# Patient Record
Sex: FEMALE | Race: BLACK OR AFRICAN AMERICAN | Employment: OTHER | URBAN - METROPOLITAN AREA
[De-identification: names, ages, dates, MRNs, and addresses within clinical notes are randomized per-mention and may not be internally consistent; named-entity substitution may affect disease eponyms.]

---

## 2018-03-05 ENCOUNTER — OP HISTORICAL/CONVERTED ENCOUNTER (OUTPATIENT)
Dept: OTHER | Age: 63
End: 2018-03-05

## 2018-07-25 ENCOUNTER — IP HISTORICAL/CONVERTED ENCOUNTER (OUTPATIENT)
Dept: OTHER | Age: 63
End: 2018-07-25

## 2020-09-28 ENCOUNTER — HOSPITAL ENCOUNTER (OUTPATIENT)
Dept: NUCLEAR MEDICINE | Age: 65
Discharge: HOME | End: 2020-09-28
Attending: NURSE PRACTITIONER
Payer: MEDICARE

## 2020-09-28 ENCOUNTER — HOSPITAL ENCOUNTER (OUTPATIENT)
Dept: NON INVASIVE DIAGNOSTICS | Age: 65
Discharge: HOME | End: 2020-09-28
Attending: NURSE PRACTITIONER
Payer: MEDICARE

## 2020-09-28 VITALS — WEIGHT: 199.74 LBS

## 2020-09-28 DIAGNOSIS — I25.10 CAD (CORONARY ARTERY DISEASE): ICD-10-CM

## 2020-09-28 PROCEDURE — 78452 HT MUSCLE IMAGE SPECT MULT: CPT

## 2020-09-28 PROCEDURE — 74011250636 HC RX REV CODE- 250/636: Performed by: NURSE PRACTITIONER

## 2020-09-28 PROCEDURE — 93017 CV STRESS TEST TRACING ONLY: CPT

## 2020-09-28 PROCEDURE — 74011000258 HC RX REV CODE- 258: Performed by: NURSE PRACTITIONER

## 2020-09-28 RX ADMIN — ADENOSINE: 3 INJECTION INTRAVENOUS at 09:44

## 2020-09-30 LAB
STRESS BASELINE DIAS BP: 64 MMHG
STRESS BASELINE HR: 53 BPM
STRESS BASELINE SYS BP: 137 MMHG
STRESS PEAK DIAS BP: 59 MMHG
STRESS PEAK SYS BP: 146 MMHG
STRESS PERCENT HR ACHIEVED: 58 %
STRESS POST PEAK HR: 91 BPM
STRESS RATE PRESSURE PRODUCT: NORMAL BPM*MMHG
STRESS TARGET HR: 156 BPM

## 2020-11-11 ENCOUNTER — TRANSCRIBE ORDER (OUTPATIENT)
Dept: SCHEDULING | Age: 65
End: 2020-11-11

## 2020-11-11 DIAGNOSIS — R68.89 ABNORMAL ANKLE BRACHIAL INDEX (ABI): Primary | ICD-10-CM

## 2020-12-11 ENCOUNTER — TRANSCRIBE ORDER (OUTPATIENT)
Dept: SCHEDULING | Age: 65
End: 2020-12-11

## 2020-12-11 DIAGNOSIS — Z12.31 SCREENING MAMMOGRAM FOR HIGH-RISK PATIENT: Primary | ICD-10-CM

## 2020-12-16 ENCOUNTER — HOSPITAL ENCOUNTER (OUTPATIENT)
Dept: MAMMOGRAPHY | Age: 65
Discharge: HOME | End: 2020-12-16
Payer: MEDICARE

## 2020-12-16 DIAGNOSIS — Z12.31 SCREENING MAMMOGRAM FOR HIGH-RISK PATIENT: ICD-10-CM

## 2020-12-16 PROCEDURE — 77067 SCR MAMMO BI INCL CAD: CPT

## 2020-12-21 ENCOUNTER — TRANSCRIBE ORDER (OUTPATIENT)
Dept: SCHEDULING | Age: 65
End: 2020-12-21

## 2020-12-21 DIAGNOSIS — R92.8 ABNORMAL MAMMOGRAM: Primary | ICD-10-CM

## 2021-01-04 ENCOUNTER — HOSPITAL ENCOUNTER (OUTPATIENT)
Dept: MAMMOGRAPHY | Age: 66
Discharge: HOME | End: 2021-01-04
Payer: MEDICARE

## 2021-01-04 ENCOUNTER — HOSPITAL ENCOUNTER (OUTPATIENT)
Dept: ULTRASOUND IMAGING | Age: 66
Discharge: HOME | End: 2021-01-04
Payer: MEDICARE

## 2021-01-04 DIAGNOSIS — R92.8 ABNORMAL MAMMOGRAM: ICD-10-CM

## 2021-01-04 PROCEDURE — 77065 DX MAMMO INCL CAD UNI: CPT

## 2021-01-18 ENCOUNTER — DOCUMENTATION ONLY (OUTPATIENT)
Dept: SURGERY | Age: 66
End: 2021-01-18

## 2021-01-18 ENCOUNTER — OFFICE VISIT (OUTPATIENT)
Dept: SURGERY | Age: 66
End: 2021-01-18
Payer: MEDICARE

## 2021-01-18 VITALS
BODY MASS INDEX: 31.98 KG/M2 | HEIGHT: 66 IN | DIASTOLIC BLOOD PRESSURE: 56 MMHG | SYSTOLIC BLOOD PRESSURE: 139 MMHG | HEART RATE: 61 BPM | WEIGHT: 199 LBS | TEMPERATURE: 98.2 F

## 2021-01-18 DIAGNOSIS — R92.8 ABNORMAL MAMMOGRAM OF RIGHT BREAST: Primary | ICD-10-CM

## 2021-01-18 DIAGNOSIS — R92.1 CALCIFICATION OF RIGHT BREAST ON MAMMOGRAPHY: ICD-10-CM

## 2021-01-18 PROCEDURE — 99203 OFFICE O/P NEW LOW 30 MIN: CPT | Performed by: SURGERY

## 2021-01-18 RX ORDER — BRINZOLAMIDE/BRIMONIDINE TARTRATE 10; 2 MG/ML; MG/ML
1 SUSPENSION/ DROPS OPHTHALMIC 2 TIMES DAILY
COMMUNITY
Start: 2020-04-07 | End: 2022-02-14

## 2021-01-18 RX ORDER — MELATONIN
1000 DAILY
COMMUNITY

## 2021-01-18 RX ORDER — FOLIC AC/VIT BCOMP,C/ZN/VIT D3 0.8MG-2
1 TABLET ORAL DAILY
COMMUNITY
Start: 2020-03-04

## 2021-01-18 RX ORDER — SIMETHICONE 125 MG
125 TABLET,CHEWABLE ORAL
COMMUNITY

## 2021-01-18 RX ORDER — INSULIN ASPART 100 [IU]/ML
10 INJECTION, SOLUTION INTRAVENOUS; SUBCUTANEOUS
COMMUNITY

## 2021-01-18 RX ORDER — LATANOPROST 50 UG/ML
1 SOLUTION/ DROPS OPHTHALMIC AT BEDTIME
COMMUNITY
Start: 2020-04-07

## 2021-01-18 RX ORDER — GUAIFENESIN 100 MG/5ML
81 LIQUID (ML) ORAL DAILY
COMMUNITY

## 2021-01-18 RX ORDER — PANTOPRAZOLE SODIUM 40 MG/1
40 TABLET, DELAYED RELEASE ORAL DAILY
COMMUNITY
Start: 2020-12-09

## 2021-01-18 RX ORDER — ATORVASTATIN CALCIUM 10 MG/1
10 TABLET, FILM COATED ORAL DAILY
COMMUNITY
Start: 2020-12-09

## 2021-01-18 RX ORDER — SACUBITRIL AND VALSARTAN 24; 26 MG/1; MG/1
TABLET, FILM COATED ORAL
COMMUNITY
Start: 2020-11-30

## 2021-01-18 RX ORDER — TIMOLOL MALEATE 5 MG/ML
SOLUTION/ DROPS OPHTHALMIC 2 TIMES DAILY
COMMUNITY
Start: 2020-02-29

## 2021-01-18 RX ORDER — SODIUM BICARBONATE 650 MG/1
1300 TABLET ORAL 2 TIMES DAILY
COMMUNITY
Start: 2020-02-29 | End: 2022-02-14

## 2021-01-18 RX ORDER — FUROSEMIDE 80 MG/1
80 TABLET ORAL DAILY
COMMUNITY
Start: 2020-12-09

## 2021-01-18 RX ORDER — PEN NEEDLE, DIABETIC 29 G X1/2"
NEEDLE, DISPOSABLE MISCELLANEOUS
COMMUNITY
Start: 2020-12-22

## 2021-01-18 NOTE — LETTER
1/22/2021 10:53 AM 
 
Patient:  Promise Patterson YOB: 1955 Date of Visit: 1/18/2021 Dear Carmelo Penaloza: Thank you for referring Ms. Titi Howard to me for evaluation/treatment. Below are the relevant portions of my assessment and plan of care. If you have questions, please do not hesitate to call me. I look forward to following Ms. Jorge A Moser along with you. Sincerely, 
 
Theador Cure Debbie Tapia MD

## 2021-01-18 NOTE — PROGRESS NOTES
Type of Film: [x] CD [] FILMS  Type of Test: [] MRI [x] [place]  Given to: Patient to take with her  To be Downloaded into PACS:  NO    These are already in PACS.

## 2021-01-18 NOTE — PATIENT INSTRUCTIONS
Core Needle Breast Biopsy: About This Test  What is it? A core needle breast biopsy removes samples of breast tissue using a needle with a special tip. The samples are looked at under a microscope to check for cancer cells. Why is this test done? A core needle breast biopsy is most often done to check a lump found during a breast exam or a suspicious area found on a mammogram or other imaging. How do you prepare for the test?  If you take aspirin or some other blood thinner, ask your doctor if you should stop taking it before your test. Make sure that you understand exactly what your doctor wants you to do. These medicines increase the risk of bleeding. How is the test done? · You may sit in a chair or lie face up on a table. Or you may lie on your stomach on a table that has a hole for your breast to hang through. · When the area in your breast is numb, a small cut (incision) is made in the skin. · Using imaging, the doctor will guide the needle into the biopsy area. · The doctor will take several samples of breast tissue. This may be done with the needle or with a probe that uses a gentle vacuum to remove the samples. · A small clip is usually inserted into your breast to eleonora the biopsy site. · The needle is removed and pressure is put on the needle site to stop any bleeding. · A bandage is put on the needle site. How long does the test take? The test may take 15 minutes to 60 minutes, depending on how the procedure is done. What happens after the test?  · You'll be told how long it may take to get your results back. · You will probably be able to go home right away. · After a specialist looks at the biopsy sample for signs of cancer, your doctor's office will let you know the results. · If the test results aren't clear, you may have another biopsy or test.  How can you care for yourself at home? · You can go back to your usual activities right away.  But avoid heavy lifting for 24 hours.  · The site may be tender for 2 or 3 days. You may also have some bruising, swelling, or slight bleeding. ? You can use an ice pack. Put ice or a cold pack on the area for 10 to 20 minutes at a time. Put a thin cloth between the ice and your skin. ? Ask your doctor if you can take an over-the-counter pain medicine, such as acetaminophen (Tylenol), ibuprofen (Advil, Motrin), or naproxen (Aleve). Be safe with medicines. Read and follow all instructions on the label. Follow-up care is a key part of your treatment and safety. Be sure to make and go to all appointments, and call your doctor if you are having problems. It's also a good idea to keep a list of the medicines you take. Ask your doctor when you can expect to have your test results. Where can you learn more? Go to http://www.gray.com/  Enter Y286 in the search box to learn more about \"Core Needle Breast Biopsy: About This Test.\"  Current as of: April 29, 2020               Content Version: 12.6  © 9040-8726 [employer]. Care instructions adapted under license by [employer] (which disclaims liability or warranty for this information). If you have questions about a medical condition or this instruction, always ask your healthcare professional. [address] any warranty or liability for your use of this information.

## 2021-01-18 NOTE — PROGRESS NOTES
HISTORY OF PRESENT ILLNESS Tyrone Chung is a 60 y.o. female. HPI    NEW patient presents for consultation at the request of Jaren Waterman PA-C for abnormal RIGHT mammogram which shows calcifications. She is not feeling any breast lumps, has no nipple or skin change, has no pain. Did have BILATERAL breast biopsies about 25 yrs ago (benign pathology). She is on hemodialysis three days a week. 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 Mammograms have been at [facility]. [facility] Results (most recent): 
Results from Abstract encounter on 21 [facility] MAMMO BI SCREENING INCL CAD Review of Systems Constitutional: Negative. HENT: Negative. Eyes: Positive for blurred vision. Respiratory: Negative. Cardiovascular: Positive for palpitations. Gastrointestinal: Negative. Genitourinary: Negative. Musculoskeletal: Positive for joint pain. Skin: Negative. Neurological: Negative. Endo/Heme/Allergies: Negative. Psychiatric/Behavioral: Negative. Physical Exam 
 
ASSESSMENT and PLAN 
{ASSESSMENT/PLAN:31001}

## 2021-01-18 NOTE — PROGRESS NOTES
HISTORY OF PRESENT ILLNESS  Garrett Wilkerson is a 60 y.o. female. HPI  NEW patient presents for consultation at the request of Dionte Reese PA-C for abnormal RIGHT mammogram which shows calcifications. She is not feeling any breast lumps, has no nipple or skin change, has no pain. Did have BILATERAL breast biopsies about 25 yrs ago (benign pathology). She is on hemodialysis three days a week. 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 Mammograms have been at [facility].     [facility] Results (most recent):  Results from Abstract encounter on 21   [facility] MAMMO BI SCREENING INCL CAD        Past Medical History:   Diagnosis Date    Chronic kidney disease     [facility]     Heart failure ([facility] [facility]     Hypertension     Menopause        Past Surgical History:   Procedure Laterality Date    VASCULAR SURGERY PROCEDURE UNLIST         Social History     Socioeconomic History    Marital status:      Spouse name: Not on file    Number of children: Not on file    Years of education: Not on file    Highest education level: Not on file   Occupational History    Not on file   Social Needs    Financial resource strain: Not on file    Food insecurity     Worry: Not on file     Inability: Not on file    Transportation needs     Medical: Not on file     Non-medical: Not on file   Tobacco Use    Smoking status: Never Smoker    Smokeless tobacco: Never Used   Substance and Sexual Activity    Alcohol use: Not on file    Drug use: Not Currently    Sexual activity: Not on file   Lifestyle    Physical activity     Days per week: Not on file     Minutes per session: Not on file    Stress: Not on file   Relationships    Social connections     Talks on phone: Not on file     Gets together: Not on file     Attends [employer] service: Not on file     Active member of club or organization: Not on file     Attends meetings of clubs or organizations: Not on file     Relationship status: Not on file    Intimate partner violence     Fear of current or ex partner: Not on file     Emotionally abused: Not on file     Physically abused: Not on file     Forced sexual activity: Not on file   Other Topics Concern    Not on file   Social History Narrative    Not on file       Current Outpatient Medications on File Prior to Visit   Medication Sig Dispense Refill    ferric citrate (AURYXIA PO) Take  by mouth.  aspirin 81 mg chewable tablet Take 81 mg by mouth daily.  atorvastatin (LIPITOR) 10 mg tablet       brinzolamide-brimonidine (Simbrinza) 1-0.2 % drps Apply 1 Drop to eye two (2) times a day.  cholecalciferol (VITAMIN D3) (1000 Units /25 mcg) tablet Take 1,000 Units by mouth daily.  FA-Vit BComp&C-Zinc-Vitamin D3 (Dialyvite 800-Ultra D) 0.8-2,000 mg-unit tab Take 1 Tab by mouth daily.  furosemide (LASIX) 80 mg tablet       insulin aspart U-100 (NOVOLOG) 100 unit/mL (3 mL) inpn 10 Units by SubCUTAneous route.  latanoprost (XALATAN) 0.005 % ophthalmic solution Apply 1 Drop to eye At bedtime.  omega 3-dha-epa-fish oil 100-160-1,000 mg cap Take  by mouth.  pantoprazole (PROTONIX) 40 mg tablet       Insulin Needles, Disposable, 31 gauge x 1/4\" ndle USE PEN NEEDLES TO INJECT INSULIN TWICE DAILY      Entresto 24-26 mg tablet TAKE 1 TABLET BY MOUTH TWICE DAILY      sodium bicarbonate 650 mg tablet Take 1,300 mg by mouth two (2) times a day.  timolol (TIMOPTIC) 0.5 % ophthalmic solution Apply  to eye two (2) times a day.  insulin detemir (LEVEMIR FLEXPEN SC) by SubCUTAneous route.  cephalexin (KEFLEX PO) Take  by mouth.  acetaminophen (TYLENOL PO) Take  by mouth.  simethicone (Gas-X Extra Strength) 125 mg chewable tablet Take 125 mg by mouth every six (6) hours as needed for Flatulence. No current facility-administered medications on file prior to visit.         Allergies   Allergen Reactions    Darvon [Propoxyphene] Nausea and Vomiting    Levaquin [Levofloxacin] Other (comments)    Nsaids (Non-Steroidal Anti-Inflammatory Drug) Other (comments)     Gi bleed    Tolectin [Tolmetin] Nausea and Vomiting    Tramadol Other (comments)     Gi bleed       OB History    No obstetric history on file. Obstetric Comments   Menarche 16, LMP 2000, # of children 1, age of 4st delivery 14, Hysterectomy/oophorectomy No/No, Breast bx Yes, BILATERAL 25 yrs ago, history of breast feeding No, BCP Yes, in the past, Hormone therapy No               ROS  Constitutional: Negative. HENT: Negative. Eyes: Positive for blurred vision. Respiratory: Negative. Cardiovascular: Positive for palpitations. Gastrointestinal: Negative. Genitourinary: Negative. Musculoskeletal: Positive for joint pain. Skin: Negative. Neurological: Negative. Endo/Heme/Allergies: Negative. Psychiatric/Behavioral: Negative. Physical Exam  Exam conducted with a chaperone present. Cardiovascular:      Rate and Rhythm: Normal rate and regular rhythm. Heart sounds: Normal heart sounds. Pulmonary:      Breath sounds: Normal breath sounds. Chest:      Breasts: Breasts are symmetrical.         Right: Normal. No swelling, bleeding, inverted nipple, mass, nipple discharge, skin change or tenderness. Left: Normal. No swelling, bleeding, inverted nipple, mass, nipple discharge, skin change or tenderness. Lymphadenopathy:      Cervical:      Right cervical: No superficial, deep or posterior cervical adenopathy. Left cervical: No superficial, deep or posterior cervical adenopathy. Upper Body:      Right upper body: No supraclavicular or axillary adenopathy. Left upper body: No supraclavicular or axillary adenopathy. ASSESSMENT and PLAN    ICD-10-CM ICD-9-CM    1. Abnormal mammogram of right breast  R92.8 793.80 ALTHEA 3D FERNANDO W MAMMO RT DX INCL CAD   2.  Calcification of right breast on mammography  R92.1 793.89       New patient presents for abnormal RIGHT breast mammogram with calcifications, and is doing well overall. Physical exam today normal. Reviewed imaging; calcifications stable definetly since 2019, and probably since 2018. Instead of bx, we discussed repeating RIGHT diagnostic mammogram in 6 months. F/U in 6 months, after mammogram. This plan was reviewed with the patient and patient agrees. All questions were answered. Total time spent was 30 minutes.     Written by Mani Little, as dictated by Dr. Jose Daniel White MD.

## 2021-04-07 ENCOUNTER — APPOINTMENT (OUTPATIENT)
Dept: CARDIAC REHAB | Age: 66
End: 2021-04-07

## 2021-04-09 ENCOUNTER — APPOINTMENT (OUTPATIENT)
Dept: CARDIAC REHAB | Age: 66
End: 2021-04-09

## 2021-04-12 ENCOUNTER — APPOINTMENT (OUTPATIENT)
Dept: CARDIAC REHAB | Age: 66
End: 2021-04-12

## 2021-04-14 ENCOUNTER — APPOINTMENT (OUTPATIENT)
Dept: CARDIAC REHAB | Age: 66
End: 2021-04-14

## 2021-04-16 ENCOUNTER — APPOINTMENT (OUTPATIENT)
Dept: CARDIAC REHAB | Age: 66
End: 2021-04-16

## 2021-04-19 ENCOUNTER — APPOINTMENT (OUTPATIENT)
Dept: CARDIAC REHAB | Age: 66
End: 2021-04-19

## 2021-04-21 ENCOUNTER — APPOINTMENT (OUTPATIENT)
Dept: CARDIAC REHAB | Age: 66
End: 2021-04-21

## 2021-04-23 ENCOUNTER — APPOINTMENT (OUTPATIENT)
Dept: CARDIAC REHAB | Age: 66
End: 2021-04-23

## 2021-04-26 ENCOUNTER — APPOINTMENT (OUTPATIENT)
Dept: CARDIAC REHAB | Age: 66
End: 2021-04-26

## 2021-04-28 ENCOUNTER — APPOINTMENT (OUTPATIENT)
Dept: CARDIAC REHAB | Age: 66
End: 2021-04-28

## 2021-04-30 ENCOUNTER — APPOINTMENT (OUTPATIENT)
Dept: CARDIAC REHAB | Age: 66
End: 2021-04-30

## 2021-05-03 ENCOUNTER — APPOINTMENT (OUTPATIENT)
Dept: CARDIAC REHAB | Age: 66
End: 2021-05-03

## 2021-05-05 ENCOUNTER — APPOINTMENT (OUTPATIENT)
Dept: CARDIAC REHAB | Age: 66
End: 2021-05-05

## 2021-05-07 ENCOUNTER — APPOINTMENT (OUTPATIENT)
Dept: CARDIAC REHAB | Age: 66
End: 2021-05-07

## 2021-05-10 ENCOUNTER — APPOINTMENT (OUTPATIENT)
Dept: CARDIAC REHAB | Age: 66
End: 2021-05-10

## 2021-05-12 ENCOUNTER — APPOINTMENT (OUTPATIENT)
Dept: CARDIAC REHAB | Age: 66
End: 2021-05-12

## 2021-05-14 ENCOUNTER — APPOINTMENT (OUTPATIENT)
Dept: CARDIAC REHAB | Age: 66
End: 2021-05-14

## 2021-05-17 ENCOUNTER — APPOINTMENT (OUTPATIENT)
Dept: CARDIAC REHAB | Age: 66
End: 2021-05-17

## 2021-05-19 ENCOUNTER — APPOINTMENT (OUTPATIENT)
Dept: CARDIAC REHAB | Age: 66
End: 2021-05-19

## 2021-05-21 ENCOUNTER — APPOINTMENT (OUTPATIENT)
Dept: CARDIAC REHAB | Age: 66
End: 2021-05-21

## 2021-05-24 ENCOUNTER — APPOINTMENT (OUTPATIENT)
Dept: CARDIAC REHAB | Age: 66
End: 2021-05-24

## 2021-05-26 ENCOUNTER — APPOINTMENT (OUTPATIENT)
Dept: CARDIAC REHAB | Age: 66
End: 2021-05-26

## 2021-05-28 ENCOUNTER — APPOINTMENT (OUTPATIENT)
Dept: CARDIAC REHAB | Age: 66
End: 2021-05-28

## 2021-06-02 ENCOUNTER — APPOINTMENT (OUTPATIENT)
Dept: CARDIAC REHAB | Age: 66
End: 2021-06-02

## 2021-06-04 ENCOUNTER — APPOINTMENT (OUTPATIENT)
Dept: CARDIAC REHAB | Age: 66
End: 2021-06-04

## 2021-06-07 ENCOUNTER — APPOINTMENT (OUTPATIENT)
Dept: CARDIAC REHAB | Age: 66
End: 2021-06-07

## 2021-06-09 ENCOUNTER — APPOINTMENT (OUTPATIENT)
Dept: CARDIAC REHAB | Age: 66
End: 2021-06-09

## 2021-06-11 ENCOUNTER — APPOINTMENT (OUTPATIENT)
Dept: CARDIAC REHAB | Age: 66
End: 2021-06-11

## 2021-06-14 ENCOUNTER — APPOINTMENT (OUTPATIENT)
Dept: CARDIAC REHAB | Age: 66
End: 2021-06-14

## 2021-06-16 ENCOUNTER — APPOINTMENT (OUTPATIENT)
Dept: CARDIAC REHAB | Age: 66
End: 2021-06-16

## 2021-06-18 ENCOUNTER — APPOINTMENT (OUTPATIENT)
Dept: CARDIAC REHAB | Age: 66
End: 2021-06-18

## 2021-06-21 ENCOUNTER — APPOINTMENT (OUTPATIENT)
Dept: CARDIAC REHAB | Age: 66
End: 2021-06-21

## 2021-06-23 ENCOUNTER — APPOINTMENT (OUTPATIENT)
Dept: CARDIAC REHAB | Age: 66
End: 2021-06-23

## 2021-06-24 ENCOUNTER — OFFICE VISIT (OUTPATIENT)
Dept: SURGERY | Age: 66
End: 2021-06-24
Payer: MEDICARE

## 2021-06-24 VITALS
WEIGHT: 197.8 LBS | HEIGHT: 66 IN | DIASTOLIC BLOOD PRESSURE: 60 MMHG | OXYGEN SATURATION: 99 % | BODY MASS INDEX: 31.79 KG/M2 | RESPIRATION RATE: 14 BRPM | HEART RATE: 67 BPM | SYSTOLIC BLOOD PRESSURE: 140 MMHG | TEMPERATURE: 98.1 F

## 2021-06-24 DIAGNOSIS — L02.32 BOIL OF BUTTOCK: Primary | ICD-10-CM

## 2021-06-24 PROBLEM — Z99.2 ESRD ON DIALYSIS (HCC): Status: ACTIVE | Noted: 2021-06-24

## 2021-06-24 PROBLEM — N18.6 ESRD ON DIALYSIS (HCC): Status: ACTIVE | Noted: 2021-06-24

## 2021-06-24 PROBLEM — E11.9 DIABETES MELLITUS (HCC): Status: ACTIVE | Noted: 2021-06-24

## 2021-06-24 PROCEDURE — G8427 DOCREV CUR MEDS BY ELIG CLIN: HCPCS | Performed by: COLON & RECTAL SURGERY

## 2021-06-24 PROCEDURE — G8417 CALC BMI ABV UP PARAM F/U: HCPCS | Performed by: COLON & RECTAL SURGERY

## 2021-06-24 PROCEDURE — 99204 OFFICE O/P NEW MOD 45 MIN: CPT | Performed by: COLON & RECTAL SURGERY

## 2021-06-24 PROCEDURE — G8510 SCR DEP NEG, NO PLAN REQD: HCPCS | Performed by: COLON & RECTAL SURGERY

## 2021-06-24 PROCEDURE — G9899 SCRN MAM PERF RSLTS DOC: HCPCS | Performed by: COLON & RECTAL SURGERY

## 2021-06-24 PROCEDURE — 3017F COLORECTAL CA SCREEN DOC REV: CPT | Performed by: COLON & RECTAL SURGERY

## 2021-06-24 RX ORDER — SUCROFERRIC OXYHYDROXIDE 500 MG/1
500 TABLET, CHEWABLE ORAL
COMMUNITY

## 2021-06-24 RX ORDER — AMOXICILLIN AND CLAVULANATE POTASSIUM 875; 125 MG/1; MG/1
1 TABLET, FILM COATED ORAL EVERY 12 HOURS
Qty: 20 TABLET | Refills: 0 | Status: SHIPPED | OUTPATIENT
Start: 2021-06-24 | End: 2021-07-04

## 2021-06-24 NOTE — PROGRESS NOTES
OFFICE VISIT NOTE    Mary Leonardo is a 60 y.o. female who presents to the office today for:    Chief Complaint   Patient presents with    New Patient     Recurrent boil  R Buttock. PCP C Tereso Cheadle has been treating her. Ms. Bud Hidalgo is a very pleasant 81-year-old female who is referred for evaluation of recurrent perianal abscess. She states been present for couple of months and intermittently swells up. She has been treated with antibiotics. She also states that intermittently drained spontaneously. Currently she denies any pain at the site. Her last colonoscopy was approximately 8 years ago she states she was told was fine and she is due for another one in 2 years. She denies any change in stools or blood in the stool. She has a fairly extensive past medical history. She has a history of diabetes, hypertension, CHF, end-stage renal disease on dialysis, peripheral vascular disease. She is followed by podiatry for diabetic neuropathy as well as where she states her ulcers on her great toes. She has been seen by a vascular surgeon not feel any intervention is necessary. In terms of her diabetes she states her sugars are well regulated. Her blood pressure likewise is well regulated and today is 140/60.       Past Medical History:   Diagnosis Date    Boil of buttock 06/24/2021    R Buttock    Chronic kidney disease     Diabetes ([address]     Diabetes mellitus ([address] 6/24/2021    ESRD on dialysis ([address] 6/24/2021    Heart failure ([address]     Hypertension     Menopause        Past Surgical History:   Procedure Laterality Date    VASCULAR SURGERY PROCEDURE UNLIST         Family History   Problem Relation Age of Onset    Hypertension Other     Diabetes Other     Cancer Other     Heart Disease Other        Social History     Socioeconomic History    Marital status:  Spouse name: Not on file    Number of children: Not on file    Years of education: Not on file    Highest education level: Not on file   Occupational History    Not on file   Tobacco Use    Smoking status: Never Smoker    Smokeless tobacco: Never Used   Vaping Use    Vaping Use: Never used   Substance and Sexual Activity    Alcohol use: Never    Drug use: Not Currently    Sexual activity: Not on file   Other Topics Concern    Not on file   Social History Narrative    Not on file     Social Determinants of Health     Financial Resource Strain:     Difficulty of Paying Living Expenses:    Food Insecurity:     Worried About Running Out of Food in the Last Year:     [address] in the Last Year:    Transportation Needs:     Lack of Transportation (Medical):  Lack of Transportation (Non-Medical):    Physical Activity:     Days of Exercise per Week:     Minutes of Exercise per Session:    Stress:     Feeling of Stress :    Social Connections:     Frequency of Communication with Friends and Family:     Frequency of Social Gatherings with Friends and Family:     Attends Hinduism Services:     Active Member of Clubs or Organizations:     Attends Club or Organization Meetings:     Marital Status:    Intimate Partner Violence:     Fear of Current or Ex-Partner:     Emotionally Abused:     Physically Abused:     Sexually Abused: Allergies   Allergen Reactions    Influenza Virus Vaccines Shortness of Breath    Darvon [Propoxyphene] Nausea and Vomiting    Levaquin [Levofloxacin] Other (comments)    Nsaids (Non-Steroidal Anti-Inflammatory Drug) Other (comments)     Gi bleed    Tolectin [Tolmetin] Nausea and Vomiting    Tramadol Other (comments)     Gi bleed       Current Outpatient Medications   Medication Sig    sucroferric oxyhydroxide (Velphoro) 500 mg chew chewable tablet Take 500 mg by mouth three (3) times daily (with meals).     amoxicillin-clavulanate (AUGMENTIN) 630-182 mg per tablet Take 1 Tablet by mouth every twelve (12) hours for 10 days. Indications: an infection of the skin and the tissue below the skin    aspirin 81 mg chewable tablet Take 81 mg by mouth daily.  atorvastatin (LIPITOR) 10 mg tablet 10 mg daily.  brinzolamide-brimonidine (Simbrinza) 1-0.2 % drps Apply 1 Drop to eye two (2) times a day.  cholecalciferol (VITAMIN D3) (1000 Units /25 mcg) tablet Take 1,000 Units by mouth daily.  FA-Vit BComp&C-Zinc-Vitamin D3 (Dialyvite 800-Ultra D) 0.8-2,000 mg-unit tab Take 1 Tab by mouth daily.  furosemide (LASIX) 80 mg tablet 80 mg daily.  insulin aspart U-100 (NOVOLOG) 100 unit/mL (3 mL) inpn 10 Units by SubCUTAneous route.  latanoprost (XALATAN) 0.005 % ophthalmic solution Apply 1 Drop to eye At bedtime.  pantoprazole (PROTONIX) 40 mg tablet 40 mg daily.  Insulin Needles, Disposable, 31 gauge x 1/4\" ndle USE PEN NEEDLES TO INJECT INSULIN TWICE DAILY    Entresto 24-26 mg tablet TAKE 1 TABLET BY MOUTH TWICE DAILY    sodium bicarbonate 650 mg tablet Take 1,300 mg by mouth two (2) times a day.  timolol (TIMOPTIC) 0.5 % ophthalmic solution Apply  to eye two (2) times a day.  insulin detemir (LEVEMIR FLEXPEN SC) by SubCUTAneous route.  acetaminophen (TYLENOL PO) Take  by mouth.  ferric citrate (AURYXIA PO) Take  by mouth. (Patient not taking: Reported on 6/24/2021)    omega 3-dha-epa-fish oil 100-160-1,000 mg cap Take  by mouth.  cephalexin (KEFLEX PO) Take  by mouth. (Patient not taking: Reported on 6/24/2021)    simethicone (Gas-X Extra Strength) 125 mg chewable tablet Take 125 mg by mouth every six (6) hours as needed for Flatulence. No current facility-administered medications for this visit. Review of Systems   Constitutional: Negative. Eyes:        Glaucoma   Respiratory: Negative. Cardiovascular: Positive for leg swelling. Gastrointestinal: Negative. Genitourinary: Negative.     Musculoskeletal: Positive for back pain, joint pain and myalgias. Skin: Negative. Neurological: Negative. Endo/Heme/Allergies:        Diabetes   Psychiatric/Behavioral: Negative. BP (!) 140/60 (BP 1 Location: Right upper arm, BP Patient Position: Sitting, BP Cuff Size: Adult)   Pulse 67   Temp 98.1 °F (36.7 °C) (Temporal)   Resp 14   Ht 5' 6\" (1.676 m)   Wt 197 lb 12.8 oz (89.7 kg)   SpO2 99% Comment: room air  BMI 31.93 kg/m²     Physical Exam  Constitutional:       General: She is not in acute distress. Appearance: Normal appearance. She is obese. She is not ill-appearing. HENT:      Head: Normocephalic and atraumatic. Cardiovascular:      Rate and Rhythm: Normal rate. Heart sounds: Normal heart sounds. Pulmonary:      Breath sounds: Normal breath sounds. Abdominal:      General: There is no distension. Palpations: Abdomen is soft. Tenderness: There is no abdominal tenderness. Genitourinary:         Comments: Small area of induration in the right anterior lateral quadrant. There is no erythema of the overlying skin. I cannot express any purulent material.  Musculoskeletal:         General: Normal range of motion. Cervical back: Normal range of motion and neck supple. Skin:     General: Skin is warm and dry. Neurological:      General: No focal deficit present. Mental Status: She is alert and oriented to person, place, and time. Psychiatric:         Mood and Affect: Mood normal.         Thought Content: Thought content normal.         Judgment: Judgment normal.         Problem List Items Addressed This Visit        Integumentary    Boil of buttock - Primary    Relevant Medications    amoxicillin-clavulanate (AUGMENTIN) 875-125 mg per tablet          Assessment and Plan:    I told the patient my suspicion is that this is a abscess associated with an epidermoid cyst.  We discussed drainage and possible excision.   She wishes to avoid hospitalization for this because of the cost.  I told her that I can do an I&D procedure in the office next week at this may resolve it. She wishes to proceed with that as she has been scheduled to come back next week for an incision and drainage. In the interim I will start her on Augmentin.           Hasmukh Smith MD

## 2021-06-24 NOTE — PROGRESS NOTES
1. Have you been to the ER, urgent care clinic since your last visit? Hospitalized since your last visit? NO    2. Have you seen or consulted any other health care providers outside of the [address] [place] [address] since your last visit? Include any pap smears or colon screening. NO   Chief Complaint   Patient presents with    New Patient     Recurrent boil   . CC  Visit Vitals  BP (!) 140/60 (BP 1 Location: Right upper arm, BP Patient Position: Sitting, BP Cuff Size: Adult)   Pulse 67   Temp 98.1 °F (36.7 °C) (Temporal)   Resp 14   Ht 5' 6\" (1.676 m)   Wt 89.7 kg (197 lb 12.8 oz)   SpO2 99% Comment: room air   BMI 31.93 kg/m²

## 2021-06-25 ENCOUNTER — APPOINTMENT (OUTPATIENT)
Dept: CARDIAC REHAB | Age: 66
End: 2021-06-25

## 2021-06-28 ENCOUNTER — APPOINTMENT (OUTPATIENT)
Dept: CARDIAC REHAB | Age: 66
End: 2021-06-28

## 2021-07-08 ENCOUNTER — OFFICE VISIT (OUTPATIENT)
Dept: SURGERY | Age: 66
End: 2021-07-08
Payer: MEDICARE

## 2021-07-08 VITALS
RESPIRATION RATE: 14 BRPM | DIASTOLIC BLOOD PRESSURE: 60 MMHG | HEART RATE: 66 BPM | WEIGHT: 201 LBS | OXYGEN SATURATION: 97 % | HEIGHT: 66 IN | TEMPERATURE: 97.6 F | SYSTOLIC BLOOD PRESSURE: 140 MMHG | BODY MASS INDEX: 32.3 KG/M2

## 2021-07-08 DIAGNOSIS — L02.32 BOIL OF BUTTOCK: Primary | ICD-10-CM

## 2021-07-08 PROCEDURE — G8510 SCR DEP NEG, NO PLAN REQD: HCPCS | Performed by: COLON & RECTAL SURGERY

## 2021-07-08 PROCEDURE — 1090F PRES/ABSN URINE INCON ASSESS: CPT | Performed by: COLON & RECTAL SURGERY

## 2021-07-08 PROCEDURE — 99213 OFFICE O/P EST LOW 20 MIN: CPT | Performed by: COLON & RECTAL SURGERY

## 2021-07-08 PROCEDURE — G8536 NO DOC ELDER MAL SCRN: HCPCS | Performed by: COLON & RECTAL SURGERY

## 2021-07-08 PROCEDURE — G8427 DOCREV CUR MEDS BY ELIG CLIN: HCPCS | Performed by: COLON & RECTAL SURGERY

## 2021-07-08 PROCEDURE — G8417 CALC BMI ABV UP PARAM F/U: HCPCS | Performed by: COLON & RECTAL SURGERY

## 2021-07-08 PROCEDURE — 1101F PT FALLS ASSESS-DOCD LE1/YR: CPT | Performed by: COLON & RECTAL SURGERY

## 2021-07-08 PROCEDURE — 3017F COLORECTAL CA SCREEN DOC REV: CPT | Performed by: COLON & RECTAL SURGERY

## 2021-07-08 PROCEDURE — G8400 PT W/DXA NO RESULTS DOC: HCPCS | Performed by: COLON & RECTAL SURGERY

## 2021-07-08 PROCEDURE — G9899 SCRN MAM PERF RSLTS DOC: HCPCS | Performed by: COLON & RECTAL SURGERY

## 2021-07-08 RX ORDER — AMOXICILLIN AND CLAVULANATE POTASSIUM 875; 125 MG/1; MG/1
1 TABLET, FILM COATED ORAL 2 TIMES DAILY
COMMUNITY
End: 2022-02-14

## 2021-07-08 NOTE — PROGRESS NOTES
Chief Complaint   Patient presents with    Follow-up     boil on buttock     1. Have you been to the ER, urgent care clinic since your last visit? Hospitalized since your last visit? NO    2. Have you seen or consulted any other health care providers outside of the [address] [place] [address] since your last visit? Include any pap smears or colon screening.  NO  Visit Vitals  BP (!) 140/60 (BP 1 Location: Right upper arm, BP Patient Position: Sitting, BP Cuff Size: Adult)   Pulse 66   Temp 97.6 °F (36.4 °C) (Temporal)   Resp 14   Ht 5' 6\" (1.676 m)   Wt 91.2 kg (201 lb)   SpO2 97% Comment: room air   BMI 32.44 kg/m²

## 2021-07-08 NOTE — PROGRESS NOTES
OFFICE VISIT NOTE    Justice Sherman is a 73 y.o. female who presents to the office today for:    Chief Complaint   Patient presents with    Follow-up     boil on buttock  PATIENT SAYS HEALED       Ms. Niesha Capps comes in today for evaluation of her perianal abscess. I saw her approximately 2 weeks ago at that time she gave a history of intermittent swelling and drainage. She has been treated with antibiotics several times. Her last colonoscopy was approximately 8 years ago. At her last visit she did have a draining wound. I felt that this is most likely an infected epidermoid cyst rather than a perianal abscess with anal etiology. I did switch her antibiotics. Today she is supposed to come in for an incision and drainage procedure she did not want to have anything done in the hospital because of the cost.    Today she states that she the area is healed up. She denies any drainage. She denies any pain or swelling at the site currently.         Past Medical History:   Diagnosis Date    Boil of buttock 06/24/2021    R Buttock    Chronic kidney disease     Diabetes ([facility]     Diabetes mellitus ([address] 6/24/2021    ESRD on dialysis ([address] 6/24/2021    Heart failure ([address]     Hypertension     Menopause        Past Surgical History:   Procedure Laterality Date    VASCULAR SURGERY PROCEDURE UNLIST         Family History   Problem Relation Age of Onset    Hypertension Other     Diabetes Other     Cancer Other     Heart Disease Other        Social History     Socioeconomic History    Marital status:      Spouse name: Not on file    Number of children: Not on file    Years of education: Not on file    Highest education level: Not on file   Occupational History    Not on file   Tobacco Use    Smoking status: Never Smoker    Smokeless tobacco: Never Used   Vaping Use    Vaping Use: Never used   Substance and Sexual Activity    Alcohol use: Never    Drug use: Not Currently    Sexual activity: Not on file   Other Topics Concern    Not on file   Social History Narrative    Not on file     Social Determinants of Health     Financial Resource Strain:     Difficulty of Paying Living Expenses:    Food Insecurity:     Worried About Running Out of Food in the Last Year:     [address] in the Last Year:    Transportation Needs:     Lack of Transportation (Medical):  Lack of Transportation (Non-Medical):    Physical Activity:     Days of Exercise per Week:     Minutes of Exercise per Session:    Stress:     Feeling of Stress :    Social Connections:     Frequency of Communication with Friends and Family:     Frequency of Social Gatherings with Friends and Family:     Attends Pent[employer]al Services:     Active Member of Clubs or Organizations:     Attends Club or Organization Meetings:     Marital Status:    Intimate Partner Violence:     Fear of Current or Ex-Partner:     Emotionally Abused:     Physically Abused:     Sexually Abused: Allergies   Allergen Reactions    Influenza Virus Vaccines Shortness of Breath    Darvon [Propoxyphene] Nausea and Vomiting    Levaquin [Levofloxacin] Other (comments)    Nsaids (Non-Steroidal Anti-Inflammatory Drug) Other (comments)     Gi bleed    Tolectin [Tolmetin] Nausea and Vomiting    Tramadol Other (comments)     Gi bleed       Current Outpatient Medications   Medication Sig    amoxicillin-clavulanate (Augmentin) 875-125 mg per tablet Take 1 Tablet by mouth two (2) times a day. Taking last one today    sucroferric oxyhydroxide (Velphoro) 500 mg chew chewable tablet Take 500 mg by mouth three (3) times daily (with meals).  aspirin 81 mg chewable tablet Take 81 mg by mouth daily.  atorvastatin (LIPITOR) 10 mg tablet 10 mg daily.     brinzolamide-brimonidine (Simbrinza) 1-0.2 % drps Apply 1 Drop to eye two (2) times a day.  cholecalciferol (VITAMIN D3) (1000 Units /25 mcg) tablet Take 1,000 Units by mouth daily.  FA-Vit BComp&C-Zinc-Vitamin D3 (Dialyvite 800-Ultra D) 0.8-2,000 mg-unit tab Take 1 Tab by mouth daily.  furosemide (LASIX) 80 mg tablet 80 mg daily.  insulin aspart U-100 (NOVOLOG) 100 unit/mL (3 mL) inpn 10 Units by SubCUTAneous route.  latanoprost (XALATAN) 0.005 % ophthalmic solution Apply 1 Drop to eye At bedtime.  omega 3-dha-epa-fish oil 100-160-1,000 mg cap Take  by mouth.  pantoprazole (PROTONIX) 40 mg tablet 40 mg daily.  Insulin Needles, Disposable, 31 gauge x 1/4\" ndle USE PEN NEEDLES TO INJECT INSULIN TWICE DAILY    Entresto 24-26 mg tablet TAKE 1 TABLET BY MOUTH TWICE DAILY    timolol (TIMOPTIC) 0.5 % ophthalmic solution Apply  to eye two (2) times a day.  insulin detemir (LEVEMIR FLEXPEN SC) by SubCUTAneous route.  acetaminophen (TYLENOL PO) Take  by mouth.  simethicone (Gas-X Extra Strength) 125 mg chewable tablet Take 125 mg by mouth every six (6) hours as needed for Flatulence.  ferric citrate (AURYXIA PO) Take  by mouth. (Patient not taking: Reported on 6/24/2021)    sodium bicarbonate 650 mg tablet Take 1,300 mg by mouth two (2) times a day. No current facility-administered medications for this visit. Review of Systems   All other systems reviewed and are negative. BP (!) 140/60 (BP 1 Location: Right upper arm, BP Patient Position: Sitting, BP Cuff Size: Adult)   Pulse 66   Temp 97.6 °F (36.4 °C) (Temporal)   Resp 14   Ht 5' 6\" (1.676 m)   Wt 201 lb (91.2 kg)   SpO2 97% Comment: room air  BMI 32.44 kg/m²     Physical Exam  Genitourinary:     Comments: The area of small open wound with drainage in the right anterior lateral quadrant has completely healed. There is no erythema induration of the overlying skin. There is no undrained collection that I can appreciate.   It does feel like there is a small cyst present however this is nontender to palpation. Problem List Items Addressed This Visit        Integumentary    Boil of buttock - Primary    Relevant Medications    amoxicillin-clavulanate (Augmentin) 875-125 mg per tablet          Assessment and Plan:  I told Ms. Angelika Reynoso since area is healed up at this point I recommend against any procedure. Given her history of diabetes and it takes her longer to heal an I&D site. She will come back and see me on a as needed basis should her abscesses recur.             Lonnie Bautista MD

## 2021-07-26 ENCOUNTER — OFFICE VISIT (OUTPATIENT)
Dept: SURGERY | Age: 66
End: 2021-07-26
Payer: MEDICARE

## 2021-07-26 ENCOUNTER — HOSPITAL ENCOUNTER (OUTPATIENT)
Dept: MAMMOGRAPHY | Age: 66
Discharge: HOME | End: 2021-07-26
Attending: SURGERY
Payer: MEDICARE

## 2021-07-26 VITALS
HEIGHT: 66 IN | HEART RATE: 65 BPM | BODY MASS INDEX: 32.3 KG/M2 | SYSTOLIC BLOOD PRESSURE: 169 MMHG | WEIGHT: 201 LBS | DIASTOLIC BLOOD PRESSURE: 65 MMHG

## 2021-07-26 DIAGNOSIS — R92.8 ABNORMAL MAMMOGRAM OF RIGHT BREAST: ICD-10-CM

## 2021-07-26 DIAGNOSIS — R92.1 CALCIFICATION OF RIGHT BREAST ON MAMMOGRAPHY: Primary | ICD-10-CM

## 2021-07-26 PROCEDURE — G9899 SCRN MAM PERF RSLTS DOC: HCPCS | Performed by: SURGERY

## 2021-07-26 PROCEDURE — G8536 NO DOC ELDER MAL SCRN: HCPCS | Performed by: SURGERY

## 2021-07-26 PROCEDURE — G8432 DEP SCR NOT DOC, RNG: HCPCS | Performed by: SURGERY

## 2021-07-26 PROCEDURE — 1101F PT FALLS ASSESS-DOCD LE1/YR: CPT | Performed by: SURGERY

## 2021-07-26 PROCEDURE — 3017F COLORECTAL CA SCREEN DOC REV: CPT | Performed by: SURGERY

## 2021-07-26 PROCEDURE — G8417 CALC BMI ABV UP PARAM F/U: HCPCS | Performed by: SURGERY

## 2021-07-26 PROCEDURE — G8400 PT W/DXA NO RESULTS DOC: HCPCS | Performed by: SURGERY

## 2021-07-26 PROCEDURE — 77061 BREAST TOMOSYNTHESIS UNI: CPT

## 2021-07-26 PROCEDURE — G8427 DOCREV CUR MEDS BY ELIG CLIN: HCPCS | Performed by: SURGERY

## 2021-07-26 PROCEDURE — 1090F PRES/ABSN URINE INCON ASSESS: CPT | Performed by: SURGERY

## 2021-07-26 PROCEDURE — 99213 OFFICE O/P EST LOW 20 MIN: CPT | Performed by: SURGERY

## 2021-07-26 NOTE — PATIENT INSTRUCTIONS

## 2021-07-26 NOTE — PROGRESS NOTES
HISTORY OF PRESENT ILLNESS  Janeth Florentino is a 73 y.o. female. HPI   ESTABLISHED PATIENT here today for her 6 month follow up for RIGHT breast calcifications. She is not feeling any breast lumps, has no nipple or skin change, has no pain. [facility] Results (most recent):  Results from [place] encounter on 07/26/21     [facility] 3D FERNANDO W MAMMO RT DX INCL CAD     Narrative  INDICATION: . Other abnormal and inconclusive findings on diagnostic imaging of  breast.  COMPARISON: Mammogram(s), most recent, 1/4/2021. Batsheva Sida COMPOSITION:  There are scattered fibroglandular densities. .  TECHNIQUE:  Standard 2D, CC and MLO views as well as spot magnification views of the right  breast were performed with digital technique and subjected to computer-aided  detection. Tomosynthesis of the right breast was performed in CC and MLO  projections. Batsheva Sida FINDINGS:  There is a group of calcifications in the medial aspect of the right breast.  These have slowly increased since 2018 and have some linear component. .     Impression  1. BI-RADS Category 4 - Suspicious abnormality.   .  RECOMMENDATION:  Stereotactic guided core needle biopsy      Past Medical History:   Diagnosis Date    Boil of buttock 06/24/2021    R Buttock    Chronic kidney disease     Diabetes ([address]     Diabetes mellitus ([address] 6/24/2021    ESRD on dialysis ([address] 6/24/2021    Heart failure ([address]     Hypertension     Menopause        Past Surgical History:   Procedure Laterality Date    VASCULAR SURGERY PROCEDURE UNLIST         Social History     Socioeconomic History    Marital status:      Spouse name: Not on file    Number of children: Not on file    Years of education: Not on file    Highest education level: Not on file   Occupational History    Not on file   Tobacco Use    Smoking status: Never Smoker    Smokeless tobacco: Never Used   Vaping Use    Vaping Use: Never used   Substance and Sexual Activity    Alcohol use: Never    Drug use: Not Currently    Sexual activity: Not on file   Other Topics Concern    Not on file   Social History Narrative    Not on file     Social Determinants of Health     Financial Resource Strain:     Difficulty of Paying Living Expenses:    Food Insecurity:     Worried About Running Out of Food in the Last Year:     [address] in the Last Year:    Transportation Needs:     Lack of Transportation (Medical):  Lack of Transportation (Non-Medical):    Physical Activity:     Days of Exercise per Week:     Minutes of Exercise per Session:    Stress:     Feeling of Stress :    Social Connections:     Frequency of Communication with Friends and Family:     Frequency of Social Gatherings with Friends and Family:     Attends Muslim Services:     Active Member of Clubs or Organizations:     Attends Club or Organization Meetings:     Marital Status:    Intimate Partner Violence:     Fear of Current or Ex-Partner:     Emotionally Abused:     Physically Abused:     Sexually Abused:        Current Outpatient Medications on File Prior to Visit   Medication Sig Dispense Refill    amoxicillin-clavulanate (Augmentin) 875-125 mg per tablet Take 1 Tablet by mouth two (2) times a day. Taking last one today      sucroferric oxyhydroxide (Velphoro) 500 mg chew chewable tablet Take 500 mg by mouth three (3) times daily (with meals).  ferric citrate (AURYXIA PO) Take  by mouth.  aspirin 81 mg chewable tablet Take 81 mg by mouth daily.  atorvastatin (LIPITOR) 10 mg tablet 10 mg daily.  brinzolamide-brimonidine (Simbrinza) 1-0.2 % drps Apply 1 Drop to eye two (2) times a day.  cholecalciferol (VITAMIN D3) (1000 Units /25 mcg) tablet Take 1,000 Units by mouth daily.  FA-Vit BComp&C-Zinc-Vitamin D3 (Dialyvite 800-Ultra D) 0.8-2,000 mg-unit tab Take 1 Tab by mouth daily.  furosemide (LASIX) 80 mg tablet 80 mg daily.       insulin aspart U-100 (NOVOLOG) 100 unit/mL (3 mL) inpn 10 Units by SubCUTAneous route.  latanoprost (XALATAN) 0.005 % ophthalmic solution Apply 1 Drop to eye At bedtime.  omega 3-dha-epa-fish oil 100-160-1,000 mg cap Take  by mouth.  pantoprazole (PROTONIX) 40 mg tablet 40 mg daily.  Insulin Needles, Disposable, 31 gauge x 1/4\" ndle USE PEN NEEDLES TO INJECT INSULIN TWICE DAILY      Entresto 24-26 mg tablet TAKE 1 TABLET BY MOUTH TWICE DAILY      sodium bicarbonate 650 mg tablet Take 1,300 mg by mouth two (2) times a day.  timolol (TIMOPTIC) 0.5 % ophthalmic solution Apply  to eye two (2) times a day.  insulin detemir (LEVEMIR FLEXPEN SC) by SubCUTAneous route.  acetaminophen (TYLENOL PO) Take  by mouth.  simethicone (Gas-X Extra Strength) 125 mg chewable tablet Take 125 mg by mouth every six (6) hours as needed for Flatulence. No current facility-administered medications on file prior to visit. Allergies   Allergen Reactions    Influenza Virus Vaccines Shortness of Breath    Darvon [Propoxyphene] Nausea and Vomiting    Levaquin [Levofloxacin] Other (comments)    Nsaids (Non-Steroidal Anti-Inflammatory Drug) Other (comments)     Gi bleed    Tolectin [Tolmetin] Nausea and Vomiting    Tramadol Other (comments)     Gi bleed       OB History    No obstetric history on file. Obstetric Comments   Menarche 16, LMP 2000, # of children 1, age of 4st delivery 14, Hysterectomy/oophorectomy No/No, Breast bx Yes, BILATERAL 25 yrs ago, history of breast feeding No, BCP Yes, in the past, Hormone therapy No               ROS    Physical Exam       ASSESSMENT and PLAN    ICD-10-CM ICD-9-CM    1. Calcification of right breast on mammography  R92.1 793.89       Patient presents to follow up on RIGHT breast calcifications, and is doing well overall. No exam today. Reviewed today's mammogram findings, and discussed recommendation for bx. Reviewed options for stereo bx or excisional bx.  Pt states she gets Heparin for dialysis on Tuesdays, Thursdays, and Saturdays. Will schedule for stereo bx on a Monday in the near future. This plan was reviewed with the patient and patient agrees. All questions were answered. Total time spent was 20 minutes.     Written by Philip Arevalo, as dictated by Dr. Negrita Vazquez MD.

## 2021-07-26 NOTE — PROGRESS NOTES
HISTORY OF PRESENT ILLNESS  Nataliia Alston is a 73 y.o. female. HPI   ESTABLISHED PATIENT here today for her 5 (SIX) month follow up for  RIGHT  Breast calcifications. She is not feeling any breast lumps, has no nipple or skin change, has no pain. [facility] Results (most recent):  Results from [place] encounter on 07/26/21    [facility] 3D FERNANDO W MAMMO RT DX INCL CAD    Narrative  INDICATION: . Other abnormal and inconclusive findings on diagnostic imaging of  breast.  COMPARISON: Mammogram(s), most recent, 1/4/2021. Bettey Grad COMPOSITION:  There are scattered fibroglandular densities. .  TECHNIQUE:  Standard 2D, CC and MLO views as well as spot magnification views of the right  breast were performed with digital technique and subjected to computer-aided  detection. Tomosynthesis of the right breast was performed in CC and MLO  projections. Bettey Grad FINDINGS:  There is a group of calcifications in the medial aspect of the right breast.  These have slowly increased since 2018 and have some linear component. .    Impression  1. BI-RADS Category 4 - Suspicious abnormality.   .  RECOMMENDATION:  Stereotactic guided core needle biopsy    ROS    Physical Exam    ASSESSMENT and PLAN  {ASSESSMENT/PLAN:99227}

## 2021-08-09 ENCOUNTER — HOSPITAL ENCOUNTER (OUTPATIENT)
Dept: MAMMOGRAPHY | Age: 66
Discharge: HOME | End: 2021-08-09
Payer: MEDICARE

## 2021-08-09 ENCOUNTER — OFFICE VISIT (OUTPATIENT)
Dept: SURGERY | Age: 66
End: 2021-08-09
Payer: MEDICARE

## 2021-08-09 ENCOUNTER — HOSPITAL ENCOUNTER (OUTPATIENT)
Dept: LAB | Age: 66
Discharge: HOME | End: 2021-08-09

## 2021-08-09 VITALS — DIASTOLIC BLOOD PRESSURE: 59 MMHG | SYSTOLIC BLOOD PRESSURE: 152 MMHG

## 2021-08-09 DIAGNOSIS — R92.1 CALCIFICATION OF RIGHT BREAST ON MAMMOGRAPHY: ICD-10-CM

## 2021-08-09 DIAGNOSIS — R92.8 ABNORMAL FINDING ON BREAST IMAGING: ICD-10-CM

## 2021-08-09 DIAGNOSIS — R92.1 BREAST CALCIFICATION, RIGHT: Primary | ICD-10-CM

## 2021-08-09 PROCEDURE — 1090F PRES/ABSN URINE INCON ASSESS: CPT | Performed by: SURGERY

## 2021-08-09 PROCEDURE — 77065 DX MAMMO INCL CAD UNI: CPT

## 2021-08-09 PROCEDURE — 3017F COLORECTAL CA SCREEN DOC REV: CPT | Performed by: SURGERY

## 2021-08-09 PROCEDURE — 99212 OFFICE O/P EST SF 10 MIN: CPT | Performed by: SURGERY

## 2021-08-09 PROCEDURE — 19081 BX BREAST 1ST LESION STRTCTC: CPT

## 2021-08-09 PROCEDURE — G8400 PT W/DXA NO RESULTS DOC: HCPCS | Performed by: SURGERY

## 2021-08-09 PROCEDURE — G8536 NO DOC ELDER MAL SCRN: HCPCS | Performed by: SURGERY

## 2021-08-09 PROCEDURE — G8427 DOCREV CUR MEDS BY ELIG CLIN: HCPCS | Performed by: SURGERY

## 2021-08-09 PROCEDURE — 19081 BX BREAST 1ST LESION STRTCTC: CPT | Performed by: SURGERY

## 2021-08-09 PROCEDURE — G9899 SCRN MAM PERF RSLTS DOC: HCPCS | Performed by: SURGERY

## 2021-08-09 PROCEDURE — G8432 DEP SCR NOT DOC, RNG: HCPCS | Performed by: SURGERY

## 2021-08-09 PROCEDURE — G8417 CALC BMI ABV UP PARAM F/U: HCPCS | Performed by: SURGERY

## 2021-08-09 PROCEDURE — 1101F PT FALLS ASSESS-DOCD LE1/YR: CPT | Performed by: SURGERY

## 2021-08-09 NOTE — PROGRESS NOTES
HISTORY OF PRESENT ILLNESS  Rashad Marie is a 73 y.o. female. HPI ESTABLISHED patient here for DR CAMP BX 15. [facility] Results (most recent):  Results from [place] [facility] encounter on 07/26/21    [facility] 3D FERNANDO W MAMMO RT DX INCL CAD    Narrative  INDICATION: . Other abnormal and inconclusive findings on diagnostic imaging of  breast.  COMPARISON: Mammogram(s), most recent, 1/4/2021. Maryln Solar COMPOSITION:  There are scattered fibroglandular densities. .  TECHNIQUE:  Standard 2D, CC and MLO views as well as spot magnification views of the right  breast were performed with digital technique and subjected to computer-aided  detection. Tomosynthesis of the right breast was performed in CC and MLO  projections. Maryln Solar FINDINGS:  There is a group of calcifications in the medial aspect of the right breast.  These have slowly increased since 2018 and have some linear component. .    Impression  1. BI-RADS Category 4 - Suspicious abnormality.   .  RECOMMENDATION:  Stereotactic guided core needle biopsy          ROS    Physical Exam    ASSESSMENT and PLAN  {ASSESSMENT/PLAN:[id number]}

## 2021-08-09 NOTE — PROGRESS NOTES
HISTORY OF PRESENT ILLNESS  Brady Dinero is a 73 y.o. female. HPI  ESTABLISHED patient here for stereo bx for calcifications on RIGHT breast mammogram.      [facility] Results (most recent):  Results from [place] encounter on 07/26/21     [facility] 3D FERNANDO W MAMMO RT DX INCL CAD     Narrative  INDICATION: . Other abnormal and inconclusive findings on diagnostic imaging of  breast.  COMPARISON: Mammogram(s), most recent, 1/4/2021. Tian Kyle COMPOSITION:  There are scattered fibroglandular densities. .  TECHNIQUE:  Standard 2D, CC and MLO views as well as spot magnification views of the right  breast were performed with digital technique and subjected to computer-aided  detection. Tomosynthesis of the right breast was performed in CC and MLO  projections. Tian Kyle FINDINGS:  There is a group of calcifications in the medial aspect of the right breast.  These have slowly increased since 2018 and have some linear component. .     Impression  1. BI-RADS Category 4 - Suspicious abnormality.   .  RECOMMENDATION:  Stereotactic guided core needle biopsy      Past Medical History:   Diagnosis Date    Boil of buttock 06/24/2021    R Buttock    Chronic kidney disease     Diabetes ([address]     Diabetes mellitus ([address] 6/24/2021    ESRD on dialysis ([address] 6/24/2021    Heart failure ([address]     Hypertension     Menopause        Past Surgical History:   Procedure Laterality Date    HX BREAST BIOPSY Right 08/09/2021    Stereo    VASCULAR SURGERY PROCEDURE UNLIST         Social History     Socioeconomic History    Marital status:      Spouse name: Not on file    Number of children: Not on file    Years of education: Not on file    Highest education level: Not on file   Occupational History    Not on file   Tobacco Use    Smoking status: Never Smoker    Smokeless tobacco: Never Used   Vaping Use    Vaping Use: Never used   Substance and Sexual Activity    Alcohol use: Never    Drug use: Not Currently    Sexual activity: Not on file   Other Topics Concern    Not on file   Social History Narrative    Not on file     Social Determinants of Health     Financial Resource Strain:     Difficulty of Paying Living Expenses:    Food Insecurity:     Worried About Running Out of Food in the Last Year:     [address] in the Last Year:    Transportation Needs:     Lack of Transportation (Medical):  Lack of Transportation (Non-Medical):    Physical Activity:     Days of Exercise per Week:     Minutes of Exercise per Session:    Stress:     Feeling of Stress :    Social Connections:     Frequency of Communication with Friends and Family:     Frequency of Social Gatherings with Friends and Family:     Attends Denominational Services:     Active Member of Clubs or Organizations:     Attends Club or Organization Meetings:     Marital Status:    Intimate Partner Violence:     Fear of Current or Ex-Partner:     Emotionally Abused:     Physically Abused:     Sexually Abused:        Current Outpatient Medications on File Prior to Visit   Medication Sig Dispense Refill    amoxicillin-clavulanate (Augmentin) 875-125 mg per tablet Take 1 Tablet by mouth two (2) times a day. Taking last one today      sucroferric oxyhydroxide (Velphoro) 500 mg chew chewable tablet Take 500 mg by mouth three (3) times daily (with meals).  ferric citrate (AURYXIA PO) Take  by mouth.  aspirin 81 mg chewable tablet Take 81 mg by mouth daily.  atorvastatin (LIPITOR) 10 mg tablet 10 mg daily.  brinzolamide-brimonidine (Simbrinza) 1-0.2 % drps Apply 1 Drop to eye two (2) times a day.  cholecalciferol (VITAMIN D3) (1000 Units /25 mcg) tablet Take 1,000 Units by mouth daily.  FA-Vit BComp&C-Zinc-Vitamin D3 (Dialyvite 800-Ultra D) 0.8-2,000 mg-unit tab Take 1 Tab by mouth daily.  furosemide (LASIX) 80 mg tablet 80 mg daily.  insulin aspart U-100 (NOVOLOG) 100 unit/mL (3 mL) inpn 10 Units by SubCUTAneous route.  latanoprost (XALATAN) 0.005 % ophthalmic solution Apply 1 Drop to eye At bedtime.  omega 3-dha-epa-fish oil 100-160-1,000 mg cap Take  by mouth.  pantoprazole (PROTONIX) 40 mg tablet 40 mg daily.  Insulin Needles, Disposable, 31 gauge x 1/4\" ndle USE PEN NEEDLES TO INJECT INSULIN TWICE DAILY      Entresto 24-26 mg tablet TAKE 1 TABLET BY MOUTH TWICE DAILY      sodium bicarbonate 650 mg tablet Take 1,300 mg by mouth two (2) times a day.  timolol (TIMOPTIC) 0.5 % ophthalmic solution Apply  to eye two (2) times a day.  insulin detemir (LEVEMIR FLEXPEN SC) by SubCUTAneous route.  acetaminophen (TYLENOL PO) Take  by mouth.  simethicone (Gas-X Extra Strength) 125 mg chewable tablet Take 125 mg by mouth every six (6) hours as needed for Flatulence. No current facility-administered medications on file prior to visit. Allergies   Allergen Reactions    Influenza Virus Vaccines Shortness of Breath    Darvon [Propoxyphene] Nausea and Vomiting    Levaquin [Levofloxacin] Other (comments)    Nsaids (Non-Steroidal Anti-Inflammatory Drug) Other (comments)     Gi bleed    Tolectin [Tolmetin] Nausea and Vomiting    Tramadol Other (comments)     Gi bleed       OB History    No obstetric history on file. Obstetric Comments   Menarche 16, LMP 2000, # of children 1, age of 5st delivery 14, Hysterectomy/oophorectomy No/No, Breast bx Yes, BILATERAL 25 yrs ago, history of breast feeding No, BCP Yes, in the past, Hormone therapy No               ROS    Physical Exam       STEREOTACTIC BIOPSY  PROCEDURE: LORAD stereotactic vacuum assisted biopsy and related digital mammography of the RIGHT breast.  INDICATION: Microcalcifications. CONSENT: Following a detailed description of the LORAD stereotactic core biopsy procedure, its risks, benefits and possible alternatives (open biopsy), the patient signed the informed consent.  The risks and benefits of the procedure have been explained to the patient by the stereotactic technologist and Dr. Julianne Tabor. IMAGING: The RIGHT breast was imaged. Pre-biopsy digital stereotactic imaging of the breast was obtained and confirmed the presence of the abnormality in both  and stereotactic views. PROJECTION: ML.  BIOPSY PROCEDURE: Following sterile preparation of the skin, a cutaneous skin wheel of 1% lidocaine was use as a local anesthetic. A 4mm skin incision was made for the entry site of the biopsy needle. Prefire stereotactic images were obtained to confirm accurate targeting. A 7 gauge Encor needle was used for the biopsy. 7 biopsy specimens were obtained. CLIP PLACEMENT: A marking clip was placed for future mammographic reference and position was confirmed with a 0 degree postfire image. SPECIMEN RADIOGRAPHY: Digital spot mammography of the core biopsy specimens demonstrated microcalcifications corresponding to the targeted calcifications. FINAL PATHOLOGY: Pending at this time. POST BIOPSY MAMMOGRAM: Clip in good position. ASSESSMENT and PLAN    ICD-10-CM ICD-9-CM    1. Breast calcification, right  R92.1 793.89 SURGICAL PATHOLOGY   2. Abnormal finding on breast imaging  R92.8 793.89       Patient presents for stereo bx for RIGHT breast calcifications, and is doing well overall. Discussed stereo bx procedure in detail, and pt agreed to proceed. She tolerated the procedure well. ML projection, 7 bx specimens obtained, clip placed. Will send for PATH and f/u with the results. This plan was reviewed with the patient and patient agrees. All questions were answered. Total time spent was 20 minutes.     Written by Juan Alberto Gabriel, as dictated by Dr. Quinn Emery MD.

## 2021-08-09 NOTE — PROGRESS NOTES
21   Patient received for right breast stereotactic biopsy. Procedure explained to patient as well as risks associated with procedure. Patient has had this procedure in the past and is comfortable with proceeding. 1105  Patient tolerated procedure well with minimal bleeding. Pressure held to biopsy site for 5 minutes, no oozing, bleeding or hematoma noted. Dressing applied over steri-strip at biopsy site. Patient sent for post-biopsy mammogram.  Dressing remained dry and intact. Ice pack applied over transparent dressing. 1140   Discharge instructions reviewed with patient and given a copy of the instructions. She has no questions at this time. Patient expects to be contacted by phone by with pathology results by Dr. Tina aZrate.

## 2021-08-11 ENCOUNTER — TELEPHONE (OUTPATIENT)
Dept: SURGERY | Age: 66
End: 2021-08-11

## 2022-02-14 ENCOUNTER — APPOINTMENT (OUTPATIENT)
Dept: GENERAL RADIOLOGY | Age: 67
End: 2022-02-14
Attending: EMERGENCY MEDICINE
Payer: MEDICARE

## 2022-02-14 ENCOUNTER — HOSPITAL ENCOUNTER (OUTPATIENT)
Age: 67
Setting detail: OBSERVATION
Discharge: HOME | End: 2022-02-15
Attending: EMERGENCY MEDICINE | Admitting: FAMILY MEDICINE
Payer: MEDICARE

## 2022-02-14 DIAGNOSIS — R42 DIZZINESS: Primary | ICD-10-CM

## 2022-02-14 DIAGNOSIS — Z86.79 HISTORY OF CORONARY ARTERY DISEASE: ICD-10-CM

## 2022-02-14 PROBLEM — R55 SYNCOPE: Status: ACTIVE | Noted: 2022-02-14

## 2022-02-14 LAB
ALBUMIN SERPL-MCNC: 3.5 G/DL (ref 3.5–5)
ALBUMIN/GLOB SERPL: 0.7 {RATIO} (ref 1.1–2.2)
ALP SERPL-CCNC: 77 U/L (ref 45–117)
ALT SERPL-CCNC: 22 U/L (ref 12–78)
ANION GAP SERPL CALC-SCNC: 7 MMOL/L (ref 5–15)
AST SERPL W P-5'-P-CCNC: 61 U/L (ref 15–37)
BASOPHILS # BLD: 0.1 K/UL (ref 0–0.2)
BASOPHILS NFR BLD: 1 % (ref 0–2.5)
BILIRUB SERPL-MCNC: 0.7 MG/DL (ref 0.2–1)
BNP SERPL-MCNC: 181 PG/ML
BUN SERPL-MCNC: 22 MG/DL (ref 6–20)
BUN/CREAT SERPL: 3 (ref 12–20)
CA-I BLD-MCNC: 9.9 MG/DL (ref 8.5–10.1)
CHLORIDE SERPL-SCNC: 98 MMOL/L (ref 97–108)
CO2 SERPL-SCNC: 28 MMOL/L (ref 21–32)
CREAT SERPL-MCNC: 7.16 MG/DL (ref 0.55–1.02)
EOSINOPHIL # BLD: 0.1 K/UL (ref 0–0.7)
EOSINOPHIL NFR BLD: 2 % (ref 0.9–2.9)
ERYTHROCYTE [DISTWIDTH] IN BLOOD BY AUTOMATED COUNT: 14.1 % (ref 11.5–14.5)
GLOBULIN SER CALC-MCNC: 4.7 G/DL (ref 2–4)
GLUCOSE BLD STRIP.AUTO-MCNC: 139 MG/DL (ref 65–117)
GLUCOSE BLD STRIP.AUTO-MCNC: 143 MG/DL (ref 65–117)
GLUCOSE SERPL-MCNC: 162 MG/DL (ref 65–100)
HCT VFR BLD AUTO: 36.3 % (ref 36–46)
HGB BLD-MCNC: 11.7 G/DL (ref 13.5–17.5)
INR PPP: 1 (ref 0.9–1.1)
LYMPHOCYTES # BLD: 1 K/UL (ref 1–4.8)
LYMPHOCYTES NFR BLD: 17 % (ref 20.5–51.1)
MAGNESIUM SERPL-MCNC: 2.5 MG/DL (ref 1.6–2.4)
MCH RBC QN AUTO: 26.7 PG (ref 31–34)
MCHC RBC AUTO-ENTMCNC: 32.3 G/DL (ref 31–36)
MCV RBC AUTO: 82.8 FL (ref 80–100)
MONOCYTES # BLD: 0.5 K/UL (ref 0.2–2.4)
MONOCYTES NFR BLD: 9 % (ref 1.7–9.3)
NEUTS SEG # BLD: 4.2 K/UL (ref 1.8–7.7)
NEUTS SEG NFR BLD: 71 % (ref 42–75)
NRBC # BLD: 0 K/UL
NRBC BLD-RTO: 0.1 PER 100 WBC
PERFORMED BY, TECHID: ABNORMAL
PERFORMED BY, TECHID: ABNORMAL
PLATELET # BLD AUTO: 186 K/UL (ref 150–400)
PMV BLD AUTO: 8.6 FL (ref 6.5–11.5)
POTASSIUM SERPL-SCNC: 4.7 MMOL/L (ref 3.5–5.1)
PROT SERPL-MCNC: 8.2 G/DL (ref 6.4–8.2)
PROTHROMBIN TIME: 9.9 SEC (ref 9–11.1)
RBC # BLD AUTO: 4.39 M/UL (ref 4.5–5.9)
SODIUM SERPL-SCNC: 133 MMOL/L (ref 136–145)
TROPONIN-HIGH SENSITIVITY: 31 NG/L (ref 0–51)
TROPONIN-HIGH SENSITIVITY: 41 NG/L (ref 0–51)
WBC # BLD AUTO: 5.9 K/UL (ref 4.4–11.3)

## 2022-02-14 PROCEDURE — 74011000250 HC RX REV CODE- 250: Performed by: FAMILY MEDICINE

## 2022-02-14 PROCEDURE — 71045 X-RAY EXAM CHEST 1 VIEW: CPT

## 2022-02-14 PROCEDURE — 84484 ASSAY OF TROPONIN QUANT: CPT

## 2022-02-14 PROCEDURE — 93005 ELECTROCARDIOGRAM TRACING: CPT

## 2022-02-14 PROCEDURE — 83880 ASSAY OF NATRIURETIC PEPTIDE: CPT

## 2022-02-14 PROCEDURE — 99285 EMERGENCY DEPT VISIT HI MDM: CPT

## 2022-02-14 PROCEDURE — G0378 HOSPITAL OBSERVATION PER HR: HCPCS

## 2022-02-14 PROCEDURE — 85610 PROTHROMBIN TIME: CPT

## 2022-02-14 PROCEDURE — 80053 COMPREHEN METABOLIC PANEL: CPT

## 2022-02-14 PROCEDURE — 96372 THER/PROPH/DIAG INJ SC/IM: CPT

## 2022-02-14 PROCEDURE — 82962 GLUCOSE BLOOD TEST: CPT

## 2022-02-14 PROCEDURE — 83735 ASSAY OF MAGNESIUM: CPT

## 2022-02-14 PROCEDURE — 74011250636 HC RX REV CODE- 250/636: Performed by: FAMILY MEDICINE

## 2022-02-14 PROCEDURE — 85025 COMPLETE CBC W/AUTO DIFF WBC: CPT

## 2022-02-14 RX ORDER — ACETAMINOPHEN 650 MG/1
650 SUPPOSITORY RECTAL
Status: DISCONTINUED | OUTPATIENT
Start: 2022-02-14 | End: 2022-02-16 | Stop reason: HOSPADM

## 2022-02-14 RX ORDER — SODIUM CHLORIDE 0.9 % (FLUSH) 0.9 %
5-40 SYRINGE (ML) INJECTION AS NEEDED
Status: DISCONTINUED | OUTPATIENT
Start: 2022-02-14 | End: 2022-02-16 | Stop reason: HOSPADM

## 2022-02-14 RX ORDER — INSULIN LISPRO 100 [IU]/ML
INJECTION, SOLUTION INTRAVENOUS; SUBCUTANEOUS
Status: DISCONTINUED | OUTPATIENT
Start: 2022-02-14 | End: 2022-02-16 | Stop reason: HOSPADM

## 2022-02-14 RX ORDER — DEXTROSE 50 % IN WATER (D50W) INTRAVENOUS SYRINGE
12.5-25 AS NEEDED
Status: DISCONTINUED | OUTPATIENT
Start: 2022-02-14 | End: 2022-02-16 | Stop reason: HOSPADM

## 2022-02-14 RX ORDER — ONDANSETRON 2 MG/ML
4 INJECTION INTRAMUSCULAR; INTRAVENOUS
Status: DISCONTINUED | OUTPATIENT
Start: 2022-02-14 | End: 2022-02-16 | Stop reason: HOSPADM

## 2022-02-14 RX ORDER — BRIMONIDINE TARTRATE 2 MG/ML
1 SOLUTION/ DROPS OPHTHALMIC 2 TIMES DAILY
Status: DISCONTINUED | OUTPATIENT
Start: 2022-02-14 | End: 2022-02-16 | Stop reason: HOSPADM

## 2022-02-14 RX ORDER — HEPARIN SODIUM 5000 [USP'U]/ML
5000 INJECTION, SOLUTION INTRAVENOUS; SUBCUTANEOUS EVERY 8 HOURS
Status: DISCONTINUED | OUTPATIENT
Start: 2022-02-14 | End: 2022-02-16 | Stop reason: HOSPADM

## 2022-02-14 RX ORDER — LATANOPROST 50 UG/ML
1 SOLUTION/ DROPS OPHTHALMIC EVERY EVENING
Status: DISCONTINUED | OUTPATIENT
Start: 2022-02-14 | End: 2022-02-16 | Stop reason: HOSPADM

## 2022-02-14 RX ORDER — CARVEDILOL 3.12 MG/1
TABLET ORAL
COMMUNITY
Start: 2022-01-14 | End: 2022-02-15

## 2022-02-14 RX ORDER — MAGNESIUM SULFATE 100 %
4 CRYSTALS MISCELLANEOUS AS NEEDED
Status: DISCONTINUED | OUTPATIENT
Start: 2022-02-14 | End: 2022-02-16 | Stop reason: HOSPADM

## 2022-02-14 RX ORDER — DORZOLAMIDE HCL 20 MG/ML
1 SOLUTION/ DROPS OPHTHALMIC 2 TIMES DAILY
Status: DISCONTINUED | OUTPATIENT
Start: 2022-02-14 | End: 2022-02-16 | Stop reason: HOSPADM

## 2022-02-14 RX ORDER — SODIUM CHLORIDE 0.9 % (FLUSH) 0.9 %
5-40 SYRINGE (ML) INJECTION EVERY 8 HOURS
Status: DISCONTINUED | OUTPATIENT
Start: 2022-02-14 | End: 2022-02-16 | Stop reason: HOSPADM

## 2022-02-14 RX ORDER — MIDODRINE HYDROCHLORIDE 5 MG/1
TABLET ORAL
COMMUNITY
Start: 2021-12-23

## 2022-02-14 RX ORDER — ACETAMINOPHEN 325 MG/1
650 TABLET ORAL
Status: DISCONTINUED | OUTPATIENT
Start: 2022-02-14 | End: 2022-02-16 | Stop reason: HOSPADM

## 2022-02-14 RX ORDER — POLYETHYLENE GLYCOL 3350 17 G/17G
17 POWDER, FOR SOLUTION ORAL DAILY PRN
Status: DISCONTINUED | OUTPATIENT
Start: 2022-02-14 | End: 2022-02-16 | Stop reason: HOSPADM

## 2022-02-14 RX ORDER — ONDANSETRON 4 MG/1
4 TABLET, ORALLY DISINTEGRATING ORAL
Status: DISCONTINUED | OUTPATIENT
Start: 2022-02-14 | End: 2022-02-16 | Stop reason: HOSPADM

## 2022-02-14 RX ORDER — TIMOLOL MALEATE 5 MG/ML
1 SOLUTION/ DROPS OPHTHALMIC 2 TIMES DAILY
Status: DISCONTINUED | OUTPATIENT
Start: 2022-02-14 | End: 2022-02-16 | Stop reason: HOSPADM

## 2022-02-14 RX ADMIN — HEPARIN SODIUM 5000 UNITS: 5000 INJECTION INTRAVENOUS; SUBCUTANEOUS at 16:27

## 2022-02-14 RX ADMIN — SODIUM CHLORIDE, PRESERVATIVE FREE 10 ML: 5 INJECTION INTRAVENOUS at 21:54

## 2022-02-14 RX ADMIN — DORZOLAMIDE HYDROCHLORIDE 1 DROP: 20 SOLUTION/ DROPS OPHTHALMIC at 21:08

## 2022-02-14 RX ADMIN — HEPARIN SODIUM 5000 UNITS: 5000 INJECTION INTRAVENOUS; SUBCUTANEOUS at 21:08

## 2022-02-14 RX ADMIN — TIMOLOL MALEATE 1 DROP: 5 SOLUTION/ DROPS OPHTHALMIC at 21:25

## 2022-02-14 RX ADMIN — LATANOPROST 1 DROP: 50 SOLUTION/ DROPS OPHTHALMIC at 17:10

## 2022-02-14 RX ADMIN — BRIMONIDINE TARTRATE 1 DROP: 2 SOLUTION/ DROPS OPHTHALMIC at 21:07

## 2022-02-14 RX ADMIN — SODIUM CHLORIDE, PRESERVATIVE FREE 10 ML: 5 INJECTION INTRAVENOUS at 16:25

## 2022-02-14 NOTE — ED NOTES
TRANSFER - OUT REPORT:    Verbal report given to Amanda BOB on Miryam Frazier  being transferred to [facility] for routine progression of care       Report consisted of patients Situation, Background, Assessment and   Recommendations(SBAR). Information from the following report(s) SBAR, ED Summary, MAR, Med Rec Status and Cardiac Rhythm NSR was reviewed with the receiving nurse. Lines:       Opportunity for questions and clarification was provided.       Patient transported with:   Registered Nurse

## 2022-02-14 NOTE — ED PROVIDER NOTES
EMERGENCY DEPARTMENT HISTORY AND PHYSICAL EXAM      Date: 2/14/2022  Patient Name: Stepan Hickey    History of Presenting Illness     Chief Complaint   Patient presents with    Dizziness     woke up this am around 0800 and was dizzy, felt fine yesterday, no new medication changes, had normal dialysis treatment Saturday, denies shortness/n/v, hx of MI 2020. denies chest pain, ambulatory to ER 10 with cane        History Provided By: Patient    HPI: Stepan Hickey, 73 y.o. female with a past medical history significant diabetes, hypertension, renal insufficiency and Coronary artery disease presents to the ED with cc of dizziness similar to when she had her first heart attack, patient denies any chest pain denies any shortness of breath, patient stated that when she had her first MI she never had chest pain but just dizziness and stated that \"this is what that feels like but today's dizziness is milder\"    There are no other complaints, changes, or physical findings at this time. PCP: Hema Gale PA-C    No current facility-administered medications on file prior to encounter. Current Outpatient Medications on File Prior to Encounter   Medication Sig Dispense Refill    sucroferric oxyhydroxide (Velphoro) 500 mg chew chewable tablet Take 500 mg by mouth three (3) times daily (with meals).  aspirin 81 mg chewable tablet Take 81 mg by mouth daily.  atorvastatin (LIPITOR) 10 mg tablet 10 mg daily.  cholecalciferol (VITAMIN D3) (1000 Units /25 mcg) tablet Take 1,000 Units by mouth daily.  FA-Vit BComp&C-Zinc-Vitamin D3 (Dialyvite 800-Ultra D) 0.8-2,000 mg-unit tab Take 1 Tab by mouth daily.  furosemide (LASIX) 80 mg tablet 80 mg daily.  insulin aspart U-100 (NOVOLOG) 100 unit/mL (3 mL) inpn 10 Units by SubCUTAneous route.  latanoprost (XALATAN) 0.005 % ophthalmic solution Apply 1 Drop to eye At bedtime.       omega 3-dha-epa-fish oil 100-160-1,000 mg cap Take by mouth.  pantoprazole (PROTONIX) 40 mg tablet 40 mg daily.  Insulin Needles, Disposable, 31 gauge x 1/4\" ndle USE PEN NEEDLES TO INJECT INSULIN TWICE DAILY      Entresto 24-26 mg tablet TAKE 1 TABLET BY MOUTH TWICE DAILY      timolol (TIMOPTIC) 0.5 % ophthalmic solution Apply  to eye two (2) times a day.  insulin detemir (LEVEMIR FLEXPEN SC) by SubCUTAneous route.  carvediloL (COREG) 3.125 mg tablet       midodrine (PROAMATINE) 5 mg tablet TAKE 1 TABLET BY MOUTH ONCE DAILY BEFORE DIALYSIS      [DISCONTINUED] amoxicillin-clavulanate (Augmentin) 875-125 mg per tablet Take 1 Tablet by mouth two (2) times a day. Taking last one today      acetaminophen (TYLENOL PO) Take  by mouth.  simethicone (Gas-X Extra Strength) 125 mg chewable tablet Take 125 mg by mouth every six (6) hours as needed for Flatulence.  [DISCONTINUED] ferric citrate (AURYXIA PO) Take  by mouth.  [DISCONTINUED] brinzolamide-brimonidine (Simbrinza) 1-0.2 % drps Apply 1 Drop to eye two (2) times a day.  [DISCONTINUED] sodium bicarbonate 650 mg tablet Take 1,300 mg by mouth two (2) times a day.          Past History     Past Medical History:  Past Medical History:   Diagnosis Date    Boil of buttock 06/24/2021    R Buttock    CAD (coronary artery disease)     MI 2020    Chronic kidney disease     dialyssi Tu Thu Sat    [facility] [address]     Diabetes mellitus ([facility] [address] 6/24/2021    ESRD on dialysis ([facility] [address] 6/24/2021    Heart failure ([facility] [address]     Hypertension     Ill-defined condition     scolosis    Menopause        Past Surgical History:  Past Surgical History:   Procedure Laterality Date    HX BREAST BIOPSY Right 08/09/2021    Stereo    VASCULAR SURGERY PROCEDURE UNLIST         Family History:  Family History   Problem Relation Age of Onset    Hypertension Other     Diabetes Other     Cancer Other     Heart Disease Other        Social History:  Social History     Tobacco Use    Smoking status: Never Smoker    Smokeless tobacco: Never Used   Vaping Use    Vaping Use: Never used   Substance Use Topics    Alcohol use: Never    Drug use: Not Currently       Allergies: Allergies   Allergen Reactions    Influenza Virus Vaccines Shortness of Breath    Darvon [Propoxyphene] Nausea and Vomiting    Levaquin [Levofloxacin] Other (comments)    Nsaids (Non-Steroidal Anti-Inflammatory Drug) Other (comments)     Gi bleed    Tolectin [Tolmetin] Nausea and Vomiting    Tramadol Other (comments)     Gi bleed         Review of Systems     Review of Systems   Constitutional: Negative for chills and fever. HENT: Negative for rhinorrhea and sore throat. Eyes: Negative for pain and visual disturbance. Respiratory: Negative for cough and shortness of breath. Cardiovascular: Negative for chest pain, palpitations and leg swelling. Gastrointestinal: Negative for abdominal pain and vomiting. Endocrine: Negative for polydipsia and polyuria. Genitourinary: Negative for dysuria and hematuria. Musculoskeletal: Negative for back pain and neck pain. Skin: Negative for color change and pallor. Neurological: Positive for dizziness. Negative for weakness and headaches. Psychiatric/Behavioral: Negative for agitation and suicidal ideas. Physical Exam     Physical Exam  Vitals and nursing note reviewed. Constitutional:       General: She is not in acute distress. Appearance: She is not ill-appearing, toxic-appearing or diaphoretic. HENT:      Head: Normocephalic and atraumatic. Right Ear: Tympanic membrane normal.      Left Ear: Tympanic membrane normal.      Nose: Nose normal. No congestion. Mouth/Throat:      Mouth: Mucous membranes are moist.      Pharynx: Oropharynx is clear. Eyes:      Extraocular Movements: Extraocular movements intact. Conjunctiva/sclera: Conjunctivae normal.      Pupils: Pupils are equal, round, and reactive to light.    Cardiovascular:      Rate and Rhythm: Normal rate and regular rhythm. Pulses: Normal pulses. Heart sounds: Normal heart sounds. Pulmonary:      Effort: Pulmonary effort is normal.      Breath sounds: Normal breath sounds. Abdominal:      General: Bowel sounds are normal.      Palpations: Abdomen is soft. Tenderness: There is no abdominal tenderness. Musculoskeletal:         General: No tenderness, deformity or signs of injury. Normal range of motion. Cervical back: Normal range of motion and neck supple. No rigidity or tenderness. Lymphadenopathy:      Cervical: No cervical adenopathy. Skin:     General: Skin is warm and dry. Capillary Refill: Capillary refill takes less than 2 seconds. Findings: No rash. Neurological:      General: No focal deficit present. Mental Status: She is alert and oriented to person, place, and time. Cranial Nerves: No cranial nerve deficit. Sensory: No sensory deficit.    Psychiatric:         Mood and Affect: Mood normal.         Behavior: Behavior normal.         Lab and Diagnostic Study Results     Labs -     Recent Results (from the past 12 hour(s))   EKG, 12 LEAD, INITIAL    Collection Time: 02/14/22 10:47 AM   Result Value Ref Range    Ventricular Rate 59 BPM    Atrial Rate 59 BPM    P-R Interval 191 ms    QRS Duration 97 ms    Q-T Interval 439 ms    QTC Calculation (Bezet) 435 ms    Calculated P Axis 28 degrees    Calculated R Axis 9 degrees    Calculated T Axis 52 degrees    Diagnosis Sinus rhythm  Low voltage, precordial leads      CBC WITH AUTOMATED DIFF    Collection Time: 02/14/22 11:05 AM   Result Value Ref Range    WBC 5.9 4.4 - 11.3 K/uL    RBC 4.39 (L) 4.50 - 5.90 M/uL    HGB 11.7 (L) 13.5 - 17.5 g/dL    HCT 36.3 36 - 46 %    MCV 82.8 80 - 100 FL    MCH 26.7 (L) 31 - 34 PG    MCHC 32.3 31.0 - 36.0 g/dL    RDW 14.1 11.5 - 14.5 %    PLATELET 596 407 - 601 K/uL    MPV 8.6 6.5 - 11.5 FL    NRBC 0.1  WBC    ABSOLUTE NRBC 0.00 K/uL    NEUTROPHILS 71 42 - 75 %    LYMPHOCYTES 17 (L) 20.5 - 51.1 %    MONOCYTES 9 1.7 - 9.3 %    EOSINOPHILS 2 0.9 - 2.9 %    BASOPHILS 1 0.0 - 2.5 %    ABS. NEUTROPHILS 4.2 1.8 - 7.7 K/UL    ABS. LYMPHOCYTES 1.0 1.0 - 4.8 K/UL    ABS. MONOCYTES 0.5 0.2 - 2.4 K/UL    ABS. EOSINOPHILS 0.1 0.0 - 0.7 K/UL    ABS. BASOPHILS 0.1 0.0 - 0.2 K/UL   PROTHROMBIN TIME + INR    Collection Time: 02/14/22 11:05 AM   Result Value Ref Range    Prothrombin time 9.9 9.0 - 11.1 sec    INR 1.0 0.9 - 1.1     METABOLIC PANEL, COMPREHENSIVE    Collection Time: 02/14/22 11:05 AM   Result Value Ref Range    Sodium 133 (L) 136 - 145 mmol/L    Potassium 4.7 3.5 - 5.1 mmol/L    Chloride 98 97 - 108 mmol/L    CO2 28 21 - 32 mmol/L    Anion gap 7 5 - 15 mmol/L    Glucose 162 (H) 65 - 100 mg/dL    BUN 22 (H) 6 - 20 mg/dL    Creatinine 7.16 (H) 0.55 - 1.02 mg/dL    BUN/Creatinine ratio 3 (L) 12 - 20      GFR est AA 7 (L) >60 ml/min/1.73m2    GFR est non-AA 6 (L) >60 ml/min/1.73m2    Calcium 9.9 8.5 - 10.1 mg/dL    Bilirubin, total 0.7 0.2 - 1.0 mg/dL    AST (SGOT) 61 (H) 15 - 37 U/L    ALT (SGPT) 22 12 - 78 U/L    Alk. phosphatase 77 45 - 117 U/L    Protein, total 8.2 6.4 - 8.2 g/dL    Albumin 3.5 3.5 - 5.0 g/dL    Globulin 4.7 (H) 2.0 - 4.0 g/dL    A-G Ratio 0.7 (L) 1.1 - 2.2     TROPONIN-HIGH SENSITIVITY    Collection Time: 02/14/22 11:05 AM   Result Value Ref Range    Troponin-High Sensitivity 31 0 - 51 ng/L   NT-PRO BNP    Collection Time: 02/14/22 11:05 AM   Result Value Ref Range    NT pro- (H) <125 pg/mL   MAGNESIUM    Collection Time: 02/14/22 11:05 AM   Result Value Ref Range    Magnesium 2.5 (H) 1.6 - 2.4 mg/dL       Radiologic Studies -   @lastxrresult@  CT Results  (Last 48 hours)    None        CXR Results  (Last 48 hours)               02/14/22 1136  XR CHEST PORT Final result    Narrative:  Chest single view. Cannot comparison single view chest September 15, 2019. Lungs clear; no interstitial or alveolar pulmonary edema.  Cardiac silhouette enlargement similar compared to prior imaging. Thoracic aorta atherosclerosis. No pneumothorax or sizable pleural effusion. Left axillary stent and staples, evidence for prior vascular intervention. Medical Decision Making   - I am the first provider for this patient. - I reviewed the vital signs, available nursing notes, past medical history, past surgical history, family history and social history. - Initial assessment performed. The patients presenting problems have been discussed, and they are in agreement with the care plan formulated and outlined with them. I have encouraged them to ask questions as they arise throughout their visit. Vital Signs-Reviewed the patient's vital signs. Patient Vitals for the past 12 hrs:   Temp Pulse Resp BP SpO2   02/14/22 1048 98.8 °F (37.1 °C) 61 18 (!) 183/79 100 %       Records Reviewed: Nursing Notes    The patient presents with dizziness with a differential diagnosis of  cardiac dysrhythm, dizziness/vertigo and hypertension      ED Course:     ED Course as of 02/14/22 1217   Mon Feb 14, 2022   1100 Patient states current symptoms similar to when she had her heart cath in 2020 [SB]      ED Course User Index  [SB] Leontine Kanner, MD       Provider Notes (Medical Decision Making): MDM       Procedures   Medical Decision Makingedical Decision Making  Performed by: Isabella Moncada MD  PROCEDURES:  Procedures       Disposition   Disposition: Condition stable and improved  Admitted to Observation Unit the case was discussed with the admitting physician Kerwin        DISCHARGE PLAN:  1. Current Discharge Medication List      CONTINUE these medications which have NOT CHANGED    Details   sucroferric oxyhydroxide (Velphoro) 500 mg chew chewable tablet Take 500 mg by mouth three (3) times daily (with meals). aspirin 81 mg chewable tablet Take 81 mg by mouth daily. atorvastatin (LIPITOR) 10 mg tablet 10 mg daily. cholecalciferol (VITAMIN D3) (1000 Units /25 mcg) tablet Take 1,000 Units by mouth daily. FA-Vit BComp&C-Zinc-Vitamin D3 (Dialyvite 800-Ultra D) 0.8-2,000 mg-unit tab Take 1 Tab by mouth daily. furosemide (LASIX) 80 mg tablet 80 mg daily. insulin aspart U-100 (NOVOLOG) 100 unit/mL (3 mL) inpn 10 Units by SubCUTAneous route. latanoprost (XALATAN) 0.005 % ophthalmic solution Apply 1 Drop to eye At bedtime. omega 3-dha-epa-fish oil 100-160-1,000 mg cap Take  by mouth.      pantoprazole (PROTONIX) 40 mg tablet 40 mg daily. Insulin Needles, Disposable, 31 gauge x 1/4\" ndle USE PEN NEEDLES TO INJECT INSULIN TWICE DAILY      Entresto 24-26 mg tablet TAKE 1 TABLET BY MOUTH TWICE DAILY      timolol (TIMOPTIC) 0.5 % ophthalmic solution Apply  to eye two (2) times a day. insulin detemir (LEVEMIR FLEXPEN SC) by SubCUTAneous route. carvediloL (COREG) 3.125 mg tablet       midodrine (PROAMATINE) 5 mg tablet TAKE 1 TABLET BY MOUTH ONCE DAILY BEFORE DIALYSIS      acetaminophen (TYLENOL PO) Take  by mouth. simethicone (Gas-X Extra Strength) 125 mg chewable tablet Take 125 mg by mouth every six (6) hours as needed for Flatulence. 2.   Follow-up Information    None       3. Return to ED if worse   4. Current Discharge Medication List            Diagnosis     Clinical Impression: No diagnosis found. Attestations:    Armandina Buerger, MD    Please note that this dictation was completed with [email or web address], the [employer] voice recognition software. Quite often unanticipated grammatical, syntax, homophones, and other interpretive errors are inadvertently transcribed by the computer software. Please disregard these errors. Please excuse any errors that have escaped final proofreading. Thank you.

## 2022-02-14 NOTE — ROUTINE PROCESS
Pt admitted to room 203. Monitor on. MA intact. No c/o. No dizziness. Up to void 250cc. Visitor in at bedside. MONTSERRAT bruit+ fistula no resp distress no shortness of breath.

## 2022-02-14 NOTE — H&P
History and Physical    Date of Service:  2/14/2022  Primary Care Provider: Vadim Chacon PA-C  Source of information: The patient    Chief Complaint: Dizziness (woke up this am around 0800 and was dizzy, felt fine yesterday, no new medication changes, had normal dialysis treatment Saturday, denies shortness/n/v, hx of MI 2020. denies chest pain, ambulatory to ER 10 with cane )      History of Presenting Illness:   Jac Hoffman is a 73 y.o. female who presents with dizziness. Patient went to bed last night in her usual state of health. She slept on a recliner. This morning she woke up around 6 AM and watch TV prior to getting up from the recliner. After she stood up from the recliner, she felt lightheaded and felt like she was almost about to pass out. However she did not lose consciousness. She sat down back in the recliner. Then she checked her blood pressure and her systolic blood pressure was noted to be around 180s. Then she stood up to check her blood pressure again and her blood pressure is a little higher on standing up. When she tried to walk she felt like she was swaying to one side a like she could not control her balance. She called a physician that she has known from the past, who advised to come to the emergency room. Patient denies any chest pain or shortness of breath. Denies any slurred speech, blurry vision, weakness or numbness of extremities. On evaluation in the ER, cardiac enzymes were unremarkable. Blood pressure was a little higher on the systolic. CT head was not done. Hospitalist service requested admit the patient for further evaluation management.     The patient denies any headache, blurry vision, sore throat, trouble swallowing, trouble with speech, chest pain, SOB, cough, fever, chills, N/V/D, abd pain, urinary symptoms, constipation, recent travels, sick contacts, focal or generalized neurological symptoms, falls, injuries, rashes, contact with COVID-19 diagnosed patients, hematemesis, melena, hemoptysis, hematuria, rashes, denies starting any new medications and denies any other concerns or problems besides as mentioned above. REVIEW OF SYSTEMS:  A comprehensive review of systems was negative except for that written in the History of Present Illness. Past Medical History:   Diagnosis Date    Boil of buttock 06/24/2021    R Buttock    CAD (coronary artery disease)     MI 2020    Chronic kidney disease     dialyssi Tu Thu Sat    [facility] [address]     Diabetes mellitus ([facility] [address] 6/24/2021    ESRD on dialysis ([facility] [address] 6/24/2021    Heart failure [facility] [address]     Hypertension     Ill-defined condition     scolosis    Menopause       Past Surgical History:   Procedure Laterality Date    HX BREAST BIOPSY Right 08/09/2021    Stereo    VASCULAR SURGERY PROCEDURE UNLIST       Prior to Admission medications    Medication Sig Start Date End Date Taking? Authorizing Provider   sucroferric oxyhydroxide (Velphoro) 500 mg chew chewable tablet Take 500 mg by mouth three (3) times daily (with meals). Yes Provider, Historical   aspirin 81 mg chewable tablet Take 81 mg by mouth daily. Yes Provider, Historical   atorvastatin (LIPITOR) 10 mg tablet 10 mg daily. 12/9/20  Yes Provider, Historical   cholecalciferol (VITAMIN D3) (1000 Units /25 mcg) tablet Take 1,000 Units by mouth daily. Yes Provider, Historical   FA-Vit BComp&C-Zinc-Vitamin D3 (Dialyvite 800-Ultra D) 0.8-2,000 mg-unit tab Take 1 Tab by mouth daily. 3/4/20  Yes Provider, Historical   furosemide (LASIX) 80 mg tablet 80 mg daily. 12/9/20  Yes Provider, Historical   insulin aspart U-100 (NOVOLOG) 100 unit/mL (3 mL) inpn 10 Units by SubCUTAneous route. Yes Provider, Historical   latanoprost (XALATAN) 0.005 % ophthalmic solution Apply 1 Drop to eye At bedtime. 4/7/20  Yes Provider, Historical   omega 3-dha-epa-fish oil 100-160-1,000 mg cap Take  by mouth.    Yes Provider, Historical   pantoprazole (PROTONIX) 40 mg tablet 40 mg daily. 12/9/20  Yes Provider, Historical   Insulin Needles, Disposable, 31 gauge x 1/4\" ndle USE PEN NEEDLES TO INJECT INSULIN TWICE DAILY 12/22/20  Yes Provider, Historical   Entresto 24-26 mg tablet TAKE 1 TABLET BY MOUTH TWICE DAILY 11/30/20  Yes Provider, Historical   timolol (TIMOPTIC) 0.5 % ophthalmic solution Apply  to eye two (2) times a day. 2/29/20  Yes Provider, Historical   insulin detemir (LEVEMIR FLEXPEN SC) by SubCUTAneous route. Yes Provider, Historical   carvediloL (COREG) 3.125 mg tablet  1/14/22   Other, MD Airam   midodrine (PROAMATINE) 5 mg tablet TAKE 1 TABLET BY MOUTH ONCE DAILY BEFORE DIALYSIS 12/23/21   Other, MD Airam   acetaminophen (TYLENOL PO) Take  by mouth. Provider, Historical   simethicone (Gas-X Extra Strength) 125 mg chewable tablet Take 125 mg by mouth every six (6) hours as needed for Flatulence. Provider, Historical     Allergies   Allergen Reactions    Influenza Virus Vaccines Shortness of Breath    Darvon [Propoxyphene] Nausea and Vomiting    Levaquin [Levofloxacin] Other (comments)    Nsaids (Non-Steroidal Anti-Inflammatory Drug) Other (comments)     Gi bleed    Tolectin [Tolmetin] Nausea and Vomiting    Tramadol Other (comments)     Gi bleed      Family History   Problem Relation Age of Onset    Hypertension Other     Diabetes Other     Cancer Other     Heart Disease Other       Social History:  reports that she has never smoked. She has never used smokeless tobacco. She reports previous drug use. She reports that she does not drink alcohol. Family and social history were personally reviewed, all pertinent and relevant details are outlined as above.     Objective:     Visit Vitals  BP (!) 187/63   Pulse 93   Temp 98.8 °F (37.1 °C)   Resp 18   Ht 5' 6\" (1.676 m)   Wt 90.7 kg (200 lb)   SpO2 100%   BMI 32.28 kg/m²      O2 Device: None (Room air)    PHYSICAL EXAM:   General: Alert x oriented x 3, awake, no acute distress, resting in bed, pleasant female, appears to be stated age  [de-identified]: PEERL, EOMI, moist mucus membranes  Neck: Supple, no JVD, no meningeal signs  Chest: Clear to auscultation bilaterally   CVS: RRR, S1 S2 heard, no murmurs/rubs/gallops  Abd: Soft, non-tender, non-distended, +bowel sounds   Ext: No clubbing, no cyanosis, no edema  Neuro/Psych: Pleasant mood and affect, CN 2-12 grossly intact, sensory grossly within normal limit, Strength 5/5 in all extremities, DTR 1+ x 4  Cap refill: Brisk, less than 3 seconds  Pulses: 2+, symmetric in all extremities  Skin: Warm, dry, without rashes or lesions    Data Review: All diagnostic labs and studies have been reviewed.     Abnormal Labs Reviewed   CBC WITH AUTOMATED DIFF - Abnormal; Notable for the following components:       Result Value    RBC 4.39 (*)     HGB 11.7 (*)     MCH 26.7 (*)     LYMPHOCYTES 17 (*)     All other components within normal limits   METABOLIC PANEL, COMPREHENSIVE - Abnormal; Notable for the following components:    Sodium 133 (*)     Glucose 162 (*)     BUN 22 (*)     Creatinine 7.16 (*)     BUN/Creatinine ratio 3 (*)     GFR est AA 7 (*)     GFR est non-AA 6 (*)     AST (SGOT) 61 (*)     Globulin 4.7 (*)     A-G Ratio 0.7 (*)     All other components within normal limits   NT-PRO BNP - Abnormal; Notable for the following components:    NT pro- (*)     All other components within normal limits   MAGNESIUM - Abnormal; Notable for the following components:    Magnesium 2.5 (*)     All other components within normal limits       All Micro Results     None          IMAGING:   XR CHEST PORT   Final Result           ECG/ECHO:    Results for orders placed or performed during the hospital encounter of 02/14/22   EKG, 12 LEAD, INITIAL   Result Value Ref Range    Ventricular Rate 59 BPM    Atrial Rate 59 BPM    P-R Interval 191 ms    QRS Duration 97 ms    Q-T Interval 439 ms    QTC Calculation (Bezet) 435 ms    Calculated P Axis 28 degrees    Calculated R Axis 9 degrees    Calculated T Axis 52 degrees    Diagnosis Sinus rhythm  Low voltage, precordial leads           Assessment:   Given the patient's current clinical presentation, there is a high level of concern for decompensation if discharged from the emergency department. Complex decision making was performed, which includes reviewing the patient's available past medical records, laboratory results, and imaging studies. Plan:     Presyncope  -Symptoms consistent with presyncope, admitted to observation for further evaluation  -Monitor on cardiac telemetry, check echocardiogram, prior history of coronary artery disease, no clinical signs or symptoms of ACS  -Check head CT to rule out intracranial pathology, check orthostatic blood pressure  -Cardiology consult for further evaluation  -Depending on patient's symptoms, may need PT evaluation in a.m. Coronary artery disease  -Continue aspirin, statin and beta-blocker  -No clinical signs or symptoms of acute coronary syndrome  -Check echocardiogram    End-stage renal disease  -On hemodialysis Tuesday, Thursday and Saturday  -Patient states that she takes midodrine prior to dialysis as patient known to have hypotension on dialysis  -Consult nephrology to continue dialysis here in the hospital    Cardiomyopathy  -No echo available in the system, she follows with cardiology as outpatient  -Resume Entresto, Coreg and Lasix    Hypertension  -Mildly elevated systolic blood pressure  -Resume home medications including Coreg, Entresto and Lasix     Diabetes type 2  -Start insulin sliding scale coverage  -Lantus dose was not available in home med list  -Resume Lantus after medication reconciliation    Dyslipidemia  -Resume statin    Estimated length of stay is 1 midnight. DIET: ADULT DIET Regular; Low Sodium (2 gm); Low Potassium (Less than 3000 mg/day);  Low Phosphorus (Less than 1000 mg)   ISOLATION PRECAUTIONS: There are currently no Active Isolations  CODE STATUS: Full Code   DVT PROPHYLAXIS: Heparin  FUNCTIONAL STATUS PRIOR TO HOSPITALIZATION: Fully active and ambulatory; able to carry on all self-care without restriction. EARLY MOBILITY ASSESSMENT: Recommend routine ambulation while hospitalized with the assistance of nursing staff  ANTICIPATED DISCHARGE: less than 24 hours. EMERGENCY CONTACT/SURROGATE DECISION MAKER:     CRITICAL CARE WAS PERFORMED FOR THIS ENCOUNTER: NO.      Signed By: Franco Malik MD     February 14, 2022         Please note that this dictation may have been completed with Dragon, the computer voice recognition software. Quite often unanticipated grammatical, syntax, homophones, and other interpretive errors are inadvertently transcribed by the computer software. Please disregard these errors. Please excuse any errors that have escaped final proofreading.

## 2022-02-15 ENCOUNTER — APPOINTMENT (OUTPATIENT)
Dept: CT IMAGING | Age: 67
End: 2022-02-15
Attending: FAMILY MEDICINE
Payer: MEDICARE

## 2022-02-15 ENCOUNTER — APPOINTMENT (OUTPATIENT)
Dept: VASCULAR SURGERY | Age: 67
End: 2022-02-15
Attending: FAMILY MEDICINE
Payer: MEDICARE

## 2022-02-15 VITALS
BODY MASS INDEX: 32.14 KG/M2 | TEMPERATURE: 97.7 F | HEART RATE: 56 BPM | HEIGHT: 66 IN | DIASTOLIC BLOOD PRESSURE: 51 MMHG | OXYGEN SATURATION: 99 % | SYSTOLIC BLOOD PRESSURE: 108 MMHG | WEIGHT: 200 LBS | RESPIRATION RATE: 20 BRPM

## 2022-02-15 PROBLEM — I10 HYPERTENSION: Status: ACTIVE | Noted: 2022-02-15

## 2022-02-15 PROBLEM — D63.1 ANEMIA OF RENAL DISEASE: Status: ACTIVE | Noted: 2022-02-15

## 2022-02-15 PROBLEM — M89.8X9 METABOLIC BONE DISEASE: Status: ACTIVE | Noted: 2022-02-15

## 2022-02-15 PROBLEM — N18.9 ANEMIA OF RENAL DISEASE: Status: ACTIVE | Noted: 2022-02-15

## 2022-02-15 LAB
ANION GAP SERPL CALC-SCNC: 10 MMOL/L (ref 5–15)
BUN SERPL-MCNC: 57 MG/DL (ref 6–20)
BUN/CREAT SERPL: 7 (ref 12–20)
CA-I BLD-MCNC: 9.4 MG/DL (ref 8.5–10.1)
CHLORIDE SERPL-SCNC: 101 MMOL/L (ref 97–108)
CO2 SERPL-SCNC: 27 MMOL/L (ref 21–32)
CREAT SERPL-MCNC: 7.86 MG/DL (ref 0.55–1.02)
ECHO AV AREA PEAK VELOCITY: 2.1 CM2
ECHO AV AREA VTI: 2.3 CM2
ECHO AV AREA/BSA PEAK VELOCITY: 1.1 CM2/M2
ECHO AV AREA/BSA VTI: 1.2 CM2/M2
ECHO AV MEAN GRADIENT: 5 MMHG
ECHO AV PEAK GRADIENT: 11 MMHG
ECHO AV PEAK VELOCITY: 1.7 M/S
ECHO AV VTI: 36.7 CM
ECHO EST RA PRESSURE: 3 MMHG
ECHO LV EDV A2C: 49 ML
ECHO LV EDV A4C: 76 ML
ECHO LV EDV INDEX A4C: 38 ML/M2
ECHO LV EDV NDEX A2C: 25 ML/M2
ECHO LV EJECTION FRACTION BIPLANE: 72 % (ref 55–100)
ECHO LV ESV A2C: 12 ML
ECHO LV ESV A4C: 26 ML
ECHO LV ESV INDEX A2C: 6 ML/M2
ECHO LV ESV INDEX A4C: 13 ML/M2
ECHO LV FRACTIONAL SHORTENING: 33 % (ref 28–44)
ECHO LV INTERNAL DIMENSION DIASTOLE INDEX: 2.15 CM/M2
ECHO LV INTERNAL DIMENSION DIASTOLIC: 4.3 CM (ref 3.9–5.3)
ECHO LV INTERNAL DIMENSION SYSTOLIC INDEX: 1.45 CM/M2
ECHO LV INTERNAL DIMENSION SYSTOLIC: 2.9 CM
ECHO LV IVSD: 1.5 CM (ref 0.6–0.9)
ECHO LV MASS 2D: 245 G (ref 67–162)
ECHO LV MASS INDEX 2D: 122.5 G/M2 (ref 43–95)
ECHO LV POSTERIOR WALL DIASTOLIC: 1.4 CM (ref 0.6–0.9)
ECHO LV RELATIVE WALL THICKNESS RATIO: 0.65
ECHO LVOT AREA: 2.8 CM2
ECHO LVOT AV VTI INDEX: 0.82
ECHO LVOT DIAM: 1.9 CM
ECHO LVOT MEAN GRADIENT: 3 MMHG
ECHO LVOT PEAK GRADIENT: 6 MMHG
ECHO LVOT STROKE VOLUME INDEX: 42.5 ML/M2
ECHO LVOT SV: 85 ML
ECHO LVOT VTI: 30 CM
ECHO MV A VELOCITY: 1.35 M/S
ECHO MV AREA PHT: 2.2 CM2
ECHO MV E DECELERATION TIME (DT): 341.8 MS
ECHO MV E VELOCITY: 1.1 M/S
ECHO MV E/A RATIO: 0.81
ECHO MV PRESSURE HALF TIME (PHT): 99.1 MS
ECHO RA AREA 4C: 8 CM2
ECHO RIGHT VENTRICULAR SYSTOLIC PRESSURE (RVSP): 23 MMHG
ECHO RV INTERNAL DIMENSION: 2.2 CM
ECHO RV TAPSE: 2.4 CM (ref 1.5–2)
ECHO TRICUSPID ANNULAR PEAK SYSTOLIC VELOCITY: 14 CM/S
ECHO TV REGURGITANT MAX VELOCITY: 2.21 M/S
GLUCOSE BLD STRIP.AUTO-MCNC: 112 MG/DL (ref 65–117)
GLUCOSE BLD STRIP.AUTO-MCNC: 155 MG/DL (ref 65–117)
GLUCOSE BLD STRIP.AUTO-MCNC: 175 MG/DL (ref 65–117)
GLUCOSE SERPL-MCNC: 180 MG/DL (ref 65–100)
PERFORMED BY, TECHID: ABNORMAL
PERFORMED BY, TECHID: ABNORMAL
PERFORMED BY, TECHID: NORMAL
POTASSIUM SERPL-SCNC: 4.9 MMOL/L (ref 3.5–5.1)
SODIUM SERPL-SCNC: 138 MMOL/L (ref 136–145)

## 2022-02-15 PROCEDURE — 70450 CT HEAD/BRAIN W/O DYE: CPT

## 2022-02-15 PROCEDURE — 96372 THER/PROPH/DIAG INJ SC/IM: CPT

## 2022-02-15 PROCEDURE — 74011000250 HC RX REV CODE- 250: Performed by: FAMILY MEDICINE

## 2022-02-15 PROCEDURE — 74011250637 HC RX REV CODE- 250/637: Performed by: FAMILY MEDICINE

## 2022-02-15 PROCEDURE — 90935 HEMODIALYSIS ONE EVALUATION: CPT

## 2022-02-15 PROCEDURE — G0257 UNSCHED DIALYSIS ESRD PT HOS: HCPCS

## 2022-02-15 PROCEDURE — 74011636637 HC RX REV CODE- 636/637: Performed by: FAMILY MEDICINE

## 2022-02-15 PROCEDURE — 74011250636 HC RX REV CODE- 250/636: Performed by: FAMILY MEDICINE

## 2022-02-15 PROCEDURE — 80048 BASIC METABOLIC PNL TOTAL CA: CPT

## 2022-02-15 PROCEDURE — 97161 PT EVAL LOW COMPLEX 20 MIN: CPT

## 2022-02-15 PROCEDURE — G0378 HOSPITAL OBSERVATION PER HR: HCPCS

## 2022-02-15 PROCEDURE — 82962 GLUCOSE BLOOD TEST: CPT

## 2022-02-15 PROCEDURE — 36415 COLL VENOUS BLD VENIPUNCTURE: CPT

## 2022-02-15 PROCEDURE — 93306 TTE W/DOPPLER COMPLETE: CPT

## 2022-02-15 RX ORDER — TIMOLOL MALEATE 5 MG/ML
1 SOLUTION/ DROPS OPHTHALMIC 2 TIMES DAILY
Status: DISCONTINUED | OUTPATIENT
Start: 2022-02-15 | End: 2022-02-15

## 2022-02-15 RX ORDER — MIDODRINE HYDROCHLORIDE 5 MG/1
5 TABLET ORAL
Status: COMPLETED | OUTPATIENT
Start: 2022-02-15 | End: 2022-02-15

## 2022-02-15 RX ORDER — CARVEDILOL 3.12 MG/1
3.12 TABLET ORAL 2 TIMES DAILY WITH MEALS
Status: DISCONTINUED | OUTPATIENT
Start: 2022-02-15 | End: 2022-02-15

## 2022-02-15 RX ORDER — LATANOPROST 50 UG/ML
1 SOLUTION/ DROPS OPHTHALMIC EVERY EVENING
Status: DISCONTINUED | OUTPATIENT
Start: 2022-02-15 | End: 2022-02-15

## 2022-02-15 RX ORDER — ATORVASTATIN CALCIUM 10 MG/1
10 TABLET, FILM COATED ORAL
Status: DISCONTINUED | OUTPATIENT
Start: 2022-02-15 | End: 2022-02-16 | Stop reason: HOSPADM

## 2022-02-15 RX ORDER — DIPHENHYDRAMINE HYDROCHLORIDE 50 MG/ML
25 INJECTION, SOLUTION INTRAMUSCULAR; INTRAVENOUS
Status: DISCONTINUED | OUTPATIENT
Start: 2022-02-15 | End: 2022-02-16 | Stop reason: HOSPADM

## 2022-02-15 RX ORDER — PANTOPRAZOLE SODIUM 40 MG/1
40 TABLET, DELAYED RELEASE ORAL
Status: DISCONTINUED | OUTPATIENT
Start: 2022-02-15 | End: 2022-02-16 | Stop reason: HOSPADM

## 2022-02-15 RX ORDER — MELATONIN
1000 DAILY
Status: DISCONTINUED | OUTPATIENT
Start: 2022-02-15 | End: 2022-02-16 | Stop reason: HOSPADM

## 2022-02-15 RX ORDER — GUAIFENESIN 100 MG/5ML
81 LIQUID (ML) ORAL DAILY
Status: DISCONTINUED | OUTPATIENT
Start: 2022-02-15 | End: 2022-02-16 | Stop reason: HOSPADM

## 2022-02-15 RX ORDER — FUROSEMIDE 40 MG/1
80 TABLET ORAL DAILY
Status: DISCONTINUED | OUTPATIENT
Start: 2022-02-15 | End: 2022-02-16 | Stop reason: HOSPADM

## 2022-02-15 RX ADMIN — CARVEDILOL 3.12 MG: 3.12 TABLET, FILM COATED ORAL at 08:18

## 2022-02-15 RX ADMIN — SACUBITRIL AND VALSARTAN 1 TABLET: 24; 26 TABLET, FILM COATED ORAL at 08:18

## 2022-02-15 RX ADMIN — SODIUM CHLORIDE, PRESERVATIVE FREE 10 ML: 5 INJECTION INTRAVENOUS at 06:41

## 2022-02-15 RX ADMIN — Medication 1000 UNITS: at 08:18

## 2022-02-15 RX ADMIN — TIMOLOL MALEATE 1 DROP: 5 SOLUTION/ DROPS OPHTHALMIC at 21:36

## 2022-02-15 RX ADMIN — ATORVASTATIN CALCIUM 10 MG: 10 TABLET, FILM COATED ORAL at 21:38

## 2022-02-15 RX ADMIN — BRIMONIDINE TARTRATE 1 DROP: 2 SOLUTION/ DROPS OPHTHALMIC at 09:54

## 2022-02-15 RX ADMIN — INSULIN LISPRO 2 UNITS: 100 INJECTION, SOLUTION INTRAVENOUS; SUBCUTANEOUS at 08:18

## 2022-02-15 RX ADMIN — LATANOPROST 1 DROP: 50 SOLUTION/ DROPS OPHTHALMIC at 18:16

## 2022-02-15 RX ADMIN — MIDODRINE HYDROCHLORIDE 5 MG: 5 TABLET ORAL at 19:20

## 2022-02-15 RX ADMIN — PANTOPRAZOLE SODIUM 40 MG: 40 TABLET, DELAYED RELEASE ORAL at 16:50

## 2022-02-15 RX ADMIN — SODIUM CHLORIDE, PRESERVATIVE FREE 10 ML: 5 INJECTION INTRAVENOUS at 14:53

## 2022-02-15 RX ADMIN — BRIMONIDINE TARTRATE 1 DROP: 2 SOLUTION/ DROPS OPHTHALMIC at 21:35

## 2022-02-15 RX ADMIN — DORZOLAMIDE HYDROCHLORIDE 1 DROP: 20 SOLUTION/ DROPS OPHTHALMIC at 21:35

## 2022-02-15 RX ADMIN — DORZOLAMIDE HYDROCHLORIDE 1 DROP: 20 SOLUTION/ DROPS OPHTHALMIC at 09:54

## 2022-02-15 RX ADMIN — HEPARIN SODIUM 5000 UNITS: 5000 INJECTION INTRAVENOUS; SUBCUTANEOUS at 06:41

## 2022-02-15 RX ADMIN — ASPIRIN 81 MG 81 MG: 81 TABLET ORAL at 08:18

## 2022-02-15 RX ADMIN — FUROSEMIDE 80 MG: 40 TABLET ORAL at 08:18

## 2022-02-15 RX ADMIN — TIMOLOL MALEATE 1 DROP: 5 SOLUTION/ DROPS OPHTHALMIC at 08:26

## 2022-02-15 RX ADMIN — ACETAMINOPHEN 650 MG: 325 TABLET ORAL at 12:11

## 2022-02-15 RX ADMIN — INSULIN LISPRO 2 UNITS: 100 INJECTION, SOLUTION INTRAVENOUS; SUBCUTANEOUS at 12:07

## 2022-02-15 RX ADMIN — PANTOPRAZOLE SODIUM 40 MG: 40 TABLET, DELAYED RELEASE ORAL at 08:30

## 2022-02-15 NOTE — PROGRESS NOTES
PHYSICAL THERAPY EVALUATION/DISCHARGE  Patient: Chichi Abdalla (89 y.o. female)  Date: 2/15/2022  Primary Diagnosis: Syncope [R55]       Precautions: fall         ASSESSMENT  Based on the objective data described below, the patient presents with decreased balance but appears to be at baseline status with functional mobility with use of single point cane for ambulation of short distances. Other factors to consider for discharge: patient lives with  in private residence     Further skilled acute physical therapy is not indicated at this time. PLAN :  Recommendation for discharge: (in order for the patient to meet his/her long term goals)  No skilled physical therapy/ follow up rehabilitation needs identified at this time. This discharge recommendation:  Has been made in collaboration with the attending provider and/or case management    IF patient discharges home will need the following DME: patient owns DME required for discharge       SUBJECTIVE:   Patient stated I use my cane for short distances and the walker if I am going out somewhere and have to walk a lot.     OBJECTIVE DATA SUMMARY:   HISTORY:    Past Medical History:   Diagnosis Date    Boil of buttock 06/24/2021    R Buttock    CAD (coronary artery disease)     MI 2020    Chronic kidney disease     dialyssi Tu Thu Sat    [facility] [address]     Diabetes mellitus ([facility] [address] 6/24/2021    ESRD on dialysis ([facility] [address] 6/24/2021    Heart failure ([facility] [address]     Hypertension     Ill-defined condition     scolosis    Menopause      Past Surgical History:   Procedure Laterality Date    HX BREAST BIOPSY Right 08/09/2021    Stereo    VASCULAR SURGERY PROCEDURE UNLIST         Prior level of function: Patient was modified independent with all functional mobility with use of rolling walker for long distances and cane for short distances. Personal factors and/or comorbidities impacting plan of care: patient receives dialysis.      Home Situation  Home Environment: Private residence  # Steps to Enter: 4  Rails to Enter: Yes  Hand Rails : Bilateral  One/Two Story Residence: One story  Living Alone: No  Support Systems: Spouse/Significant Other  Patient Expects to be Discharged to[de-identified] Home  Current DME Used/Available at Home: Cane, straight,Walker, rolling    EXAMINATION/PRESENTATION/DECISION MAKING:   Critical Behavior:  Neurologic State: Alert  Orientation Level: Oriented X4  Cognition: Appropriate decision making,Appropriate for age attention/concentration,Appropriate safety awareness     Hearing: Auditory  Auditory Impairment: None  Range Of Motion:  AROM: Within functional limits                       Strength:    Strength: Within functional limits                       Functional Mobility:  Bed Mobility:  Rolling: Independent  Supine to Sit: Independent  Sit to Supine: Independent  Scooting: Independent  Transfers:  Sit to Stand: Independent  Stand to Sit: Independent  Stand Pivot Transfers: Independent                    Balance:   Sitting: Intact  Standing: Intact; With support  Ambulation/Gait Training:  Distance (ft): 30 Feet (ft)  Assistive Device: Cane, straight  Ambulation - Level of Assistance: Modified independent     Gait Description (WDL): Exceptions to [facility]           Base of Support: Widened                           Patient uses single point cane for standing balance   Stairs:  Number of Stairs Trained: 7  Stairs - Level of Assistance: Modified independent   Rail Use: Both         Physical Therapy Evaluation Charge Determination   History Examination Presentation Decision-Making   LOW Complexity : Zero comorbidities / personal factors that will impact the outcome / POC LOW Complexity : 1-2 Standardized tests and measures addressing body structure, function, activity limitation and / or participation in recreation  LOW Complexity : Stable, uncomplicated  Low      Based on the above components, the patient evaluation is determined to be of the following complexity level: LOW     Pain Ratin/10    Activity Tolerance:   Good      After treatment patient left in no apparent distress:   Call bell within reach, Caregiver / family present and sitting up in bed with lunch    COMMUNICATION/EDUCATION:   The patients plan of care was discussed with: Physical therapist, Physician and Case management. Fall prevention education was provided and the patient/caregiver indicated understanding.     Thank you for this referral.  Nan Agarwal, PT   Time Calculation: 13 mins

## 2022-02-15 NOTE — PROGRESS NOTES
Problem: Falls - Risk of  Goal: *Absence of Falls  Description: Document Freeman Fail Fall Risk and appropriate interventions in the flowsheet.   2/15/2022 1807 by Tabby France RN  Outcome: Progressing Towards Goal  Note: Fall Risk Interventions:  Mobility Interventions: Patient to call before getting OOB,Strengthening exercises (ROM-active/passive),Utilize walker, cane, or other assistive device              Elimination Interventions: Call light in reach           2/15/2022 0725 by Tabby France RN  Outcome: Progressing Towards Goal  Note: Fall Risk Interventions:  Mobility Interventions: Patient to call before getting OOB,Strengthening exercises (ROM-active/passive),Utilize walker, cane, or other assistive device              Elimination Interventions: Call light in reach,Patient to call for help with toileting needs,Toileting schedule/hourly rounds           2/15/2022 0725 by Tabby France RN  Outcome: Progressing Towards Goal  Note: Fall Risk Interventions:  Mobility Interventions: Patient to call before getting OOB,Strengthening exercises (ROM-active/passive),Utilize walker, cane, or other assistive device              Elimination Interventions: Call light in reach,Patient to call for help with toileting needs,Toileting schedule/hourly rounds

## 2022-02-15 NOTE — DISCHARGE INSTRUCTIONS
Patient Education        Fainting: Care Instructions  Your Care Instructions     When you faint, or pass out, you lose consciousness for a short time. A brief drop in blood flow to the brain often causes it. When you fall or lie down, more blood flows to your brain and you regain consciousness. Emotional stress, pain, or overheating--especially if you have been standing--can make you faint. In these cases, fainting is usually not serious. But fainting can be a sign of a more serious problem. Your doctor may want you to have more tests to rule out other causes. The treatment you need depends on the reason why you fainted. The doctor has checked you carefully, but problems can develop later. If you notice any problems or new symptoms, get medical treatment right away. Follow-up care is a key part of your treatment and safety. Be sure to make and go to all appointments, and call your doctor if you are having problems. It's also a good idea to know your test results and keep a list of the medicines you take. How can you care for yourself at home? · Drink plenty of fluids to prevent dehydration. If you have kidney, heart, or liver disease and have to limit fluids, talk with your doctor before you increase your fluid intake. When should you call for help? Call 911 anytime you think you may need emergency care. For example, call if:    · You have symptoms of a heart problem. These may include:  ? Chest pain or pressure. ? Severe trouble breathing. ? A fast or irregular heartbeat. ? Lightheadedness or sudden weakness. ? Coughing up pink, foamy mucus. ? Passing out. After you call 911, the  may tell you to chew 1 adult-strength or 2 to 4 low-dose aspirin. Wait for an ambulance. Do not try to drive yourself.     · You have symptoms of a stroke. These may include:  ? Sudden numbness, tingling, weakness, or loss of movement in your face, arm, or leg, especially on only one side of your body.   ? Sudden vision changes. ? Sudden trouble speaking. ? Sudden confusion or trouble understanding simple statements. ? Sudden problems with walking or balance. ? A sudden, severe headache that is different from past headaches.     · You passed out (lost consciousness) again. Watch closely for changes in your health, and be sure to contact your doctor if:    · You do not get better as expected. Where can you learn more? Go to http://www.gray.com/  Enter A848 in the search box to learn more about \"Fainting: Care Instructions. \"  Current as of: July 1, 2021               Content Version: 13.0  © 8610-0226 [employer]. Care instructions adapted under license by [employer] (which disclaims liability or warranty for this information). If you have questions about a medical condition or this instruction, always ask your healthcare professional. [address] any warranty or liability for your use of this information.

## 2022-02-15 NOTE — PROGRESS NOTES
02/15/22 1730   Patient Information   Acute or Chronic Care Chronic   Treatment Number 1   Informed Consent Verified Yes(obtained)   Dialysis Weight   Pre-Dialysis Weight 90.7 kg (199 lb 15.3 oz)   Goal/Amount of Fluid to Remove (mL) 2500 mL   Dialyzer/Set Up Inspection   Dialyzer/Set Up Inspection Revaclear   Alarms Verified Yes   Test Pass Yes   pH 7.4   Machine Conductivity 13.9   Meter Conductivity 14   Reverse Osmosis Safety Checks   Reverse Osmosis Machine Log Completed Yes   Total Chlorine Test Negative   Machine Initiation   Machine Number [id number]   Hemodialysis Start Time 1730   Unused Lines Clamped Yes   Machine Temperature 96.8 °F (36 °C)   Dialysis Initiation   All Connections Secured Yes   NS Bag  Yes   Saline Line Double Clamped Yes   Prime Given   Air Foam Detector Engaged Yes   Dialysate NA (mEq/L) 138   Dialysate K (mEq/L) 2   Dialysate CA (mEq/L) 2.5   Dialysate HCO3 (mEq/L) 35   Citrasate No   During Hemodialysis    Temp 98.1 °F (36.7 °C)   Temp source Oral   Pulse (Heart Rate) (!) 59   Resp Rate 17   BP (!) 143/66   MAP (Calculated) 92   Transducer Checks Dry   Saline Given (mL) 200 mL   Heparin Bolus (units) 0 units   Continuous Heparin Infusion (Units/hr) 0 Units/hr   Blood Flow Rate (ml/min) 450 ml/min   Dialysate Flow Rate (ml/hr) 600 ml/hr   Arterial Access Pressure (mmHg) -80   Venous Return Pressure (mmHg) 180   Transmembrane Pressure (mmHg) 90 mmHg   Ultrafiltration Rate (ml/hr) 860 ml/hr   Fluid Removed (mL) 0   $$ Dialysis Charges   $$ Method Hemodialysis      02/15/22 2100   During Hemodialysis    Temp 98.3 °F (36.8 °C)   Pulse (Heart Rate) (!) 58   Resp Rate 17   BP (!) 101/55   MAP (Calculated) 70   Fluid Removed (mL) 2500   NET Fluid Removed (mL) 2000 ml   Post-Dialysis   Rinseback Volume (ml) 200 ml   Duration of Treatment (hours) 3.5 hours   Patient Response to Treatment Well   Patient Disposition (tx @ bedside)   Condition of Dialyzer Filter Good   Hemodialysis End Time 2100 Assessment Pre Post   LOC A/OX4 NO CHANGE   Lungs BLCTA NO CHANGE   Cardiac RRR, NO TELE NO CHANGE   Skin APPROPRIATE FOR ETHNICITY, W/D/I NO CHANGE   Edema NONE NO CHANGE   Pain Pain Intensity 1: 0 (02/15/22 0741) NO CHANGE     Orders   Duration: Start: 3980 End: 2100 Total: 3.5   Dialyzer: Dialyzer/Set Up Inspection: Marialuisa Pew (02/15/22 1730)   K Bath: Dialysate K (mEq/L): 2 (02/15/22 1730)   Ca Bath: Dialysate CA (mEq/L): 2.5 (02/15/22 1730)   Na: Dialysate NA (mEq/L): 138 (02/15/22 1730)   Bicarb: Dialysate HCO3 (mEq/L): 35 (02/15/22 1730)   Target Fluid Removal: Goal/Amount of Fluid to Remove (mL): 2500 mL (02/15/22 1730)     Access   Type & Location: LUE AVG   Comments:  +BRUIT/THRILL, NO S/S INFECTION, NOTED AREAS OF SCARRING AND SCABBING TO SITE.  ACCESSED W/ 2 15G 1IN NEEDLES W/O ISSUES, AWAY FROM SCABBED AREAS, GOOD FLOW TO EACH LINE                                      Labs   HBsAg (Antigen) / date:  11/8/16 NEGATIVE                                             HBsAb (Antibody) / date: IMMUNE (38) 8/12/21   Source: PHYSICIAN PORTAL   Obtained/Reviewed  Critical Results Called HGB   Date Value Ref Range Status   02/14/2022 11.7 (L) 13.5 - 17.5 g/dL Final     Potassium   Date Value Ref Range Status   02/15/2022 4.9 3.5 - 5.1 mmol/L Final     Calcium   Date Value Ref Range Status   02/15/2022 9.4 8.5 - 10.1 mg/dL Final     BUN   Date Value Ref Range Status   02/15/2022 57 (H) 6 - 20 mg/dL Final     Creatinine   Date Value Ref Range Status   02/15/2022 7.86 (H) 0.55 - 1.02 mg/dL Final        Meds Given N/A     Adequacy / Fluid    Total Liters Process: 77.6      Comments   Time Out Done:   (Time) 1725   Admitting Diagnosis: DIZZINESS, SYNCOPE   Consent obtained/signed: Informed Consent Verified: Yes (obtained) (02/15/22 1730)   Machine / RO # Machine Number: [id number] (02/15/22 1730)   Primary Nurse Rpt Pre: XENIA WASHBURN   Primary Nurse Rpt Post: Severiano Chihuahua   Pt Education: ACCESS CARE, DIET, FLUID REMOVAL/MANAGEMENT   Dialyzer: [id number]  Lines: [id number]  Tx Summary   Comments: @1730 TX STARTED W/O ISSUES, GOOD FLOW TO EACH LINE.   @1800 pt watching tv, nad  @1900 pt resting w/ eyes closed, nad, SBP into 90s UF goal decreased  @1915 SBP remains in 90s, UF off and BFR down to 350, pt does state her head feeling \"off\", prn midodrine requested as it was not administered prior to HD tx.            @2100 TX COMPLETED W/O ISSUES, BLOOD RETURNED, LINES FLUSHED, NEEDLES REMOVED 1 @ TIME, MANUAL PRESSURE APPLIED TO EACH SITE UNTIL HEMOSTASIS ACHIEVED. ADHESIVE BANDAGES TO EACH SITE. NO S/S ACTIVE BLEEDING UPON HD RN LEAVING PT ROOM.  BED IN LOWEST POSITION, CALL CAMILO W/I LEIDA, SPOUSE @ BEDSIDE,

## 2022-02-15 NOTE — ROUTINE PROCESS
Nephrology was recalled today re: the nephrology consult and to advise that this pt is dialysis pt that has dialysis on tue, thurs, Saturday.

## 2022-02-15 NOTE — PROGRESS NOTES
Patients case reviewed during interdisciplinary team meeting in [address] [place], [place] [address]Acute Care Unit. Rev.  John Lee, Ross Tuttle

## 2022-02-15 NOTE — CONSULTS
CONSULTATION    REASON FOR CONSULT:  Dizziness     REQUESTING PROVIDER:  Dr. Sudheer Nueñz:    Chief Complaint   Patient presents with    Dizziness     woke up this am around 0800 and was dizzy, felt fine yesterday, no new medication changes, had normal dialysis treatment Saturday, denies shortness/n/v, hx of MI 2020. denies chest pain, ambulatory to ER 10 with cane          HISTORY OF PRESENT ILLNESS:  Dorian Gandara is a 73y.o. year-old female with past medical history significant for MI, HTN, DM, HLD, ESRD with HD Dependence T,TH,SAT, and Chronic HFpEF who presented to ED for evaluation of Dizziness. Patient reports that she sleeps in recliner s/t scoliosis and when she woke up and attempted to stand yesterday am became \"dizzy\" described as feeling faint, and fell back into chair, no LOC. Checked her BP at that time and it was 170s. Her  went and got her medications, a Banana, and some Diet Sundrop and she took it all. Sometime later she checked her glucose and it was 130. She was eventually able to stand and presented to ED ambulating. Her concern was the dizziness, because she had some with MI in 2020. Workup in ED was negative for acute cardiac ischemia, however given risk factors and prior history was admitted for observation for further trending and workup. This am she is sx free and reports that she is feeling better. She has no new complaints. She reports that she is followed by Dr. Pancho Chapman in the OP setting and has an appointment with him next week. Records from hospital admission course thus far reviewed.       Telemetry Review: SB rates 50s      PAST MEDICAL HISTORY:    Past Medical History:   Diagnosis Date    Boil of buttock 06/24/2021    R Buttock    CAD (coronary artery disease)     MI 2020    Chronic kidney disease     dialyssi Tu Thu Sat    Diabetes ([facility]     Diabetes mellitus ([address] 6/24/2021    ESRD on dialysis ([address] 6/24/2021    Heart failure ([address]  Hypertension     Ill-defined condition     scolosis    Menopause        PAST SURGICAL HISTORY:   Past Surgical History:   Procedure Laterality Date    HX BREAST BIOPSY Right 08/09/2021    Stereo    VASCULAR SURGERY PROCEDURE UNLIST         ALLERGIES:    Allergies   Allergen Reactions    Influenza Virus Vaccines Shortness of Breath    Darvon [Propoxyphene] Nausea and Vomiting    Levaquin [Levofloxacin] Other (comments)    Nsaids (Non-Steroidal Anti-Inflammatory Drug) Other (comments)     Gi bleed    Tolectin [Tolmetin] Nausea and Vomiting    Tramadol Other (comments)     Gi bleed       FAMILY HISTORY:    Family History   Problem Relation Age of Onset    Hypertension Other     Diabetes Other     Cancer Other     Heart Disease Other        SOCIAL HISTORY:    Social History     Tobacco Use    Smoking status: Never Smoker    Smokeless tobacco: Never Used   Vaping Use    Vaping Use: Never used   Substance Use Topics    Alcohol use: Never    Drug use: Not Currently         HOME MEDICATIONS:    Prior to Admission Medications   Prescriptions Last Dose Informant Patient Reported? Taking? Entresto 24-26 mg tablet 2/14/2022 at Unknown time  Yes Yes   Sig: TAKE 1 TABLET BY MOUTH TWICE DAILY   FA-Vit BComp&C-Zinc-Vitamin D3 (Dialyvite 800-Ultra D) 0.8-2,000 mg-unit tab 2/14/2022 at Unknown time  Yes Yes   Sig: Take 1 Tab by mouth daily. Insulin Needles, Disposable, 31 gauge x 1/4\" ndle 2/14/2022 at Unknown time  Yes Yes   Sig: USE PEN NEEDLES TO INJECT INSULIN TWICE DAILY   acetaminophen (TYLENOL PO) Unknown at Unknown time  Yes No   Sig: Take  by mouth. aspirin 81 mg chewable tablet 2/14/2022 at Unknown time  Yes Yes   Sig: Take 81 mg by mouth daily. atorvastatin (LIPITOR) 10 mg tablet 2/14/2022 at Unknown time  Yes Yes   Sig: 10 mg daily.    carvediloL (COREG) 3.125 mg tablet   Yes No   cholecalciferol (VITAMIN D3) (1000 Units /25 mcg) tablet 2/14/2022 at Unknown time  Yes Yes   Sig: Take 1,000 Units by mouth daily. furosemide (LASIX) 80 mg tablet 2022 at Unknown time  Yes Yes   Si mg daily. insulin aspart U-100 (NOVOLOG) 100 unit/mL (3 mL) inpn 2022 at Unknown time  Yes Yes   Sig: 10 Units by SubCUTAneous route. insulin detemir (LEVEMIR FLEXPEN SC) 2022 at Unknown time  Yes Yes   Sig: 10 Units by SubCUTAneous route nightly. latanoprost (XALATAN) 0.005 % ophthalmic solution 2022 at Unknown time  Yes Yes   Sig: Apply 1 Drop to eye At bedtime. midodrine (PROAMATINE) 5 mg tablet   Yes No   Sig: TAKE 1 TABLET BY MOUTH ONCE DAILY BEFORE DIALYSIS   omega 3-dha-epa-fish oil 100-160-1,000 mg cap 2022 at Unknown time  Yes Yes   Sig: Take  by mouth.   pantoprazole (PROTONIX) 40 mg tablet 2022 at Unknown time  Yes Yes   Si mg daily. simethicone (Gas-X Extra Strength) 125 mg chewable tablet Unknown at Unknown time  Yes No   Sig: Take 125 mg by mouth every six (6) hours as needed for Flatulence. sucroferric oxyhydroxide (Velphoro) 500 mg chew chewable tablet 2022 at Unknown time  Yes Yes   Sig: Take 500 mg by mouth three (3) times daily (with meals). timolol (TIMOPTIC) 0.5 % ophthalmic solution 2022 at Unknown time  Yes Yes   Sig: Apply  to eye two (2) times a day. Facility-Administered Medications: None       REVIEW OF SYSTEMS:  Complete review of systems performed, pertinents noted above, all other systems are negative.     Patient Vitals for the past 24 hrs:   Temp Pulse Resp BP SpO2   02/15/22 0822  63  (!) 147/58    02/15/22 0818  62  (!) 152/61    02/15/22 0800  63      02/15/22 0741 97.5 °F (36.4 °C) (!) 55 18 (!) 154/55 98 %   02/15/22 0346 98.3 °F (36.8 °C) 60 20 (!) 136/51 98 %   02/15/22 0014 98.1 °F (36.7 °C) 61 20 (!) 139/57 98 %   22 2000  78      22 1919 98.8 °F (37.1 °C) 78 18 (!) 149/76 98 %   22 1612 98 °F (36.7 °C) 65 18 (!) 187/67 100 %   22 1600  62      22 1505  (!) 58 18 (!) 187/68 100 %   02/14/22 1231  93 18  100 %   02/14/22 1130  (!) 56 16 (!) 187/63 99 %   02/14/22 1100  (!) 56 21 (!) 171/65 100 %       PHYSICAL EXAMINATION:    General: Well nourished female sitting up in bed, NAD, A&O  HEENT: Normocephalic, PERRL, no drainage, no carotid bruit auscultated bilaterally. Neck: Supple, Trachea midline, No JVD  RESP: CTA bilaterally. + Symmetrical chest movement. No SOB or distress. On RA  Cardiovascular: RRR no MRG  PVS: No rubor, cyanosis, no edema, Radial, DP, PT pulses equal bilaterally  ABD: obese, soft, NT, Normoactive BS  Derm: Warm/Dry/Intact with no lesions, normal turgor, LUE fistula noted  Neuro: A&O PPTS, cranial nerves II- XII grossly intact via interaction with patient. No focal deficits  PSYCH: No anxiety or agitation      Electrocardiogram performed earlier reviewed, it shows SR, low voltage precordial leads, no acute ischemic changes    Recent labs results and imaging reviewed. Recent Results (from the past 24 hour(s))   CBC WITH AUTOMATED DIFF    Collection Time: 02/14/22 11:05 AM   Result Value Ref Range    WBC 5.9 4.4 - 11.3 K/uL    RBC 4.39 (L) 4.50 - 5.90 M/uL    HGB 11.7 (L) 13.5 - 17.5 g/dL    HCT 36.3 36 - 46 %    MCV 82.8 80 - 100 FL    MCH 26.7 (L) 31 - 34 PG    MCHC 32.3 31.0 - 36.0 g/dL    RDW 14.1 11.5 - 14.5 %    PLATELET 479 378 - 921 K/uL    MPV 8.6 6.5 - 11.5 FL    NRBC 0.1  WBC    ABSOLUTE NRBC 0.00 K/uL    NEUTROPHILS 71 42 - 75 %    LYMPHOCYTES 17 (L) 20.5 - 51.1 %    MONOCYTES 9 1.7 - 9.3 %    EOSINOPHILS 2 0.9 - 2.9 %    BASOPHILS 1 0.0 - 2.5 %    ABS. NEUTROPHILS 4.2 1.8 - 7.7 K/UL    ABS. LYMPHOCYTES 1.0 1.0 - 4.8 K/UL    ABS. MONOCYTES 0.5 0.2 - 2.4 K/UL    ABS. EOSINOPHILS 0.1 0.0 - 0.7 K/UL    ABS.  BASOPHILS 0.1 0.0 - 0.2 K/UL   PROTHROMBIN TIME + INR    Collection Time: 02/14/22 11:05 AM   Result Value Ref Range    Prothrombin time 9.9 9.0 - 11.1 sec    INR 1.0 0.9 - 1.1     METABOLIC PANEL, COMPREHENSIVE    Collection Time: 02/14/22 11:05 AM   Result Value Ref Range    Sodium 133 (L) 136 - 145 mmol/L    Potassium 4.7 3.5 - 5.1 mmol/L    Chloride 98 97 - 108 mmol/L    CO2 28 21 - 32 mmol/L    Anion gap 7 5 - 15 mmol/L    Glucose 162 (H) 65 - 100 mg/dL    BUN 22 (H) 6 - 20 mg/dL    Creatinine 7.16 (H) 0.55 - 1.02 mg/dL    BUN/Creatinine ratio 3 (L) 12 - 20      GFR est AA 7 (L) >60 ml/min/1.73m2    GFR est non-AA 6 (L) >60 ml/min/1.73m2    Calcium 9.9 8.5 - 10.1 mg/dL    Bilirubin, total 0.7 0.2 - 1.0 mg/dL    AST (SGOT) 61 (H) 15 - 37 U/L    ALT (SGPT) 22 12 - 78 U/L    Alk.  phosphatase 77 45 - 117 U/L    Protein, total 8.2 6.4 - 8.2 g/dL    Albumin 3.5 3.5 - 5.0 g/dL    Globulin 4.7 (H) 2.0 - 4.0 g/dL    A-G Ratio 0.7 (L) 1.1 - 2.2     TROPONIN-HIGH SENSITIVITY    Collection Time: 02/14/22 11:05 AM   Result Value Ref Range    Troponin-High Sensitivity 31 0 - 51 ng/L   NT-PRO BNP    Collection Time: 02/14/22 11:05 AM   Result Value Ref Range    NT pro- (H) <125 pg/mL   MAGNESIUM    Collection Time: 02/14/22 11:05 AM   Result Value Ref Range    Magnesium 2.5 (H) 1.6 - 2.4 mg/dL   TROPONIN-HIGH SENSITIVITY    Collection Time: 02/14/22  1:16 PM   Result Value Ref Range    Troponin-High Sensitivity 41 0 - 51 ng/L   GLUCOSE, POC    Collection Time: 02/14/22  4:24 PM   Result Value Ref Range    Glucose (POC) 139 (H) 65 - 117 mg/dL    Performed by [employer]    GLUCOSE, POC    Collection Time: 02/14/22  9:13 PM   Result Value Ref Range    Glucose (POC) 143 (H) 65 - 117 mg/dL    Performed by [address] [place] [address] Street, BASIC    Collection Time: 02/15/22  5:31 AM   Result Value Ref Range    Sodium 138 136 - 145 mmol/L    Potassium 4.9 3.5 - 5.1 mmol/L    Chloride 101 97 - 108 mmol/L    CO2 27 21 - 32 mmol/L    Anion gap 10 5 - 15 mmol/L    Glucose 180 (H) 65 - 100 mg/dL    BUN 57 (H) 6 - 20 mg/dL    Creatinine 7.86 (H) 0.55 - 1.02 mg/dL    BUN/Creatinine ratio 7 (L) 12 - 20      GFR est AA 6 (L) >60 ml/min/1.73m2    GFR est non-AA 5 (L) >60 ml/min/1.73m2    Calcium 9.4 8.5 - 10.1 mg/dL   GLUCOSE, POC    Collection Time: 02/15/22  7:45 AM   Result Value Ref Range    Glucose (POC) 175 (H) 65 - 117 mg/dL    Performed by Hema Salvador    ECHO ADULT COMPLETE    Collection Time: 02/15/22  9:18 AM   Result Value Ref Range    IVSd 1.5 (A) 0.6 - 0.9 cm    LVIDd 4.3 3.9 - 5.3 cm    LVIDs 2.9 cm    LVOT Diameter 1.9 cm    LVPWd 1.4 (A) 0.6 - 0.9 cm    EF BP 72 55 - 100 %    LV EDV A2C 49 mL    LV EDV A4C 76 mL    LV ESV A2C 12 mL    LV ESV A4C 26 mL    LVOT Peak Gradient 6 mmHg    LVOT Mean Gradient 3 mmHg    LVOT SV 85.0 ml    LVOT VTI 30.0 cm    RVIDd 2.2 cm    AV Area by Peak Velocity 2.1 cm2    AV Area by VTI 2.3 cm2    AV Peak Gradient 11 mmHg    AV Mean Gradient 5 mmHg    AV Peak Velocity 1.7 m/s    AV VTI 36.7 cm    MV A Velocity 1.35 m/s    MV E Wave Deceleration Time 341.8 ms    MV E Velocity 1.10 m/s    MV PHT 99.1 ms    MV Area by PHT 2.2 cm2    Fractional Shortening 2D 33 28 - 44 %    LV ESV Index A4C 13 mL/m2    LV EDV Index A4C 38 mL/m2    LV ESV Index A2C 6 mL/m2    LV EDV Index A2C 25 mL/m2    LVIDd Index 2.15 cm/m2    LVIDs Index 1.45 cm/m2    LV RWT Ratio 0.65     LV Mass 2D 245.0 (A) 67 - 162 g    LV Mass 2D Index 122.5 (A) 43 - 95 g/m2    MV E/A 0.81     LVOT Stroke Volume Index 42.5 mL/m2    LVOT Area 2.8 cm2    LVOT:AV VTI Index 0.82     TAPSE 2.4 1.5 - 2.0 cm    TAPSV 14 cm/s    RA Area 4C 8.0 cm2    Est. RA Pressure 3 mmHg    TR Max Velocity 2.21 m/s    RVSP 23 mmHg    CAPRI/BSA VTI 1.2 cm2/m2    CAPRI/BSA Peak Velocity 1.1 cm2/m2       XR Results (maximum last 3):   Results from Hospital Encounter encounter on 02/14/22    XR CHEST PORT      CT Results (maximum last 3):        Current Facility-Administered Medications:     aspirin chewable tablet 81 mg, 81 mg, Oral, DAILY, Len Suresh MD, 81 mg at 02/15/22 0818    atorvastatin (LIPITOR) tablet 10 mg, 10 mg, Oral, QHS, Len Suresh MD    carvediloL (COREG) tablet 3.125 mg, 3.125 mg, Oral, BID WITH MEALS, Shaina Anderson MD, 3.125 mg at 02/15/22 0818    cholecalciferol (VITAMIN D3) (1000 Units /25 mcg) tablet 1,000 Units, 1,000 Units, Oral, DAILY, Napoleon BUITRAGO MD, 1,000 Units at 02/15/22 0818    sacubitriL-valsartan (ENTRESTO) 24-26 mg tablet 1 Tablet, 1 Tablet, Oral, BID, Shaina Anderson MD, 1 Tablet at 02/15/22 0818    furosemide (LASIX) tablet 80 mg, 80 mg, Oral, DAILY, Shaina Anderson MD, 80 mg at 02/15/22 0818    midodrine (PROAMATINE) tablet 5 mg, 5 mg, Oral, ONCE PRN, Shaina Anderson MD    pantoprazole (PROTONIX) tablet 40 mg, 40 mg, Oral, ACB&D, Shaian Anderson MD, 40 mg at 02/15/22 0830    sodium chloride (NS) flush 5-40 mL, 5-40 mL, IntraVENous, Q8H, Len Suresh MD, 10 mL at 02/15/22 0641    sodium chloride (NS) flush 5-40 mL, 5-40 mL, IntraVENous, PRN, Shaina Anderson MD    acetaminophen (TYLENOL) tablet 650 mg, 650 mg, Oral, Q6H PRN **OR** acetaminophen (TYLENOL) suppository 650 mg, 650 mg, Rectal, Q6H PRN, Shaina Anderson MD    polyethylene glycol (MIRALAX) packet 17 g, 17 g, Oral, DAILY PRN, Shaina Anderson MD    ondansetron (ZOFRAN ODT) tablet 4 mg, 4 mg, Oral, Q8H PRN **OR** ondansetron (ZOFRAN) injection 4 mg, 4 mg, IntraVENous, Q6H PRN, Shaina Anderson MD    heparin (porcine) injection 5,000 Units, 5,000 Units, SubCUTAneous, Q8H, Len Suresh MD, 5,000 Units at 02/15/22 0641    insulin lispro (HUMALOG) injection, , SubCUTAneous, AC&HS, Shaina Anderson MD, 2 Units at 02/15/22 0818    glucose chewable tablet 16 g, 4 Tablet, Oral, PRN, Len Suresh MD    dextrose (D50W) injection syrg 12.5-25 g, 12.5-25 g, IntraVENous, PRN, Shaina Anderson MD    glucagon (GLUCAGEN) injection 1 mg, 1 mg, IntraMUSCular, PRN, Len Suresh MD    brimonidine (ALPHAGAN) 0.2 % ophthalmic solution 1 Drop, 1 Drop, Both Eyes, BID, Shaina Anderson MD, 1 Drop at 02/15/22 0954    timolol (TIMOPTIC) 0.5 % ophthalmic solution 1 Drop, 1 Drop, Both Eyes, BID, Len Suresh MD, 1 Drop at 02/15/22 0826    latanoprost (XALATAN) 0.005 % ophthalmic solution 1 Drop, 1 Drop, Both Eyes, QPM, Len Suresh MD, 1 Drop at 02/14/22 1710    dorzolamide (TRUSOPT) 2 % ophthalmic solution 1 Drop, 1 Drop, Both Eyes, BID, Len Suresh MD, 1 Drop at 02/15/22 3104          Case discussed with collaborating physician Dr. Nevaeh Mckenzie and our impression and recommendations are as follows:   1. Dizziness:  Not orthostatic. SBP noted to be elevated prior to patient arrival in ED and was still elevated upon arrival to ED despite patient taking morning am meds. ACS was ruled out with Trop trends 31-41, negative EKG. TTE pending for this am.  Patient reports followed by Vascular and feels she has had Carotid Duplex recently. No obvious Bruit noted. She did not check glucose at onset of sx yesterday. She denies chest pain or dyspnea. Last seen by Dr. Andres Simms approx 6  Months ago with appointment already scheduled for next week. Encouraged patient to keep that appointment. Note patients HR 50s/60s prior to Coreg. Would recommend holding for now until follow-up in OP setting next week. Encouraged patient to keep track of HR/BP until that follow-up and take trends with her. 2. HTN:  BP improved today with SBP in 140s this am. She reports that she usually holds meds on am of HD and takes midodrine s/t hypotension during tx. Would recommend continuing home routine, further per Nephrology. 3. Bradycardia:  Minimal with no rates <50. No pauses, blocks, significant ectopy noted on telemetry review. Stop Coreg. Follow-up with Dr. Andres Simms next week. OP holter per his discretion. 4. Chronic HFpEF:  TTE for today pending. Continue GDMT from OP setting except for BB. As long as Nephrology is in agreeance with Entresto. Thank you for involving us in the care of this patient. Please do not hesitate to call if additional questions arise.  If after hours please call [phone]

## 2022-02-15 NOTE — ROUTINE PROCESS
The pt is awake doing her am care. She have shown no syncopal episodes during the night. No further c/o of dizziness is being voiced. The assessment is unchanged.

## 2022-02-15 NOTE — ROUTINE PROCESS
The pt remains awake resting in bed with no c/o. She have shown no diabetic symptoms. She have been given a BT snack. She is beng assisted as needed.

## 2022-02-15 NOTE — PROGRESS NOTES
Problem: Falls - Risk of  Goal: *Absence of Falls  Description: Document Isaac Amado Fall Risk and appropriate interventions in the flowsheet.   2/15/2022 1819 by Celestine Muhammad RN  Outcome: Resolved/Met  Note: Fall Risk Interventions:  Mobility Interventions: Patient to call before getting OOB,Strengthening exercises (ROM-active/passive),Utilize walker, cane, or other assistive device              Elimination Interventions: Call light in reach           2/15/2022 1807 by Celestine Muhammad RN  Outcome: Progressing Towards Goal  Note: Fall Risk Interventions:  Mobility Interventions: Patient to call before getting OOB,Strengthening exercises (ROM-active/passive),Utilize walker, cane, or other assistive device              Elimination Interventions: Call light in reach           2/15/2022 0725 by Celestine Muhammad RN  Outcome: Progressing Towards Goal  Note: Fall Risk Interventions:  Mobility Interventions: Patient to call before getting OOB,Strengthening exercises (ROM-active/passive),Utilize walker, cane, or other assistive device              Elimination Interventions: Call light in reach,Patient to call for help with toileting needs,Toileting schedule/hourly rounds           2/15/2022 0725 by Celestine Muhammad RN  Outcome: Progressing Towards Goal  Note: Fall Risk Interventions:  Mobility Interventions: Patient to call before getting OOB,Strengthening exercises (ROM-active/passive),Utilize walker, cane, or other assistive device              Elimination Interventions: Call light in reach,Patient to call for help with toileting needs,Toileting schedule/hourly rounds

## 2022-02-15 NOTE — ACP (ADVANCE CARE PLANNING)
Advance Care Planning   Healthcare Decision Maker:       Primary Decision Maker: Kamaljit [place] - Spouse - [phone]    Click here to complete [id number] including selection of the Healthcare Decision Maker Relationship (ie \"Primary\")

## 2022-02-15 NOTE — PROGRESS NOTES
Problem: Falls - Risk of  Goal: *Absence of Falls  Description: Document Harleen Embs Fall Risk and appropriate interventions in the flowsheet.   2/15/2022 1819 by Vahe Hollingsworth RN  Outcome: Resolved/Met  Note: Fall Risk Interventions:  Mobility Interventions: Patient to call before getting OOB,Strengthening exercises (ROM-active/passive),Utilize walker, cane, or other assistive device              Elimination Interventions: Call light in reach           2/15/2022 1807 by Vahe Hollingsworth RN  Outcome: Progressing Towards Goal  Note: Fall Risk Interventions:  Mobility Interventions: Patient to call before getting OOB,Strengthening exercises (ROM-active/passive),Utilize walker, cane, or other assistive device              Elimination Interventions: Call light in reach           2/15/2022 0725 by Vahe Hollingsworth RN  Outcome: Progressing Towards Goal  Note: Fall Risk Interventions:  Mobility Interventions: Patient to call before getting OOB,Strengthening exercises (ROM-active/passive),Utilize walker, cane, or other assistive device              Elimination Interventions: Call light in reach,Patient to call for help with toileting needs,Toileting schedule/hourly rounds           2/15/2022 0725 by Vahe Hollingsworth RN  Outcome: Progressing Towards Goal  Note: Fall Risk Interventions:  Mobility Interventions: Patient to call before getting OOB,Strengthening exercises (ROM-active/passive),Utilize walker, cane, or other assistive device              Elimination Interventions: Call light in reach,Patient to call for help with toileting needs,Toileting schedule/hourly rounds

## 2022-02-15 NOTE — PROGRESS NOTES
Problem: Falls - Risk of  Goal: *Absence of Falls  Description: Document Josselin Platt Fall Risk and appropriate interventions in the flowsheet.   2/15/2022 0725 by Tigre Peck RN  Outcome: Progressing Towards Goal  Note: Fall Risk Interventions:  Mobility Interventions: Patient to call before getting OOB,Strengthening exercises (ROM-active/passive),Utilize walker, cane, or other assistive device              Elimination Interventions: Call light in reach,Patient to call for help with toileting needs,Toileting schedule/hourly rounds           2/15/2022 0725 by Tigre Peck RN  Outcome: Progressing Towards Goal  Note: Fall Risk Interventions:  Mobility Interventions: Patient to call before getting OOB,Strengthening exercises (ROM-active/passive),Utilize walker, cane, or other assistive device              Elimination Interventions: Call light in reach,Patient to call for help with toileting needs,Toileting schedule/hourly rounds

## 2022-02-15 NOTE — ROUTINE PROCESS
Report was received from the offgoing nurse Amanda BOB. Care was resumed via the incoming nurse [facility] HOMAR LPN. The pt is resting awake in bed with no c/o at this time. There have been no syncopal episodes noted. She does c/o of having slight dizzy feeling.

## 2022-02-15 NOTE — ROUTINE PROCESS
As per dr. Sheryle Blanc of to dc pt after dialysis.  placed 4th call to [facility] to find out whem they are coming to do dialysis for the pt.

## 2022-02-15 NOTE — PROGRESS NOTES
Care Management Interventions  PCP Verified by CM: Yes [facility]. Last seen 12/2021.)  Mode of Transport at Discharge: Other (see comment) (family member)  Transition of Care Consult (CM Consult): Discharge Planning  Support Systems: Spouse/Significant Other,Child(gayatri)  Confirm Follow Up Transport: Self  The Plan for Transition of Care is Related to the Following Treatment Goals : Patient lives with her  and is independent iwth her own care. No discharge planning needs identified.   Discharge Location  Patient Expects to be Discharged to[de-identified] Home

## 2022-02-15 NOTE — PROGRESS NOTES
Problem: Falls - Risk of  Goal: *Absence of Falls  Description: Document Sofia Fothergill Fall Risk and appropriate interventions in the flowsheet.   Outcome: Progressing Towards Goal  Note: Fall Risk Interventions:  Mobility Interventions: Patient to call before getting OOB,Strengthening exercises (ROM-active/passive),Utilize walker, cane, or other assistive device              Elimination Interventions: Call light in reach,Patient to call for help with toileting needs,Toileting schedule/hourly rounds

## 2022-02-15 NOTE — DISCHARGE SUMMARY
Discharge Summary       PATIENT ID: Laraine Barthel  MRN: [id number]   YOB: 1955    DATE OF ADMISSION: 2/14/2022 10:41 AM    DATE OF DISCHARGE: 2/15/2022   PRIMARY CARE PROVIDER: Esau Gresham PA-C     ATTENDING PHYSICIAN: Daniella Marcial  DISCHARGING PROVIDER: Aleisha Hill MD    To contact this individual call [phone] and ask the  to page. If unavailable ask to be transferred the Adult Hospitalist Department. CONSULTATIONS: IP CONSULT TO NEPHROLOGY  IP CONSULT TO CARDIOLOGY    PROCEDURES/SURGERIES: * No surgery found *    DISCHARGE DIAGNOSES:     Near syncope  Coronary artery disease  End-stage renal disease  Ischemic cardiomyopathy  Hypertension  Diabetes  Dyslipidemia    ADMISSION SUMMARY AND HOSPITAL COURSE:     HPI  Laraine Barthel is a 73 y.o. female who presents with dizziness. Patient went to bed last night in her usual state of health. She slept on a recliner. This morning she woke up around 6 AM and watch TV prior to getting up from the recliner. After she stood up from the recliner, she felt lightheaded and felt like she was almost about to pass out. However she did not lose consciousness. She sat down back in the recliner. Then she checked her blood pressure and her systolic blood pressure was noted to be around 180s. Then she stood up to check her blood pressure again and her blood pressure is a little higher on standing up. When she tried to walk she felt like she was swaying to one side a like she could not control her balance. She called a physician that she has known from the past, who advised to come to the emergency room. Patient denies any chest pain or shortness of breath. Denies any slurred speech, blurry vision, weakness or numbness of extremities. On evaluation in the ER, cardiac enzymes were unremarkable. Blood pressure was a little higher on the systolic. CT head was not done.   Hospitalist service requested admit the patient for further evaluation management. Hospital Course  Patient admitted with near syncopal episode at home. Work-up includes CT head that shows no acute pathology, age-related atrophy was noted. Echo shows moderate increased wall thickness, questionable mild hypokinesis of the base of the inferior wall. EF is greater than 65%. Negative for orthostatic blood pressure. Borderline bradycardia was noted and therefore Coreg was discontinued. Cardiology evaluated the patient during this hospitalization. Recommended follow-up with patient's cardiologist as scheduled upon discharge. PT also evaluated the patient and patient does not need any physical therapy services    DISCHARGE DIAGNOSES / PLAN:          BMI: Body mass index is 32.28 kg/m². . This patient: Meets criteria for obesity given BMI >/= 30 and < 40 due to excess calories/nutritional. Weight loss and lifestyle modifications should be encouraged as an outpatient. PENDING TEST RESULTS:   At the time of discharge the following test results are still pending: None     ADDITIONAL CARE RECOMMENDATIONS:     None     NOTIFY YOUR PHYSICIAN FOR ANY OF THE FOLLOWING:   Fever over 101 degrees for 24 hours. Chest pain, shortness of breath, fever, chills, nausea, vomiting, diarrhea, change in mentation, falling, weakness, bleeding. Severe pain or pain not relieved by medications, as well as any other signs or symptoms that you may have questions about.     FOLLOW UP APPOINTMENTS:    Follow-up Information     Follow up With Specialties Details Why Contact Info    Yessenia Goncalves PA-C Physician Assistant On 2/22/2022 10AM [address] Yessenia Peterson [phone]               DIET: Cardiac Diet    ACTIVITY: Activity as tolerated    EQUIPMENT needed: None    DISCHARGE MEDICATIONS:  Current Discharge Medication List      CONTINUE these medications which have NOT CHANGED    Details   sucroferric oxyhydroxide (Velphoro) 500 mg chew chewable tablet Take 500 mg by mouth three (3) times daily (with meals). aspirin 81 mg chewable tablet Take 81 mg by mouth daily. atorvastatin (LIPITOR) 10 mg tablet 10 mg daily. cholecalciferol (VITAMIN D3) (1000 Units /25 mcg) tablet Take 1,000 Units by mouth daily. FA-Vit BComp&C-Zinc-Vitamin D3 (Dialyvite 800-Ultra D) 0.8-2,000 mg-unit tab Take 1 Tab by mouth daily. furosemide (LASIX) 80 mg tablet 80 mg daily. insulin aspart U-100 (NOVOLOG) 100 unit/mL (3 mL) inpn 10 Units by SubCUTAneous route. latanoprost (XALATAN) 0.005 % ophthalmic solution Apply 1 Drop to eye At bedtime. omega 3-dha-epa-fish oil 100-160-1,000 mg cap Take  by mouth.      pantoprazole (PROTONIX) 40 mg tablet 40 mg daily. Insulin Needles, Disposable, 31 gauge x 1/4\" ndle USE PEN NEEDLES TO INJECT INSULIN TWICE DAILY      Entresto 24-26 mg tablet TAKE 1 TABLET BY MOUTH TWICE DAILY      timolol (TIMOPTIC) 0.5 % ophthalmic solution Apply  to eye two (2) times a day. insulin detemir (LEVEMIR FLEXPEN SC) 10 Units by SubCUTAneous route nightly. midodrine (PROAMATINE) 5 mg tablet TAKE 1 TABLET BY MOUTH ONCE DAILY BEFORE DIALYSIS      acetaminophen (TYLENOL PO) Take  by mouth. simethicone (Gas-X Extra Strength) 125 mg chewable tablet Take 125 mg by mouth every six (6) hours as needed for Flatulence. STOP taking these medications       carvediloL (COREG) 3.125 mg tablet Comments:   Reason for Stopping:               DISPOSITION:    Home With:   OT  PT  HH  RN       Long term SNF/Inpatient Rehab    Independent/assisted living    Hospice    Other:       PATIENT CONDITION AT DISCHARGE:     Functional status    Poor     Deconditioned     Independent      Cognition     Lucid     Forgetful     Dementia      Catheters/lines (plus indication)    Guevara     PICC     PEG     None      Code status     Full code     DNR      PHYSICAL EXAMINATION AT DISCHARGE:  General:          Alert, cooperative, no distress, appears stated age.      HEENT: Atraumatic, anicteric sclerae, pink conjunctivae                          No oral ulcers, mucosa moist, throat clear, dentition fair  Neck:               Supple, symmetrical  Lungs:             Clear to auscultation bilaterally. No Wheezing or Rhonchi. No rales. Chest wall:      No tenderness  No Accessory muscle use. Heart:              Regular  rhythm,  No  murmur   No edema  Abdomen:        Soft, non-tender. Not distended. Bowel sounds normal  Extremities:     No cyanosis. No clubbing,                            Skin turgor normal, Capillary refill normal  Skin:                Not pale. Not Jaundiced  No rashes   Psych:             Not anxious or agitated.   Neurologic:      Alert, moves all extremities, answers questions appropriately and responds to commands       CHRONIC MEDICAL DIAGNOSES:  Problem List as of 2/15/2022 Date Reviewed: 8/17/2021          Codes Class Noted - Resolved    Syncope ICD-10-CM: R55  ICD-9-CM: 780.2  2/14/2022 - Present        Diabetes mellitus ([facility] [address] ICD-10-CM: E11.9  ICD-9-CM: 250.00  6/24/2021 - Present        ESRD on dialysis [facility]) ICD-10-CM: N18.6, Z99.2  ICD-9-CM: 585.6, V45.11  6/24/2021 - Present        Boil of buttock ICD-10-CM: L02.32  ICD-9-CM: 680.5  6/24/2021 - Present    Overview Signed 6/24/2021 11:02 AM by Andry Stokes                   Greater than 60 minutes were spent with the patient on counseling and coordination of care    Signed:   Matheus Bahena MD  2/15/2022  3:34 PM

## 2022-02-16 LAB
ATRIAL RATE: 59 BPM
CALCULATED P AXIS, ECG09: 28 DEGREES
CALCULATED R AXIS, ECG10: 9 DEGREES
CALCULATED T AXIS, ECG11: 52 DEGREES
DIAGNOSIS, 93000: NORMAL
P-R INTERVAL, ECG05: 191 MS
Q-T INTERVAL, ECG07: 439 MS
QRS DURATION, ECG06: 97 MS
QTC CALCULATION (BEZET), ECG08: 435 MS
VENTRICULAR RATE, ECG03: 59 BPM

## 2022-02-16 NOTE — ASSESSMENT & PLAN NOTE
In the setting of renal failure on hemodialysis  Being managed on outpatient setting of activated vitamin D as per protocol  To continue with binders during hospitalization.  Can use sevelamer 800 mg 3 times daily with meals  Monitor serum calcium

## 2022-02-16 NOTE — ROUTINE PROCESS
Report was received from the offgoing nurse [facility] RN. Care was resumed via the incoming nurse [facility] HOMAR LPN. The pt is resting in bed on dialysis. She is tolerating the run. Her  is also at the bedside.

## 2022-02-16 NOTE — CONSULTS
Consult Date: 2/15/2022    IP CONSULT TO NEPHROLOGY  Consult performed by: Mirtha Parker MD  Consult ordered by: Porfirio Hardwick MD          Subjective   This is a 28-year-old female with past medical history significant for ESRD on hemodialysis, hypertension, coronary artery disease, prior MI, diabetes mellitus, heart failure with preserved ejection fraction, who presented emergency room with complaints of dizziness, that started suddenly when she was sitting in her recliner and attempted to stand up. The onset of dizziness was sudden, and it was severe which resulted in her having to sit down. This recurred when she again attempted to stand up. In the emergency room, patient has had work-up including troponins which were negative. A CT scan of head was performed which was unremarkable. EKG was unremarkable echocardiogram was performed which was also unremarkable. Patient reports normal p.o. intake. Over the weekend she reports no other significant changes. Patient was due for hemodialysis today. She has regular dialysis as per TTS schedule at [facility].         Past Medical History:   Diagnosis Date    Boil of buttock 06/24/2021    R Buttock    CAD (coronary artery disease)     MI 2020    Chronic kidney disease     dialyssi [address] Sat    Diabetes ([address]     Diabetes mellitus ([address] 6/24/2021    ESRD on dialysis ([address] 6/24/2021    Heart failure ([address]     Hypertension     Ill-defined condition     scolosis    Menopause       Past Surgical History:   Procedure Laterality Date    HX BREAST BIOPSY Right 08/09/2021    Stereo    VASCULAR SURGERY PROCEDURE UNLIST       Family History   Problem Relation Age of Onset    Hypertension Other     Diabetes Other     Cancer Other     Heart Disease Other       Social History     Tobacco Use    Smoking status: Never Smoker    Smokeless tobacco: Never Used   Substance Use Topics    Alcohol use: Never       Current Facility-Administered Medications Medication Dose Route Frequency Provider Last Rate Last Admin    aspirin chewable tablet 81 mg  81 mg Oral DAILY Len Suresh MD   81 mg at 02/15/22 0818    atorvastatin (LIPITOR) tablet 10 mg  10 mg Oral QHS Klaudia Suresh MD        cholecalciferol (VITAMIN D3) (1000 Units /25 mcg) tablet 1,000 Units  1,000 Units Oral DAILY Len Suresh MD   1,000 Units at 02/15/22 0818    sacubitriL-valsartan (ENTRESTO) 24-26 mg tablet 1 Tablet  1 Tablet Oral BID Jonas Camargo MD   1 Tablet at 02/15/22 0818    furosemide (LASIX) tablet 80 mg  80 mg Oral DAILY Len Suresh MD   80 mg at 02/15/22 0818    pantoprazole (PROTONIX) tablet 40 mg  40 mg Oral ACB&D Jonas Camargo MD   40 mg at 02/15/22 1650    diphenhydrAMINE (BENADRYL) injection 25 mg  25 mg IntraVENous ONCE PRN Tyron Rhodes MD        sodium chloride (NS) flush 5-40 mL  5-40 mL IntraVENous Q8H Jonas Camargo MD   10 mL at 02/15/22 1453    sodium chloride (NS) flush 5-40 mL  5-40 mL IntraVENous PRN Jonas Camargo MD        acetaminophen (TYLENOL) tablet 650 mg  650 mg Oral Q6H PRN Jonas Camargo MD   650 mg at 02/15/22 1211    Or    acetaminophen (TYLENOL) suppository 650 mg  650 mg Rectal Q6H PRN Jonas Camargo MD        polyethylene glycol (MIRALAX) packet 17 g  17 g Oral DAILY PRN Jonas Camargo MD        ondansetron (ZOFRAN ODT) tablet 4 mg  4 mg Oral Q8H PRN Jonas Camargo MD        Or    ondansetron (ZOFRAN) injection 4 mg  4 mg IntraVENous Q6H PRN Jonas Camargo MD        heparin (porcine) injection 5,000 Units  5,000 Units SubCUTAneous Klaudia Baker MD   5,000 Units at 02/15/22 0641    insulin lispro (HUMALOG) injection   SubCUTAneous AC&HS Jonas Camargo MD   2 Units at 02/15/22 1207    glucose chewable tablet 16 g  4 Tablet Oral PRN Jonas Camargo MD        dextrose (D50W) injection syrg 12.5-25 g  12.5-25 g IntraVENous PRN Jonas Camargo MD        glucagon (GLUCAGEN) injection 1 mg  1 mg IntraMUSCular PRN Jonas Camargo MD        brimonidine (ALPHAGAN) 0.2 % ophthalmic solution 1 Drop  1 Drop Both Eyes BID Nasim Campuzano MD   1 Drop at 02/15/22 0954    timolol (TIMOPTIC) 0.5 % ophthalmic solution 1 Drop  1 Drop Both Eyes BID Nasim Campuzano MD   1 Drop at 02/15/22 0826    latanoprost (XALATAN) 0.005 % ophthalmic solution 1 Drop  1 Drop Both Eyes QPM Nasim Campuzano MD   1 Drop at 02/15/22 1816    dorzolamide (TRUSOPT) 2 % ophthalmic solution 1 Drop  1 Drop Both Eyes BID Nasim Campuzano MD   1 Drop at 02/15/22 0954        Review of Systems   Constitutional: Negative. HENT: Negative for hearing loss, rhinorrhea and sinus pain. Respiratory: Negative. Cardiovascular: Negative. Genitourinary: Negative. Musculoskeletal: Negative. Neurological: Positive for dizziness. Hematological: Negative. Psychiatric/Behavioral: Negative. Objective     Vital signs for last 24 hours:  Visit Vitals  BP (!) 101/55   Pulse (!) 58   Temp 98.1 °F (36.7 °C) (Oral)   Resp 17   Ht 5' 6\" (1.676 m)   Wt 90.7 kg (200 lb)   SpO2 99%   BMI 32.28 kg/m²       Intake/Output this shift:  Current Shift: No intake/output data recorded.   Last 3 Shifts: 02/14 0701 - 02/15 1900  In: -   Out: 101 [Urine:100]    Data Review:   Recent Results (from the past 24 hour(s))   GLUCOSE, POC    Collection Time: 02/14/22  9:13 PM   Result Value Ref Range    Glucose (POC) 143 (H) 65 - 117 mg/dL    Performed by [address] [place] [address]    Collection Time: 02/15/22  5:31 AM   Result Value Ref Range    Sodium 138 136 - 145 mmol/L    Potassium 4.9 3.5 - 5.1 mmol/L    Chloride 101 97 - 108 mmol/L    CO2 27 21 - 32 mmol/L    Anion gap 10 5 - 15 mmol/L    Glucose 180 (H) 65 - 100 mg/dL    BUN 57 (H) 6 - 20 mg/dL    Creatinine 7.86 (H) 0.55 - 1.02 mg/dL    BUN/Creatinine ratio 7 (L) 12 - 20      GFR est AA 6 (L) >60 ml/min/1.73m2    GFR est non-AA 5 (L) >60 ml/min/1.73m2    Calcium 9.4 8.5 - 10.1 mg/dL   GLUCOSE, POC    Collection Time: 02/15/22  7:45 AM   Result Value Ref Range    Glucose (POC) 175 (H) 65 - 117 mg/dL    Performed by Violet Littlejohn    ECHO ADULT COMPLETE    Collection Time: 02/15/22  9:18 AM   Result Value Ref Range    IVSd 1.5 (A) 0.6 - 0.9 cm    LVIDd 4.3 3.9 - 5.3 cm    LVIDs 2.9 cm    LVOT Diameter 1.9 cm    LVPWd 1.4 (A) 0.6 - 0.9 cm    EF BP 72 55 - 100 %    LV EDV A2C 49 mL    LV EDV A4C 76 mL    LV ESV A2C 12 mL    LV ESV A4C 26 mL    LVOT Peak Gradient 6 mmHg    LVOT Mean Gradient 3 mmHg    LVOT SV 85.0 ml    LVOT VTI 30.0 cm    RVIDd 2.2 cm    AV Area by Peak Velocity 2.1 cm2    AV Area by VTI 2.3 cm2    AV Peak Gradient 11 mmHg    AV Mean Gradient 5 mmHg    AV Peak Velocity 1.7 m/s    AV VTI 36.7 cm    MV A Velocity 1.35 m/s    MV E Wave Deceleration Time 341.8 ms    MV E Velocity 1.10 m/s    MV PHT 99.1 ms    MV Area by PHT 2.2 cm2    Fractional Shortening 2D 33 28 - 44 %    LV ESV Index A4C 13 mL/m2    LV EDV Index A4C 38 mL/m2    LV ESV Index A2C 6 mL/m2    LV EDV Index A2C 25 mL/m2    LVIDd Index 2.15 cm/m2    LVIDs Index 1.45 cm/m2    LV RWT Ratio 0.65     LV Mass 2D 245.0 (A) 67 - 162 g    LV Mass 2D Index 122.5 (A) 43 - 95 g/m2    MV E/A 0.81     LVOT Stroke Volume Index 42.5 mL/m2    LVOT Area 2.8 cm2    LVOT:AV VTI Index 0.82     TAPSE 2.4 1.5 - 2.0 cm    TAPSV 14 cm/s    RA Area 4C 8.0 cm2    Est. RA Pressure 3 mmHg    TR Max Velocity 2.21 m/s    RVSP 23 mmHg    CAPRI/BSA VTI 1.2 cm2/m2    CAPRI/BSA Peak Velocity 1.1 cm2/m2   GLUCOSE, POC    Collection Time: 02/15/22 11:49 AM   Result Value Ref Range    Glucose (POC) 155 (H) 65 - 117 mg/dL    Performed by Violet Littlejohn    GLUCOSE, POC    Collection Time: 02/15/22  4:45 PM   Result Value Ref Range    Glucose (POC) 112 65 - 117 mg/dL    Performed by Violet Littlejohn        Physical Exam  Constitutional:       Appearance: She is normal weight. HENT:      Mouth/Throat:      Mouth: Mucous membranes are moist.      Pharynx: Oropharynx is clear. Cardiovascular:      Rate and Rhythm: Normal rate and regular rhythm.       Pulses: Normal pulses. Heart sounds: Normal heart sounds. Pulmonary:      Effort: Pulmonary effort is normal.      Breath sounds: Normal breath sounds. Abdominal:      General: Abdomen is flat. Skin:     General: Skin is warm and dry. Neurological:      Mental Status: She is alert. ESRD on dialysis [facility])  Regular hemodialysis as per TTS schedule, being continued while inpatient. Nominal ultrafiltration today of 1.5 L given regular state of health  Patient has working left upper extremity AV graft that has had no recent complications  Patient remains close to dry weight and her intradialytic weight gains have been reasonable. Hypertension  Blood pressure has stabilized during inpatient setting. Being continued on Entresto, as well as taking furosemide 80 mg twice daily  Inpatient blood pressure goal less than 150/80    Anemia of renal disease  Being managed on outpatient setting of EUSEBIA and IV iron as required  Hemoglobin within goal limits, with goal being hemoglobin above 10  Continue to monitor    Metabolic bone disease  In the setting of renal failure on hemodialysis  Being managed on outpatient setting of activated vitamin D as per protocol  To continue with binders during hospitalization.  Can use sevelamer 800 mg 3 times daily with meals  Monitor serum calcium

## 2022-02-16 NOTE — ASSESSMENT & PLAN NOTE
Being managed on outpatient setting of EUSEBIA and IV iron as required  Hemoglobin within goal limits, with goal being hemoglobin above 10  Continue to monitor

## 2022-02-16 NOTE — ROUTINE PROCESS
VS was checked,monitor was removed & the rt wrist Iv site was removed. Pt instructed to get dressed. No c/o is being voiced.

## 2022-02-16 NOTE — ASSESSMENT & PLAN NOTE
Regular hemodialysis as per TTS schedule, being continued while inpatient. Nominal ultrafiltration today of 1.5 L given regular state of health  Patient has working left upper extremity AV graft that has had no recent complications  Patient remains close to dry weight and her intradialytic weight gains have been reasonable.

## 2022-02-16 NOTE — ASSESSMENT & PLAN NOTE
Blood pressure has stabilized during inpatient setting.   Being continued on Entresto, as well as taking furosemide 80 mg twice daily  Inpatient blood pressure goal less than 150/80

## 2022-03-18 PROBLEM — L02.32 BOIL OF BUTTOCK: Status: ACTIVE | Noted: 2021-06-24

## 2022-03-18 PROBLEM — M89.8X9 METABOLIC BONE DISEASE: Status: ACTIVE | Noted: 2022-02-15

## 2022-03-19 PROBLEM — R55 SYNCOPE: Status: ACTIVE | Noted: 2022-02-14

## 2022-03-19 PROBLEM — Z99.2 ESRD ON DIALYSIS (HCC): Status: ACTIVE | Noted: 2021-06-24

## 2022-03-19 PROBLEM — I10 HYPERTENSION: Status: ACTIVE | Noted: 2022-02-15

## 2022-03-19 PROBLEM — N18.9 ANEMIA OF RENAL DISEASE: Status: ACTIVE | Noted: 2022-02-15

## 2022-03-19 PROBLEM — D63.1 ANEMIA OF RENAL DISEASE: Status: ACTIVE | Noted: 2022-02-15

## 2022-03-19 PROBLEM — N18.6 ESRD ON DIALYSIS (HCC): Status: ACTIVE | Noted: 2021-06-24

## 2022-03-20 PROBLEM — E11.9 DIABETES MELLITUS (HCC): Status: ACTIVE | Noted: 2021-06-24

## 2022-04-22 ENCOUNTER — TRANSCRIBE ORDER (OUTPATIENT)
Dept: SCHEDULING | Age: 67
End: 2022-04-22

## 2022-04-22 DIAGNOSIS — Z12.31 VISIT FOR SCREENING MAMMOGRAM: Primary | ICD-10-CM

## 2022-05-11 ENCOUNTER — HOSPITAL ENCOUNTER (OUTPATIENT)
Age: 67
Setting detail: OBSERVATION
LOS: 1 days | Discharge: HOME | End: 2022-05-12
Attending: INTERNAL MEDICINE | Admitting: INTERNAL MEDICINE
Payer: MEDICARE

## 2022-05-11 PROBLEM — E87.70 FLUID OVERLOAD: Status: ACTIVE | Noted: 2022-05-11

## 2022-05-11 LAB
ALBUMIN SERPL-MCNC: 3.2 G/DL (ref 3.5–5)
ANION GAP SERPL CALC-SCNC: 14 MMOL/L (ref 5–15)
BASOPHILS # BLD: 0 K/UL (ref 0–0.2)
BASOPHILS NFR BLD: 1 % (ref 0–2.5)
BUN SERPL-MCNC: 70 MG/DL (ref 6–20)
BUN/CREAT SERPL: 8 (ref 12–20)
CA-I BLD-MCNC: 8.9 MG/DL (ref 8.5–10.1)
CHLORIDE SERPL-SCNC: 105 MMOL/L (ref 97–108)
CO2 SERPL-SCNC: 24 MMOL/L (ref 21–32)
CREAT SERPL-MCNC: 8.73 MG/DL (ref 0.55–1.02)
EOSINOPHIL # BLD: 0 K/UL (ref 0–0.7)
EOSINOPHIL NFR BLD: 0 % (ref 0.9–2.9)
ERYTHROCYTE [DISTWIDTH] IN BLOOD BY AUTOMATED COUNT: 15.1 % (ref 11.5–14.5)
GLUCOSE BLD STRIP.AUTO-MCNC: 120 MG/DL (ref 65–117)
GLUCOSE SERPL-MCNC: 148 MG/DL (ref 65–100)
HCT VFR BLD AUTO: 34.4 % (ref 36–46)
HGB BLD-MCNC: 11.2 G/DL (ref 13.5–17.5)
LYMPHOCYTES # BLD: 0.6 K/UL (ref 1–4.8)
LYMPHOCYTES NFR BLD: 15 % (ref 20.5–51.1)
MCH RBC QN AUTO: 26.5 PG (ref 31–34)
MCHC RBC AUTO-ENTMCNC: 32.5 G/DL (ref 31–36)
MCV RBC AUTO: 81.7 FL (ref 80–100)
MONOCYTES # BLD: 0.4 K/UL (ref 0.2–2.4)
MONOCYTES NFR BLD: 11 % (ref 1.7–9.3)
NEUTS SEG # BLD: 2.9 K/UL (ref 1.8–7.7)
NEUTS SEG NFR BLD: 73 % (ref 42–75)
NRBC # BLD: 0 K/UL
NRBC BLD-RTO: 0.1 PER 100 WBC
PERFORMED BY, TECHID: ABNORMAL
PHOSPHATE SERPL-MCNC: 4.4 MG/DL (ref 2.6–4.7)
PLATELET # BLD AUTO: 111 K/UL (ref 150–400)
PMV BLD AUTO: 9.4 FL (ref 6.5–11.5)
POTASSIUM SERPL-SCNC: 4.5 MMOL/L (ref 3.5–5.1)
RBC # BLD AUTO: 4.21 M/UL (ref 4.5–5.9)
SODIUM SERPL-SCNC: 143 MMOL/L (ref 136–145)
WBC # BLD AUTO: 3.9 K/UL (ref 4.4–11.3)

## 2022-05-11 PROCEDURE — 90935 HEMODIALYSIS ONE EVALUATION: CPT

## 2022-05-11 PROCEDURE — 36415 COLL VENOUS BLD VENIPUNCTURE: CPT

## 2022-05-11 PROCEDURE — 80069 RENAL FUNCTION PANEL: CPT

## 2022-05-11 PROCEDURE — G0279 TOMOSYNTHESIS, MAMMO: HCPCS

## 2022-05-11 PROCEDURE — G0378 HOSPITAL OBSERVATION PER HR: HCPCS

## 2022-05-11 PROCEDURE — 74011636637 HC RX REV CODE- 636/637: Performed by: INTERNAL MEDICINE

## 2022-05-11 PROCEDURE — 85025 COMPLETE CBC W/AUTO DIFF WBC: CPT

## 2022-05-11 PROCEDURE — 74011250637 HC RX REV CODE- 250/637: Performed by: INTERNAL MEDICINE

## 2022-05-11 PROCEDURE — 82962 GLUCOSE BLOOD TEST: CPT

## 2022-05-11 RX ORDER — PANTOPRAZOLE SODIUM 40 MG/1
40 TABLET, DELAYED RELEASE ORAL DAILY
Status: DISCONTINUED | OUTPATIENT
Start: 2022-05-12 | End: 2022-05-12 | Stop reason: HOSPADM

## 2022-05-11 RX ORDER — SODIUM CHLORIDE 0.9 % (FLUSH) 0.9 %
5-40 SYRINGE (ML) INJECTION AS NEEDED
Status: DISCONTINUED | OUTPATIENT
Start: 2022-05-11 | End: 2022-05-12 | Stop reason: HOSPADM

## 2022-05-11 RX ORDER — ATORVASTATIN CALCIUM 10 MG/1
10 TABLET, FILM COATED ORAL DAILY
Status: DISCONTINUED | OUTPATIENT
Start: 2022-05-12 | End: 2022-05-12 | Stop reason: HOSPADM

## 2022-05-11 RX ORDER — ONDANSETRON 2 MG/ML
4 INJECTION INTRAMUSCULAR; INTRAVENOUS
Status: DISCONTINUED | OUTPATIENT
Start: 2022-05-11 | End: 2022-05-12 | Stop reason: HOSPADM

## 2022-05-11 RX ORDER — ONDANSETRON 4 MG/1
4 TABLET, ORALLY DISINTEGRATING ORAL
Status: DISCONTINUED | OUTPATIENT
Start: 2022-05-11 | End: 2022-05-12 | Stop reason: HOSPADM

## 2022-05-11 RX ORDER — POLYETHYLENE GLYCOL 3350 17 G/17G
17 POWDER, FOR SOLUTION ORAL DAILY PRN
Status: DISCONTINUED | OUTPATIENT
Start: 2022-05-11 | End: 2022-05-12 | Stop reason: HOSPADM

## 2022-05-11 RX ORDER — SODIUM CHLORIDE 0.9 % (FLUSH) 0.9 %
5-40 SYRINGE (ML) INJECTION EVERY 8 HOURS
Status: DISCONTINUED | OUTPATIENT
Start: 2022-05-11 | End: 2022-05-12 | Stop reason: HOSPADM

## 2022-05-11 RX ORDER — ACETAMINOPHEN 650 MG/1
650 SUPPOSITORY RECTAL
Status: DISCONTINUED | OUTPATIENT
Start: 2022-05-11 | End: 2022-05-12 | Stop reason: HOSPADM

## 2022-05-11 RX ORDER — ACETAMINOPHEN 325 MG/1
650 TABLET ORAL
Status: DISCONTINUED | OUTPATIENT
Start: 2022-05-11 | End: 2022-05-12 | Stop reason: HOSPADM

## 2022-05-11 RX ORDER — FUROSEMIDE 40 MG/1
80 TABLET ORAL DAILY
Status: DISCONTINUED | OUTPATIENT
Start: 2022-05-12 | End: 2022-05-12 | Stop reason: HOSPADM

## 2022-05-11 RX ORDER — INSULIN GLARGINE 100 [IU]/ML
10 INJECTION, SOLUTION SUBCUTANEOUS
Status: DISCONTINUED | OUTPATIENT
Start: 2022-05-11 | End: 2022-05-12 | Stop reason: HOSPADM

## 2022-05-11 RX ORDER — GUAIFENESIN 100 MG/5ML
81 LIQUID (ML) ORAL DAILY
Status: DISCONTINUED | OUTPATIENT
Start: 2022-05-12 | End: 2022-05-12 | Stop reason: HOSPADM

## 2022-05-11 RX ADMIN — INSULIN GLARGINE 10 UNITS: 100 INJECTION, SOLUTION SUBCUTANEOUS at 21:43

## 2022-05-11 RX ADMIN — ONDANSETRON 4 MG: 4 TABLET, ORALLY DISINTEGRATING ORAL at 19:50

## 2022-05-11 RX ADMIN — SACUBITRIL AND VALSARTAN 1 TABLET: 24; 26 TABLET, FILM COATED ORAL at 21:44

## 2022-05-11 NOTE — ACP (ADVANCE CARE PLANNING)
Advance Care Planning   Healthcare Decision Maker:       Primary Decision Maker: Eric Lomax - Spouse - [phone]    Click here to complete [id number] including selection of the Healthcare Decision Maker Relationship (ie \"Primary\")

## 2022-05-11 NOTE — PROGRESS NOTES
Called dialysis center to confirm chair availability for Friday. Patient does not have a confirmed chair in [facility] on Friday, per her home dialysis center in [place] [address]. Called [facility] admissions and and requested a Covid positive chair for Friday. [facility] stated that patient will be able to come back to her normal dialysis schedule on Monday. [facility] hear [facility] confirmation. Primary nurse, Rosi Phoenix, made aware that patient does not have a confirmed chair at this time.

## 2022-05-11 NOTE — PROGRESS NOTES
Care Management Interventions  PCP Verified by CM: Yes  Transition of Care Consult (CM Consult): Discharge Planning  Support Systems: Spouse/Significant Other  The Plan for Transition of Care is Related to the Following Treatment Goals : Plan to discharge patient after dialysis, per Nephrology note. Note also stated that patient has an outpatient chair in [place] on Friday. Will confirm same.   Discharge Location  Patient Expects to be Discharged to[de-identified] Home with family assistance

## 2022-05-11 NOTE — H&P
History & Physical    Primary Care Provider: Manuela Lawton PA-C  Source of Information: Patient     History of Presenting Illness:   Sara Lombardo is a 73 y.o. female who presents with c/o shortness of breath. Known history of ESRD on HD. Not dialyses since last Thursday due to COVID positive test results. Notes non productive cough without fevers or chills. Due to fluid overload,she will be admitted for dialysis. Relating to COVID, she is not hypoxic. Review of Systems:  A comprehensive review of systems was negative except for that written in the History of Present Illness. Past Medical History:   Diagnosis Date    Boil of buttock 06/24/2021    R Buttock    CAD (coronary artery disease)     MI 2020    Chronic kidney disease     dialyssi Tu Thu Sat    Diabetes ([facility]     Diabetes mellitus ([facility] 6/24/2021    ESRD on dialysis ([facility] 6/24/2021    Heart failure ([facility]     Hypertension     Ill-defined condition     scolosis    Menopause       Past Surgical History:   Procedure Laterality Date    HX BREAST BIOPSY Right 08/09/2021    Stereo    VASCULAR SURGERY PROCEDURE UNLIST       Prior to Admission medications    Medication Sig Start Date End Date Taking? Authorizing Provider   midodrine (PROAMATINE) 5 mg tablet TAKE 1 TABLET BY MOUTH ONCE DAILY BEFORE DIALYSIS 12/23/21   Other, MD Airam   sucroferric oxyhydroxide (Velphoro) 500 mg chew chewable tablet Take 500 mg by mouth three (3) times daily (with meals). Provider, Historical   aspirin 81 mg chewable tablet Take 81 mg by mouth daily. Provider, Historical   atorvastatin (LIPITOR) 10 mg tablet 10 mg daily. 12/9/20   Provider, Historical   cholecalciferol (VITAMIN D3) (1000 Units /25 mcg) tablet Take 1,000 Units by mouth daily. Provider, Historical   FA-Vit BComp&C-Zinc-Vitamin D3 (Dialyvite 800-Ultra D) 0.8-2,000 mg-unit tab Take 1 Tab by mouth daily.  3/4/20   Provider, Historical furosemide (LASIX) 80 mg tablet 80 mg daily. 12/9/20   Provider, Historical   insulin aspart U-100 (NOVOLOG) 100 unit/mL (3 mL) inpn 10 Units by SubCUTAneous route. Provider, Historical   latanoprost (XALATAN) 0.005 % ophthalmic solution Apply 1 Drop to eye At bedtime. 4/7/20   Provider, Historical   omega 3-dha-epa-fish oil 100-160-1,000 mg cap Take  by mouth. Provider, Historical   pantoprazole (PROTONIX) 40 mg tablet 40 mg daily. 12/9/20   Provider, Historical   Insulin Needles, Disposable, 31 gauge x 1/4\" ndle USE PEN NEEDLES TO INJECT INSULIN TWICE DAILY 12/22/20   Provider, Historical   Entresto 24-26 mg tablet TAKE 1 TABLET BY MOUTH TWICE DAILY 11/30/20   Provider, Historical   timolol (TIMOPTIC) 0.5 % ophthalmic solution Apply  to eye two (2) times a day. 2/29/20   Provider, Historical   insulin detemir (LEVEMIR FLEXPEN SC) 10 Units by SubCUTAneous route nightly. Provider, Historical   acetaminophen (TYLENOL PO) Take  by mouth. Provider, Historical   simethicone (Gas-X Extra Strength) 125 mg chewable tablet Take 125 mg by mouth every six (6) hours as needed for Flatulence.     Provider, Historical     Allergies   Allergen Reactions    Influenza Virus Vaccines Shortness of Breath    Darvon [Propoxyphene] Nausea and Vomiting    Levaquin [Levofloxacin] Other (comments)    Nsaids (Non-Steroidal Anti-Inflammatory Drug) Other (comments)     Gi bleed    Tolectin [Tolmetin] Nausea and Vomiting    Tramadol Other (comments)     Gi bleed      Family History   Problem Relation Age of Onset    Hypertension Other     Diabetes Other     Cancer Other     Heart Disease Other         SOCIAL HISTORY:  Patient resides:  Independently    Assisted Living    SNF    With family care x      Smoking history:   None x   Former    Chronic      Alcohol history:   None x   Social    Chronic      Ambulates:   Independently x   w/cane    w/walker    w/wc    CODE STATUS:  DNR    Full x   Other      Objective: Physical Exam:     Visit Vitals  /80 (BP 1 Location: Right upper arm, BP Patient Position: At rest)   Pulse 88   Temp 98.7 °F (37.1 °C)   Resp 20   Ht 5' 6\" (1.676 m)   Wt 92.9 kg (204 lb 12.8 oz)   SpO2 100%   BMI 33.06 kg/m²      O2 Device: None (Room air)    General:  Alert, cooperative, no distress, appears stated age. Head:  Normocephalic, without obvious abnormality, atraumatic. Eyes:  Conjunctivae/corneas clear. PERRL, EOMs intact. Nose: Nares normal. Septum midline. Mucosa normal. No drainage or sinus tenderness. Throat: Lips, mucosa, and tongue normal. Teeth and gums normal.   Neck: Supple, symmetrical, trachea midline, no adenopathy, thyroid: no enlargement/tenderness/nodules, no carotid bruit and no JVD. Back:   Symmetric, no curvature. ROM normal. No CVA tenderness. Lungs:   Clear to auscultation bilaterally. Chest wall:  No tenderness or deformity. Heart:  Regular rate and rhythm, S1, S2 normal, no murmur, click, rub or gallop. Abdomen:   Soft, non-tender. Bowel sounds normal. No masses,  No organomegaly. Extremities: Extremities normal, atraumatic, no cyanosis or edema. Pulses: 2+ and symmetric all extremities. Skin: Skin color, texture, turgor normal. No rashes or lesions   Neurologic: CNII-XII intact. No motor or sensory deficits. EKG:  normal EKG, normal sinus rhythm, unchanged from previous tracings, nonspecific ST and T waves changes. Data Review:     Recent Days:  No results for input(s): WBC, HGB, HCT, PLT, HGBEXT, HCTEXT, PLTEXT in the last 72 hours. No results for input(s): NA, K, CL, CO2, GLU, BUN, CREA, CA, MG, PHOS, ALB, TBIL, TBILI, ALT, INR, INREXT in the last 72 hours. No lab exists for component: SGOT  No results for input(s): PH, PCO2, PO2, HCO3, FIO2 in the last 72 hours. 24 Hour Results:  No results found for this or any previous visit (from the past 24 hour(s)).       Imaging:   No orders to display         Assessment:     Fluid overload  End-stage renal disease on hemodialysis  Type 2 diabetes.   Fairly stable    Plan:     Admit to observation  Dialysis tomorrow called this afternoon  Patient may discharge home after dialysis  Case discussed with nephrologist  She is full code    Signed By: Sukumar Harris MD     May 11, 2022

## 2022-05-11 NOTE — DIALYSIS
[facility] / [phone]    Vitals Pre Post Assessment Pre Post   BP BP: (!) 173/79 (05/11/22 1800) 151/73 LOC A/O x4, anxious to get started A/O x4   HR Pulse (Heart Rate): 84 (05/11/22 1800) 81 Lungs Clear Clear   Resp Resp Rate: 18 (05/11/22 1800) 18 Cardiac NSR NSR   Temp Temp: 98.2 °F (36.8 °C) (05/11/22 1745) 98.6 Skin Warm/dry/wnc Warm/dry/wnc   Weight  92.9 kg NA Edema +2 hands, +3 BLE +1 hands, +2 BLE   Tele status Remote Remote Pain Pain Intensity 1: 0 (05/11/22 1551) 0/10     Orders   Duration: Start: 1800 End: 2100 Total: 3   Dialyzer: Dialyzer/Set Up Inspection: [place] [employer] (05/11/22 1745)   K Bath: Dialysate K (mEq/L): 2 (05/11/22 1745)   Ca Bath: Dialysate CA (mEq/L): 2.5 (05/11/22 1745)   Na: Dialysate NA (mEq/L): 138 (05/11/22 1745)   Bicarb: Dialysate HCO3 (mEq/L): 35 (05/11/22 1745)   Target Fluid Removal: Goal/Amount of Fluid to Remove (mL): 1000 mL (05/11/22 1745)     Access   Type & Location: Left upper arm AV Fistula without evidence of warmth, redness, or drainage. +thrill/+bruit. Each access site disinfected for 60 seconds per site with alcohol swabs per P&P. Cannulated with 15G needles x2 and secured with paper tape. +aspiration/+flushed.    Comments:                                        Labs   HBsAg (Antigen) / date:                      Negative - 8/12/21                         HBsAb (Antibody) / date:                      Immune - 8/12/21   Source:                      [facility]   Obtained/Reviewed  Critical Results Called HGB   Date Value Ref Range Status   05/11/2022 11.2 (L) 13.5 - 17.5 g/dL Final     Potassium   Date Value Ref Range Status   05/11/2022 4.5 3.5 - 5.1 mmol/L Final     Calcium   Date Value Ref Range Status   05/11/2022 8.9 8.5 - 10.1 mg/dL Final     BUN   Date Value Ref Range Status   05/11/2022 70 (H) 6 - 20 mg/dL Final     Creatinine   Date Value Ref Range Status   05/11/2022 8.73 (H) 0.55 - 1.02 mg/dL Final        Meds Given   Name Dose Route Adequacy / Fluid    Total Liters Process: 61.8   Net Fluid Removed: 1250 mL      Comments   Time Out Done:   (Time) 1700   Admitting Diagnosis: Covid, needs HD   Consent obtained/signed: Informed Consent Verified: Yes (05/11/22 7341)   Machine / Pascale Burns # Machine Number: [id number] (05/11/22 0430)   Primary Nurse Rpt Pre: Latanya Hooks LPN   Primary Nurse Rpt Post: Neftali Watts RN   Pt Education: Procedure and run time   Care Plan: Continue HD MWF until DC   Pts outpatient clinic: [facility] Summary   Comments:                         1800 - Treatment initiated without difficulty. 2030 - Venous pressures now climbing, small rinse of saline given with no improvement in pressures. 2100 - Treatment terminated early due to extremely high venous pressures. Circuit 100% clotted half way through rinseback, partial amount completed. Sites remain +aspiration/flush, 15g needles removed intact x2 and pressure with bandages applied. Sites remain +bruit/thrill x2. Patient A/O x4 on departure, plan is for patient to be discharged in the morning.

## 2022-05-11 NOTE — CONSULTS
Consult Date: 5/11/2022    Consults    Subjective   HISTORY OF PRESENTING ILLNESS   Patient is a 71-year-old -American female with history of diabetes mellitus, hypertension, ESRD on hemodialysis, previous history of coronary artery disease who is presenting again with shortness of breath and dyspnea. She was recently positive for COVID-last test on Monday. She has no symptoms suggestive of COVID. She has missed dialysis since last week Thursday. Past Surgical History:   Procedure Laterality Date    HX BREAST BIOPSY Right 08/09/2021    Stereo    VASCULAR SURGERY PROCEDURE UNLIST       Family History   Problem Relation Age of Onset    Hypertension Other     Diabetes Other     Cancer Other     Heart Disease Other       Social History     Tobacco Use    Smoking status: Never Smoker    Smokeless tobacco: Never Used   Substance Use Topics    Alcohol use: Never            Review of Systems   Constitutional: Negative for activity change, appetite change, fatigue, fever and unexpected weight change. HENT: Negative for congestion, facial swelling, postnasal drip, sinus pressure and trouble swallowing. Eyes: Negative for discharge, redness and visual disturbance. Respiratory: Positive for shortness of breath. Negative for cough, chest tightness and wheezing. Cardiovascular: Positive for chest pain and palpitations. Negative for leg swelling. Gastrointestinal: Negative for abdominal distention, abdominal pain, constipation, diarrhea, nausea and vomiting. Endocrine: Negative for cold intolerance, heat intolerance and polyuria. Genitourinary: Negative for difficulty urinating and hematuria. Musculoskeletal: Negative for arthralgias, back pain, gait problem, joint swelling and myalgias. Skin: Negative for color change and pallor. Allergic/Immunologic: Negative for environmental allergies and food allergies.    Neurological: Negative for dizziness, tremors, seizures, facial asymmetry and headaches. Hematological: Negative for adenopathy. Does not bruise/bleed easily. Psychiatric/Behavioral: Negative for agitation, behavioral problems and confusion. Objective     Vital signs for last 24 hours: There were no vitals taken for this visit. No results found for this or any previous visit (from the past 24 hour(s)). No intake or output data in the 24 hours ending 05/11/22 1342   Current Shift: No intake/output data recorded. Last 3 Shifts: No intake/output data recorded. Physical Exam  Vitals and nursing note reviewed. Constitutional:       Appearance: Normal appearance. She is normal weight. HENT:      Head: Normocephalic and atraumatic. Nose: Nose normal.      Mouth/Throat:      Mouth: Mucous membranes are moist.      Pharynx: Oropharynx is clear. Eyes:      Conjunctiva/sclera: Conjunctivae normal.   Cardiovascular:      Rate and Rhythm: Regular rhythm. Tachycardia present. Pulses: Normal pulses. Heart sounds: Normal heart sounds. Pulmonary:      Effort: Pulmonary effort is normal.      Breath sounds: Normal breath sounds. Abdominal:      General: Abdomen is flat. Palpations: Abdomen is soft. Skin:     General: Skin is warm and dry. Coloration: Skin is not pale. Findings: No erythema. Neurological:      General: No focal deficit present. Mental Status: She is alert and oriented to person, place, and time. Psychiatric:         Mood and Affect: Mood normal.         Behavior: Behavior normal.        Presence of AV fistula with good thrill  Data Review:   No results found for this or any previous visit (from the past 24 hour(s)).       No orders to display        Patient Active Problem List   Diagnosis Code    Diabetes mellitus ([facility] E11.9    ESRD on dialysis ([facility] N18.6, Z99.2    Boil of buttock L02.32    Syncope R55    Hypertension I10    Anemia of renal disease N18.9, F68.8    Metabolic bone disease J06.8, M90.80 DIAGNOSES:  1. ESRD on hemodialysis  2. COVID-19 positive  3. Missed dialysis  4. Hypertension  5. Anemia chronic disease  6. Secondary hyperparathyroidism  DISCUSSION:  · She has no symptoms of COVID but was tested positive last Monday. · Outpatient dialysis chair has not yet been arranged. Usually dialysis at [facility]. But no chairs available at the cohort unit at [facility]  · Will arrange dialysis today as she has gone several days without dialysis in the school because of the shortness of breath. My expectation is her symptoms will slightly improve and she could be discharged after dialysis  · OP Dialysis chair-cohort unit at [facility] is available on Friday  · Labs CBC, renal panel to be sent at dialysis  · Hepatitis B studies can be obtained from the dialysis center at [facility]    Thanks for consulting me. Please don't hesitate to contact me if any questions arise of if I can assist in any manner. This dictation was done by dragon, computer voice recognition software. Often unanticipated grammatical, syntax, phones and other interpretive errors are inadvertently transcribed. Please excuse errors that have escaped final proofreading. Please contact me if you suspect dictation or transcription errors.   Dr Yady Isaacs  [id number], [address], [address] [facility]  Cell Phone: [phone]  Office phone: [phone]  Fax: [phone]

## 2022-05-11 NOTE — PROGRESS NOTES
Problem: Falls - Risk of  Goal: *Absence of Falls  Description: Document Clarisa Lyons Fall Risk and appropriate interventions in the flowsheet.   Outcome: Progressing Towards Goal  Note: Fall Risk Interventions:  Mobility Interventions: Patient to call before getting OOB    Mentation Interventions: More frequent rounding

## 2022-05-11 NOTE — ROUTINE PROCESS
Received pt. Direct admit from doctor office. Pt. Alert and oriented. Pt. Denies any pain. Lungs clear. Left av fistuila intact. ronda lower ext edema noted. Bed in lowest position call bell within reach. Closure 3 Information: This tab is for additional flaps and grafts above and beyond our usual structured repairs.  Please note if you enter information here it will not currently bill and you will need to add the billing information manually.

## 2022-05-12 VITALS
SYSTOLIC BLOOD PRESSURE: 133 MMHG | HEIGHT: 66 IN | WEIGHT: 204.8 LBS | RESPIRATION RATE: 20 BRPM | OXYGEN SATURATION: 98 % | BODY MASS INDEX: 32.92 KG/M2 | TEMPERATURE: 99.9 F | HEART RATE: 71 BPM | DIASTOLIC BLOOD PRESSURE: 57 MMHG

## 2022-05-12 LAB
ANION GAP SERPL CALC-SCNC: 6 MMOL/L (ref 5–15)
BUN SERPL-MCNC: 34 MG/DL (ref 6–20)
BUN/CREAT SERPL: 6 (ref 12–20)
CA-I BLD-MCNC: 8.8 MG/DL (ref 8.5–10.1)
CHLORIDE SERPL-SCNC: 101 MMOL/L (ref 97–108)
CO2 SERPL-SCNC: 31 MMOL/L (ref 21–32)
CREAT SERPL-MCNC: 5.6 MG/DL (ref 0.55–1.02)
ERYTHROCYTE [DISTWIDTH] IN BLOOD BY AUTOMATED COUNT: 14.8 % (ref 11.5–14.5)
GLUCOSE BLD STRIP.AUTO-MCNC: 138 MG/DL (ref 65–117)
GLUCOSE SERPL-MCNC: 77 MG/DL (ref 65–100)
HCT VFR BLD AUTO: 31.1 % (ref 36–46)
HGB BLD-MCNC: 10 G/DL (ref 13.5–17.5)
MCH RBC QN AUTO: 26.4 PG (ref 31–34)
MCHC RBC AUTO-ENTMCNC: 32.2 G/DL (ref 31–36)
MCV RBC AUTO: 81.8 FL (ref 80–100)
NRBC # BLD: 0 K/UL
NRBC BLD-RTO: 0.1 PER 100 WBC
PERFORMED BY, TECHID: ABNORMAL
PLATELET # BLD AUTO: 107 K/UL (ref 150–400)
PMV BLD AUTO: 9 FL (ref 6.5–11.5)
POTASSIUM SERPL-SCNC: 4 MMOL/L (ref 3.5–5.1)
RBC # BLD AUTO: 3.8 M/UL (ref 4.5–5.9)
SODIUM SERPL-SCNC: 138 MMOL/L (ref 136–145)
WBC # BLD AUTO: 3 K/UL (ref 4.4–11.3)

## 2022-05-12 PROCEDURE — 85027 COMPLETE CBC AUTOMATED: CPT

## 2022-05-12 PROCEDURE — 80048 BASIC METABOLIC PNL TOTAL CA: CPT

## 2022-05-12 PROCEDURE — 82962 GLUCOSE BLOOD TEST: CPT

## 2022-05-12 PROCEDURE — G0378 HOSPITAL OBSERVATION PER HR: HCPCS

## 2022-05-12 PROCEDURE — 36415 COLL VENOUS BLD VENIPUNCTURE: CPT

## 2022-05-12 PROCEDURE — 74011250637 HC RX REV CODE- 250/637: Performed by: INTERNAL MEDICINE

## 2022-05-12 RX ADMIN — SACUBITRIL AND VALSARTAN 1 TABLET: 24; 26 TABLET, FILM COATED ORAL at 09:50

## 2022-05-12 RX ADMIN — PANTOPRAZOLE SODIUM 40 MG: 40 TABLET, DELAYED RELEASE ORAL at 09:50

## 2022-05-12 RX ADMIN — ASPIRIN 81 MG 81 MG: 81 TABLET ORAL at 09:50

## 2022-05-12 RX ADMIN — FUROSEMIDE 80 MG: 40 TABLET ORAL at 09:51

## 2022-05-12 RX ADMIN — ATORVASTATIN CALCIUM 10 MG: 10 TABLET, FILM COATED ORAL at 09:50

## 2022-05-12 NOTE — DISCHARGE SUMMARY
Physician Discharge Summary     Patient ID:    Antony Vargas  [id number]  73 y.o.  1955    Admit date: 5/11/2022    Discharge date : 5/12/2022    Chronic Diagnoses:    Problem List as of 5/12/2022 Date Reviewed: 8/17/2021          Codes Class Noted - Resolved    Fluid overload ICD-10-CM: E87.70  ICD-9-CM: 276.69  5/11/2022 - Present        Hypertension ICD-10-CM: I10  ICD-9-CM: 401.9  2/15/2022 - Present        Anemia of renal disease ICD-10-CM: N18.9, D63.1  ICD-9-CM: 285.21  2/15/2022 - Present        Metabolic bone disease [facility]62-DZ: E88.9, M90.80  ICD-9-CM: 733.99  2/15/2022 - Present        Syncope ICD-10-CM: R55  ICD-9-CM: 780.2  2/14/2022 - Present        Diabetes mellitus ([facility] ICD-10-CM: E11.9  ICD-9-CM: 250.00  6/24/2021 - Present        ESRD on dialysis [facility]) ICD-10-CM: N18.6, Z99.2  ICD-9-CM: 585.6, V45.11  6/24/2021 - Present        Boil of buttock ICD-10-CM: L02.32  ICD-9-CM: 680.5  6/24/2021 - Present    Overview Signed 6/24/2021 11:02 AM by Georgie Wood Buttock               22    Final Diagnoses:   Fluid overload [E87.70]  Fluid overload  End-stage renal disease on hemodialysis  Type 2 diabetes. Fairly stable    Reason for Hospitalization:    Shortness of breath    Hospital Course:   Per H and P,\"69 y.o. female who presents with c/o shortness of breath. Known history of ESRD on HD. Not dialyses since last Thursday due to COVID positive test results. Notes non productive cough without fevers or chills. Due to fluid overload,she will be admitted for dialysis. Relating to COVID, she is not hypoxic\"  She was dialyzed without any issues. No shortness of breath. Encourage self quarantine at home due to recent COVID diagnosis.   She is not hypoxic and not requiring home oxygen              Discharge Medications:   Current Discharge Medication List      CONTINUE these medications which have NOT CHANGED    Details   midodrine (PROAMATINE) 5 mg tablet TAKE 1 TABLET BY MOUTH ONCE DAILY BEFORE DIALYSIS      sucroferric oxyhydroxide (Velphoro) 500 mg chew chewable tablet Take 500 mg by mouth three (3) times daily (with meals). aspirin 81 mg chewable tablet Take 81 mg by mouth daily. atorvastatin (LIPITOR) 10 mg tablet 10 mg daily. cholecalciferol (VITAMIN D3) (1000 Units /25 mcg) tablet Take 1,000 Units by mouth daily. FA-Vit BComp&C-Zinc-Vitamin D3 (Dialyvite 800-Ultra D) 0.8-2,000 mg-unit tab Take 1 Tab by mouth daily. furosemide (LASIX) 80 mg tablet 80 mg daily. insulin aspart U-100 (NOVOLOG) 100 unit/mL (3 mL) inpn 10 Units by SubCUTAneous route. latanoprost (XALATAN) 0.005 % ophthalmic solution Apply 1 Drop to eye At bedtime. omega 3-dha-epa-fish oil 100-160-1,000 mg cap Take  by mouth.      pantoprazole (PROTONIX) 40 mg tablet 40 mg daily. Insulin Needles, Disposable, 31 gauge x 1/4\" ndle USE PEN NEEDLES TO INJECT INSULIN TWICE DAILY      Entresto 24-26 mg tablet TAKE 1 TABLET BY MOUTH TWICE DAILY      timolol (TIMOPTIC) 0.5 % ophthalmic solution Apply  to eye two (2) times a day. insulin detemir (LEVEMIR FLEXPEN SC) 10 Units by SubCUTAneous route nightly. acetaminophen (TYLENOL PO) Take  by mouth. simethicone (Gas-X Extra Strength) 125 mg chewable tablet Take 125 mg by mouth every six (6) hours as needed for Flatulence. Follow up Care:    1. Wilberto Mayfield PA-C in 1-2 weeks. Please call to set up an appointment shortly after discharge. Diet:  Cardiac Diet    Disposition:  Home.     Advanced Directive:   FULL    DNR      Discharge Exam:  Visit Vitals  BP (!) 133/57 (BP 1 Location: Right upper arm, BP Patient Position: At rest;Lying)   Pulse 71   Temp 99.9 °F (37.7 °C)   Resp 20   Ht 5' 6\" (1.676 m)   Wt 92.9 kg (204 lb 12.8 oz)   SpO2 98%   BMI 33.06 kg/m²      O2 Device: None (Room air)    Temp (24hrs), Av.2 °F (36.8 °C), Min:96 °F (35.6 °C), Max:99.9 °F (37.7 °C)    No intake/output data recorded. 05/10 1901 - 05/12 0700  In: -   Out: 1250     General:  Alert, cooperative, no distress, appears stated age. Lungs:   Clear to auscultation bilaterally. Chest wall:  No tenderness or deformity. Heart:  Regular rate and rhythm, S1, S2 normal, no murmur, click, rub or gallop. Abdomen:   Soft, non-tender. Bowel sounds normal. No masses,  No organomegaly. Extremities: Extremities normal, atraumatic, no cyanosis or edema. Pulses: 2+ and symmetric all extremities. Skin: Skin color, texture, turgor normal. No rashes or lesions   Neurologic: CNII-XII intact. No gross sensory or motor deficits         CONSULTATIONS: Nephrology    Significant Diagnostic Studies:   5/11/2022: BUN 70 mg/dL (H; Ref range: 6 - 20 mg/dL); Calcium 8.9 mg/dL (Ref range: 8.5 - 10.1 mg/dL); CO2 24 mmol/L (Ref range: 21 - 32 mmol/L); Creatinine 8.73 mg/dL (H; Ref range: 0.55 - 1.02 mg/dL); Glucose 148 mg/dL (H; Ref range: 65 - 100 mg/dL); HCT 34.4 % (L; Ref range: 36 - 46 %); HGB 11.2 g/dL (L; Ref range: 13.5 - 17.5 g/dL); Potassium 4.5 mmol/L (Ref range: 3.5 - 5.1 mmol/L); Sodium 143 mmol/L (Ref range: 136 - 145 mmol/L)  5/12/2022: BUN 34 mg/dL (H; Ref range: 6 - 20 mg/dL); Calcium 8.8 mg/dL (Ref range: 8.5 - 10.1 mg/dL); CO2 31 mmol/L (Ref range: 21 - 32 mmol/L); Creatinine 5.60 mg/dL (H; Ref range: 0.55 - 1.02 mg/dL); Glucose 77 mg/dL (Ref range: 65 - 100 mg/dL); HCT 31.1 % (L; Ref range: 36 - 46 %); HGB 10.0 g/dL (L; Ref range: 13.5 - 17.5 g/dL); Potassium 4.0 mmol/L (Ref range: 3.5 - 5.1 mmol/L);  Sodium 138 mmol/L (Ref range: 136 - 145 mmol/L)  Recent Labs     05/12/22  0542 05/11/22  1429   WBC 3.0* 3.9*   HGB 10.0* 11.2*   HCT 31.1* 34.4*   * 111*     Recent Labs     05/12/22  0542 05/11/22  1429    143   K 4.0 4.5    105   CO2 31 24   BUN 34* 70*   CREA 5.60* 8.73*   GLU 77 148*   CA 8.8 8.9   PHOS  --  4.4     Recent Labs     05/11/22  1429   ALB 3.2*     No results for input(s): INR, PTP, APTT, INREXT in the last 72 hours. No results for input(s): FE, TIBC, PSAT, FERR in the last 72 hours. No results for input(s): PH, PCO2, PO2 in the last 72 hours. No results for input(s): CPK, CKMB in the last 72 hours.     No lab exists for component: TROPONINI  Lab Results   Component Value Date/Time    Glucose (POC) 138 (H) 05/12/2022 09:10 AM    Glucose (POC) 120 (H) 05/11/2022 09:51 PM    Glucose (POC) 112 02/15/2022 04:45 PM    Glucose (POC) 155 (H) 02/15/2022 11:49 AM    Glucose (POC) 175 (H) 02/15/2022 07:45 AM       Total Time: 35 minutes    Signed:  Bryan Ash MD  5/12/2022  9:52 AM

## 2022-05-12 NOTE — PROGRESS NOTES
Patients case reviewed during interdisciplinary team meeting in [address] [place], [place] [address]Acute Care Unit. Rev.  John Coppola [address], [address]

## 2022-05-12 NOTE — PROGRESS NOTES
Spoke to [facility] and they stated patient can resume her normal chair on Monday 5. 16. informed Dr. Jewel Berrios and he stated that she is okay to be DC with no chair until Monday because she is not in fluid overload.

## 2022-05-12 NOTE — ROUTINE PROCESS
The pt is resting awake in bed on dialysis. She is now c/o of being nauseated & was given zofran 4mg po. She is showing no diabetic symptoms.

## 2022-05-12 NOTE — ROUTINE PROCESS
Report was received from the offgoing nurse Page Archibald. Care was resumed via the incoming nurse [facility] HOMAR LOREDO. The report consist of using SBAR information.

## 2022-05-18 ENCOUNTER — HOSPITAL ENCOUNTER (OUTPATIENT)
Dept: MAMMOGRAPHY | Age: 67
Discharge: HOME | End: 2022-05-18
Payer: MEDICARE

## 2022-05-18 DIAGNOSIS — Z12.31 VISIT FOR SCREENING MAMMOGRAM: ICD-10-CM

## 2022-05-18 PROCEDURE — 77063 BREAST TOMOSYNTHESIS BI: CPT

## 2022-05-21 ENCOUNTER — TELEPHONE (OUTPATIENT)
Dept: INTERNAL MEDICINE | Age: 67
End: 2022-05-21

## 2022-05-21 NOTE — TELEPHONE ENCOUNTER
Follow up call from recent hospitalization where patient had COVID and needing dialysis. States she is doing well has restarted going to her home dialysis and from Cristiano standpoint no fevers just productive cough.   Has followed up with her primary doctor and no noted concerns at this time

## 2023-01-06 ENCOUNTER — APPOINTMENT (OUTPATIENT)
Dept: GENERAL RADIOLOGY | Age: 68
End: 2023-01-06
Payer: MEDICARE

## 2023-01-06 ENCOUNTER — HOSPITAL ENCOUNTER (EMERGENCY)
Age: 68
Discharge: HOME | End: 2023-01-06
Attending: EMERGENCY MEDICINE
Payer: MEDICARE

## 2023-01-06 VITALS
DIASTOLIC BLOOD PRESSURE: 72 MMHG | BODY MASS INDEX: 32.14 KG/M2 | RESPIRATION RATE: 19 BRPM | SYSTOLIC BLOOD PRESSURE: 198 MMHG | HEART RATE: 63 BPM | HEIGHT: 66 IN | WEIGHT: 199.96 LBS | OXYGEN SATURATION: 99 % | TEMPERATURE: 98 F

## 2023-01-06 DIAGNOSIS — M54.6 ACUTE RIGHT-SIDED THORACIC BACK PAIN: ICD-10-CM

## 2023-01-06 DIAGNOSIS — N30.01 ACUTE CYSTITIS WITH HEMATURIA: Primary | ICD-10-CM

## 2023-01-06 LAB
APPEARANCE UR: ABNORMAL
BACTERIA URNS QL MICRO: ABNORMAL /HPF
BILIRUB UR QL: NEGATIVE
COLOR UR: YELLOW
EPITH CASTS URNS QL MICRO: ABNORMAL /LPF
GLUCOSE UR STRIP.AUTO-MCNC: NEGATIVE MG/DL
HGB UR QL STRIP: ABNORMAL
KETONES UR QL STRIP.AUTO: NEGATIVE MG/DL
LEUKOCYTE ESTERASE UR QL STRIP.AUTO: ABNORMAL
MUCOUS THREADS URNS QL MICRO: ABNORMAL /LPF
NITRITE UR QL STRIP.AUTO: NEGATIVE
PH UR STRIP: 7 (ref 5–8)
PROT UR STRIP-MCNC: 100 MG/DL
RBC #/AREA URNS HPF: ABNORMAL /HPF (ref 0–5)
SP GR UR REFRACTOMETRY: 1.01 (ref 1–1.03)
UROBILINOGEN UR QL STRIP.AUTO: 0.1 EU/DL (ref 0.1–1)
WBC URNS QL MICRO: >100 /HPF (ref 0–4)

## 2023-01-06 PROCEDURE — 74011000250 HC RX REV CODE- 250

## 2023-01-06 PROCEDURE — 99284 EMERGENCY DEPT VISIT MOD MDM: CPT

## 2023-01-06 PROCEDURE — 71046 X-RAY EXAM CHEST 2 VIEWS: CPT

## 2023-01-06 PROCEDURE — 74011250637 HC RX REV CODE- 250/637

## 2023-01-06 PROCEDURE — 81001 URINALYSIS AUTO W/SCOPE: CPT

## 2023-01-06 RX ORDER — LIDOCAINE 4 G/100G
PATCH TOPICAL
Qty: 5 PATCH | Refills: 0 | Status: SHIPPED | OUTPATIENT
Start: 2023-01-06

## 2023-01-06 RX ORDER — ACETAMINOPHEN 500 MG
1000 TABLET ORAL ONCE
Status: COMPLETED | OUTPATIENT
Start: 2023-01-06 | End: 2023-01-06

## 2023-01-06 RX ORDER — LIDOCAINE 4 G/100G
1 PATCH TOPICAL EVERY 24 HOURS
Status: DISCONTINUED | OUTPATIENT
Start: 2023-01-06 | End: 2023-01-06 | Stop reason: HOSPADM

## 2023-01-06 RX ORDER — CEPHALEXIN 500 MG/1
500 CAPSULE ORAL 4 TIMES DAILY
Qty: 28 CAPSULE | Refills: 0 | Status: SHIPPED | OUTPATIENT
Start: 2023-01-06 | End: 2023-01-13

## 2023-01-06 RX ADMIN — ACETAMINOPHEN 1000 MG: 500 TABLET ORAL at 17:33

## 2023-01-06 NOTE — DISCHARGE INSTRUCTIONS
Thank you! Thank you for allowing me to care for you in the emergency department. It is my goal to provide you with excellent care. If you have not received excellent quality care, please ask to speak to the nurse manager. Please fill out the survey that will come to you by mail or email since we listen to your feedback! Below you will find a list of your tests from today's visit. Should you have any questions, please do not hesitate to call the emergency department. Labs  No results found for this or any previous visit (from the past 12 hour(s)). Radiologic Studies  XR CHEST PA LAT   Final Result      No acute process seen by plain chest radiography           CT Results  (Last 48 hours)      None          CXR Results  (Last 48 hours)                 01/06/23 1605  XR CHEST PA LAT Final result    Impression:      No acute process seen by plain chest radiography           Narrative:  EXAM: XR CHEST PA LAT       INDICATION: Right posterior thoracic pain       COMPARISON: 2/14/2022 and 11/3/2016       TECHNIQUE: PA and lateral chest views       FINDINGS: The cardiac size is within normal limits. The pulmonary vasculature is   within normal limits. A left axillary stent is present. The lungs and pleural spaces are clear. The visualized bones and upper abdomen   are age-appropriate. Detail of osseous structures is compromised by patient body   habitus                 ------------------------------------------------------------------------------------------------------------  The exam and treatment you received in the Emergency Department were for an urgent problem and are not intended as complete care. It is important that you follow-up with a doctor, nurse practitioner, or physician assistant to:  (1) confirm your diagnosis,  (2) re-evaluation of changes in your illness and treatment, and  (3) for ongoing care. Please take your discharge instructions with you when you go to your follow-up appointment. If you have any problem arranging a follow-up appointment, contact the Emergency Department. If your symptoms become worse or you do not improve as expected and you are unable to reach your health care provider, please return to the Emergency Department. We are available 24 hours a day. If a prescription has been provided, please have it filled as soon as possible to prevent a delay in treatment. If you have any questions or reservations about taking the medication due to side effects or interactions with other medications, please call your primary care provider or contact the ER.

## 2023-01-06 NOTE — ED TRIAGE NOTES
Pt arrives with back pain and right hip pain. Denies urinary/bowel incontinence. T,TH,SAT dialysis patient.

## 2023-01-06 NOTE — ED PROVIDER NOTES
EMERGENCY DEPARTMENT HISTORY AND PHYSICAL EXAM      Date: 1/6/2023  Patient Name: Ernestine Martinez    History of Presenting Illness     Chief Complaint   Patient presents with    Back Pain       History Provided By: Patient    HPI: Ernestine Martinez, 78 y.o. female with a history of CKD, dialysis, DM, heart failure, hypertension, and scoliosis. Patient states atraumatic onset 2 days ago of right posterior thoracic pain. She denies recent trauma or illness. No inciting event with gradual worsening. Pain is sharp in nature, worsened with twisting motions and bending, relieved by rest and laying down. Patient took Tylenol with relief last night. Dialysis compliant patient, T, TH, Sat. She denies fever, difficulty breathing, chest pain, abdominal pain, nausea, vomiting, diarrhea, dysuria, reduced urine output, hematuria, dizziness, or palpitations. No hx of IV drug use, chronic steroids, trauma, osteopenia, or malignancy. There are no other complaints, changes, or physical findings at this time.     Past History     Past Medical History:  Past Medical History:   Diagnosis Date    Boil of buttock 06/24/2021    R Buttock    CAD (coronary artery disease)     MI 2020    Chronic kidney disease     dialyssi Tu Thu Sat    Diabetes ([facility] [address]     Diabetes mellitus ([facility] [address] 6/24/2021    ESRD on dialysis ([facility] [address] 6/24/2021    Heart failure ([facility] [address]     Hypertension     Ill-defined condition     scolosis    Menopause        Past Surgical History:  Past Surgical History:   Procedure Laterality Date    HX BREAST BIOPSY Right 08/09/2021    Stereo    DC UNLISTED PROCEDURE VASCULAR SURGERY         Family History:  Family History   Problem Relation Age of Onset    Hypertension Other     Diabetes Other     Cancer Other     Heart Disease Other        Social History:  Social History     Tobacco Use    Smoking status: Never    Smokeless tobacco: Never   Vaping Use    Vaping Use: Never used   Substance Use Topics    Alcohol use: Never Drug use: Not Currently       Allergies: Allergies   Allergen Reactions    Influenza Virus Vaccines Shortness of Breath    Darvon [Propoxyphene] Nausea and Vomiting    Levaquin [Levofloxacin] Other (comments)    Nsaids (Non-Steroidal Anti-Inflammatory Drug) Other (comments)     Gi bleed    Tolectin [Tolmetin] Nausea and Vomiting    Tramadol Other (comments)     Gi bleed       PCP: Prasanth Torres PA-C    No current facility-administered medications on file prior to encounter. Current Outpatient Medications on File Prior to Encounter   Medication Sig Dispense Refill    midodrine (PROAMATINE) 5 mg tablet TAKE 1 TABLET BY MOUTH ONCE DAILY BEFORE DIALYSIS      sucroferric oxyhydroxide (Velphoro) 500 mg chew chewable tablet Take 500 mg by mouth three (3) times daily (with meals). aspirin 81 mg chewable tablet Take 81 mg by mouth daily. atorvastatin (LIPITOR) 10 mg tablet 10 mg daily. cholecalciferol (VITAMIN D3) (1000 Units /25 mcg) tablet Take 1,000 Units by mouth daily. FA-Vit BComp&C-Zinc-Vitamin D3 (Dialyvite 800-Ultra D) 0.8-2,000 mg-unit tab Take 1 Tab by mouth daily. furosemide (LASIX) 80 mg tablet 80 mg daily. insulin aspart U-100 (NOVOLOG) 100 unit/mL (3 mL) inpn 10 Units by SubCUTAneous route. latanoprost (XALATAN) 0.005 % ophthalmic solution Apply 1 Drop to eye At bedtime. omega 3-dha-epa-fish oil 100-160-1,000 mg cap Take  by mouth.      pantoprazole (PROTONIX) 40 mg tablet 40 mg daily. Insulin Needles, Disposable, 31 gauge x 1/4\" ndle USE PEN NEEDLES TO INJECT INSULIN TWICE DAILY      Entresto 24-26 mg tablet TAKE 1 TABLET BY MOUTH TWICE DAILY      timolol (TIMOPTIC) 0.5 % ophthalmic solution Apply  to eye two (2) times a day. insulin detemir (LEVEMIR FLEXPEN SC) 10 Units by SubCUTAneous route nightly. acetaminophen (TYLENOL PO) Take  by mouth.       simethicone (Gas-X Extra Strength) 125 mg chewable tablet Take 125 mg by mouth every six (6) hours as needed for Flatulence. Review of Systems   Review of Systems   Constitutional: Negative. Negative for appetite change, chills, fatigue and fever. HENT: Negative. Negative for congestion, rhinorrhea, sore throat and trouble swallowing. Eyes: Negative. Negative for photophobia. Respiratory: Negative. Negative for cough, chest tightness and shortness of breath. Cardiovascular: Negative. Negative for chest pain and palpitations. Gastrointestinal: Negative. Negative for abdominal pain, constipation, diarrhea, nausea and vomiting. Endocrine: Negative. Genitourinary: Negative. Negative for decreased urine volume, dysuria, frequency, hematuria and pelvic pain. Musculoskeletal:  Positive for back pain (Right midthoracic). Negative for gait problem, myalgias and neck pain. Skin: Negative. Negative for rash. Allergic/Immunologic: Negative. Neurological: Negative. Negative for dizziness, weakness, numbness and headaches. Hematological: Negative. Psychiatric/Behavioral: Negative. Physical Exam   Physical Exam  Vitals and nursing note reviewed. Constitutional:       General: She is not in acute distress. Appearance: She is not ill-appearing. HENT:      Head: Normocephalic. Nose: Nose normal. No congestion or rhinorrhea. Mouth/Throat:      Mouth: Mucous membranes are moist.      Pharynx: Oropharynx is clear. No posterior oropharyngeal erythema. Eyes:      Extraocular Movements: Extraocular movements intact. Conjunctiva/sclera: Conjunctivae normal.      Pupils: Pupils are equal, round, and reactive to light. Cardiovascular:      Rate and Rhythm: Normal rate and regular rhythm. Pulses: Normal pulses. Heart sounds: Normal heart sounds. No murmur heard. Pulmonary:      Effort: Pulmonary effort is normal. No respiratory distress. Breath sounds: Normal breath sounds.    Abdominal:      General: Bowel sounds are normal.      Palpations: Abdomen is soft. Tenderness: There is no abdominal tenderness. There is no right CVA tenderness, left CVA tenderness or guarding. Musculoskeletal:         General: Tenderness (Right paraspinal to lateral midthoracic tenderness with palpation. Demonstrates twisting bending movements with reported exacerbation. No pain at rest.) present. Normal range of motion. Cervical back: Normal range of motion and neck supple. No tenderness. Lymphadenopathy:      Cervical: No cervical adenopathy. Skin:     General: Skin is warm and dry. Findings: No bruising or rash. Neurological:      General: No focal deficit present. Mental Status: She is alert and oriented to person, place, and time. Cranial Nerves: No cranial nerve deficit. Sensory: No sensory deficit. Motor: No weakness. Psychiatric:         Mood and Affect: Mood normal.       Lab and Diagnostic Study Results   Labs -   No results found for this or any previous visit (from the past 12 hour(s)). Radiologic Studies -   @lastxrresult@  CT Results  (Last 48 hours)      None          CXR Results  (Last 48 hours)                 01/06/23 1605  XR CHEST PA LAT Final result    Impression:      No acute process seen by plain chest radiography           Narrative:  EXAM: XR CHEST PA LAT       INDICATION: Right posterior thoracic pain       COMPARISON: 2/14/2022 and 11/3/2016       TECHNIQUE: PA and lateral chest views       FINDINGS: The cardiac size is within normal limits. The pulmonary vasculature is   within normal limits. A left axillary stent is present. The lungs and pleural spaces are clear. The visualized bones and upper abdomen   are age-appropriate. Detail of osseous structures is compromised by patient body   habitus                   Medical Decision Making and ED Course   Differential Diagnosis & Medical Decision Making Provider Note:     - I am the first provider for this patient.   I reviewed the vital signs, available nursing notes, past medical history, past surgical history, family history and social history. The patients presenting problems have been discussed, and they are in agreement with the care plan formulated and outlined with them. I have encouraged them to ask questions as they arise throughout their visit. Vital Signs-Reviewed the patient's vital signs. Vitals:    01/06/23 1326   BP: (!) 198/72   Pulse: 63   Resp: 19   Temp: 98 °F (36.7 °C)   SpO2: 99%   Weight: 90.7 kg (199 lb 15.3 oz)   Height: 5' 6\" (1.676 m)       ED Course:   ED Course as of 01/07/23 1339   Fri Jan 06, 2023   139 52-year-old female presents with right posterior thoracic pain. She is well-appearing, sitting upright in hallway chair, in no visible distress. Triage vitals with hypertension. Differentials include pneumonia, pneumothorax, musculoskeletal pain, UTI, pyelonephritis, rib fracture. Urine ordered in triage. Will assess CXR. [KW]   3925 Chest x-ray without acute abnormality. [KW]   1710 No crepitus, diminished lung sounds, tearing back pain, neurologic symptoms. Equal radial pulses. No cardio / respiratory complaint. No localized or systemic signs of infection. [KW]   1747 Appearance(!): Turbid [KW]   1747 Protein(!): 100 [KW]   4313 Blood(!): Small [KW]   1747 Leukocyte Esterase(!): Large [KW]   1747 WBC(!): >100 [KW]   1748 Epithelial cells(!): Many [KW]   1748 Bacteria(!): 2+ [KW]   1748 Mucus(!): Trace [KW]   1800 Reassessment without change. We discussed results and prescription for UTI antibiotics. Importance of completion of antibiotics and probiotic use. Pharmacologic and nonpharmacologic (stretching, heat application, PT referral) symptom management discussed. Restricted options for pain medication due to kidney failure and allergies. Patient agrees to follow-up closely with primary care and verbalized understanding of warning signs and return precautions.  [KW]      ED Course User Index  [KW] Poppy Lantigua Breonna Lucas NP       Procedures   Performed by: Kia Valdez NP  Procedures      Disposition   Disposition: DC- Adult Discharges: All of the diagnostic tests were reviewed and questions answered. Diagnosis, care plan and treatment options were discussed. The patient understands the instructions and will follow up as directed. The patients results have been reviewed with them. They have been counseled regarding their diagnosis. The patient verbally convey understanding and agreement of the signs, symptoms, diagnosis, treatment and prognosis and additionally agrees to follow up as recommended with their PCP in 24 - 48 hours. They also agree with the care-plan and convey that all of their questions have been answered. I have also put together some discharge instructions for them that include: 1) educational information regarding their diagnosis, 2) how to care for their diagnosis at home, as well a 3) list of reasons why they would want to return to the ED prior to their follow-up appointment, should their condition change. DISCHARGE PLAN:  1. Current Discharge Medication List        CONTINUE these medications which have NOT CHANGED    Details   midodrine (PROAMATINE) 5 mg tablet TAKE 1 TABLET BY MOUTH ONCE DAILY BEFORE DIALYSIS      sucroferric oxyhydroxide (Velphoro) 500 mg chew chewable tablet Take 500 mg by mouth three (3) times daily (with meals). aspirin 81 mg chewable tablet Take 81 mg by mouth daily. atorvastatin (LIPITOR) 10 mg tablet 10 mg daily. cholecalciferol (VITAMIN D3) (1000 Units /25 mcg) tablet Take 1,000 Units by mouth daily. FA-Vit BComp&C-Zinc-Vitamin D3 (Dialyvite 800-Ultra D) 0.8-2,000 mg-unit tab Take 1 Tab by mouth daily. furosemide (LASIX) 80 mg tablet 80 mg daily. insulin aspart U-100 (NOVOLOG) 100 unit/mL (3 mL) inpn 10 Units by SubCUTAneous route. latanoprost (XALATAN) 0.005 % ophthalmic solution Apply 1 Drop to eye At bedtime.       omega 3-dha-epa-fish oil 100-160-1,000 mg cap Take  by mouth.      pantoprazole (PROTONIX) 40 mg tablet 40 mg daily. Insulin Needles, Disposable, 31 gauge x 1/4\" ndle USE PEN NEEDLES TO INJECT INSULIN TWICE DAILY      Entresto 24-26 mg tablet TAKE 1 TABLET BY MOUTH TWICE DAILY      timolol (TIMOPTIC) 0.5 % ophthalmic solution Apply  to eye two (2) times a day. insulin detemir (LEVEMIR FLEXPEN SC) 10 Units by SubCUTAneous route nightly. acetaminophen (TYLENOL PO) Take  by mouth. simethicone (Gas-X Extra Strength) 125 mg chewable tablet Take 125 mg by mouth every six (6) hours as needed for Flatulence. 2.   Follow-up Information       Follow up With Specialties Details Why [address] EMERGENCY DEPT Emergency Medicine  If symptoms worsen [address] [facility] Jaswinder Gutierrez PA-C Physician Assistant Call in 1 day  5 [facility]  [address] [place] [address],[phone]            3. Return to ED if worse   4. Discharge Medication List as of 1/6/2023  6:00 PM        START taking these medications    Details   lidocaine 4 % patch Apply for 12 hours, remove for 12 hours. , Normal, Disp-5 Patch, R-0      cephALEXin (Keflex) 500 mg capsule Take 1 Capsule by mouth four (4) times daily for 7 days. , Normal, Disp-28 Capsule, R-0           CONTINUE these medications which have NOT CHANGED    Details   midodrine (PROAMATINE) 5 mg tablet TAKE 1 TABLET BY MOUTH ONCE DAILY BEFORE DIALYSIS, Historical Med      sucroferric oxyhydroxide (Velphoro) 500 mg chew chewable tablet Take 500 mg by mouth three (3) times daily (with meals). , Historical Med      aspirin 81 mg chewable tablet Take 81 mg by mouth daily. , Historical Med      atorvastatin (LIPITOR) 10 mg tablet 10 mg daily. , Historical Med      cholecalciferol (VITAMIN D3) (1000 Units /25 mcg) tablet Take 1,000 Units by mouth daily. , Historical Med      FA-Vit BComp&C-Zinc-Vitamin D3 (Dialyvite 800-Ultra D) 0.8-2,000 mg-unit tab Take 1 Tab by mouth daily. , Historical Med      furosemide (LASIX) 80 mg tablet 80 mg daily. , Historical Med      insulin aspart U-100 (NOVOLOG) 100 unit/mL (3 mL) inpn 10 Units by SubCUTAneous route., Historical Med      latanoprost (XALATAN) 0.005 % ophthalmic solution Apply 1 Drop to eye At bedtime. , Historical Med      omega 3-dha-epa-fish oil 100-160-1,000 mg cap Take  by mouth., Historical Med      pantoprazole (PROTONIX) 40 mg tablet 40 mg daily. , Historical Med      Insulin Needles, Disposable, 31 gauge x 1/4\" ndle USE PEN NEEDLES TO INJECT INSULIN TWICE DAILY, Historical Med      Entresto 24-26 mg tablet TAKE 1 TABLET BY MOUTH TWICE DAILY, Historical Med, MARBELLA      timolol (TIMOPTIC) 0.5 % ophthalmic solution Apply  to eye two (2) times a day., Historical Med      insulin detemir (LEVEMIR FLEXPEN SC) 10 Units by SubCUTAneous route nightly., Historical Med      acetaminophen (TYLENOL PO) Take  by mouth., Historical Med      simethicone (Gas-X Extra Strength) 125 mg chewable tablet Take 125 mg by mouth every six (6) hours as needed for Flatulence., Historical Med             Diagnosis/Clinical Impression     Clinical Impression:   1. Acute cystitis with hematuria    2. Acute right-sided thoracic back pain        Attestations: Jonas CLAYTON NP, am the primary clinician of record. Please note that this dictation was completed with [employer], the [employer] voice recognition software. Quite often unanticipated grammatical, syntax, homophones, and other interpretive errors are inadvertently transcribed by the computer software. Please disregard these errors. Please excuse any errors that have escaped final proofreading. Thank you.

## 2023-05-01 ENCOUNTER — TRANSCRIBE ORDER (OUTPATIENT)
Dept: SCHEDULING | Age: 68
End: 2023-05-01

## 2023-05-01 DIAGNOSIS — Z12.31 VISIT FOR SCREENING MAMMOGRAM: Primary | ICD-10-CM

## 2023-05-08 DIAGNOSIS — Z12.31 VISIT FOR SCREENING MAMMOGRAM: Primary | ICD-10-CM

## 2023-05-17 ENCOUNTER — TRANSCRIBE ORDERS (OUTPATIENT)
Facility: HOSPITAL | Age: 68
End: 2023-05-17

## 2023-05-17 DIAGNOSIS — Z12.31 SCREENING MAMMOGRAM FOR BREAST CANCER: Primary | ICD-10-CM

## 2023-05-23 RX ORDER — LIDOCAINE 4 G/G
PATCH TOPICAL
COMMUNITY
Start: 2023-01-06

## 2023-05-23 RX ORDER — LATANOPROST 50 UG/ML
1 SOLUTION/ DROPS OPHTHALMIC NIGHTLY
COMMUNITY
Start: 2020-04-07

## 2023-05-23 RX ORDER — SUCROFERRIC OXYHYDROXIDE 500 MG/1
500 TABLET, CHEWABLE ORAL
COMMUNITY

## 2023-05-23 RX ORDER — SACUBITRIL AND VALSARTAN 24; 26 MG/1; MG/1
1 TABLET, FILM COATED ORAL 2 TIMES DAILY
COMMUNITY
Start: 2020-11-30

## 2023-05-23 RX ORDER — ASPIRIN 81 MG/1
81 TABLET, CHEWABLE ORAL DAILY
COMMUNITY

## 2023-05-23 RX ORDER — MIDODRINE HYDROCHLORIDE 5 MG/1
TABLET ORAL
COMMUNITY
Start: 2021-12-23

## 2023-05-23 RX ORDER — SIMETHICONE 125 MG
125 TABLET,CHEWABLE ORAL EVERY 6 HOURS PRN
COMMUNITY

## 2023-05-23 RX ORDER — TIMOLOL MALEATE 5 MG/ML
SOLUTION/ DROPS OPHTHALMIC 2 TIMES DAILY
COMMUNITY
Start: 2020-02-29

## 2023-05-23 RX ORDER — FUROSEMIDE 80 MG
80 TABLET ORAL DAILY
COMMUNITY
Start: 2020-12-09

## 2023-05-23 RX ORDER — PANTOPRAZOLE SODIUM 40 MG/1
40 TABLET, DELAYED RELEASE ORAL DAILY
COMMUNITY
Start: 2020-12-09

## 2023-05-23 RX ORDER — ATORVASTATIN CALCIUM 10 MG/1
10 TABLET, FILM COATED ORAL DAILY
COMMUNITY
Start: 2020-12-09

## 2023-08-16 ENCOUNTER — HOSPITAL ENCOUNTER (OUTPATIENT)
Facility: HOSPITAL | Age: 68
Discharge: HOME | End: 2023-08-19
Payer: MEDICARE

## 2023-08-16 DIAGNOSIS — Z12.31 SCREENING MAMMOGRAM FOR BREAST CANCER: ICD-10-CM

## 2023-08-16 PROCEDURE — 77063 BREAST TOMOSYNTHESIS BI: CPT

## 2023-09-08 ENCOUNTER — TELEPHONE (OUTPATIENT)
Age: 68
End: 2023-09-08

## 2023-09-08 DIAGNOSIS — Z12.11 ENCOUNTER FOR SCREENING FOR MALIGNANT NEOPLASM OF COLON: Primary | ICD-10-CM

## 2023-09-12 RX ORDER — POLYETHYLENE GLYCOL 3350 17 G/17G
POWDER, FOR SOLUTION ORAL
Qty: 510 G | Refills: 0 | Status: SHIPPED | OUTPATIENT
Start: 2023-09-12

## 2023-10-20 ENCOUNTER — HOSPITAL ENCOUNTER (OUTPATIENT)
Facility: HOSPITAL | Age: 68
Setting detail: OUTPATIENT SURGERY
Discharge: HOME | End: 2023-10-20
Attending: INTERNAL MEDICINE | Admitting: INTERNAL MEDICINE
Payer: MEDICARE

## 2023-10-20 ENCOUNTER — ANESTHESIA EVENT (OUTPATIENT)
Facility: HOSPITAL | Age: 68
End: 2023-10-20
Payer: MEDICARE

## 2023-10-20 ENCOUNTER — ANESTHESIA (OUTPATIENT)
Facility: HOSPITAL | Age: 68
End: 2023-10-20
Payer: MEDICARE

## 2023-10-20 VITALS
HEIGHT: 64 IN | WEIGHT: 197 LBS | SYSTOLIC BLOOD PRESSURE: 137 MMHG | RESPIRATION RATE: 20 BRPM | TEMPERATURE: 98 F | HEART RATE: 64 BPM | OXYGEN SATURATION: 99 % | BODY MASS INDEX: 33.63 KG/M2 | DIASTOLIC BLOOD PRESSURE: 59 MMHG

## 2023-10-20 LAB
GLUCOSE BLD STRIP.AUTO-MCNC: 89 MG/DL (ref 65–100)
PERFORMED BY:: NORMAL

## 2023-10-20 PROCEDURE — 88305 TISSUE EXAM BY PATHOLOGIST: CPT

## 2023-10-20 PROCEDURE — 7100000010 HC PHASE II RECOVERY - FIRST 15 MIN: Performed by: INTERNAL MEDICINE

## 2023-10-20 PROCEDURE — 3700000001 HC ADD 15 MINUTES (ANESTHESIA): Performed by: INTERNAL MEDICINE

## 2023-10-20 PROCEDURE — 3600007502: Performed by: INTERNAL MEDICINE

## 2023-10-20 PROCEDURE — 3600007512: Performed by: INTERNAL MEDICINE

## 2023-10-20 PROCEDURE — 82962 GLUCOSE BLOOD TEST: CPT

## 2023-10-20 PROCEDURE — 3700000000 HC ANESTHESIA ATTENDED CARE: Performed by: INTERNAL MEDICINE

## 2023-10-20 PROCEDURE — 45380 COLONOSCOPY AND BIOPSY: CPT | Performed by: INTERNAL MEDICINE

## 2023-10-20 PROCEDURE — 2709999900 HC NON-CHARGEABLE SUPPLY: Performed by: INTERNAL MEDICINE

## 2023-10-20 PROCEDURE — 2580000003 HC RX 258: Performed by: INTERNAL MEDICINE

## 2023-10-20 PROCEDURE — 7100000011 HC PHASE II RECOVERY - ADDTL 15 MIN: Performed by: INTERNAL MEDICINE

## 2023-10-20 PROCEDURE — 6360000002 HC RX W HCPCS: Performed by: NURSE ANESTHETIST, CERTIFIED REGISTERED

## 2023-10-20 RX ORDER — SODIUM CHLORIDE 9 MG/ML
25 INJECTION, SOLUTION INTRAVENOUS PRN
Status: CANCELLED | OUTPATIENT
Start: 2023-10-20

## 2023-10-20 RX ORDER — PROPOFOL 10 MG/ML
INJECTION, EMULSION INTRAVENOUS PRN
Status: DISCONTINUED | OUTPATIENT
Start: 2023-10-20 | End: 2023-10-20 | Stop reason: SDUPTHER

## 2023-10-20 RX ORDER — SODIUM CHLORIDE 450 MG/100ML
INJECTION, SOLUTION INTRAVENOUS CONTINUOUS
Status: DISCONTINUED | OUTPATIENT
Start: 2023-10-20 | End: 2023-10-20 | Stop reason: HOSPADM

## 2023-10-20 RX ADMIN — PROPOFOL 80 MG: 10 INJECTION, EMULSION INTRAVENOUS at 10:24

## 2023-10-20 RX ADMIN — SODIUM CHLORIDE: 4.5 INJECTION, SOLUTION INTRAVENOUS at 08:55

## 2023-10-20 RX ADMIN — PROPOFOL 60 MG: 10 INJECTION, EMULSION INTRAVENOUS at 10:30

## 2023-10-20 ASSESSMENT — PAIN - FUNCTIONAL ASSESSMENT
PAIN_FUNCTIONAL_ASSESSMENT: NONE - DENIES PAIN
PAIN_FUNCTIONAL_ASSESSMENT: NONE - DENIES PAIN

## 2023-10-20 ASSESSMENT — PAIN SCALES - GENERAL
PAINLEVEL_OUTOF10: 0

## 2023-10-20 NOTE — DISCHARGE INSTRUCTIONS

## 2023-10-20 NOTE — ANESTHESIA POSTPROCEDURE EVALUATION
Department of Anesthesiology  Postprocedure Note    Patient: Amber Rehman  MRN: [id number]  YOB: 1955  Date of evaluation: 10/20/2023      Procedure Summary     Date: 10/20/23 Room / Location: [facility] ENDO 01 / [facility] END[facility]COPY    Anesthesia Start: 1014 Anesthesia Stop: 1105    Procedure: COLONOSCOPY (Anus) Diagnosis:       Special screening for malignant neoplasms, colon      (Special screening for malignant neoplasms, colon [Z12.11])    Surgeons: Summer Soares MD Responsible Provider: ADRY Swift Cha, CRNA    Anesthesia Type: TIVA ASA Status: 3          Anesthesia Type: No value filed.     Fernando Phase I: Fernando Score: 10    Fernando Phase II:        Anesthesia Post Evaluation    Patient location during evaluation: PACU  Patient participation: complete - patient participated  Level of consciousness: awake  Airway patency: patent  Nausea & Vomiting: no vomiting and no nausea  Complications: no  Cardiovascular status: blood pressure returned to baseline and hemodynamically stable  Respiratory status: room air  Hydration status: stable  Multimodal analgesia pain management approach

## 2023-10-20 NOTE — INTERVAL H&P NOTE
Update History & Physical    The patient's History and Physical of October 20, 2023 was reviewed with the patient and I examined the patient. There was no change. The surgical site was confirmed by the patient and me. Plan: The risks, benefits, expected outcome, and alternative to the recommended procedure have been discussed with the patient. Patient understands and wants to proceed with the procedure.      Electronically signed by Emy Archer MD on 10/20/2023 at 10:07 AM

## 2023-10-20 NOTE — OP NOTE
Colonoscopy Procedure Note      Patient: Amber Rehman MRN: [id number]  SSN: [id number]    YOB: 1955  Age: 80 y.o. Sex: female      Date of Procedure: 10/20/2023  Date/Time:  10/20/2023 10:53 AM       IMPRESSION:     1. Ascending colon polyps   2. Sigmoid colon polyp              3.  Right-sided diverticulosis              4.  Angiodysplasia (cecum)              5.  Melanosis coli         RECOMMENDATIONS:     1) Check biopsy results. 2) Await pathology report. Call me in 2 weeks if you have not received any information from my office regarding your results. 3) Repeat colonoscopy in 2 to 3 years or as determined by pathology report. INDICATION: Colon screening    PROCEDURE PERFORMED: Colonoscopy with cold biopsies     DESCRIPTION OF PROCEDURE: An informed consent was obtained. The patient was placed in left lateral position. Perianal inspection and a digital rectal exam was performed. Video colonoscope was introduced into the rectum and advanced under direct vision up to the terminal ileum. With adequate insufflation and maneuvering of the withdrawing scope, the colonic mucosa was visualized carefully. Retroflexion was performed in the rectum to see the anorectum and also in the ascending colon to look behind the folds. Vital signs, pulse oximetry, single lead cardiac monitor were monitored throughout the procedure as the sedation was titrated to the desired effect ensuring patient comfort and safety. The patient tolerated the procedure very well and was transferred to the recovery area. Following is the summary of findings: In the ascending colon we saw 2 polyps which measured 0.5 and 0.4 cm which were removed via cold biopsies. In the sigmoid colon was a polyp measuring 0.4 cm which was removed via cold biopsies. Patient had diverticulosis involving the right colon. There was one angiodysplasia in the cecum with measured approximately 2 cm across.   There was no bleeding

## 2024-04-02 ENCOUNTER — HOSPITAL ENCOUNTER (OUTPATIENT)
Facility: HOSPITAL | Age: 69
Discharge: HOME | End: 2024-04-05
Attending: PODIATRIST
Payer: MEDICARE

## 2024-04-02 DIAGNOSIS — M86.9 OSTEOMYELITIS OF LEFT FOOT, UNSPECIFIED TYPE (HCC): ICD-10-CM

## 2024-04-02 DIAGNOSIS — S91.202A OPEN WOUND OF LEFT GREAT TOE WITH DAMAGE TO NAIL, INITIAL ENCOUNTER: ICD-10-CM

## 2024-04-02 PROCEDURE — 73718 MRI LOWER EXTREMITY W/O DYE: CPT

## 2024-06-25 ENCOUNTER — HOSPITAL ENCOUNTER (INPATIENT)
Facility: HOSPITAL | Age: 69
LOS: 23 days | Discharge: NURSING, REHAB OR LONG-TERM CARE | End: 2024-07-18
Attending: STUDENT IN AN ORGANIZED HEALTH CARE EDUCATION/TRAINING PROGRAM | Admitting: STUDENT IN AN ORGANIZED HEALTH CARE EDUCATION/TRAINING PROGRAM
Payer: MEDICARE

## 2024-06-25 DIAGNOSIS — N18.6 ESRD (END STAGE RENAL DISEASE) (HCC): ICD-10-CM

## 2024-06-25 DIAGNOSIS — L08.9 INFECTION OF SKIN AND SUBCUTANEOUS TISSUE: ICD-10-CM

## 2024-06-25 DIAGNOSIS — L08.9 DIABETIC FOOT INFECTION (HCC): Primary | ICD-10-CM

## 2024-06-25 DIAGNOSIS — E87.6 HYPOKALEMIA: ICD-10-CM

## 2024-06-25 DIAGNOSIS — M86.9 OSTEOMYELITIS OF RIGHT FOOT, UNSPECIFIED TYPE (HCC): ICD-10-CM

## 2024-06-25 DIAGNOSIS — I96 GANGRENE OF TOE OF RIGHT FOOT (HCC): ICD-10-CM

## 2024-06-25 DIAGNOSIS — I73.9 PAD (PERIPHERAL ARTERY DISEASE) (HCC): ICD-10-CM

## 2024-06-25 DIAGNOSIS — D64.9 ANEMIA, UNSPECIFIED TYPE: ICD-10-CM

## 2024-06-25 DIAGNOSIS — R73.9 HYPERGLYCEMIA: ICD-10-CM

## 2024-06-25 DIAGNOSIS — E11.628 DIABETIC FOOT INFECTION (HCC): Primary | ICD-10-CM

## 2024-06-25 PROBLEM — I50.32 CHRONIC HEART FAILURE WITH PRESERVED EJECTION FRACTION (HFPEF) (HCC): Status: ACTIVE | Noted: 2024-06-25

## 2024-06-25 PROBLEM — I25.10 CORONARY ARTERY DISEASE INVOLVING NATIVE CORONARY ARTERY OF NATIVE HEART WITHOUT ANGINA PECTORIS: Status: ACTIVE | Noted: 2024-06-25

## 2024-06-25 LAB
ANION GAP SERPL CALC-SCNC: 7 MMOL/L (ref 3–18)
BASOPHILS # BLD: 0 K/UL (ref 0–0.1)
BASOPHILS NFR BLD: 0 % (ref 0–2)
BUN SERPL-MCNC: 14 MG/DL (ref 7–18)
BUN/CREAT SERPL: 4 (ref 12–20)
CALCIUM SERPL-MCNC: 10 MG/DL (ref 8.5–10.1)
CHLORIDE SERPL-SCNC: 96 MMOL/L (ref 100–111)
CO2 SERPL-SCNC: 30 MMOL/L (ref 21–32)
CREAT SERPL-MCNC: 3.85 MG/DL (ref 0.6–1.3)
DIFFERENTIAL METHOD BLD: ABNORMAL
EOSINOPHIL # BLD: 0.1 K/UL (ref 0–0.4)
EOSINOPHIL NFR BLD: 1 % (ref 0–5)
ERYTHROCYTE [DISTWIDTH] IN BLOOD BY AUTOMATED COUNT: 16.4 % (ref 11.6–14.5)
FERRITIN SERPL-MCNC: 2073 NG/ML (ref 8–388)
FOLATE SERPL-MCNC: >20 NG/ML (ref 3.1–17.5)
GLUCOSE BLD STRIP.AUTO-MCNC: 232 MG/DL (ref 70–110)
GLUCOSE SERPL-MCNC: 208 MG/DL (ref 74–99)
HCT VFR BLD AUTO: 19.9 % (ref 35–45)
HGB BLD-MCNC: 6.2 G/DL (ref 12–16)
HISTORY CHECK: NORMAL
IMM GRANULOCYTES # BLD AUTO: 0.1 K/UL (ref 0–0.04)
IMM GRANULOCYTES NFR BLD AUTO: 1 % (ref 0–0.5)
IRON SATN MFR SERPL: 24 % (ref 20–50)
IRON SERPL-MCNC: 29 UG/DL (ref 50–175)
LYMPHOCYTES # BLD: 0.9 K/UL (ref 0.9–3.6)
LYMPHOCYTES NFR BLD: 8 % (ref 21–52)
MCH RBC QN AUTO: 23.9 PG (ref 24–34)
MCHC RBC AUTO-ENTMCNC: 31.2 G/DL (ref 31–37)
MCV RBC AUTO: 76.8 FL (ref 78–100)
MONOCYTES # BLD: 1.4 K/UL (ref 0.05–1.2)
MONOCYTES NFR BLD: 12 % (ref 3–10)
NEUTS SEG # BLD: 9 K/UL (ref 1.8–8)
NEUTS SEG NFR BLD: 78 % (ref 40–73)
NRBC # BLD: 0 K/UL (ref 0–0.01)
NRBC BLD-RTO: 0 PER 100 WBC
PLATELET # BLD AUTO: 330 K/UL (ref 135–420)
PMV BLD AUTO: 9.8 FL (ref 9.2–11.8)
POTASSIUM SERPL-SCNC: 3 MMOL/L (ref 3.5–5.5)
RBC # BLD AUTO: 2.59 M/UL (ref 4.2–5.3)
SODIUM SERPL-SCNC: 133 MMOL/L (ref 136–145)
TIBC SERPL-MCNC: 123 UG/DL (ref 250–450)
VIT B12 SERPL-MCNC: 800 PG/ML (ref 211–911)
WBC # BLD AUTO: 11.6 K/UL (ref 4.6–13.2)

## 2024-06-25 PROCEDURE — 6370000000 HC RX 637 (ALT 250 FOR IP): Performed by: PHYSICIAN ASSISTANT

## 2024-06-25 PROCEDURE — 1100000000 HC RM PRIVATE

## 2024-06-25 PROCEDURE — 82746 ASSAY OF FOLIC ACID SERUM: CPT

## 2024-06-25 PROCEDURE — 86900 BLOOD TYPING SEROLOGIC ABO: CPT

## 2024-06-25 PROCEDURE — 83540 ASSAY OF IRON: CPT

## 2024-06-25 PROCEDURE — P9016 RBC LEUKOCYTES REDUCED: HCPCS

## 2024-06-25 PROCEDURE — 6360000002 HC RX W HCPCS: Performed by: PHYSICIAN ASSISTANT

## 2024-06-25 PROCEDURE — 99223 1ST HOSP IP/OBS HIGH 75: CPT | Performed by: PHYSICIAN ASSISTANT

## 2024-06-25 PROCEDURE — 82728 ASSAY OF FERRITIN: CPT

## 2024-06-25 PROCEDURE — 80048 BASIC METABOLIC PNL TOTAL CA: CPT

## 2024-06-25 PROCEDURE — 82607 VITAMIN B-12: CPT

## 2024-06-25 PROCEDURE — 30233N1 TRANSFUSION OF NONAUTOLOGOUS RED BLOOD CELLS INTO PERIPHERAL VEIN, PERCUTANEOUS APPROACH: ICD-10-PCS | Performed by: STUDENT IN AN ORGANIZED HEALTH CARE EDUCATION/TRAINING PROGRAM

## 2024-06-25 PROCEDURE — 85025 COMPLETE CBC W/AUTO DIFF WBC: CPT

## 2024-06-25 PROCEDURE — 83550 IRON BINDING TEST: CPT

## 2024-06-25 PROCEDURE — 96365 THER/PROPH/DIAG IV INF INIT: CPT

## 2024-06-25 PROCEDURE — 96366 THER/PROPH/DIAG IV INF ADDON: CPT

## 2024-06-25 PROCEDURE — 87040 BLOOD CULTURE FOR BACTERIA: CPT

## 2024-06-25 PROCEDURE — 86923 COMPATIBILITY TEST ELECTRIC: CPT

## 2024-06-25 PROCEDURE — 86901 BLOOD TYPING SEROLOGIC RH(D): CPT

## 2024-06-25 PROCEDURE — 82962 GLUCOSE BLOOD TEST: CPT

## 2024-06-25 PROCEDURE — 86850 RBC ANTIBODY SCREEN: CPT

## 2024-06-25 PROCEDURE — 96367 TX/PROPH/DG ADDL SEQ IV INF: CPT

## 2024-06-25 PROCEDURE — 99285 EMERGENCY DEPT VISIT HI MDM: CPT

## 2024-06-25 PROCEDURE — 2580000003 HC RX 258: Performed by: PHYSICIAN ASSISTANT

## 2024-06-25 RX ORDER — TIMOLOL MALEATE 5 MG/ML
1 SOLUTION/ DROPS OPHTHALMIC 2 TIMES DAILY
Status: DISCONTINUED | OUTPATIENT
Start: 2024-06-25 | End: 2024-07-18 | Stop reason: HOSPADM

## 2024-06-25 RX ORDER — SODIUM CHLORIDE 0.9 % (FLUSH) 0.9 %
5-40 SYRINGE (ML) INJECTION PRN
Status: DISCONTINUED | OUTPATIENT
Start: 2024-06-25 | End: 2024-07-18 | Stop reason: HOSPADM

## 2024-06-25 RX ORDER — ONDANSETRON 2 MG/ML
4 INJECTION INTRAMUSCULAR; INTRAVENOUS EVERY 6 HOURS PRN
Status: DISCONTINUED | OUTPATIENT
Start: 2024-06-25 | End: 2024-07-18 | Stop reason: HOSPADM

## 2024-06-25 RX ORDER — ACETAMINOPHEN 325 MG/1
650 TABLET ORAL EVERY 6 HOURS PRN
Status: DISCONTINUED | OUTPATIENT
Start: 2024-06-25 | End: 2024-07-18 | Stop reason: HOSPADM

## 2024-06-25 RX ORDER — POLYETHYLENE GLYCOL 3350 17 G/17G
17 POWDER, FOR SOLUTION ORAL DAILY PRN
Status: DISCONTINUED | OUTPATIENT
Start: 2024-06-25 | End: 2024-07-18 | Stop reason: HOSPADM

## 2024-06-25 RX ORDER — INSULIN LISPRO 100 [IU]/ML
0-4 INJECTION, SOLUTION INTRAVENOUS; SUBCUTANEOUS NIGHTLY
Status: DISCONTINUED | OUTPATIENT
Start: 2024-06-25 | End: 2024-07-18 | Stop reason: HOSPADM

## 2024-06-25 RX ORDER — CARVEDILOL 3.12 MG/1
3.12 TABLET ORAL 2 TIMES DAILY WITH MEALS
Status: DISCONTINUED | OUTPATIENT
Start: 2024-06-25 | End: 2024-07-15

## 2024-06-25 RX ORDER — HYDROCODONE BITARTRATE AND ACETAMINOPHEN 5; 325 MG/1; MG/1
1 TABLET ORAL EVERY 6 HOURS PRN
Status: DISCONTINUED | OUTPATIENT
Start: 2024-06-25 | End: 2024-06-28

## 2024-06-25 RX ORDER — DEXTROSE MONOHYDRATE 100 MG/ML
INJECTION, SOLUTION INTRAVENOUS CONTINUOUS PRN
Status: DISCONTINUED | OUTPATIENT
Start: 2024-06-25 | End: 2024-07-18 | Stop reason: HOSPADM

## 2024-06-25 RX ORDER — BISACODYL 10 MG
10 SUPPOSITORY, RECTAL RECTAL DAILY PRN
Status: DISCONTINUED | OUTPATIENT
Start: 2024-06-25 | End: 2024-06-28

## 2024-06-25 RX ORDER — LATANOPROST 50 UG/ML
1 SOLUTION/ DROPS OPHTHALMIC NIGHTLY
Status: DISCONTINUED | OUTPATIENT
Start: 2024-06-25 | End: 2024-07-18 | Stop reason: HOSPADM

## 2024-06-25 RX ORDER — CARVEDILOL 3.12 MG/1
3.12 TABLET ORAL 2 TIMES DAILY WITH MEALS
Status: ON HOLD | COMMUNITY
Start: 2020-01-03 | End: 2024-07-18 | Stop reason: HOSPADM

## 2024-06-25 RX ORDER — ATORVASTATIN CALCIUM 10 MG/1
10 TABLET, FILM COATED ORAL DAILY
Status: DISCONTINUED | OUTPATIENT
Start: 2024-06-26 | End: 2024-07-18 | Stop reason: HOSPADM

## 2024-06-25 RX ORDER — FUROSEMIDE 40 MG/1
80 TABLET ORAL DAILY
Status: DISCONTINUED | OUTPATIENT
Start: 2024-06-26 | End: 2024-06-30

## 2024-06-25 RX ORDER — SODIUM CHLORIDE 9 MG/ML
INJECTION, SOLUTION INTRAVENOUS PRN
Status: DISCONTINUED | OUTPATIENT
Start: 2024-06-25 | End: 2024-06-26 | Stop reason: SDUPTHER

## 2024-06-25 RX ORDER — MORPHINE SULFATE 2 MG/ML
1 INJECTION, SOLUTION INTRAMUSCULAR; INTRAVENOUS
Status: COMPLETED | OUTPATIENT
Start: 2024-06-25 | End: 2024-06-25

## 2024-06-25 RX ORDER — POTASSIUM CHLORIDE 20 MEQ/1
40 TABLET, EXTENDED RELEASE ORAL ONCE
Status: DISCONTINUED | OUTPATIENT
Start: 2024-06-25 | End: 2024-07-18 | Stop reason: HOSPADM

## 2024-06-25 RX ORDER — DORZOLAMIDE HCL 20 MG/ML
1 SOLUTION/ DROPS OPHTHALMIC 2 TIMES DAILY
Status: DISCONTINUED | OUTPATIENT
Start: 2024-06-25 | End: 2024-07-18 | Stop reason: HOSPADM

## 2024-06-25 RX ORDER — INSULIN LISPRO 100 [IU]/ML
0-4 INJECTION, SOLUTION INTRAVENOUS; SUBCUTANEOUS
Status: DISCONTINUED | OUTPATIENT
Start: 2024-06-26 | End: 2024-07-18 | Stop reason: HOSPADM

## 2024-06-25 RX ORDER — SODIUM CHLORIDE 9 MG/ML
INJECTION, SOLUTION INTRAVENOUS PRN
Status: DISCONTINUED | OUTPATIENT
Start: 2024-06-25 | End: 2024-07-18 | Stop reason: HOSPADM

## 2024-06-25 RX ORDER — INSULIN DETEMIR 100 [IU]/ML
10 INJECTION, SOLUTION SUBCUTANEOUS NIGHTLY
COMMUNITY

## 2024-06-25 RX ORDER — ONDANSETRON 4 MG/1
4 TABLET, ORALLY DISINTEGRATING ORAL EVERY 8 HOURS PRN
Status: DISCONTINUED | OUTPATIENT
Start: 2024-06-25 | End: 2024-07-18 | Stop reason: HOSPADM

## 2024-06-25 RX ORDER — PANTOPRAZOLE SODIUM 40 MG/1
40 TABLET, DELAYED RELEASE ORAL
Status: DISCONTINUED | OUTPATIENT
Start: 2024-06-26 | End: 2024-07-18 | Stop reason: HOSPADM

## 2024-06-25 RX ORDER — INSULIN GLARGINE 100 [IU]/ML
10 INJECTION, SOLUTION SUBCUTANEOUS NIGHTLY
Status: DISCONTINUED | OUTPATIENT
Start: 2024-06-25 | End: 2024-07-18 | Stop reason: HOSPADM

## 2024-06-25 RX ORDER — SODIUM CHLORIDE 0.9 % (FLUSH) 0.9 %
5-40 SYRINGE (ML) INJECTION EVERY 12 HOURS SCHEDULED
Status: DISCONTINUED | OUTPATIENT
Start: 2024-06-25 | End: 2024-07-18 | Stop reason: HOSPADM

## 2024-06-25 RX ORDER — ACETAMINOPHEN 650 MG/1
650 SUPPOSITORY RECTAL EVERY 6 HOURS PRN
Status: DISCONTINUED | OUTPATIENT
Start: 2024-06-25 | End: 2024-07-18 | Stop reason: HOSPADM

## 2024-06-25 RX ORDER — DORZOLAMIDE HCL 20 MG/ML
1 SOLUTION/ DROPS OPHTHALMIC 2 TIMES DAILY
COMMUNITY
Start: 2024-01-12

## 2024-06-25 RX ORDER — POTASSIUM CHLORIDE 20 MEQ/1
40 TABLET, EXTENDED RELEASE ORAL
Status: COMPLETED | OUTPATIENT
Start: 2024-06-25 | End: 2024-06-25

## 2024-06-25 RX ORDER — ASPIRIN 81 MG/1
81 TABLET, CHEWABLE ORAL DAILY
Status: DISCONTINUED | OUTPATIENT
Start: 2024-06-26 | End: 2024-07-18 | Stop reason: HOSPADM

## 2024-06-25 RX ADMIN — PIPERACILLIN AND TAZOBACTAM 4500 MG: 4; .5 INJECTION, POWDER, FOR SOLUTION INTRAVENOUS at 18:40

## 2024-06-25 RX ADMIN — INSULIN GLARGINE 10 UNITS: 100 INJECTION, SOLUTION SUBCUTANEOUS at 20:31

## 2024-06-25 RX ADMIN — HYDROCODONE BITARTRATE AND ACETAMINOPHEN 1 TABLET: 5; 325 TABLET ORAL at 20:29

## 2024-06-25 RX ADMIN — SACUBITRIL AND VALSARTAN 1 TABLET: 24; 26 TABLET, FILM COATED ORAL at 20:30

## 2024-06-25 RX ADMIN — MORPHINE SULFATE 1 MG: 2 INJECTION, SOLUTION INTRAMUSCULAR; INTRAVENOUS at 18:47

## 2024-06-25 RX ADMIN — CARVEDILOL 3.12 MG: 3.12 TABLET, FILM COATED ORAL at 20:29

## 2024-06-25 RX ADMIN — POTASSIUM CHLORIDE 40 MEQ: 1500 TABLET, EXTENDED RELEASE ORAL at 19:23

## 2024-06-25 RX ADMIN — VANCOMYCIN HYDROCHLORIDE 2000 MG: 10 INJECTION, POWDER, LYOPHILIZED, FOR SOLUTION INTRAVENOUS at 18:47

## 2024-06-25 ASSESSMENT — PAIN SCALES - GENERAL
PAINLEVEL_OUTOF10: 6
PAINLEVEL_OUTOF10: 6

## 2024-06-25 ASSESSMENT — PAIN DESCRIPTION - LOCATION
LOCATION: FOOT
LOCATION: FOOT

## 2024-06-25 ASSESSMENT — PAIN DESCRIPTION - DESCRIPTORS: DESCRIPTORS: THROBBING

## 2024-06-25 ASSESSMENT — PAIN DESCRIPTION - ORIENTATION
ORIENTATION: RIGHT
ORIENTATION: RIGHT

## 2024-06-25 NOTE — ED PROVIDER NOTES
EMERGENCY DEPARTMENT HISTORY AND PHYSICAL EXAM    7:02 PM      Date: 6/25/2024  Patient Name: Jigna Conner    History of Presenting Illness     Chief Complaint   Patient presents with    Foot Pain       History Provided By: Patient    Additional History (Context): Jigna Conner is a 68 y.o. female with   Past Medical History:   Diagnosis Date    Boil of buttock 06/24/2021    R Buttock    CAD (coronary artery disease)     MI 2020    Chronic kidney disease     dialyssi Tu Thu Sat    Diabetes (HCC)     Diabetes mellitus (HCC) 6/24/2021    ESRD on dialysis (HCC) 6/24/2021    Heart failure (HCC)     Hypertension     Ill-defined condition     scolosis    Menopause     Renal insufficiency    } who presents with complaint of right foot pain, wound, and drainage x months.  Patient has been evaluated by Dr. Gabriel, podiatry, with three rounds of antibiotics.  Patient sent to the ED for admission for antibiotics and I&D tomorrow.  Patient denies fever or chills, numbness or tingling.  Notes she does have pain at the site.    PCP: Tiffanie Lang PA-C    Current Facility-Administered Medications   Medication Dose Route Frequency Provider Last Rate Last Admin    piperacillin-tazobactam (ZOSYN) 4,500 mg in sodium chloride 0.9 % 100 mL IVPB (mini-bag)  4,500 mg IntraVENous Once Elinor Tubbs  mL/hr at 06/25/24 1901 Restarted at 06/25/24 1901    vancomycin (VANCOCIN) 2000 mg in 0.9% sodium chloride 500 mL IVPB  2,000 mg IntraVENous Once Elinor Tubbs  mL/hr at 06/25/24 1847 Rate Verify at 06/25/24 1847    0.9 % sodium chloride infusion   IntraVENous PRN Elinor Tubbs PA        potassium chloride (KLOR-CON M) extended release tablet 40 mEq  40 mEq Oral NOW Elinor Tubbs PA         Current Outpatient Medications   Medication Sig Dispense Refill    aspirin 81 MG chewable tablet Take 1 tablet by mouth daily      atorvastatin (LIPITOR) 10 MG tablet 1 tablet daily      vitamin D                  Diagnostic Study Results     Labs -  Recent Results (from the past 12 hour(s))   CBC with Auto Differential    Collection Time: 06/25/24  6:25 PM   Result Value Ref Range    WBC 11.6 4.6 - 13.2 K/uL    RBC 2.59 (L) 4.20 - 5.30 M/uL    Hemoglobin 6.2 (L) 12.0 - 16.0 g/dL    Hematocrit 19.9 (L) 35.0 - 45.0 %    MCV 76.8 (L) 78.0 - 100.0 FL    MCH 23.9 (L) 24.0 - 34.0 PG    MCHC 31.2 31.0 - 37.0 g/dL    RDW 16.4 (H) 11.6 - 14.5 %    Platelets 330 135 - 420 K/uL    MPV 9.8 9.2 - 11.8 FL    Nucleated RBCs 0.0 0  WBC    nRBC 0.00 0.00 - 0.01 K/uL    Neutrophils % 78 (H) 40 - 73 %    Lymphocytes % 8 (L) 21 - 52 %    Monocytes % 12 (H) 3 - 10 %    Eosinophils % 1 0 - 5 %    Basophils % 0 0 - 2 %    Immature Granulocytes % 1 (H) 0.0 - 0.5 %    Neutrophils Absolute 9.0 (H) 1.8 - 8.0 K/UL    Lymphocytes Absolute 0.9 0.9 - 3.6 K/UL    Monocytes Absolute 1.4 (H) 0.05 - 1.2 K/UL    Eosinophils Absolute 0.1 0.0 - 0.4 K/UL    Basophils Absolute 0.0 0.0 - 0.1 K/UL    Immature Granulocytes Absolute 0.1 (H) 0.00 - 0.04 K/UL    Differential Type AUTOMATED     Basic Metabolic Panel    Collection Time: 06/25/24  6:25 PM   Result Value Ref Range    Sodium 133 (L) 136 - 145 mmol/L    Potassium 3.0 (L) 3.5 - 5.5 mmol/L    Chloride 96 (L) 100 - 111 mmol/L    CO2 30 21 - 32 mmol/L    Anion Gap 7 3.0 - 18 mmol/L    Glucose 208 (H) 74 - 99 mg/dL    BUN 14 7.0 - 18 MG/DL    Creatinine 3.85 (H) 0.6 - 1.3 MG/DL    BUN/Creatinine Ratio 4 (L) 12 - 20      Est, Glom Filt Rate 12 (L) >60 ml/min/1.73m2    Calcium 10.0 8.5 - 10.1 MG/DL       Radiologic Studies -   No orders to display         Medical Decision Making   I am the first provider for this patient.    I reviewed the vital signs, available nursing notes, past medical history, past surgical history, family history and social history.    Vital Signs-Reviewed the patient's vital signs.    Records Reviewed: Nursing Notes and Old Medical Records (Time of Review: 7:02

## 2024-06-25 NOTE — H&P
History and Physical          Subjective     HPI: Jigna Conner is a 68 y.o. female with a PMHx of IDDM, CAD, ESRD on dialysis, HFpEF who presented to the ED with c/o right foot pain associated with wound/drainage ongoing for 5-6 weeks. She reports two wounds, one on the bottom of her foot and one at the site of her prior 2nd toe amputation. Pain is throbbing and severe, but improved to 3/10 after morphine. She states she's had three rounds of abx. She was instructed to go to the ED by per podiatrist today for admission for I&D tomorrow. She denies fever, chills, abd pain, nvd, sob, cp, cough.     In the ED, VSS. Labs with K 3.0, , hgb 6.2, MCV 76.8.  Podiatry consulted.     PMHx:  Past Medical History:   Diagnosis Date    Boil of buttock 06/24/2021    R Buttock    CAD (coronary artery disease)     MI 2020    Chronic kidney disease     dialyssi Tu Thu Sat    Diabetes (HCC)     Diabetes mellitus (HCC) 6/24/2021    ESRD on dialysis (HCC) 6/24/2021    Heart failure (HCC)     Hypertension     Ill-defined condition     scolosis    Menopause     Renal insufficiency        PSurgHx:  Past Surgical History:   Procedure Laterality Date    BREAST BIOPSY Right 08/09/2021    Stereo    COLONOSCOPY N/A 10/20/2023    COLONOSCOPY WITH BIOPSY performed by Gracia Tran Jr., MD at [facility] ENDOSCOPY    CLEOPATRA STEROTACTIC LOC BREAST BIOPSY RIGHT Right 8/9/2021    CLEOPATRA STEROTACTIC LOC BREAST BIOPSY RIGHT 8/9/2021 [facility] RAD MAMMO    VASCULAR SURGERY         SocialHx:  Social History     Socioeconomic History    Marital status:    Tobacco Use    Smoking status: Never    Smokeless tobacco: Never   Vaping Use    Vaping Use: Never used   Substance and Sexual Activity    Alcohol use: Never    Drug use: Never       FamilyHx:  Family History   Problem Relation Age of Onset    Cancer Other     Heart Disease Other     Diabetes Other     Hypertension Other        Home Medications:  Prior to Admission medications    Medication Sig

## 2024-06-26 ENCOUNTER — APPOINTMENT (OUTPATIENT)
Facility: HOSPITAL | Age: 69
End: 2024-06-26
Payer: MEDICARE

## 2024-06-26 ENCOUNTER — ANESTHESIA (OUTPATIENT)
Facility: HOSPITAL | Age: 69
End: 2024-06-26
Payer: MEDICARE

## 2024-06-26 ENCOUNTER — ANESTHESIA EVENT (OUTPATIENT)
Facility: HOSPITAL | Age: 69
End: 2024-06-26
Payer: MEDICARE

## 2024-06-26 PROBLEM — N18.6 ESRD ON DIALYSIS (HCC): Chronic | Status: ACTIVE | Noted: 2021-06-24

## 2024-06-26 PROBLEM — E11.9 DIABETES MELLITUS (HCC): Chronic | Status: ACTIVE | Noted: 2021-06-24

## 2024-06-26 PROBLEM — I50.32 CHRONIC HEART FAILURE WITH PRESERVED EJECTION FRACTION (HFPEF) (HCC): Chronic | Status: ACTIVE | Noted: 2024-06-25

## 2024-06-26 PROBLEM — Z99.2 ESRD ON DIALYSIS (HCC): Chronic | Status: ACTIVE | Noted: 2021-06-24

## 2024-06-26 PROBLEM — I10 HYPERTENSION: Chronic | Status: ACTIVE | Noted: 2022-02-15

## 2024-06-26 PROBLEM — I25.10 CORONARY ARTERY DISEASE INVOLVING NATIVE CORONARY ARTERY OF NATIVE HEART WITHOUT ANGINA PECTORIS: Chronic | Status: ACTIVE | Noted: 2024-06-25

## 2024-06-26 LAB
ANION GAP SERPL CALC-SCNC: 8 MMOL/L (ref 3–18)
BASOPHILS # BLD: 0 K/UL (ref 0–0.1)
BASOPHILS NFR BLD: 0 % (ref 0–2)
BUN SERPL-MCNC: 16 MG/DL (ref 7–18)
BUN/CREAT SERPL: 4 (ref 12–20)
CALCIUM SERPL-MCNC: 9.5 MG/DL (ref 8.5–10.1)
CHLORIDE SERPL-SCNC: 100 MMOL/L (ref 100–111)
CO2 SERPL-SCNC: 27 MMOL/L (ref 21–32)
CREAT SERPL-MCNC: 4.27 MG/DL (ref 0.6–1.3)
DIFFERENTIAL METHOD BLD: ABNORMAL
ECHO BSA: 2 M2
EOSINOPHIL # BLD: 0.1 K/UL (ref 0–0.4)
EOSINOPHIL NFR BLD: 1 % (ref 0–5)
ERYTHROCYTE [DISTWIDTH] IN BLOOD BY AUTOMATED COUNT: 16.4 % (ref 11.6–14.5)
EST. AVERAGE GLUCOSE BLD GHB EST-MCNC: 131 MG/DL
GLUCOSE BLD STRIP.AUTO-MCNC: 110 MG/DL (ref 70–110)
GLUCOSE BLD STRIP.AUTO-MCNC: 111 MG/DL (ref 70–110)
GLUCOSE BLD STRIP.AUTO-MCNC: 68 MG/DL (ref 70–110)
GLUCOSE BLD STRIP.AUTO-MCNC: 76 MG/DL (ref 70–110)
GLUCOSE BLD STRIP.AUTO-MCNC: 83 MG/DL (ref 70–110)
GLUCOSE BLD STRIP.AUTO-MCNC: 84 MG/DL (ref 70–110)
GLUCOSE BLD STRIP.AUTO-MCNC: 90 MG/DL (ref 70–110)
GLUCOSE SERPL-MCNC: 145 MG/DL (ref 74–99)
HBA1C MFR BLD: 6.2 % (ref 4.2–5.6)
HBV SURFACE AG SER QL: <0.1 INDEX
HBV SURFACE AG SER QL: NEGATIVE
HCT VFR BLD AUTO: 20.6 % (ref 35–45)
HGB BLD-MCNC: 6.5 G/DL (ref 12–16)
HISTORY CHECK: NORMAL
IMM GRANULOCYTES # BLD AUTO: 0.1 K/UL (ref 0–0.04)
IMM GRANULOCYTES NFR BLD AUTO: 1 % (ref 0–0.5)
LYMPHOCYTES # BLD: 0.9 K/UL (ref 0.9–3.6)
LYMPHOCYTES NFR BLD: 8 % (ref 21–52)
MCH RBC QN AUTO: 24.8 PG (ref 24–34)
MCHC RBC AUTO-ENTMCNC: 31.6 G/DL (ref 31–37)
MCV RBC AUTO: 78.6 FL (ref 78–100)
MONOCYTES # BLD: 1.5 K/UL (ref 0.05–1.2)
MONOCYTES NFR BLD: 13 % (ref 3–10)
NEUTS SEG # BLD: 8.8 K/UL (ref 1.8–8)
NEUTS SEG NFR BLD: 78 % (ref 40–73)
NRBC # BLD: 0 K/UL (ref 0–0.01)
NRBC BLD-RTO: 0 PER 100 WBC
PLATELET # BLD AUTO: 280 K/UL (ref 135–420)
PMV BLD AUTO: 10.3 FL (ref 9.2–11.8)
POTASSIUM SERPL-SCNC: 3.7 MMOL/L (ref 3.5–5.5)
RBC # BLD AUTO: 2.62 M/UL (ref 4.2–5.3)
SODIUM SERPL-SCNC: 135 MMOL/L (ref 136–145)
VANCOMYCIN SERPL-MCNC: 27.6 UG/ML (ref 5–40)
VAS LEFT ABI: 0.85
VAS LEFT CALF PRESSURE: 255 MMHG
VAS LEFT DORSALIS PEDIS BP: 133 MMHG
VAS LEFT PTA BP: 131 MMHG
VAS RIGHT ABI: 0.73
VAS RIGHT ARM BP: 157 MMHG
VAS RIGHT CALF PRESSURE: 255 MMHG
VAS RIGHT DORSALIS PEDIS BP: 115 MMHG
VAS RIGHT PTA BP: 88 MMHG
WBC # BLD AUTO: 11.4 K/UL (ref 4.6–13.2)

## 2024-06-26 PROCEDURE — 3700000001 HC ADD 15 MINUTES (ANESTHESIA): Performed by: PODIATRIST

## 2024-06-26 PROCEDURE — 7100000000 HC PACU RECOVERY - FIRST 15 MIN: Performed by: PODIATRIST

## 2024-06-26 PROCEDURE — 87070 CULTURE OTHR SPECIMN AEROBIC: CPT

## 2024-06-26 PROCEDURE — 6360000002 HC RX W HCPCS: Performed by: INTERNAL MEDICINE

## 2024-06-26 PROCEDURE — 2500000003 HC RX 250 WO HCPCS: Performed by: PODIATRIST

## 2024-06-26 PROCEDURE — 86706 HEP B SURFACE ANTIBODY: CPT

## 2024-06-26 PROCEDURE — 94761 N-INVAS EAR/PLS OXIMETRY MLT: CPT

## 2024-06-26 PROCEDURE — 93923 UPR/LXTR ART STDY 3+ LVLS: CPT

## 2024-06-26 PROCEDURE — 73630 X-RAY EXAM OF FOOT: CPT

## 2024-06-26 PROCEDURE — 2580000003 HC RX 258: Performed by: INTERNAL MEDICINE

## 2024-06-26 PROCEDURE — 36415 COLL VENOUS BLD VENIPUNCTURE: CPT

## 2024-06-26 PROCEDURE — 2580000003 HC RX 258: Performed by: PHYSICIAN ASSISTANT

## 2024-06-26 PROCEDURE — 87075 CULTR BACTERIA EXCEPT BLOOD: CPT

## 2024-06-26 PROCEDURE — 0QBQ0ZX EXCISION OF RIGHT TOE PHALANX, OPEN APPROACH, DIAGNOSTIC: ICD-10-PCS | Performed by: PODIATRIST

## 2024-06-26 PROCEDURE — 6360000002 HC RX W HCPCS: Performed by: PODIATRIST

## 2024-06-26 PROCEDURE — P9016 RBC LEUKOCYTES REDUCED: HCPCS

## 2024-06-26 PROCEDURE — 93923 UPR/LXTR ART STDY 3+ LVLS: CPT | Performed by: INTERNAL MEDICINE

## 2024-06-26 PROCEDURE — 3600000002 HC SURGERY LEVEL 2 BASE: Performed by: PODIATRIST

## 2024-06-26 PROCEDURE — 6370000000 HC RX 637 (ALT 250 FOR IP): Performed by: PHYSICIAN ASSISTANT

## 2024-06-26 PROCEDURE — 7100000001 HC PACU RECOVERY - ADDTL 15 MIN: Performed by: PODIATRIST

## 2024-06-26 PROCEDURE — 80048 BASIC METABOLIC PNL TOTAL CA: CPT

## 2024-06-26 PROCEDURE — 36430 TRANSFUSION BLD/BLD COMPNT: CPT

## 2024-06-26 PROCEDURE — 0J9Q0ZZ DRAINAGE OF RIGHT FOOT SUBCUTANEOUS TISSUE AND FASCIA, OPEN APPROACH: ICD-10-PCS | Performed by: PODIATRIST

## 2024-06-26 PROCEDURE — 87205 SMEAR GRAM STAIN: CPT

## 2024-06-26 PROCEDURE — 2709999900 HC NON-CHARGEABLE SUPPLY: Performed by: PODIATRIST

## 2024-06-26 PROCEDURE — 3700000000 HC ANESTHESIA ATTENDED CARE: Performed by: PODIATRIST

## 2024-06-26 PROCEDURE — 3600000012 HC SURGERY LEVEL 2 ADDTL 15MIN: Performed by: PODIATRIST

## 2024-06-26 PROCEDURE — 80202 ASSAY OF VANCOMYCIN: CPT

## 2024-06-26 PROCEDURE — 6360000002 HC RX W HCPCS: Performed by: PHYSICIAN ASSISTANT

## 2024-06-26 PROCEDURE — 85025 COMPLETE CBC W/AUTO DIFF WBC: CPT

## 2024-06-26 PROCEDURE — 88311 DECALCIFY TISSUE: CPT

## 2024-06-26 PROCEDURE — 1100000000 HC RM PRIVATE

## 2024-06-26 PROCEDURE — 6360000002 HC RX W HCPCS: Performed by: NURSE ANESTHETIST, CERTIFIED REGISTERED

## 2024-06-26 PROCEDURE — 99233 SBSQ HOSP IP/OBS HIGH 50: CPT | Performed by: INTERNAL MEDICINE

## 2024-06-26 PROCEDURE — 0QBN0ZZ EXCISION OF RIGHT METATARSAL, OPEN APPROACH: ICD-10-PCS | Performed by: PODIATRIST

## 2024-06-26 PROCEDURE — 88305 TISSUE EXAM BY PATHOLOGIST: CPT

## 2024-06-26 PROCEDURE — 83036 HEMOGLOBIN GLYCOSYLATED A1C: CPT

## 2024-06-26 PROCEDURE — 87176 TISSUE HOMOGENIZATION CULTR: CPT

## 2024-06-26 PROCEDURE — 82962 GLUCOSE BLOOD TEST: CPT

## 2024-06-26 PROCEDURE — 87340 HEPATITIS B SURFACE AG IA: CPT

## 2024-06-26 RX ORDER — SODIUM CHLORIDE 9 MG/ML
INJECTION, SOLUTION INTRAVENOUS PRN
Status: DISCONTINUED | OUTPATIENT
Start: 2024-06-26 | End: 2024-06-26 | Stop reason: HOSPADM

## 2024-06-26 RX ORDER — ONDANSETRON 2 MG/ML
INJECTION INTRAMUSCULAR; INTRAVENOUS PRN
Status: DISCONTINUED | OUTPATIENT
Start: 2024-06-26 | End: 2024-06-26 | Stop reason: SDUPTHER

## 2024-06-26 RX ORDER — LIDOCAINE HYDROCHLORIDE 10 MG/ML
1 INJECTION, SOLUTION EPIDURAL; INFILTRATION; INTRACAUDAL; PERINEURAL
Status: CANCELLED | OUTPATIENT
Start: 2024-06-26 | End: 2024-06-27

## 2024-06-26 RX ORDER — FENTANYL CITRATE 50 UG/ML
50 INJECTION, SOLUTION INTRAMUSCULAR; INTRAVENOUS EVERY 5 MIN PRN
Status: DISCONTINUED | OUTPATIENT
Start: 2024-06-26 | End: 2024-06-26 | Stop reason: HOSPADM

## 2024-06-26 RX ORDER — DEXTROSE MONOHYDRATE 100 MG/ML
INJECTION, SOLUTION INTRAVENOUS CONTINUOUS PRN
Status: DISCONTINUED | OUTPATIENT
Start: 2024-06-26 | End: 2024-06-26 | Stop reason: HOSPADM

## 2024-06-26 RX ORDER — SODIUM CHLORIDE 0.9 % (FLUSH) 0.9 %
5-40 SYRINGE (ML) INJECTION PRN
Status: CANCELLED | OUTPATIENT
Start: 2024-06-26

## 2024-06-26 RX ORDER — DEXAMETHASONE SODIUM PHOSPHATE 4 MG/ML
INJECTION, SOLUTION INTRA-ARTICULAR; INTRALESIONAL; INTRAMUSCULAR; INTRAVENOUS; SOFT TISSUE PRN
Status: DISCONTINUED | OUTPATIENT
Start: 2024-06-26 | End: 2024-06-26 | Stop reason: SDUPTHER

## 2024-06-26 RX ORDER — PROPOFOL 10 MG/ML
INJECTION, EMULSION INTRAVENOUS PRN
Status: DISCONTINUED | OUTPATIENT
Start: 2024-06-26 | End: 2024-06-26 | Stop reason: SDUPTHER

## 2024-06-26 RX ORDER — ONDANSETRON 2 MG/ML
4 INJECTION INTRAMUSCULAR; INTRAVENOUS
Status: DISCONTINUED | OUTPATIENT
Start: 2024-06-26 | End: 2024-06-26 | Stop reason: HOSPADM

## 2024-06-26 RX ORDER — NALOXONE HYDROCHLORIDE 0.4 MG/ML
INJECTION, SOLUTION INTRAMUSCULAR; INTRAVENOUS; SUBCUTANEOUS PRN
Status: DISCONTINUED | OUTPATIENT
Start: 2024-06-26 | End: 2024-06-26 | Stop reason: HOSPADM

## 2024-06-26 RX ORDER — SODIUM CHLORIDE 9 MG/ML
INJECTION, SOLUTION INTRAVENOUS PRN
Status: DISCONTINUED | OUTPATIENT
Start: 2024-06-26 | End: 2024-07-18 | Stop reason: HOSPADM

## 2024-06-26 RX ORDER — FAMOTIDINE 20 MG/1
20 TABLET, FILM COATED ORAL ONCE
Status: CANCELLED | OUTPATIENT
Start: 2024-06-26 | End: 2024-06-26

## 2024-06-26 RX ORDER — SODIUM CHLORIDE 0.9 % (FLUSH) 0.9 %
5-40 SYRINGE (ML) INJECTION EVERY 12 HOURS SCHEDULED
Status: CANCELLED | OUTPATIENT
Start: 2024-06-26

## 2024-06-26 RX ORDER — FENTANYL CITRATE 50 UG/ML
25 INJECTION, SOLUTION INTRAMUSCULAR; INTRAVENOUS EVERY 5 MIN PRN
Status: DISCONTINUED | OUTPATIENT
Start: 2024-06-26 | End: 2024-06-26 | Stop reason: HOSPADM

## 2024-06-26 RX ORDER — MAGNESIUM SULFATE HEPTAHYDRATE 500 MG/ML
INJECTION, SOLUTION INTRAMUSCULAR; INTRAVENOUS PRN
Status: DISCONTINUED | OUTPATIENT
Start: 2024-06-26 | End: 2024-06-26 | Stop reason: SDUPTHER

## 2024-06-26 RX ORDER — FENTANYL CITRATE 50 UG/ML
INJECTION, SOLUTION INTRAMUSCULAR; INTRAVENOUS PRN
Status: DISCONTINUED | OUTPATIENT
Start: 2024-06-26 | End: 2024-06-26 | Stop reason: SDUPTHER

## 2024-06-26 RX ORDER — SODIUM CHLORIDE 0.9 % (FLUSH) 0.9 %
5-40 SYRINGE (ML) INJECTION PRN
Status: DISCONTINUED | OUTPATIENT
Start: 2024-06-26 | End: 2024-06-26 | Stop reason: HOSPADM

## 2024-06-26 RX ORDER — INSULIN LISPRO 100 [IU]/ML
0-4 INJECTION, SOLUTION INTRAVENOUS; SUBCUTANEOUS EVERY 4 HOURS
Status: CANCELLED | OUTPATIENT
Start: 2024-06-26

## 2024-06-26 RX ORDER — GLUCAGON 1 MG
1 KIT INJECTION PRN
Status: DISCONTINUED | OUTPATIENT
Start: 2024-06-26 | End: 2024-06-26 | Stop reason: HOSPADM

## 2024-06-26 RX ORDER — SODIUM CHLORIDE 9 MG/ML
INJECTION, SOLUTION INTRAVENOUS PRN
Status: CANCELLED | OUTPATIENT
Start: 2024-06-26

## 2024-06-26 RX ORDER — M-VIT,TX,IRON,MINS/CALC/FOLIC 27MG-0.4MG
1 TABLET ORAL DAILY
COMMUNITY

## 2024-06-26 RX ORDER — SODIUM CHLORIDE 9 MG/ML
INJECTION, SOLUTION INTRAVENOUS PRN
Status: DISCONTINUED | OUTPATIENT
Start: 2024-06-26 | End: 2024-07-03

## 2024-06-26 RX ORDER — DEXTROSE MONOHYDRATE 100 MG/ML
INJECTION, SOLUTION INTRAVENOUS CONTINUOUS PRN
Status: CANCELLED | OUTPATIENT
Start: 2024-06-26

## 2024-06-26 RX ORDER — SODIUM CHLORIDE 0.9 % (FLUSH) 0.9 %
5-40 SYRINGE (ML) INJECTION EVERY 12 HOURS SCHEDULED
Status: DISCONTINUED | OUTPATIENT
Start: 2024-06-26 | End: 2024-06-26 | Stop reason: HOSPADM

## 2024-06-26 RX ADMIN — SODIUM CHLORIDE, PRESERVATIVE FREE 10 ML: 5 INJECTION INTRAVENOUS at 21:28

## 2024-06-26 RX ADMIN — SODIUM CHLORIDE: 9 INJECTION, SOLUTION INTRAVENOUS at 10:35

## 2024-06-26 RX ADMIN — PROPOFOL 140 MCG/KG/MIN: 10 INJECTION, EMULSION INTRAVENOUS at 17:59

## 2024-06-26 RX ADMIN — FENTANYL CITRATE 25 MCG: 50 INJECTION INTRAMUSCULAR; INTRAVENOUS at 18:07

## 2024-06-26 RX ADMIN — CARVEDILOL 3.12 MG: 3.12 TABLET, FILM COATED ORAL at 10:04

## 2024-06-26 RX ADMIN — TIMOLOL MALEATE 1 DROP: 5 SOLUTION OPHTHALMIC at 12:19

## 2024-06-26 RX ADMIN — TIMOLOL MALEATE 1 DROP: 5 SOLUTION OPHTHALMIC at 21:28

## 2024-06-26 RX ADMIN — ONDANSETRON 4 MG: 2 INJECTION INTRAMUSCULAR; INTRAVENOUS at 17:59

## 2024-06-26 RX ADMIN — FUROSEMIDE 80 MG: 40 TABLET ORAL at 10:04

## 2024-06-26 RX ADMIN — PIPERACILLIN AND TAZOBACTAM 3375 MG: 3; .375 INJECTION, POWDER, FOR SOLUTION INTRAVENOUS at 21:36

## 2024-06-26 RX ADMIN — PANTOPRAZOLE SODIUM 40 MG: 40 TABLET, DELAYED RELEASE ORAL at 10:03

## 2024-06-26 RX ADMIN — FENTANYL CITRATE 25 MCG: 50 INJECTION INTRAMUSCULAR; INTRAVENOUS at 18:37

## 2024-06-26 RX ADMIN — ASPIRIN 81 MG CHEWABLE TABLET 81 MG: 81 TABLET CHEWABLE at 10:04

## 2024-06-26 RX ADMIN — DEXAMETHASONE SODIUM PHOSPHATE 4 MG: 4 INJECTION INTRA-ARTICULAR; INTRALESIONAL; INTRAMUSCULAR; INTRAVENOUS; SOFT TISSUE at 17:59

## 2024-06-26 RX ADMIN — PIPERACILLIN AND TAZOBACTAM 3375 MG: 3; .375 INJECTION, POWDER, FOR SOLUTION INTRAVENOUS at 10:36

## 2024-06-26 RX ADMIN — PROPOFOL 50 MG: 10 INJECTION, EMULSION INTRAVENOUS at 17:55

## 2024-06-26 RX ADMIN — HYDROMORPHONE HYDROCHLORIDE 0.25 MG: 1 INJECTION, SOLUTION INTRAMUSCULAR; INTRAVENOUS; SUBCUTANEOUS at 00:34

## 2024-06-26 RX ADMIN — HYDROCODONE BITARTRATE AND ACETAMINOPHEN 1 TABLET: 5; 325 TABLET ORAL at 10:24

## 2024-06-26 RX ADMIN — SACUBITRIL AND VALSARTAN 1 TABLET: 24; 26 TABLET, FILM COATED ORAL at 10:04

## 2024-06-26 RX ADMIN — FENTANYL CITRATE 25 MCG: 50 INJECTION, SOLUTION INTRAMUSCULAR; INTRAVENOUS at 19:13

## 2024-06-26 RX ADMIN — INSULIN GLARGINE 10 UNITS: 100 INJECTION, SOLUTION SUBCUTANEOUS at 21:28

## 2024-06-26 RX ADMIN — ATORVASTATIN CALCIUM 10 MG: 10 TABLET, FILM COATED ORAL at 10:04

## 2024-06-26 RX ADMIN — MAGNESIUM SULFATE HEPTAHYDRATE 1 G: 500 INJECTION, SOLUTION INTRAMUSCULAR; INTRAVENOUS at 17:59

## 2024-06-26 RX ADMIN — SACUBITRIL AND VALSARTAN 1 TABLET: 24; 26 TABLET, FILM COATED ORAL at 21:28

## 2024-06-26 RX ADMIN — ONDANSETRON 4 MG: 2 INJECTION INTRAMUSCULAR; INTRAVENOUS at 12:27

## 2024-06-26 RX ADMIN — FENTANYL CITRATE 25 MCG: 50 INJECTION INTRAMUSCULAR; INTRAVENOUS at 17:59

## 2024-06-26 RX ADMIN — FENTANYL CITRATE 25 MCG: 50 INJECTION INTRAMUSCULAR; INTRAVENOUS at 18:23

## 2024-06-26 RX ADMIN — HYDROCODONE BITARTRATE AND ACETAMINOPHEN 1 TABLET: 5; 325 TABLET ORAL at 21:47

## 2024-06-26 RX ADMIN — SODIUM CHLORIDE, PRESERVATIVE FREE 10 ML: 5 INJECTION INTRAVENOUS at 10:05

## 2024-06-26 ASSESSMENT — PAIN SCALES - GENERAL
PAINLEVEL_OUTOF10: 9
PAINLEVEL_OUTOF10: 6
PAINLEVEL_OUTOF10: 4
PAINLEVEL_OUTOF10: 8
PAINLEVEL_OUTOF10: 4
PAINLEVEL_OUTOF10: 3
PAINLEVEL_OUTOF10: 4
PAINLEVEL_OUTOF10: 0
PAINLEVEL_OUTOF10: 5
PAINLEVEL_OUTOF10: 9
PAINLEVEL_OUTOF10: 2
PAINLEVEL_OUTOF10: 2
PAINLEVEL_OUTOF10: 6

## 2024-06-26 ASSESSMENT — PAIN DESCRIPTION - LOCATION
LOCATION: FOOT

## 2024-06-26 ASSESSMENT — PAIN DESCRIPTION - PAIN TYPE
TYPE: ACUTE PAIN
TYPE: SURGICAL PAIN
TYPE: ACUTE PAIN
TYPE: ACUTE PAIN

## 2024-06-26 ASSESSMENT — PAIN - FUNCTIONAL ASSESSMENT
PAIN_FUNCTIONAL_ASSESSMENT: ACTIVITIES ARE NOT PREVENTED
PAIN_FUNCTIONAL_ASSESSMENT: 0-10
PAIN_FUNCTIONAL_ASSESSMENT: ACTIVITIES ARE NOT PREVENTED

## 2024-06-26 ASSESSMENT — PAIN DESCRIPTION - FREQUENCY
FREQUENCY: CONTINUOUS
FREQUENCY: INTERMITTENT
FREQUENCY: CONTINUOUS
FREQUENCY: INTERMITTENT
FREQUENCY: CONTINUOUS
FREQUENCY: CONTINUOUS

## 2024-06-26 ASSESSMENT — PAIN DESCRIPTION - ORIENTATION
ORIENTATION: RIGHT

## 2024-06-26 ASSESSMENT — PAIN DESCRIPTION - ONSET
ONSET: ON-GOING
ONSET: ON-GOING

## 2024-06-26 ASSESSMENT — PAIN DESCRIPTION - DESCRIPTORS
DESCRIPTORS: SHARP
DESCRIPTORS: ACHING;SHOOTING
DESCRIPTORS: SHARP
DESCRIPTORS: ACHING
DESCRIPTORS: JABBING;SHARP;THROBBING

## 2024-06-26 NOTE — ED NOTES
Attempted report to 5 S, RN unavailable to take report.  
Attempted report to 5S x2, RN and charge RN both unavailable, offered to give bedside report, informed that floor would call back when ready.  
Pt denies any feelings of chest pain, SOB or other new sx. No signs of transfusion reaction noted, rate increased.  
Pt given dinner tray, denies further needs at this time, monitors on and call bell in reach.  
Report given to LUCERO Pang.  
TRANSFER - OUT REPORT:    Verbal report given to LUCERO Melgar on Jigna Conner  being transferred to 5 S for routine progression of patient care       Report consisted of patient's Situation, Background, Assessment and   Recommendations(SBAR).     Information from the following report(s) ED Encounter Summary, ED SBAR, and Recent Results was reviewed with the receiving nurse.    [place] Assessment:    Presents to emergency department  because of falls (Syncope, seizure, or loss of consciousness): No  Age > 70: No  Altered Mental Status, Intoxication with alcohol or substance confusion (Disorientation, impaired judgment, poor safety awaremess, or inability to follow instructions): No  Impaired Mobility: Ambulates or transfers with assistive devices or assistance; Unable to ambulate or transer.: Yes  Nursing Judgement: No          Lines:   Peripheral IV 06/25/24 Right;Anterior;Distal Wrist (Active)        Opportunity for questions and clarification was provided.             
This RN and LUCERO Pang tried to stick patient but was not able to obtain one.   ED tech Lynsey trying to find one as of the moment.  
Wound cleaning and dressing done.  
71

## 2024-06-26 NOTE — ANESTHESIA PRE PROCEDURE
Department of Anesthesiology  Preprocedure Note       Name:  Jigna Conner   Age:  68 y.o.  :  1955                                          MRN:  [id number]         Date:  2024      Surgeon: Surgeon(s):  Marcin Gabriel DPM    Procedure: Procedure(s):  RIGHT FOOT INCISION AND DRAINAGE, PARTIAL RESECTION SECOND METATARSAL    Medications prior to admission:   Prior to Admission medications    Medication Sig Start Date End Date Taking? Authorizing Provider   Multiple Vitamins-Minerals (THERAPEUTIC MULTIVITAMIN-MINERALS) tablet Take 1 tablet by mouth daily   Yes Audrey Lion MD   carvedilol (COREG) 3.125 MG tablet Take 1 tablet by mouth 2 times daily (with meals) 1/3/20  Yes Audrey Lion MD   dorzolamide (TRUSOPT) 2 % ophthalmic solution Apply 1 drop to eye 2 times daily 24  Yes Audrey Lion MD   insulin detemir (LEVEMIR) 100 UNIT/ML injection vial Inject 10 Units into the skin nightly    Audrey Lion MD   Insulin Pump - insulin lispro Inject 10 Units into the skin 3 times daily (before meals)  Patient not taking: Reported on 2024    ProviderAudrey MD   aspirin 81 MG chewable tablet Take 1 tablet by mouth daily    Automatic Reconciliation, Ar   atorvastatin (LIPITOR) 10 MG tablet 1 tablet daily 20   Automatic Reconciliation, Ar   vitamin D (CHOLECALCIFEROL) 25 MCG (1000 UT) TABS tablet Take 1 tablet by mouth daily    Automatic Reconciliation, Ar   furosemide (LASIX) 80 MG tablet 1 tablet daily 20   Automatic Reconciliation, Ar   latanoprost (XALATAN) 0.005 % ophthalmic solution Apply 1 drop to eye nightly 20   Automatic Reconciliation, Ar   pantoprazole (PROTONIX) 40 MG tablet 1 tablet daily 20   Automatic Reconciliation, Ar   sacubitril-valsartan (ENTRESTO) 24-26 MG per tablet Take 1 tablet by mouth 2 times daily 20   Automatic Reconciliation, Ar   simethicone (MYLICON) 125 MG chewable tablet Take 1 tablet by mouth every

## 2024-06-26 NOTE — PROGRESS NOTES
Per blood bank, blood is ready. Unable to transfuse patient until additional PIV is placed. Patient currently has 4 hour antibiotic infusing. Med tech will place PIV shortly. Will continue to monitor. Call bell within reach.

## 2024-06-26 NOTE — PROGRESS NOTES
Advance Care Planning   Healthcare Decision Maker:    Today we documented Decision Maker(s) consistent with Legal Next of Kin hierarchy.       Cuauhtemoc Conner Spouse      Legal Guardian?: NoPrimary Phone: [phone] (H)Home Phone: [phone] Phone: [phone]      Dez Conner  Son      Legal Guardian?: NoPrimary Phone: [phone] (M)Home Phone: [phone] Phone: [phone]           conducted an initial consultation and Spiritual Assessment for Jigna Conner, who is a 68 y.o.,female. Patient's Primary Language is: English.   According to the patient's EMR Congregation Affiliation is: [employer].     The reason the Patient came to the hospital is:   Patient Active Problem List    Diagnosis Date Noted    Diabetic foot infection (HCC) 06/25/2024    Hypokalemia 06/25/2024    Chronic heart failure with preserved ejection fraction (HFpEF) ([facility]) 06/25/2024    Coronary artery disease involving native coronary artery of native heart without angina pectoris 06/25/2024    Fluid overload 05/11/2022    Metabolic bone disease 02/15/2022    Hypertension 02/15/2022    Anemia of renal disease 02/15/2022    Syncope 02/14/2022    Boil of buttock 06/24/2021    ESRD on dialysis ([facility]) 06/24/2021    Diabetes mellitus ([facility]) 06/24/2021        The  provided the following Interventions:  Initiated a relationship of care and support.   Explored issues of jose, belief, spirituality and Protestant/ritual needs while hospitalized.  Listened empathically.  Provided information about Spiritual Care Services.  Offered prayer and assurance of continued prayers on patient's behalf.   Chart reviewed.    The following outcomes where achieved:  Patient shared limited information about both their medical narrative and spiritual journey/beliefs.   confirmed Patient's Congregation Affiliation.  Patient processed feeling about current hospitalization.  Patient expressed gratitude for 's

## 2024-06-26 NOTE — PROGRESS NOTES
MRI screening form needs to be filled out and faxed to [phone] BEFORE MRI can be scheduled.  If unable to obtain information from patient , [facility]PO[facility] needs to be contacted . If patient is claustrophobic or will needs pain meds, please have ordered in advance in order to facilitate exam.

## 2024-06-26 NOTE — CONSENT
Informed Consent for Blood Component Transfusion Note    Out-going Hospitalist ostensibly discussed with the patient the rationale for blood component transfusion; its benefits in treating or preventing fatigue, organ damage, or death; and its risk which includes mild transfusion reactions, rare risk of blood borne infection, or more serious but rare reactions.  Out-going Hospitalist ostensibly discussed the alternatives to transfusion, including the risk and consequences of not receiving transfusion. The patient ostensibly had an opportunity to ask questions and had agreed to proceed with transfusion of blood components.    Physical Informed Consent Document resides in Physical Chart at this time and appears to have been completed by Outgoing Hospitalist at approximately 0800 EST today (6/26/2024).    Electronically signed by Ryan Bolanos DO on 6/26/24 at 9:48 AM EDT

## 2024-06-26 NOTE — PROGRESS NOTES
[facility]   Pharmacy Pharmacokinetic Monitoring Service - Vancomycin    Indication: DM foot infection  Target Pre-Dialysis Concentration: 21-24 mg/L  Day of Therapy: 2  Additional Antimicrobials: pip-tazo    Pertinent Laboratory Values:   Wt Readings from Last 1 Encounters:   06/26/24 92.6 kg (204 lb 1.6 oz)     Temp Readings from Last 1 Encounters:   06/26/24 98.2 °F (36.8 °C) (Oral)     Recent Labs     06/25/24  1825 06/26/24  0345   WBC 11.6 11.4     Pertinent Cultures:  Date Source Results   6/25 blood NGTD   MRSA Nasal Swab: NA    Assessment:  Date Current Dose Concentration (mg/L) Dialysis Session   6/25 2,000 mg x1 - no   6/26 - -      Plan:  ESRD on HD q TTS - dose by level  No dose today  No level ordered at this time  Pharmacy will continue to monitor patient and adjust therapy as indicated    Thank you for the consult,  Gianni Wolfe RPH  6/26/2024

## 2024-06-26 NOTE — CONSULTS
Room 512: pt not seen at this time. Pt to be followed by podiatry services.  Will turn over care to unit nursing staff at this time & sign off.   Nadine NGUYENN, RN, CWOCN, CFCN

## 2024-06-26 NOTE — CONSULTS
Consult Note  Consult requested by: Dr. Cici JAMIL Ubaldo is a 68 y.o. female Black /  who is being seen on consult for ESRD management.   Chief Complaint   Patient presents with    Foot Pain     Admission diagnosis: Diabetic foot infection (HCC)   68-year-old female with past medical history of ESRD, diabetes, heart failure with preserved ejection fraction admitted for diabetic foot infection, following for ESRD management.  Impression & Plan:   IMPRESSION:   ESRD, Tuesday Thursday Saturday,   Access left arm AV fistula  Right foot wound infection,  Anemia, received blood transfusion during this admission  Diabetes  Hypertension  Heart failure with preserved ejection fraction.   PLAN:   She appears well compensated from volume standpoint.  She received her dialysis yesterday.  I will arrange dialysis tomorrow per routine schedule.  Continue antibiotic per primary care, please let me know in advance for antibiotic choice as she goes to dialysis unit in [facility] .   Adjust medication per ESRD status.   Thank you very much for allowing me to participate in the care of this patient.    We will continue to monitor with you and make recommendations as needed.     HPI: 68-year-old female with past medical history of diabetes, hypertensions, ESRD, heart failure was sent to emergency room by her podiatrist as her right foot wound is getting worst.    She is admitted on the floor, received broad-spectrum antibiotic.  She was also ordered MRI of right foot.    Nephrology service consulted for ESRD management.    Tells me she goes to dialysis unit in a [place].  She does not remember her nephrologist name.  She has known history of diabetes and been on dialysis for a while now.  She has AV fistula.    She received her dialysis yesterday.  Denies for any chest pain or short of breath.  Past Medical History:   Diagnosis Date    Boil of buttock 06/24/2021    R Buttock    CAD (coronary artery disease)

## 2024-06-26 NOTE — CONSULTS
Podiatry History and Physical      Patient: Jigna Conner               Sex: female          DOA: [unfilled]       YOB: 1955      Age:  68 y.o.        LOS:  LOS: 1 day     Subjective:         Date of Consultation:  June 26, 2024    Patient is a 68 y.o. female who is being seen for right foot infection. Patient was seen in office yesterday with worsening infection despite oral antibiotic. Patient had right second toe amputation about 3 weeks ago in office however it didn't resolve her infection. Patient was admitted to hospital for further work up. She now had progression of infection with soft tissue gas on x-rays. Patient also noted to be anemic and was transfused earlier today. Denies N/V/C/F.     Patient Active Problem List    Diagnosis Date Noted    Diabetic foot infection (HCC) 06/25/2024    Hypokalemia 06/25/2024    Chronic heart failure with preserved ejection fraction (HFpEF) ([facility]) 06/25/2024    Coronary artery disease involving native coronary artery of native heart without angina pectoris 06/25/2024    Fluid overload 05/11/2022    Metabolic bone disease 02/15/2022    Hypertension 02/15/2022    Anemia of renal disease 02/15/2022    Syncope 02/14/2022    Boil of buttock 06/24/2021    ESRD on dialysis ([facility]) 06/24/2021    Diabetes mellitus ([facility]) 06/24/2021     Past Medical History:   Diagnosis Date    Boil of buttock 06/24/2021    R Buttock    CAD (coronary artery disease)     MI 2020    Chronic kidney disease     dialyssi Tu Thu Sat    Diabetes ([facility])     Diabetes mellitus ([facility]) 6/24/2021    ESRD on dialysis ([facility]) 6/24/2021    Heart failure ([facility])     Hypertension     Ill-defined condition     scolosis    Menopause     Renal insufficiency       Family History   Problem Relation Age of Onset    Cancer Other     Heart Disease Other     Diabetes Other     Hypertension Other       Social History     Tobacco Use    Smoking status: Never    Smokeless tobacco: Never   Substance Use Topics     encompassing at minimum the first through fourth digits. Postsurgical changes noted at the second digit. Obscuration and haziness at the distal second metatarsal with indistinctness of the osseous border. Could represent obscuration from soft tissue edema and gas however erosive changes are not excluded. Recommend MRI exam with and without contrast to further characterize. Widespread soft tissue edema throughout the foot.  - Patient will need urgent incision and drainage with decompression of soft tissue emphysema, partial second metatarsal resection and bone biopsy of from hallux. NPO status confirmed.   - Continue broad spectrum IV antibiotics. Consult ID.   - Medical management per primary team.  - Remain NWB to right forefoot.

## 2024-06-26 NOTE — PROGRESS NOTES
Assumed care of patient from LUCERO Melgar (off going nurse). Patient alert and oriented. No apparent distress noted. Patient offers no concerns and can verbalize needs. Assessment to follow. Call bell within reach. Bed in lowest, locked position.

## 2024-06-26 NOTE — PROGRESS NOTES
Physical Therapy  Pt not seen for skilled PT due to:    []  Nausea/vomiting  []  Eating  []  Pain  []  Pt lethargic  []  Off Unit  Other: x1 attempt in AM pt not cleared by nursing, x2 attempt in PM pt about to receive blood products.    Will f/u later as schedule allows. Thank you.  Maureen Bernal PT, DPT

## 2024-06-26 NOTE — PROGRESS NOTES
[facility]   Pharmacy Pharmacokinetic Monitoring Service - Vancomycin     Jigna Conner is a 68 y.o. female starting on vancomycin therapy for Bone and Joint Infection. Pharmacy consulted for monitoring and adjustment.    Target Concentration: Dosing based on anticipated concentration <15 mg/L due to renal impairment/insufficiency    Additional Antimicrobials: Vancomycin    Pertinent Laboratory Values:   Temp: 98.7 °F (37.1 °C), Weight - Scale: 88.3 kg (194 lb 10.7 oz)  Recent Labs     06/25/24  1825   CREATININE 3.85*   BUN 14   WBC 11.6     Estimated Creatinine Clearance: 15 mL/min (A) (based on SCr of 3.85 mg/dL (H)).      Plan:  Concentration-guided dosing due to renal impairment/insufficiency  Start vancomycin 2000mg x 1  Will dose according to vancomycin concentration levels at this time due to renal insufficiency  Renal labs as indicated   Vancomycin concentration ordered for AM labs tomorrow  Pharmacy will continue to monitor patient and adjust therapy as indicated    Thank you for the consult,  Heladio Farrell [facility]  6/25/2024

## 2024-06-26 NOTE — PERIOP NOTE
TRANSFER - IN REPORT:    Verbal report received from james Conner  being received from [facility] for ordered procedure      Report consisted of patient's Situation, Background, Assessment and   Recommendations(SBAR).     Information from the following report(s) Nurse Handoff Report and Pre Procedure Checklist was reviewed with the receiving nurse.    Opportunity for questions and clarification was provided.      Assessment completed upon patient's arrival to unit and care assumed.

## 2024-06-26 NOTE — PROGRESS NOTES
Pt not seen for skilled OT due to:     [ ]  Nausea/vomiting  [ ]  Eating  [ ]  Pain  [ ]  Pt lethargic  []  Off Unit   Other: pt requesting to hold OT evaluation d/t currently receiving blood transfusion    OT to follow

## 2024-06-27 ENCOUNTER — APPOINTMENT (OUTPATIENT)
Facility: HOSPITAL | Age: 69
End: 2024-06-27
Attending: INTERNAL MEDICINE
Payer: MEDICARE

## 2024-06-27 PROBLEM — E66.812 CLASS 2 OBESITY IN ADULT: Status: ACTIVE | Noted: 2024-06-27

## 2024-06-27 PROBLEM — E66.812 CLASS 2 OBESITY IN ADULT: Chronic | Status: ACTIVE | Noted: 2024-06-27

## 2024-06-27 PROBLEM — I25.2 HISTORY OF MI (MYOCARDIAL INFARCTION): Status: ACTIVE | Noted: 2024-06-27

## 2024-06-27 PROBLEM — E66.9 CLASS 2 OBESITY IN ADULT: Chronic | Status: ACTIVE | Noted: 2024-06-27

## 2024-06-27 PROBLEM — E66.9 CLASS 2 OBESITY IN ADULT: Status: ACTIVE | Noted: 2024-06-27

## 2024-06-27 PROBLEM — I25.2 HISTORY OF MI (MYOCARDIAL INFARCTION): Chronic | Status: ACTIVE | Noted: 2024-06-27

## 2024-06-27 LAB
ABO + RH BLD: NORMAL
ANION GAP SERPL CALC-SCNC: 8 MMOL/L (ref 3–18)
BASOPHILS # BLD: 0 K/UL (ref 0–0.1)
BASOPHILS NFR BLD: 0 % (ref 0–2)
BLD PROD TYP BPU: NORMAL
BLD PROD TYP BPU: NORMAL
BLOOD BANK BLOOD PRODUCT EXPIRATION DATE: NORMAL
BLOOD BANK BLOOD PRODUCT EXPIRATION DATE: NORMAL
BLOOD BANK DISPENSE STATUS: NORMAL
BLOOD BANK DISPENSE STATUS: NORMAL
BLOOD BANK ISBT PRODUCT BLOOD TYPE: 5100
BLOOD BANK ISBT PRODUCT BLOOD TYPE: 5100
BLOOD BANK PRODUCT CODE: NORMAL
BLOOD BANK PRODUCT CODE: NORMAL
BLOOD BANK UNIT TYPE AND RH: NORMAL
BLOOD BANK UNIT TYPE AND RH: NORMAL
BLOOD GROUP ANTIBODIES SERPL: NORMAL
BPU ID: NORMAL
BPU ID: NORMAL
BUN SERPL-MCNC: 24 MG/DL (ref 7–18)
BUN/CREAT SERPL: 4 (ref 12–20)
CALCIUM SERPL-MCNC: 10.2 MG/DL (ref 8.5–10.1)
CALLED TO: NORMAL
CALLED TO:: NORMAL
CHLORIDE SERPL-SCNC: 99 MMOL/L (ref 100–111)
CO2 SERPL-SCNC: 28 MMOL/L (ref 21–32)
CREAT SERPL-MCNC: 5.47 MG/DL (ref 0.6–1.3)
CROSSMATCH RESULT: NORMAL
CROSSMATCH RESULT: NORMAL
DIFFERENTIAL METHOD BLD: ABNORMAL
EOSINOPHIL # BLD: 0 K/UL (ref 0–0.4)
EOSINOPHIL NFR BLD: 0 % (ref 0–5)
ERYTHROCYTE [DISTWIDTH] IN BLOOD BY AUTOMATED COUNT: 16.9 % (ref 11.6–14.5)
GLUCOSE BLD STRIP.AUTO-MCNC: 128 MG/DL (ref 70–110)
GLUCOSE BLD STRIP.AUTO-MCNC: 138 MG/DL (ref 70–110)
GLUCOSE BLD STRIP.AUTO-MCNC: 151 MG/DL (ref 70–110)
GLUCOSE BLD STRIP.AUTO-MCNC: 188 MG/DL (ref 70–110)
GLUCOSE BLD STRIP.AUTO-MCNC: 191 MG/DL (ref 70–110)
GLUCOSE SERPL-MCNC: 140 MG/DL (ref 74–99)
HBV SURFACE AB SER QL IA: POSITIVE
HBV SURFACE AB SERPL IA-ACNC: 42.86 MIU/ML
HCT VFR BLD AUTO: 26.7 % (ref 35–45)
HEP BS AB COMMENT: NORMAL
HGB BLD-MCNC: 8.6 G/DL (ref 12–16)
IMM GRANULOCYTES # BLD AUTO: 0.1 K/UL (ref 0–0.04)
IMM GRANULOCYTES NFR BLD AUTO: 1 % (ref 0–0.5)
LYMPHOCYTES # BLD: 0.6 K/UL (ref 0.9–3.6)
LYMPHOCYTES NFR BLD: 4 % (ref 21–52)
MCH RBC QN AUTO: 25.4 PG (ref 24–34)
MCHC RBC AUTO-ENTMCNC: 32.2 G/DL (ref 31–37)
MCV RBC AUTO: 79 FL (ref 78–100)
MONOCYTES # BLD: 0.7 K/UL (ref 0.05–1.2)
MONOCYTES NFR BLD: 4 % (ref 3–10)
NEUTS SEG # BLD: 14.8 K/UL (ref 1.8–8)
NEUTS SEG NFR BLD: 91 % (ref 40–73)
NRBC # BLD: 0 K/UL (ref 0–0.01)
NRBC BLD-RTO: 0 PER 100 WBC
NT PRO BNP: 3639 PG/ML (ref 0–900)
PLATELET # BLD AUTO: 305 K/UL (ref 135–420)
PMV BLD AUTO: 9.8 FL (ref 9.2–11.8)
POTASSIUM SERPL-SCNC: 4.4 MMOL/L (ref 3.5–5.5)
RBC # BLD AUTO: 3.38 M/UL (ref 4.2–5.3)
SODIUM SERPL-SCNC: 135 MMOL/L (ref 136–145)
SPECIMEN EXP DATE BLD: NORMAL
UNIT DIVISION: 0
UNIT DIVISION: 0
UNIT ISSUE DATE/TIME: NORMAL
UNIT ISSUE DATE/TIME: NORMAL
WBC # BLD AUTO: 16.3 K/UL (ref 4.6–13.2)

## 2024-06-27 PROCEDURE — 6370000000 HC RX 637 (ALT 250 FOR IP): Performed by: PHYSICIAN ASSISTANT

## 2024-06-27 PROCEDURE — 97530 THERAPEUTIC ACTIVITIES: CPT

## 2024-06-27 PROCEDURE — 82962 GLUCOSE BLOOD TEST: CPT

## 2024-06-27 PROCEDURE — 97606 NEG PRS WND THER DME>50 SQCM: CPT

## 2024-06-27 PROCEDURE — 90935 HEMODIALYSIS ONE EVALUATION: CPT

## 2024-06-27 PROCEDURE — 6360000002 HC RX W HCPCS: Performed by: PHYSICIAN ASSISTANT

## 2024-06-27 PROCEDURE — 6360000002 HC RX W HCPCS: Performed by: INTERNAL MEDICINE

## 2024-06-27 PROCEDURE — 94761 N-INVAS EAR/PLS OXIMETRY MLT: CPT

## 2024-06-27 PROCEDURE — 1100000000 HC RM PRIVATE

## 2024-06-27 PROCEDURE — 2580000003 HC RX 258: Performed by: INTERNAL MEDICINE

## 2024-06-27 PROCEDURE — 71045 X-RAY EXAM CHEST 1 VIEW: CPT

## 2024-06-27 PROCEDURE — 97162 PT EVAL MOD COMPLEX 30 MIN: CPT

## 2024-06-27 PROCEDURE — 80048 BASIC METABOLIC PNL TOTAL CA: CPT

## 2024-06-27 PROCEDURE — 99232 SBSQ HOSP IP/OBS MODERATE 35: CPT | Performed by: INTERNAL MEDICINE

## 2024-06-27 PROCEDURE — 83880 ASSAY OF NATRIURETIC PEPTIDE: CPT

## 2024-06-27 PROCEDURE — 5A1D70Z PERFORMANCE OF URINARY FILTRATION, INTERMITTENT, LESS THAN 6 HOURS PER DAY: ICD-10-PCS | Performed by: INTERNAL MEDICINE

## 2024-06-27 PROCEDURE — 85025 COMPLETE CBC W/AUTO DIFF WBC: CPT

## 2024-06-27 PROCEDURE — 2700000000 HC OXYGEN THERAPY PER DAY

## 2024-06-27 PROCEDURE — 2580000003 HC RX 258: Performed by: PHYSICIAN ASSISTANT

## 2024-06-27 PROCEDURE — 36415 COLL VENOUS BLD VENIPUNCTURE: CPT

## 2024-06-27 RX ADMIN — INSULIN GLARGINE 10 UNITS: 100 INJECTION, SOLUTION SUBCUTANEOUS at 21:07

## 2024-06-27 RX ADMIN — ATORVASTATIN CALCIUM 10 MG: 10 TABLET, FILM COATED ORAL at 08:45

## 2024-06-27 RX ADMIN — TIMOLOL MALEATE 1 DROP: 5 SOLUTION OPHTHALMIC at 22:46

## 2024-06-27 RX ADMIN — ASPIRIN 81 MG CHEWABLE TABLET 81 MG: 81 TABLET CHEWABLE at 08:44

## 2024-06-27 RX ADMIN — CARVEDILOL 3.12 MG: 3.12 TABLET, FILM COATED ORAL at 18:18

## 2024-06-27 RX ADMIN — HYDROMORPHONE HYDROCHLORIDE 0.25 MG: 1 INJECTION, SOLUTION INTRAMUSCULAR; INTRAVENOUS; SUBCUTANEOUS at 18:15

## 2024-06-27 RX ADMIN — PIPERACILLIN AND TAZOBACTAM 3375 MG: 3; .375 INJECTION, POWDER, FOR SOLUTION INTRAVENOUS at 22:44

## 2024-06-27 RX ADMIN — SODIUM CHLORIDE, PRESERVATIVE FREE 10 ML: 5 INJECTION INTRAVENOUS at 21:09

## 2024-06-27 RX ADMIN — HYDROCODONE BITARTRATE AND ACETAMINOPHEN 1 TABLET: 5; 325 TABLET ORAL at 06:55

## 2024-06-27 RX ADMIN — SACUBITRIL AND VALSARTAN 1 TABLET: 24; 26 TABLET, FILM COATED ORAL at 08:45

## 2024-06-27 RX ADMIN — SACUBITRIL AND VALSARTAN 1 TABLET: 24; 26 TABLET, FILM COATED ORAL at 21:08

## 2024-06-27 RX ADMIN — HYDROCODONE BITARTRATE AND ACETAMINOPHEN 1 TABLET: 5; 325 TABLET ORAL at 11:22

## 2024-06-27 RX ADMIN — HYDROMORPHONE HYDROCHLORIDE 0.25 MG: 1 INJECTION, SOLUTION INTRAMUSCULAR; INTRAVENOUS; SUBCUTANEOUS at 01:23

## 2024-06-27 RX ADMIN — HYDROCODONE BITARTRATE AND ACETAMINOPHEN 1 TABLET: 5; 325 TABLET ORAL at 23:37

## 2024-06-27 RX ADMIN — LATANOPROST 1 DROP: 50 SOLUTION/ DROPS OPHTHALMIC at 22:49

## 2024-06-27 RX ADMIN — CARVEDILOL 3.12 MG: 3.12 TABLET, FILM COATED ORAL at 08:45

## 2024-06-27 RX ADMIN — FUROSEMIDE 80 MG: 40 TABLET ORAL at 08:45

## 2024-06-27 RX ADMIN — HYDROMORPHONE HYDROCHLORIDE 0.25 MG: 1 INJECTION, SOLUTION INTRAMUSCULAR; INTRAVENOUS; SUBCUTANEOUS at 14:19

## 2024-06-27 ASSESSMENT — PAIN DESCRIPTION - LOCATION
LOCATION: FOOT
LOCATION: FOOT
LOCATION: LEG
LOCATION: FOOT
LOCATION: LEG
LOCATION: FOOT

## 2024-06-27 ASSESSMENT — PAIN SCALES - GENERAL
PAINLEVEL_OUTOF10: 4
PAINLEVEL_OUTOF10: 9
PAINLEVEL_OUTOF10: 0
PAINLEVEL_OUTOF10: 5
PAINLEVEL_OUTOF10: 5
PAINLEVEL_OUTOF10: 6
PAINLEVEL_OUTOF10: 2
PAINLEVEL_OUTOF10: 2
PAINLEVEL_OUTOF10: 7
PAINLEVEL_OUTOF10: 5
PAINLEVEL_OUTOF10: 0

## 2024-06-27 ASSESSMENT — PAIN DESCRIPTION - ORIENTATION
ORIENTATION: RIGHT
ORIENTATION: LEFT
ORIENTATION: RIGHT
ORIENTATION: LEFT
ORIENTATION: RIGHT
ORIENTATION: RIGHT

## 2024-06-27 ASSESSMENT — PAIN DESCRIPTION - DESCRIPTORS
DESCRIPTORS: ACHING
DESCRIPTORS: ACHING;DISCOMFORT
DESCRIPTORS: ACHING;DISCOMFORT;BURNING;THROBBING
DESCRIPTORS: ACHING
DESCRIPTORS: ACHING;DISCOMFORT
DESCRIPTORS: ACHING;BURNING
DESCRIPTORS: ACHING;DISCOMFORT
DESCRIPTORS: ACHING

## 2024-06-27 ASSESSMENT — PAIN - FUNCTIONAL ASSESSMENT
PAIN_FUNCTIONAL_ASSESSMENT: ACTIVITIES ARE NOT PREVENTED
PAIN_FUNCTIONAL_ASSESSMENT: PREVENTS OR INTERFERES SOME ACTIVE ACTIVITIES AND ADLS

## 2024-06-27 ASSESSMENT — PAIN DESCRIPTION - PAIN TYPE
TYPE: SURGICAL PAIN

## 2024-06-27 ASSESSMENT — PAIN DESCRIPTION - ONSET
ONSET: ON-GOING

## 2024-06-27 ASSESSMENT — PAIN DESCRIPTION - FREQUENCY
FREQUENCY: CONTINUOUS

## 2024-06-27 NOTE — PLAN OF CARE
INTERVENTION:  HEMODYNAMIC STABILIZATION  MAINTAIN BP WNL WHILE ON HD.    INTERVENTION:  FLUID MANAGEMENT  WILL ATTEMPT 2500 ML TOTAL FLUID REMOVAL AS TOLERATED.    INTERVENTION:  METABOLIC/ELECTROLYTE MANAGEMENT  2.0 POTASSIUM 2.0 CALCIUM DIALYSATE USED WITH HD TODAY.    INTERVENTION:  HEMODIALYSIS ACCESS SITE MANAGEMENT  LEFT UPPER ARM AVF ACCESSED WITH 15G NEEDLE USING ASEPTIC TECHNIQUE.    GOAL:  SIGNS AND SYMPTOMS OF LISTED POTENTIAL PROBLEMS WILL BE ABSENT OR MANAGEABLE.    OUTCOME:  PROGRESSING.    HD PLANNED FOR 3 HOURS TODAY.

## 2024-06-27 NOTE — PLAN OF CARE
Problem: Physical Therapy - Adult  Goal: By Discharge: Performs mobility at highest level of function for planned discharge setting.  See evaluation for individualized goals.  Description: Physical Therapy Goals:  Initiated 6/27/2024 to be met within 7-10 days.    1.  Patient will move from supine to sit and sit to supine , scoot up and down, and roll side to side in bed with independence in order to safely get into/out of bed.    2.  Patient will transfer from bed to chair and chair to bed with moderate assistance  using RW in order to safely partake in OOB mobility.  3.  Patient will perform sit to stand with moderate assistance  in order to safely partake in OOB mobility.  4.  Patient will ambulate with minimal assistance/contact guard assist for 15 feet using RW in order to safely navigate household distances.     PLOF: lives with  in 1 level home with ramped entry, independent at baseline using rollator in community and SPC in home      Outcome: Progressing     PHYSICAL THERAPY EVALUATION    Patient: Jigna Conner (68 y.o. female)  Date: 6/27/2024  Primary Diagnosis: Hypokalemia [E87.6]  Hyperglycemia [R73.9]  ESRD (end stage renal disease) (HCC) [N18.6]  Diabetic foot infection (HCC) [E11.628, L08.9]  Anemia, unspecified type [D64.9]  Procedure(s) (LRB):  RIGHT FOOT INCISION AND DRAINAGE, PARTIAL RESECTION SECOND METATARSAL (Right) 1 Day Post-Op   Precautions: Fall Risk, Weight Bearing, General Precautions, Right Lower Extremity Weight Bearing: Partial Weight Bearing (NWB R forefoot),  ,  ,  ,  ,  ,      ASSESSMENT :  Patient seen for PT evaluation and treatment. Received supine; agreeable. Presenting with deficits as listed below, impacting her ability to safely mobilize. Most notably impacted by R foot pain, described as 8/10 and throbbing. Bed mobility completed with supervision and attempts at sit to stand transfer completed with maxA. Ultimately unable to achieve glute clearance despite

## 2024-06-27 NOTE — PLAN OF CARE
Problem: Discharge Planning  Goal: Discharge to home or other facility with appropriate resources  6/27/2024 0129 by Twyla العراقي RN  Outcome: Progressing  Problem: ABCDS Injury Assessment  Goal: Absence of physical injury  6/27/2024 0129 by Twyla العراقي RN  Outcome: Progressing     Problem: Safety - Adult  Goal: Free from fall injury  6/27/2024 0129 by Twyla العراقي RN  Outcome: Progressing     Problem: Chronic Conditions and Co-morbidities  Goal: Patient's chronic conditions and co-morbidity symptoms are monitored and maintained or improved  6/27/2024 0129 by Twyla العراقي RN  Outcome: Progressing        Problem: Pain  Goal: Verbalizes/displays adequate comfort level or baseline comfort level  6/27/2024 0129 by Twyla العراقي RN  Outcome: Progressing

## 2024-06-27 NOTE — PROGRESS NOTES
Pt not seen for skilled PT due to:    []  RN, MD/DO, NP/PA Hold   []  Refusal  [x]  Dialysis   []  Medically inappropriate  []  Off Unit  Other:     Will f/u later as schedule allows. Thank you.  ABDON LeeT

## 2024-06-27 NOTE — H&P (VIEW-ONLY)
Surgery ConsultSurgery Consult      Patient: Jigna Conner MRN: [id number]  CSN: [id number]      YOB: 1955    Age: 68 y.o.    Sex: female      DOA: 6/25/2024       HPI:   Patient seen, chart reviewed, all acute events noted.  Patient is awake alert and answers all questions appropriate.  Patient moves all extremities to command.  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  Patient has been followed and seen by podiatry Dr. Gabriel, patient was admitted for acute infection, was taken to the operating room for immediate I&D of the right foot.   Marcin Gabriel DPM  Physician  Podiatry     Op Note     Signed     Date of Service: 6/26/2024  5:53 PM  Case Time: Procedures: Surgeons:   6/26/2024  5:53 PM RIGHT FOOT INCISION AND DRAINAGE, PARTIAL RESECTION SECOND METATARSAL    Marcin Gabriel DPM               Signed         Operative Note        Patient: Jigna Conner  YOB: 1955  MRN: [id number]     Date of Procedure: 6/26/2024     Pre-Op Diagnosis:  Right foot cellulitis/abscess, second metatarsal osteomyelitis, soft tissue emphysema     Post-Op Diagnosis: Same       Procedure(s):  RIGHT FOOT INCISION AND DRAINAGE, PARTIAL RESECTION SECOND METATARSAL, DECOMPRESSION OF SOFT TISSUE GAS, BONE BIOPSY FROM HALLUX     Surgeon(s):  Marcin Gabriel DPM     Assistant:   Surgical Assistant: Isis Barbosa     Anesthesia: Monitor Anesthesia Care     Estimated Blood Loss (mL): Minimal     Complications: None                    Patient reports that she was seen in podiatry office yesterday with worsening infection despite oral antibiotic. Patient had right second toe amputation about 3 weeks ago in office however it didn't resolve her infection. Patient was admitted to hospital for further work up. She now had progression of infection with soft tissue gas on x-rays. Patient also noted to be anemic  Heart Disease Other     Diabetes Other     Hypertension Other        Social History     Socioeconomic History    Marital status:      Spouse name: None    Number of children: None    Years of education: None    Highest education level: None   Tobacco Use    Smoking status: Never    Smokeless tobacco: Never   Vaping Use    Vaping Use: Never used   Substance and Sexual Activity    Alcohol use: Never    Drug use: Never     Social Determinants of Health     Food Insecurity: No Food Insecurity (6/26/2024)    Hunger Vital Sign     Worried About Running Out of Food in the Last Year: Never true     Ran Out of Food in the Last Year: Never true   Transportation Needs: No Transportation Needs (6/26/2024)    PRAPARE - Transportation     Lack of Transportation (Medical): No     Lack of Transportation (Non-Medical): No   Housing Stability: Low Risk  (6/26/2024)    Housing Stability Vital Sign     Unable to Pay for Housing in the Last Year: No     Number of Places Lived in the Last Year: 1     Unstable Housing in the Last Year: No       Prior to Admission medications    Medication Sig Start Date End Date Taking? Authorizing Provider   Multiple Vitamins-Minerals (THERAPEUTIC MULTIVITAMIN-MINERALS) tablet Take 1 tablet by mouth daily   Yes Audrey Lion MD   carvedilol (COREG) 3.125 MG tablet Take 1 tablet by mouth 2 times daily (with meals) 1/3/20  Yes Audrey Lion MD   dorzolamide (TRUSOPT) 2 % ophthalmic solution Apply 1 drop to eye 2 times daily 1/12/24  Yes Audrey Lion MD   insulin detemir (LEVEMIR) 100 UNIT/ML injection vial Inject 10 Units into the skin nightly    Audrey Lion MD   Insulin Pump - insulin lispro Inject 10 Units into the skin 3 times daily (before meals)  Patient not taking: Reported on 6/26/2024    Audrey Lion MD   aspirin 81 MG chewable tablet Take 1 tablet by mouth daily    Automatic Reconciliation, Ar   atorvastatin (LIPITOR) 10 MG tablet 1 tablet daily

## 2024-06-27 NOTE — PROGRESS NOTES
Received pre HD report from  VITALIY De León RN.        Pt in bed, A+O x4, no s/s of distress noted.      PCT accessed AVF Left upper arm w/15G needle w/o difficulty.    Tx initiated at 0939.      AVF flowing with ease.  For hemodynamic stability UF goal 2500 ml.      Offered assistance with repositioning every 2 hours.  Vascular access visible at all times throughout entire duration of treatment and line connections remain intact throughout entire duration of treatment.     Tx completed at 1343, tolerated well 2L removed.  De-accessed per protocol.    Clot time 5 minutes for arterial, and 5 minutes for venous.      Unit nurse VITALIY De León, RN given report.

## 2024-06-27 NOTE — ANESTHESIA POSTPROCEDURE EVALUATION
Department of Anesthesiology  Postprocedure Note    Patient: Jigna Conner  MRN: [id number]  YOB: 1955  Date of evaluation: 6/26/2024    Procedure Summary       Date: 06/26/24 Room / Location: [facility] MAIN 03 / [facility] MAIN OR    Anesthesia Start: 1753 Anesthesia Stop: 1853    Procedure: RIGHT FOOT INCISION AND DRAINAGE, PARTIAL RESECTION SECOND METATARSAL (Right: Foot) Diagnosis:       Abscess of right foot      Cellulitis of right foot      (Abscess of right foot [L02.611])      (Cellulitis of right foot [L03.115])    Surgeons: Marcin Gabriel DPM Responsible Provider: Jameel Serrato MD    Anesthesia Type: MAC ASA Status: 3 - Emergent            Anesthesia Type: MAC    Fernando Phase I: Fernando Score: 10    Fernando Phase II:      Anesthesia Post Evaluation    Patient location during evaluation: bedside  Patient participation: complete - patient participated  Level of consciousness: responsive to verbal stimuli  Airway patency: patent  Nausea & Vomiting: no nausea  Respiratory status: acceptable  Hydration status: euvolemic    No notable events documented.

## 2024-06-27 NOTE — PROGRESS NOTES
Progress Note    68-year-old female with past medical history of ESRD, diabetes, heart failure with preserved ejection fraction admitted for diabetic foot infection, following for ESRD management.  Subjective      Overnight event noted  Examined during dialysis   Blood pressure lower side but able to tolerate it.     IMPRESSION:   ESRD, Tuesday Thursday Saturday,   Access left arm AV fistula  Right foot wound infection,  Anemia, received blood transfusion during this admission  Diabetes  Hypertension  Heart failure with preserved ejection fraction.   PLAN:   Plan for HD today with 2K bath UF goal 2L as tolerated.   Continue antibiotic per primary care, please let me know in advance for antibiotic choice as she goes to dialysis unit in [facility] .   Adjust medication per ESRD status.     Current Facility-Administered Medications   Medication Dose Route Frequency    0.9 % sodium chloride infusion   IntraVENous PRN    0.9 % sodium chloride infusion   IntraVENous PRN    piperacillin-tazobactam (ZOSYN) 3,375 mg in sodium chloride 0.9 % 50 mL IVPB (mini-bag)  3,375 mg IntraVENous Q12H    aspirin chewable tablet 81 mg  81 mg Oral Daily    atorvastatin (LIPITOR) tablet 10 mg  10 mg Oral Daily    carvedilol (COREG) tablet 3.125 mg  3.125 mg Oral BID WC    [Held by provider] dorzolamide (TRUSOPT) 2 % ophthalmic solution 1 drop  1 drop Ophthalmic BID    furosemide (LASIX) tablet 80 mg  80 mg Oral Daily    insulin glargine (LANTUS) injection vial 10 Units  10 Units SubCUTAneous Nightly    latanoprost (XALATAN) 0.005 % ophthalmic solution 1 drop  1 drop Both Eyes Nightly    pantoprazole (PROTONIX) tablet 40 mg  40 mg Oral QAM AC    sacubitril-valsartan (ENTRESTO) 24-26 MG per tablet 1 tablet  1 tablet Oral BID    timolol (TIMOPTIC) 0.5 % ophthalmic solution 1 drop  1 drop Both Eyes BID    HYDROmorphone (DILAUDID) injection 0.25 mg  0.25 mg IntraVENous Q4H PRN    HYDROcodone-acetaminophen (NORCO) 5-325 MG per tablet 1 tablet  1  tablet Oral Q6H PRN    vancomycin (VANCOCIN) intermittent dosing (placeholder)   Other RX Placeholder    sodium chloride flush 0.9 % injection 5-40 mL  5-40 mL IntraVENous 2 times per day    sodium chloride flush 0.9 % injection 5-40 mL  5-40 mL IntraVENous PRN    0.9 % sodium chloride infusion   IntraVENous PRN    ondansetron (ZOFRAN-ODT) disintegrating tablet 4 mg  4 mg Oral Q8H PRN    Or    ondansetron (ZOFRAN) injection 4 mg  4 mg IntraVENous Q6H PRN    polyethylene glycol (GLYCOLAX) packet 17 g  17 g Oral Daily PRN    bisacodyl (DULCOLAX) suppository 10 mg  10 mg Rectal Daily PRN    acetaminophen (TYLENOL) tablet 650 mg  650 mg Oral Q6H PRN    Or    acetaminophen (TYLENOL) suppository 650 mg  650 mg Rectal Q6H PRN    potassium chloride (KLOR-CON M) extended release tablet 40 mEq  40 mEq Oral Once    insulin lispro (HUMALOG,ADMELOG) injection vial 0-4 Units  0-4 Units SubCUTAneous TID WC    insulin lispro (HUMALOG,ADMELOG) injection vial 0-4 Units  0-4 Units SubCUTAneous Nightly    glucose chewable tablet 16 g  4 tablet Oral PRN    dextrose bolus 10% 125 mL  125 mL IntraVENous PRN    Or    dextrose bolus 10% 250 mL  250 mL IntraVENous PRN    glucagon injection 1 mg  1 mg SubCUTAneous PRN    dextrose 10 % infusion   IntraVENous Continuous PRN       Review of Systems:      Complete 10-point review of systems were obtained and discussed in length  with the patient. Complete review of systems was negative/unremarkable  except as mentioned in HPI section.  Data Review:    Labs: Results:       Chemistry Recent Labs     06/25/24 1825 06/26/24 0345 06/27/24  0143   * 135* 135*   K 3.0* 3.7 4.4   CL 96* 100 99*   CO2 30 27 28   BUN 14 16 24*      CBC w/Diff Recent Labs     06/25/24 1825 06/26/24 0345 06/27/24  0143   WBC 11.6 11.4 16.3*   RBC 2.59* 2.62* 3.38*   HGB 6.2* 6.5* 8.6*   HCT 19.9* 20.6* 26.7*    280 305      Coagulation No results for input(s): \"INR\", \"APTT\" in the last 72 hours.    Invalid

## 2024-06-27 NOTE — WOUND CARE
Off the floor to dialysis. Primary RN, Mirtha to call if patient returns to unit prior to 1430. If patient does not return to unit by then, unit staff will need to apply NPWT today.     Bethany LU, RN, [facility], COCN, CLIN [facility] Wound Care Dept.  Office: [phone]  Work Cell: [phone]

## 2024-06-27 NOTE — CONSULTS
Room #: 512      KESHAV: [id number]      Situation: Wound Care Consult    Background:    PMH:   Active Ambulatory Problems     Diagnosis Date Noted    Metabolic bone disease 02/15/2022    Boil of buttock 06/24/2021    Hypertension 02/15/2022    Syncope 02/14/2022    Anemia of renal disease 02/15/2022    ESRD on dialysis ([facility]) 06/24/2021    Diabetes mellitus ([facility]) 06/24/2021    Fluid overload 05/11/2022     Resolved Ambulatory Problems     Diagnosis Date Noted    No Resolved Ambulatory Problems     Past Medical History:   Diagnosis Date    CAD (coronary artery disease)     Chronic heart failure with preserved ejection fraction (HFpEF) ([facility])     History of MI (myocardial infarction) 2020    Menopause     Renal insufficiency     Scoliosis         Vinay Score: 19/23   BMI: Body mass index is 39.22 kg/m².   Preventive measures in place: limited layers, silicone    Assessment:   Patient found reclined.  Patient is Awake and alert, Oriented x person, place, time and situation, and Pleasant and conversant.     Wound(s) Description:           Negative Pressure Wound Therapy Foot Right (Active)   $ Standard NPWT >50 sq cm PER TX $ Yes 06/27/24 1521   Wound Type Surgical 06/27/24 1521   Unit Type Ulta 06/27/24 1521   Dressing Type Black Foam;Non adherent contact layer 06/27/24 1521   Number of pieces used 4 06/27/24 1521   Cycle Continuous 06/27/24 1521   Target Pressure (mmHg) 125 06/27/24 1521   Canister changed? Yes 06/27/24 1521   Dressing Changed Changed/New 06/27/24 1521   Drainage Amount Moderate 06/27/24 1521   Drainage Description Serosanguinous 06/27/24 1521   Dressing Change Due 07/01/24 06/27/24 1521   Number of days: 0       Wound 06/27/24 Foot Right;Dorsal Middle wound, above 2nd toe amputation (Active)   Wound Image   06/27/24 1518   Wound Etiology Surgical 06/27/24 1518   Dressing Status New dressing applied 06/27/24 1518   Dressing/Treatment Negative pressure wound therapy;Non adherent 06/27/24 1518   Dressing  Change Due 07/01/24 06/27/24 1518   Wound Length (cm) 7.4 cm 06/27/24 1518   Wound Width (cm) 2.4 cm 06/27/24 1518   Wound Depth (cm) 3.2 cm 06/27/24 1518   Wound Surface Area (cm^2) 17.76 cm^2 06/27/24 1518   Wound Volume (cm^3) 56.832 cm^3 06/27/24 1518   Wound Assessment Granulation tissue;Exposed structure bone;Exposed structure tendon 06/27/24 1518   Drainage Amount Moderate (25-50%) 06/27/24 1518   Drainage Description Serosanguinous 06/27/24 1518   Odor None 06/27/24 1518   Laine-wound Assessment Intact;Edematous 06/27/24 1518   Number of days: 0       Wound 06/27/24 Foot Right;Lateral Lateral wound, above 4th toe (Active)   Wound Etiology Surgical 06/27/24 1519   Dressing Status New dressing applied 06/27/24 1519   Wound Cleansed Cleansed with saline 06/27/24 1519   Dressing/Treatment Negative pressure wound therapy;Non adherent 06/27/24 1519   Dressing Change Due 07/01/24 06/27/24 1519   Wound Length (cm) 4.1 cm 06/27/24 1519   Wound Width (cm) 0.6 cm 06/27/24 1519   Wound Depth (cm) 2.2 cm 06/27/24 1519   Wound Surface Area (cm^2) 2.46 cm^2 06/27/24 1519   Wound Volume (cm^3) 5.412 cm^3 06/27/24 1519   Wound Assessment Exposed structure tendon;Granulation tissue 06/27/24 1519   Drainage Amount Moderate (25-50%) 06/27/24 1519   Drainage Description Serosanguinous 06/27/24 1519   Odor None 06/27/24 1519   Laine-wound Assessment Intact;Edematous 06/27/24 1519   Number of days: 0       Wound 06/27/24 Foot Right;Medial Medial foot (Active)   Wound Image   06/27/24 1515   Wound Etiology Surgical 06/27/24 1515   Dressing Status New dressing applied 06/27/24 1515   Wound Cleansed Irrigated with saline;Cleansed with saline 06/27/24 1515   Dressing/Treatment Negative pressure wound therapy;Non adherent 06/27/24 1515   Dressing Change Due 07/01/24 06/27/24 1515   Wound Length (cm) 11.5 cm 06/27/24 1515   Wound Width (cm) 2.9 cm 06/27/24 1515   Wound Depth (cm) 1.2 cm 06/27/24 1515   Wound Surface Area (cm^2) 33.35 cm^2

## 2024-06-27 NOTE — CONSULTS
Surgery ConsultSurgery Consult      Patient: Jigna Conner MRN: [id number]  CSN: [id number]      YOB: 1955    Age: 68 y.o.    Sex: female      DOA: 6/25/2024       HPI:   Patient seen, chart reviewed, all acute events noted.  Patient is awake alert and answers all questions appropriate.  Patient moves all extremities to command.  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  Patient has been followed and seen by podiatry Dr. Gabriel, patient was admitted for acute infection, was taken to the operating room for immediate I&D of the right foot.   Marcin Gabriel DPM  Physician  Podiatry     Op Note     Signed     Date of Service: 6/26/2024  5:53 PM  Case Time: Procedures: Surgeons:   6/26/2024  5:53 PM RIGHT FOOT INCISION AND DRAINAGE, PARTIAL RESECTION SECOND METATARSAL    Marcin Gabriel DPM               Signed         Operative Note        Patient: Jigna Conner  YOB: 1955  MRN: [id number]     Date of Procedure: 6/26/2024     Pre-Op Diagnosis:  Right foot cellulitis/abscess, second metatarsal osteomyelitis, soft tissue emphysema     Post-Op Diagnosis: Same       Procedure(s):  RIGHT FOOT INCISION AND DRAINAGE, PARTIAL RESECTION SECOND METATARSAL, DECOMPRESSION OF SOFT TISSUE GAS, BONE BIOPSY FROM HALLUX     Surgeon(s):  Marcin Gabriel DPM     Assistant:   Surgical Assistant: Isis Barbosa     Anesthesia: Monitor Anesthesia Care     Estimated Blood Loss (mL): Minimal     Complications: None                    Patient reports that she was seen in podiatry office yesterday with worsening infection despite oral antibiotic. Patient had right second toe amputation about 3 weeks ago in office however it didn't resolve her infection. Patient was admitted to hospital for further work up. She now had progression of infection with soft tissue gas on x-rays. Patient also noted to be anemic

## 2024-06-27 NOTE — PROGRESS NOTES
[facility]   Pharmacy Pharmacokinetic Monitoring Service - Vancomycin    Indication: DM foot infection  Target Pre-Dialysis Concentration: 21-24 mg/L  Day of Therapy: 3  Additional Antimicrobials: pip-tazo    Pertinent Laboratory Values:   Wt Readings from Last 1 Encounters:   06/27/24 103.6 kg (228 lb 8 oz)     Temp Readings from Last 1 Encounters:   06/27/24 97 °F (36.1 °C)     Recent Labs     06/26/24  0345 06/27/24  0143   WBC 11.4 16.3*     Pertinent Cultures:  Date Source Results   6/25 blood NGTD   MRSA Nasal Swab: NA    Assessment:  Date Current Dose Concentration (mg/L) Dialysis Session   6/25 2,000 mg x1 - no   6/26 - -    6/27 - 27.6 yes     Plan:  ESRD on HD q TTS - dose by level  No dose today  No level ordered at this time  Pharmacy will continue to monitor patient and adjust therapy as indicated    Thank you for the consult,  Iqra Maciel, [facility]  6/27/2024

## 2024-06-27 NOTE — PROGRESS NOTES
Medicine Progress Note    Patient: Jigna Conner   Age:  68 y.o.  DOA: 6/25/2024   Admit Dx / CC: Hypokalemia [E87.6]  Hyperglycemia [R73.9]  ESRD (end stage renal disease) (HCC) [N18.6]  Diabetic foot infection (HCC) [E11.628, L08.9]  Anemia, unspecified type [D64.9]  LOS:  LOS: 2 days     Assessment/Plan   Principal Problem:    Diabetic foot infection (HCC)  Active Problems:    Anemia of renal disease    Hypokalemia    Hypertension    ESRD on dialysis (HCC)    Diabetes mellitus (HCC)    Chronic heart failure with preserved ejection fraction (HFpEF) (HCC)    Coronary artery disease involving native coronary artery of native heart without angina pectoris    History of MI (myocardial infarction)    Class 2 obesity in adult  Resolved Problems:    * No resolved hospital problems. *      Additional Plan notes     Diabetes Foot Infection S/P RIGHT FOOT INCISION AND DRAINAGE, PARTIAL RESECTION SECOND METATARSAL, DECOMPRESSION OF SOFT TISSUE GAS, BONE BIOPSY FROM HALLUX  POD# 0  - Right Foot I&D performed by Marcin Gabriel DPM on 6/26/2024  IV Vancomycin, IV Piperacillin-Tazobactam (a.k.a. Zosyn), Blood Cultures are thus far negative.    Hypokalemia  - 3.0 mmol/L on 6/25/2024  Monitor.    End-Stage Renal Disease (a.k.a. ESRD) on Hemodialysis (a.k.a. HD)  Consult Nephrological services for routine Hemodialysis in AM.    Chronic Heart Failure with Preserved Ejection Fraction (a.k.a. HFpEF)  Strict I/Os, Daily Weight, Fluid Restriction 2.0 L/day, and continue home Furosemide, Carvedilol, and Sacubitril-Valsartan (a.k.a. Entresto).    Concern for possible Volume Overload.  Ordered CXR and Pro-BNP for tomorrow AM.    Coronary Artery Disease (a.k.a. CAD)  Continue home Aspirin.    Hypertension  No current home medications for this issue.  HOLD home Midodrine at this time.    Anemia of Renal Disease  Monitor.    Gastroesophageal Reflux Disease (a.k.a. GERD)  Continue home PPI therapy.    Diabetes Mellitus  POC Glucose

## 2024-06-27 NOTE — PROGRESS NOTES
Pt not seen for skilled OT due to:     [ ]  Nausea/vomiting  [ ]  Eating  [ ]  Pain  [ ]  Pt lethargic  []  Off Unit   Other: pt receiving wound care by wound nurse x 2 attempts    OT to follow

## 2024-06-27 NOTE — PLAN OF CARE
Problem: Discharge Planning  Goal: Discharge to home or other facility with appropriate resources  6/27/2024 1030 by Winifred De León RN  Outcome: Progressing  6/27/2024 0129 by Twyla العراقي RN  Outcome: Progressing     Problem: Pain  Goal: Verbalizes/displays adequate comfort level or baseline comfort level  6/27/2024 1030 by Winifred De León RN  Outcome: Progressing  6/27/2024 0129 by Twyla العراقي RN  Outcome: Progressing     Problem: ABCDS Injury Assessment  Goal: Absence of physical injury  6/27/2024 1030 by Winifred De León RN  Outcome: Progressing  6/27/2024 0129 by Twyla العراقي RN  Outcome: Progressing     Problem: Safety - Adult  Goal: Free from fall injury  6/27/2024 1030 by Winifred De León RN  Outcome: Progressing  6/27/2024 0129 by Twyla العراقي RN  Outcome: Progressing     Problem: Chronic Conditions and Co-morbidities  Goal: Patient's chronic conditions and co-morbidity symptoms are monitored and maintained or improved  6/27/2024 1030 by Winifred De León RN  Outcome: Progressing  6/27/2024 0129 by Twyla العراقي RN  Outcome: Progressing

## 2024-06-27 NOTE — OP NOTE
Operative Note      Patient: Jigna Conner  YOB: 1955  MRN: [id number]    Date of Procedure: 6/26/2024    Pre-Op Diagnosis:  Right foot cellulitis/abscess, second metatarsal osteomyelitis, soft tissue emphysema    Post-Op Diagnosis: Same       Procedure(s):  RIGHT FOOT INCISION AND DRAINAGE, PARTIAL RESECTION SECOND METATARSAL, DECOMPRESSION OF SOFT TISSUE GAS, BONE BIOPSY FROM HALLUX    Surgeon(s):  Marcin Gabriel DPM    Assistant:   Surgical Assistant: Isis Barbosa    Anesthesia: Monitor Anesthesia Care    Estimated Blood Loss (mL): Minimal    Complications: None    Specimens:   ID Type Source Tests Collected by Time Destination   1 : RIGHT foot abscess Swab Foot CULTURE, WOUND Marcin Gabriel, KATINA 6/26/2024 1818    2 : RIGHT foot proximal phalanx great toe Swab Foot CULTURE, WOUND Marcin Gabriel, ABDON 6/26/2024 1837    A : RIGHT foot 2nd metatarsal Bone Foot SURGICAL PATHOLOGY Marcin Gabriel DPM 6/26/2024 1832        Implants:  * No implants in log *      Drains: * No LDAs found *    Findings:  Infection Present At Time Of Surgery (PATOS) (choose all levels that have infection present):  - Organ Space infection (below fascia) present as evidenced by osteomyelitis  Other Findings: As noted below    Indications for procedure: Patient is a 67 y/o female seen for right foot draining wounds with abscess/cellulitis. Patient had second toe amputation about 3-4 weeks ago in office due to infection and subsequently she had dehiscence of incision site with worsening of infection. Patient was admitted to hospital for further work-up. X-ray showed soft tissue emphysema and patient needed incision and drainage. All risks, benefits, complications of procedure discussed in detail with patient. No guarantee made to outcome of procedure. Patient voiced verbal understanding and signed consent.     Detailed Description of Procedure: Patient brought into operating room and placed on operating table in

## 2024-06-28 LAB
ANION GAP SERPL CALC-SCNC: 7 MMOL/L (ref 3–18)
BACTERIA SPEC CULT: ABNORMAL
BACTERIA SPEC CULT: NORMAL
BASOPHILS # BLD: 0 K/UL (ref 0–0.1)
BASOPHILS NFR BLD: 0 % (ref 0–2)
BUN SERPL-MCNC: 22 MG/DL (ref 7–18)
BUN/CREAT SERPL: 5 (ref 12–20)
CALCIUM SERPL-MCNC: 9.4 MG/DL (ref 8.5–10.1)
CHLORIDE SERPL-SCNC: 99 MMOL/L (ref 100–111)
CO2 SERPL-SCNC: 30 MMOL/L (ref 21–32)
CREAT SERPL-MCNC: 4.41 MG/DL (ref 0.6–1.3)
DIFFERENTIAL METHOD BLD: ABNORMAL
EOSINOPHIL # BLD: 0.2 K/UL (ref 0–0.4)
EOSINOPHIL NFR BLD: 1 % (ref 0–5)
ERYTHROCYTE [DISTWIDTH] IN BLOOD BY AUTOMATED COUNT: 17.2 % (ref 11.6–14.5)
GLUCOSE BLD STRIP.AUTO-MCNC: 135 MG/DL (ref 70–110)
GLUCOSE BLD STRIP.AUTO-MCNC: 163 MG/DL (ref 70–110)
GLUCOSE BLD STRIP.AUTO-MCNC: 176 MG/DL (ref 70–110)
GLUCOSE SERPL-MCNC: 184 MG/DL (ref 74–99)
GRAM STN SPEC: ABNORMAL
GRAM STN SPEC: ABNORMAL
GRAM STN SPEC: NORMAL
HCT VFR BLD AUTO: 24.2 % (ref 35–45)
HGB BLD-MCNC: 7.8 G/DL (ref 12–16)
IMM GRANULOCYTES # BLD AUTO: 0.1 K/UL (ref 0–0.04)
IMM GRANULOCYTES NFR BLD AUTO: 1 % (ref 0–0.5)
LYMPHOCYTES # BLD: 1.4 K/UL (ref 0.9–3.6)
LYMPHOCYTES NFR BLD: 11 % (ref 21–52)
MCH RBC QN AUTO: 25.8 PG (ref 24–34)
MCHC RBC AUTO-ENTMCNC: 32.2 G/DL (ref 31–37)
MCV RBC AUTO: 80.1 FL (ref 78–100)
MONOCYTES # BLD: 1.4 K/UL (ref 0.05–1.2)
MONOCYTES NFR BLD: 10 % (ref 3–10)
NEUTS SEG # BLD: 10.3 K/UL (ref 1.8–8)
NEUTS SEG NFR BLD: 77 % (ref 40–73)
NRBC # BLD: 0 K/UL (ref 0–0.01)
NRBC BLD-RTO: 0 PER 100 WBC
PLATELET # BLD AUTO: 283 K/UL (ref 135–420)
PMV BLD AUTO: 9.8 FL (ref 9.2–11.8)
POTASSIUM SERPL-SCNC: 3.8 MMOL/L (ref 3.5–5.5)
RBC # BLD AUTO: 3.02 M/UL (ref 4.2–5.3)
SERVICE CMNT-IMP: ABNORMAL
SERVICE CMNT-IMP: NORMAL
SODIUM SERPL-SCNC: 136 MMOL/L (ref 136–145)
WBC # BLD AUTO: 13.4 K/UL (ref 4.6–13.2)

## 2024-06-28 PROCEDURE — 6360000002 HC RX W HCPCS: Performed by: INTERNAL MEDICINE

## 2024-06-28 PROCEDURE — 97535 SELF CARE MNGMENT TRAINING: CPT

## 2024-06-28 PROCEDURE — 85025 COMPLETE CBC W/AUTO DIFF WBC: CPT

## 2024-06-28 PROCEDURE — 94761 N-INVAS EAR/PLS OXIMETRY MLT: CPT

## 2024-06-28 PROCEDURE — 6370000000 HC RX 637 (ALT 250 FOR IP): Performed by: INTERNAL MEDICINE

## 2024-06-28 PROCEDURE — 80048 BASIC METABOLIC PNL TOTAL CA: CPT

## 2024-06-28 PROCEDURE — 2580000003 HC RX 258: Performed by: INTERNAL MEDICINE

## 2024-06-28 PROCEDURE — 1100000000 HC RM PRIVATE

## 2024-06-28 PROCEDURE — 99232 SBSQ HOSP IP/OBS MODERATE 35: CPT | Performed by: INTERNAL MEDICINE

## 2024-06-28 PROCEDURE — 2580000003 HC RX 258: Performed by: PHYSICIAN ASSISTANT

## 2024-06-28 PROCEDURE — 82962 GLUCOSE BLOOD TEST: CPT

## 2024-06-28 PROCEDURE — 36415 COLL VENOUS BLD VENIPUNCTURE: CPT

## 2024-06-28 PROCEDURE — 6370000000 HC RX 637 (ALT 250 FOR IP): Performed by: PHYSICIAN ASSISTANT

## 2024-06-28 PROCEDURE — 97166 OT EVAL MOD COMPLEX 45 MIN: CPT

## 2024-06-28 RX ORDER — LACTULOSE 10 G/15ML
30 SOLUTION ORAL DAILY PRN
Status: DISCONTINUED | OUTPATIENT
Start: 2024-06-28 | End: 2024-07-18 | Stop reason: HOSPADM

## 2024-06-28 RX ORDER — BISACODYL 10 MG
10 SUPPOSITORY, RECTAL RECTAL DAILY PRN
Status: DISCONTINUED | OUTPATIENT
Start: 2024-06-28 | End: 2024-07-18 | Stop reason: HOSPADM

## 2024-06-28 RX ORDER — SENNOSIDES A AND B 8.6 MG/1
1 TABLET, FILM COATED ORAL NIGHTLY PRN
Status: DISCONTINUED | OUTPATIENT
Start: 2024-06-28 | End: 2024-07-18 | Stop reason: HOSPADM

## 2024-06-28 RX ORDER — MECOBALAMIN 5000 MCG
5 TABLET,DISINTEGRATING ORAL NIGHTLY PRN
Status: DISCONTINUED | OUTPATIENT
Start: 2024-06-28 | End: 2024-07-18 | Stop reason: HOSPADM

## 2024-06-28 RX ORDER — NALOXONE HYDROCHLORIDE 0.4 MG/ML
0.4 INJECTION, SOLUTION INTRAMUSCULAR; INTRAVENOUS; SUBCUTANEOUS PRN
Status: DISCONTINUED | OUTPATIENT
Start: 2024-06-28 | End: 2024-07-18 | Stop reason: HOSPADM

## 2024-06-28 RX ORDER — OXYCODONE AND ACETAMINOPHEN 7.5; 325 MG/1; MG/1
1 TABLET ORAL EVERY 6 HOURS PRN
Status: DISCONTINUED | OUTPATIENT
Start: 2024-06-28 | End: 2024-07-18 | Stop reason: HOSPADM

## 2024-06-28 RX ORDER — IPRATROPIUM BROMIDE AND ALBUTEROL SULFATE 2.5; .5 MG/3ML; MG/3ML
1 SOLUTION RESPIRATORY (INHALATION) EVERY 4 HOURS PRN
Status: DISCONTINUED | OUTPATIENT
Start: 2024-06-28 | End: 2024-07-18 | Stop reason: HOSPADM

## 2024-06-28 RX ADMIN — TIMOLOL MALEATE 1 DROP: 5 SOLUTION OPHTHALMIC at 20:51

## 2024-06-28 RX ADMIN — PANTOPRAZOLE SODIUM 40 MG: 40 TABLET, DELAYED RELEASE ORAL at 05:56

## 2024-06-28 RX ADMIN — LATANOPROST 1 DROP: 50 SOLUTION/ DROPS OPHTHALMIC at 20:51

## 2024-06-28 RX ADMIN — HYDROMORPHONE HYDROCHLORIDE 0.25 MG: 1 INJECTION, SOLUTION INTRAMUSCULAR; INTRAVENOUS; SUBCUTANEOUS at 16:44

## 2024-06-28 RX ADMIN — DOCUSATE SODIUM 50 MG: 50 LIQUID ORAL at 20:49

## 2024-06-28 RX ADMIN — Medication 5 MG: at 20:50

## 2024-06-28 RX ADMIN — SACUBITRIL AND VALSARTAN 1 TABLET: 24; 26 TABLET, FILM COATED ORAL at 09:20

## 2024-06-28 RX ADMIN — ASPIRIN 81 MG CHEWABLE TABLET 81 MG: 81 TABLET CHEWABLE at 09:20

## 2024-06-28 RX ADMIN — OXYCODONE AND ACETAMINOPHEN 1 TABLET: 7.5; 325 TABLET ORAL at 20:50

## 2024-06-28 RX ADMIN — FUROSEMIDE 80 MG: 40 TABLET ORAL at 09:20

## 2024-06-28 RX ADMIN — PIPERACILLIN AND TAZOBACTAM 3375 MG: 3; .375 INJECTION, POWDER, FOR SOLUTION INTRAVENOUS at 22:09

## 2024-06-28 RX ADMIN — SODIUM CHLORIDE, PRESERVATIVE FREE 10 ML: 5 INJECTION INTRAVENOUS at 20:51

## 2024-06-28 RX ADMIN — CARVEDILOL 3.12 MG: 3.12 TABLET, FILM COATED ORAL at 09:20

## 2024-06-28 RX ADMIN — ATORVASTATIN CALCIUM 10 MG: 10 TABLET, FILM COATED ORAL at 09:20

## 2024-06-28 RX ADMIN — SACUBITRIL AND VALSARTAN 1 TABLET: 24; 26 TABLET, FILM COATED ORAL at 20:50

## 2024-06-28 RX ADMIN — OXYCODONE AND ACETAMINOPHEN 1 TABLET: 7.5; 325 TABLET ORAL at 09:20

## 2024-06-28 RX ADMIN — SODIUM CHLORIDE, PRESERVATIVE FREE 10 ML: 5 INJECTION INTRAVENOUS at 09:24

## 2024-06-28 RX ADMIN — INSULIN GLARGINE 10 UNITS: 100 INJECTION, SOLUTION SUBCUTANEOUS at 20:52

## 2024-06-28 RX ADMIN — HYDROMORPHONE HYDROCHLORIDE 0.25 MG: 1 INJECTION, SOLUTION INTRAMUSCULAR; INTRAVENOUS; SUBCUTANEOUS at 02:55

## 2024-06-28 RX ADMIN — CARVEDILOL 3.12 MG: 3.12 TABLET, FILM COATED ORAL at 17:07

## 2024-06-28 RX ADMIN — PIPERACILLIN AND TAZOBACTAM 3375 MG: 3; .375 INJECTION, POWDER, FOR SOLUTION INTRAVENOUS at 09:23

## 2024-06-28 ASSESSMENT — PAIN SCALES - GENERAL
PAINLEVEL_OUTOF10: 5
PAINLEVEL_OUTOF10: 2
PAINLEVEL_OUTOF10: 6
PAINLEVEL_OUTOF10: 7
PAINLEVEL_OUTOF10: 7

## 2024-06-28 ASSESSMENT — PAIN DESCRIPTION - LOCATION
LOCATION: FOOT

## 2024-06-28 ASSESSMENT — PAIN DESCRIPTION - ORIENTATION
ORIENTATION: RIGHT

## 2024-06-28 ASSESSMENT — PAIN DESCRIPTION - PAIN TYPE: TYPE: SURGICAL PAIN

## 2024-06-28 ASSESSMENT — PAIN - FUNCTIONAL ASSESSMENT
PAIN_FUNCTIONAL_ASSESSMENT: PREVENTS OR INTERFERES SOME ACTIVE ACTIVITIES AND ADLS
PAIN_FUNCTIONAL_ASSESSMENT: ACTIVITIES ARE NOT PREVENTED

## 2024-06-28 ASSESSMENT — PAIN DESCRIPTION - ONSET: ONSET: ON-GOING

## 2024-06-28 ASSESSMENT — PAIN DESCRIPTION - DESCRIPTORS
DESCRIPTORS: SHARP;THROBBING
DESCRIPTORS: BURNING;THROBBING
DESCRIPTORS: BURNING

## 2024-06-28 ASSESSMENT — PAIN DESCRIPTION - FREQUENCY: FREQUENCY: CONTINUOUS

## 2024-06-28 NOTE — PROGRESS NOTES
Admit Date: 2024    POD 2 Days Post-Op    Procedure:  Procedure(s):  RIGHT FOOT INCISION AND DRAINAGE, PARTIAL RESECTION SECOND METATARSAL    Subjective:   Patient seen, chart reviewed, all acute events noted.  Patient is awake alert and answers all questions appropriate.  Patient is status post right foot I&D by Dr. Gabriel.  Complaining of 3 out of 10 right foot pain but adequate relief with current pain regimen.  Patient instructed that given her findings from arterial imaging it would be prudent to proceed with possible angiogram and seeing if we can get any further vascular flow for healing.  Risk benefits and logistics all detailed.  Patient states she understands more than willing to proceed as planned.  Patient will be on-call to the Cath Lab on Monday, [phone] or to follow.    Objective:     Blood pressure 132/62, pulse 66, temperature 98.7 °F (37.1 °C), temperature source Oral, resp. rate 18, height 1.626 m (5' 4\"), weight 103.6 kg (228 lb 8 oz), SpO2 100 %.    Temp (24hrs), Av.3 °F (36.8 °C), Min:98.1 °F (36.7 °C), Max:98.7 °F (37.1 °C)      Physical Exam:    General -alert and oriented x 3.  Answers all question appropriate.  Moves all extremities to command.  No acute distress.  3 out of 10 right foot pain.  HEENT:  Atraumatic, normocephalic; Pupils equally round; Extraocular muscles intact; Moist Oropharynx  Cardiovascular:  Regular rate and rhythm without rubs, gallops, or murmurs  Respiratory:  (+) Mild Crackles over Bilateral Lung fields; No wheezes or rhonchi; normal effort of breathing on Room Air  Abdominal:  (+) Obese abdomen; BS present  :  Deferred  Extremities:  Pulses 2+ x4 with (+) Trace Pitting Edema to Bilateral Mid Tibias without clubbing or cyanosis; (+) Bandage on Right Foot; (+) Wound Vacuum on Right Foot  Musculoskeletal:  Strength 5/5 and symmetrical in BUE  Integument:  No rash on face, forearms, or legs  Neurological:  Alert & Ostensibly Oriented x4/4; No gross  benefits and logistics all detailed both by myself and extensively by my attending.  Patient states she understands more than willing to proceed as planned.  N.p.o. after midnight except for sips of water as of Sunday at midnight.  I will discuss details with my attending.

## 2024-06-28 NOTE — PROGRESS NOTES
[facility]   Pharmacy Pharmacokinetic Monitoring Service - Vancomycin    Indication: DM foot infection  Target Pre-Dialysis Concentration: 21-24 mg/L  Day of Therapy: 4  Additional Antimicrobials: pip-tazo    Pertinent Laboratory Values:   Wt Readings from Last 1 Encounters:   06/27/24 103.6 kg (228 lb 8 oz)     Temp Readings from Last 1 Encounters:   06/28/24 98.1 °F (36.7 °C) (Oral)     Recent Labs     06/27/24  0143 06/28/24  0347   WBC 16.3* 13.4*     Pertinent Cultures:  Date Source Results   6/25 blood NGTD   MRSA Nasal Swab: NA    Assessment:  Date Current Dose Concentration (mg/L) Dialysis Session   6/25 2,000 mg x1 - no   6/26 - - no   6/27 - 27.6 yes   6/28 - - no     Plan:  ESRD on HD q TTS - dose by level  No dose today  Ordered a level for 6/29 with AM labs  Pharmacy will continue to monitor patient and adjust therapy as indicated    Thank you for the consult,  Gianni Wolfe RPH  6/28/2024

## 2024-06-28 NOTE — PROGRESS NOTES
Podiatry Progress Note    Patient: Jigna Conner               Sex: female          DOA: [unfilled]       YOB: 1955      Age:  68 y.o.        LOS:  LOS: 3 days     POD 2 Days Post-Op  Procedure:   Procedure(s):  RIGHT FOOT INCISION AND DRAINAGE, PARTIAL RESECTION SECOND METATARSAL      Subjective:     Patient seen resting quiet and comfortably and no apparent distress. Patient has wound vac dressings to right foot. Denies N/V/C/F.         Objective:       Physical Exam:  Wound vac dressings intact to right foot. Minimal fluid collected in cannister.       Assessment:     Principal Problem:    Diabetic foot infection (HCC)  Active Problems:    Hypertension    Anemia of renal disease    ESRD on dialysis (HCC)    Diabetes mellitus (HCC)    Hypokalemia    Chronic heart failure with preserved ejection fraction (HFpEF) (HCC)    Coronary artery disease involving native coronary artery of native heart without angina pectoris    History of MI (myocardial infarction)    Class 2 obesity in adult  Resolved Problems:    * No resolved hospital problems. *      Plan/Recommendations/Medical Decision Making:     - Continue with wound vac therapy.   - Remain NWB to right foot.   - Vascular surgery saw patient. They planning to do angiogram on Monday.   - Follow OR culture. ID on board.   - Medical management per primary team.

## 2024-06-28 NOTE — PROGRESS NOTES
Physician Progress Note      PATIENT:               ODILON DYER  [facility] #:                  [id number]  :                       1955  ADMIT DATE:       2024 5:34 PM  DISCH DATE:  RESPONDING  PROVIDER #:        Ryan Burns DO          QUERY TEXT:    Patient admitted with diabetic foot infection, noted to have Gangrenous/gas   producing infection seen throughout the distal foot soft tissue of right foot   per  Xray. If possible, please document in progress notes and discharge   summary further specificity regarding gangrene as follows:    The medical record reflects the following:  Risk Factors: 68 year old female, diabetes, osteomyelitis, obesity  Clinical Indicators:  Podiatry consult - x-ray of right foot reviewed.    Gangrenous/gas producing infection seen throughout the distal foot soft tissue   encompassing at minimum the first through fourth digits.  Patient will need urgent incision and drainage with decompression of soft   tissue emphysema, partial second metatarsal resection and bone biopsy of from   hallux.  Treatment:  OP note - RIGHT FOOT INCISION AND DRAINAGE, PARTIAL RESECTION   SECOND METATARSAL, DECOMPRESSION OF SOFT TISSUE GAS, BONE BIOPSY FROM HALLUX  IV Zosyn, IV Vancomycin      Please email [email or web address] with any questions  Options provided:  -- Gas gangrene  -- Other - I will add my own diagnosis  -- Disagree - Not applicable / Not valid  -- Disagree - Clinically unable to determine / Unknown  -- Refer to Clinical Documentation Reviewer    PROVIDER RESPONSE TEXT:    This patient has gas gangrene.    Query created by: Sunitha Hernandez on 2024 6:59 AM      Electronically signed by:  Ryan Burns DO 2024 11:33 PM

## 2024-06-28 NOTE — PROGRESS NOTES
Medicine Progress Note    Patient: Jigna Conner   Age:  68 y.o.  DOA: 6/25/2024   Admit Dx / CC: Hypokalemia [E87.6]  Hyperglycemia [R73.9]  ESRD (end stage renal disease) (HCC) [N18.6]  Diabetic foot infection (HCC) [E11.628, L08.9]  Anemia, unspecified type [D64.9]  LOS:  LOS: 3 days     Assessment/Plan   Principal Problem:    Diabetic foot infection (HCC)  Active Problems:    Anemia of renal disease    Hypokalemia    Hypertension    ESRD on dialysis (HCC)    Diabetes mellitus (HCC)    Chronic heart failure with preserved ejection fraction (HFpEF) (HCC)    Coronary artery disease involving native coronary artery of native heart without angina pectoris    History of MI (myocardial infarction)    Class 2 obesity in adult  Resolved Problems:    * No resolved hospital problems. *      Additional Plan notes     Diabetes Foot Infection S/P RIGHT FOOT INCISION AND DRAINAGE, PARTIAL RESECTION SECOND METATARSAL, DECOMPRESSION OF SOFT TISSUE GAS, BONE BIOPSY FROM HALLUX  POD# 0  - Right Foot I&D performed by Maricn Gabriel DPM on 6/26/2024  IV Vancomycin and IV Piperacillin-Tazobactam (a.k.a. Zosyn).  PRN Antiemetics, PRN Pain Control, and PRN Naloxone.  Wound Culture Heavy Mixed Skin Janel.  Blood Cultures are, again, thus far negative.    Worsening leukocytosis.  Continue to monitor.    Hypokalemia  - 3.0 mmol/L on 6/25/2024  Monitor.    Constipation  - Patient reports no BM since 6/25/2024.  Added Docusate Sodium, PRN Bisacodyl, PRN Senokot, and PRN Lactulose.    End-Stage Renal Disease (a.k.a. ESRD) on Hemodialysis (a.k.a. HD)  Nephrological services consulted for routine Hemodialysis.     Chronic Heart Failure with Preserved Ejection Fraction (a.k.a. HFpEF)  - Pro-BNP 3,639 pg/mL on 6/27/2024  - CXR on 6/27/2024 shows \"Cardiomegaly without acute pulmonary process.\"  Strict I/Os, Daily Weight, Fluid Restriction 2.0 L/day, and continue home Furosemide, Carvedilol, and Sacubitril-Valsartan (a.k.a.

## 2024-06-28 NOTE — PROGRESS NOTES
Medicine Progress Note    Patient: Jigna Conner   Age:  68 y.o.  DOA: 6/25/2024   Admit Dx / CC: Hypokalemia [E87.6]  Hyperglycemia [R73.9]  ESRD (end stage renal disease) (HCC) [N18.6]  Diabetic foot infection (HCC) [E11.628, L08.9]  Anemia, unspecified type [D64.9]  LOS:  LOS: 2 days     Assessment/Plan   Principal Problem:    Diabetic foot infection (HCC)  Active Problems:    Anemia of renal disease    Hypokalemia    Hypertension    ESRD on dialysis (HCC)    Diabetes mellitus (HCC)    Chronic heart failure with preserved ejection fraction (HFpEF) (HCC)    Coronary artery disease involving native coronary artery of native heart without angina pectoris    History of MI (myocardial infarction)    Class 2 obesity in adult  Resolved Problems:    * No resolved hospital problems. *      Additional Plan notes     Diabetes Foot Infection S/P RIGHT FOOT INCISION AND DRAINAGE, PARTIAL RESECTION SECOND METATARSAL, DECOMPRESSION OF SOFT TISSUE GAS, BONE BIOPSY FROM HALLUX  POD# 0  - Right Foot I&D performed by Marcin Gabriel DPM on 6/26/2024  IV Vancomycin and IV Piperacillin-Tazobactam (a.k.a. Zosyn).  PRN Antiemetics, PRN Pain Control, and PRN Naloxone.  Wound Culture shows scant Gram (-) Rods, as well as Gram Positive Rods and Gram Positive Cocci in pairs.  Blood Cultures thus far negative.  INCREASED PRN PO Hydrocodone-APAP 5 mg-325 mg q6hrs to Oxycodone-APAP 7.5 mg-325 mg q6hrs.     Contact Infectious Disease services in AM for consultation.    Hypokalemia  - 3.0 mmol/L on 6/25/2024  Monitor.     End-Stage Renal Disease (a.k.a. ESRD) on Hemodialysis (a.k.a. HD)  Nephrological services consulted for routine Hemodialysis.     Chronic Heart Failure with Preserved Ejection Fraction (a.k.a. HFpEF)  - Pro-BNP 3,639 pg/mL on 6/27/2024  - CXR on 6/27/2024 shows \"Cardiomegaly without acute pulmonary process.\"  Strict I/Os, Daily Weight, Fluid Restriction 2.0 L/day, and continue home Furosemide, Carvedilol, and  Oral Q8H PRN    Or    ondansetron (ZOFRAN) injection 4 mg  4 mg IntraVENous Q6H PRN    polyethylene glycol (GLYCOLAX) packet 17 g  17 g Oral Daily PRN    bisacodyl (DULCOLAX) suppository 10 mg  10 mg Rectal Daily PRN    acetaminophen (TYLENOL) tablet 650 mg  650 mg Oral Q6H PRN    Or    acetaminophen (TYLENOL) suppository 650 mg  650 mg Rectal Q6H PRN    potassium chloride (KLOR-CON M) extended release tablet 40 mEq  40 mEq Oral Once    insulin lispro (HUMALOG,ADMELOG) injection vial 0-4 Units  0-4 Units SubCUTAneous TID WC    insulin lispro (HUMALOG,ADMELOG) injection vial 0-4 Units  0-4 Units SubCUTAneous Nightly    glucose chewable tablet 16 g  4 tablet Oral PRN    dextrose bolus 10% 125 mL  125 mL IntraVENous PRN    Or    dextrose bolus 10% 250 mL  250 mL IntraVENous PRN    glucagon injection 1 mg  1 mg SubCUTAneous PRN    dextrose 10 % infusion   IntraVENous Continuous PRN       Ryan Bolanos DO    June 27, 2024

## 2024-06-28 NOTE — PLAN OF CARE
Problem: Occupational Therapy - Adult  Goal: By Discharge: Performs self-care activities at highest level of function for planned discharge setting.  See evaluation for individualized goals.  Description: Occupational Therapy Goals:  Initiated 6/28/2024 to be met within 7-10 days.    1.  Patient will perform bathing with supervision/set-up.   2.  Patient will perform lower body dressing with supervision/set-up.  3.  Patient will perform BSC transfers with supervision/set-up using LRAD (sliding board vs FWW).  4.  Patient will perform all aspects of toileting with supervision/set-up.  5.  Patient will participate in upper extremity therapeutic exercise/activities with supervision/set-up for 8 minutes to improve endurance and UB strength needed for ADLs    6.  Patient will utilize energy conservation techniques during functional activities with verbal cues.    PLOF: Pt lives with spouse in [place] with ramp. Pt is independent with ADLs and functional mobility. Pt's spouse is disabled and has PCA assisting him with ADLs.  Outcome: Progressing  OCCUPATIONAL THERAPY EVALUATION    Patient: Jigna Conner (68 y.o. female)  Date: 6/28/2024  Primary Diagnosis: Hypokalemia [E87.6]  Hyperglycemia [R73.9]  ESRD (end stage renal disease) (HCC) [N18.6]  Diabetic foot infection (HCC) [E11.628, L08.9]  Anemia, unspecified type [D64.9]  Procedure(s) (LRB):  RIGHT FOOT INCISION AND DRAINAGE, PARTIAL RESECTION SECOND METATARSAL (Right) 2 Days Post-Op   Precautions: Fall Risk, Skin, Right Lower Extremity Weight Bearing: Non Weight Bearing (R forefoot)    ASSESSMENT :    Pt is agreeable to OT evaluation this am, requesting to sit at EOB for bathing. Pt required additional time for bed mobility due to pain in RLE and management of wound vac. Pt performed UB/LB ADLs (see functional levels below). Pt c/o pain in R foot when in dependent position, compliant with NWB precautions for R forefoot. Pt declined std attempt, was able to scoot    Vision  Vision: Within Functional Limits        Coordination: BUE  Coordination: Generally decreased, functional    Balance:     Balance  Sitting: Intact    Strength: BUE  Strength: Generally decreased, functional    Tone & Sensation: BUE  Tone: Normal  Sensation: Intact    Range of Motion: BUE  AROM: Generally decreased, functional   Functional Mobility and Transfers for ADLs:  Bed Mobility:     Bed Mobility Training  Bed Mobility Training: Yes  Supine to Sit: Stand-by assistance;Additional time  Sit to Supine: Supervision  Scooting: Supervision  Transfers:      Transfer Training  Transfer Training: No    ADL Assessment:   Feeding: Modified independent   Grooming: Supervision  UE Bathing: Stand by assistance  LE Bathing: Moderate assistance  UE Dressing: Stand by assistance  LE Dressing: Moderate assistance  Toileting: Moderate assistance     ADL Intervention:  UB/LB bathing while sitting EOB  SBA for UB bathing and dressing  Min-Mod A for LB ADLs while sitting EOB  Pt performed sebas-care in sitting with additional time and VCs for adaptive strategies    Pain:  Intensity Pre-treatment: 4/10   Intensity Post-treatment: 4/10  Scale: Numeric Rating Scale  Location: Right Foot  Quality: Sharp and Aching  Intervention(s): Repositioning  and Rest    Activity Tolerance:   Activity Tolerance: Patient limited by pain  Please refer to the flowsheet for vital signs taken during this treatment.    After treatment:   [] Patient left in no apparent distress sitting up in chair  [x] Patient left in no apparent distress in bed  [x] Call bell left within reach  [x] Nursing notified  [] Caregiver present  [] Bed alarm activated    COMMUNICATION/EDUCATION:   Patient Education  Education Given To: Patient  Education Provided: Role of Therapy;Plan of Care;Energy Conservation;Transfer Training;ADL Adaptive Strategies;Precautions  Education Method: Demonstration;Verbal;Teach Back  Barriers to Learning: None  Education Outcome:

## 2024-06-28 NOTE — PLAN OF CARE
Problem: Discharge Planning  Goal: Discharge to home or other facility with appropriate resources  Outcome: Progressing     Problem: Pain  Goal: Verbalizes/displays adequate comfort level or baseline comfort level  6/28/2024 1027 by Winifred De León RN  Outcome: Progressing  6/28/2024 0508 by Kim Bajwa RN  Outcome: Progressing     Problem: ABCDS Injury Assessment  Goal: Absence of physical injury  6/28/2024 1027 by Winifred De León RN  Outcome: Progressing  6/28/2024 0508 by Kim Bajwa RN  Outcome: Progressing     Problem: Safety - Adult  Goal: Free from fall injury  6/28/2024 1027 by Winifred De León RN  Outcome: Progressing  6/28/2024 0508 by Kim Bajwa RN  Outcome: Progressing     Problem: Chronic Conditions and Co-morbidities  Goal: Patient's chronic conditions and co-morbidity symptoms are monitored and maintained or improved  6/28/2024 1027 by Winifred De León RN  Outcome: Progressing  6/28/2024 0508 by Kim Bajwa RN  Outcome: Progressing     Problem: Skin/Tissue Integrity  Goal: Absence of new skin breakdown  Description: 1.  Monitor for areas of redness and/or skin breakdown  2.  Assess vascular access sites hourly  3.  Every 4-6 hours minimum:  Change oxygen saturation probe site  4.  Every 4-6 hours:  If on nasal continuous positive airway pressure, respiratory therapy assess nares and determine need for appliance change or resting period.  Outcome: Progressing

## 2024-06-28 NOTE — PROGRESS NOTES
Progress Note    68-year-old female with past medical history of ESRD, diabetes, heart failure with preserved ejection fraction admitted for diabetic foot infection, following for ESRD management.  Subjective      Overnight event noted  No complaints offered      IMPRESSION:   ESRD, Tuesday Thursday Saturday,   Access left arm AV fistula  Right foot wound infection,  Anemia, received blood transfusion during this admission  Diabetes  Hypertension  Heart failure with preserved ejection fraction.   PLAN:   HD tomorrow  Continue antibiotic per primary care, please let me know in advance for antibiotic choice as she goes to dialysis unit in [facility] .   Adjust medication per ESRD status.     Current Facility-Administered Medications   Medication Dose Route Frequency    oxyCODONE-acetaminophen (PERCOCET) 7.5-325 MG per tablet 1 tablet  1 tablet Oral Q6H PRN    naloxone (NARCAN) injection 0.4 mg  0.4 mg IntraVENous PRN    ipratropium 0.5 mg-albuterol 2.5 mg (DUONEB) nebulizer solution 1 Dose  1 Dose Inhalation Q4H PRN    melatonin disintegrating tablet 5 mg  5 mg Oral Nightly PRN    0.9 % sodium chloride infusion   IntraVENous PRN    0.9 % sodium chloride infusion   IntraVENous PRN    piperacillin-tazobactam (ZOSYN) 3,375 mg in sodium chloride 0.9 % 50 mL IVPB (mini-bag)  3,375 mg IntraVENous Q12H    aspirin chewable tablet 81 mg  81 mg Oral Daily    atorvastatin (LIPITOR) tablet 10 mg  10 mg Oral Daily    carvedilol (COREG) tablet 3.125 mg  3.125 mg Oral BID WC    [Held by provider] dorzolamide (TRUSOPT) 2 % ophthalmic solution 1 drop  1 drop Ophthalmic BID    furosemide (LASIX) tablet 80 mg  80 mg Oral Daily    insulin glargine (LANTUS) injection vial 10 Units  10 Units SubCUTAneous Nightly    latanoprost (XALATAN) 0.005 % ophthalmic solution 1 drop  1 drop Both Eyes Nightly    pantoprazole (PROTONIX) tablet 40 mg  40 mg Oral QAM AC    sacubitril-valsartan (ENTRESTO) 24-26 MG per tablet 1 tablet  1 tablet Oral BID

## 2024-06-28 NOTE — CARE COORDINATION
[facility] Sat in [place] [phone] chair time 0500. Pt was driving self and son would take her when she couldn't drive.     06/28/24 5085   Service Assessment   Patient Orientation Alert and Oriented;Person;Place;Situation   Cognition Alert   History Provided By Patient;Child/Family   Primary Caregiver Self   Accompanied By/Relationship Son Dez [phone]   Support Systems Spouse/Significant Other;Family Members;Children   Patient's Healthcare Decision Maker is: Legal Next of Kin   PCP Verified by CM Yes   Last Visit to PCP Within last 3 months   Prior Functional Level Independent in ADLs/IADLs   Current Functional Level Assistance with the following:;Cooking;Housework;Shopping;Mobility   Can patient return to prior living arrangement Yes   Ability to make needs known: Good   Family able to assist with home care needs: Yes   Would you like for me to discuss the discharge plan with any other family members/significant others, and if so, who? Yes  (Son Dez [phone])   Financial Resources Medicare   Community Resources None   Social/Functional History   Lives With Spouse   Type of Home House   Home Layout One level   Home Access Ramped entrance   Bathroom Shower/Tub Tub/Shower unit   Bathroom Toilet Handicap height   Bathroom Accessibility Accessible   Home Equipment Rollator;Cane   Receives Help From Family   ADL Assistance Independent   Homemaking Assistance Independent   Homemaking Responsibilities Yes   Ambulation Assistance Independent   Transfer Assistance Independent   Active  Yes   Mode of Transportation Car   Occupation Retired   Discharge Planning   Type of Residence House   Living Arrangements Spouse/Significant Other   Current Services Prior To Admission None   Potential Assistance Needed N/A   DME Ordered? No   Potential Assistance Purchasing Medications No   Type of Home Care Services None   Patient expects to be discharged to: House   One/Two Story Residence

## 2024-06-28 NOTE — PROGRESS NOTES
Seen and examined  Some pain in foot but overall doing ok  HD planned for tomorrow  Full note to follow

## 2024-06-28 NOTE — CONSULTS
[facility]  (A Division of [facility])    Consultation Note    Patient: Jigna Conner Age: 68 y.o. Sex: female    YOB: 1955 Admit Date: 6/25/2024 PCP: Tiffanie Lang PA-C   MRN: [id number]  CSN: [id number]       Date: June 28, 2024        Admitted to: [facility]     Reason for Referral: ? Antibiotic need for DRI w/ ? Gangrene/ OM    Current Antimicrobials:    Prior Antimicrobials:  Vanco/ zosyn         Assessment: Rec / Plan:    Rt foot infection- gas in tissue, OM suspected   Empiric rx started   Goals of therapy need to be discussed- ? Salvage foot/ LE? Empiric vanco/ zosyn started- continue til micro resulted    Surgical intervention for soft tissue debridement as needed    Vascular w/u to identify \" correctable disease\" and address if possible- help to determine if foot salvage is likely    Further debridement may be in order    Await path results from bone from OR 6/26   PAD- rt LE- mod dz    Studies pending    Vascular consult on board            comorbidities        Microbiology:   See below  6/25 bl cx x 2 ngtd  6/26 wound/ tissue- gpr, gpc in prs- gnr growing in culture    Lines / Catheters: piv  Wound vac rt foot  Lue avf    HPI:  Jigna Conner is a 68 y.o. female Black /  pmh IDDM, CAD, ESRD on dialysis, HFpEF who presented to the ED with c/o right foot pain associated with wound/drainage ongoing for 5-6 weeks. She was referred in by her podiatrist Ho 3 courses of po antibiotics with progression of disease.  Xray of rt foot   Gangrenous/gas producing infection seen throughout the distal foot soft  tissue encompassing at minimum the first through fourth digits.  2.  Postsurgical changes noted at the second digit. Obscuration and haziness at  the distal second metatarsal with indistinctness of the osseous border. Could  represent obscuration from soft tissue edema and gas however erosive changes

## 2024-06-29 LAB
ANION GAP SERPL CALC-SCNC: 7 MMOL/L (ref 3–18)
BASOPHILS # BLD: 0 K/UL (ref 0–0.1)
BASOPHILS NFR BLD: 0 % (ref 0–2)
BUN SERPL-MCNC: 33 MG/DL (ref 7–18)
BUN/CREAT SERPL: 6 (ref 12–20)
CALCIUM SERPL-MCNC: 9.9 MG/DL (ref 8.5–10.1)
CHLORIDE SERPL-SCNC: 96 MMOL/L (ref 100–111)
CO2 SERPL-SCNC: 30 MMOL/L (ref 21–32)
CREAT SERPL-MCNC: 5.53 MG/DL (ref 0.6–1.3)
DIFFERENTIAL METHOD BLD: ABNORMAL
EOSINOPHIL # BLD: 0.3 K/UL (ref 0–0.4)
EOSINOPHIL NFR BLD: 2 % (ref 0–5)
ERYTHROCYTE [DISTWIDTH] IN BLOOD BY AUTOMATED COUNT: 17.4 % (ref 11.6–14.5)
GLUCOSE BLD STRIP.AUTO-MCNC: 113 MG/DL (ref 70–110)
GLUCOSE BLD STRIP.AUTO-MCNC: 126 MG/DL (ref 70–110)
GLUCOSE SERPL-MCNC: 129 MG/DL (ref 74–99)
HCT VFR BLD AUTO: 25.5 % (ref 35–45)
HGB BLD-MCNC: 8 G/DL (ref 12–16)
IMM GRANULOCYTES # BLD AUTO: 0.2 K/UL (ref 0–0.04)
IMM GRANULOCYTES NFR BLD AUTO: 1 % (ref 0–0.5)
LYMPHOCYTES # BLD: 1.5 K/UL (ref 0.9–3.6)
LYMPHOCYTES NFR BLD: 11 % (ref 21–52)
MCH RBC QN AUTO: 25.3 PG (ref 24–34)
MCHC RBC AUTO-ENTMCNC: 31.4 G/DL (ref 31–37)
MCV RBC AUTO: 80.7 FL (ref 78–100)
MONOCYTES # BLD: 1.4 K/UL (ref 0.05–1.2)
MONOCYTES NFR BLD: 11 % (ref 3–10)
NEUTS SEG # BLD: 10 K/UL (ref 1.8–8)
NEUTS SEG NFR BLD: 75 % (ref 40–73)
NRBC # BLD: 0 K/UL (ref 0–0.01)
NRBC BLD-RTO: 0 PER 100 WBC
PLATELET # BLD AUTO: 288 K/UL (ref 135–420)
PMV BLD AUTO: 9.7 FL (ref 9.2–11.8)
POTASSIUM SERPL-SCNC: 4.2 MMOL/L (ref 3.5–5.5)
RBC # BLD AUTO: 3.16 M/UL (ref 4.2–5.3)
SODIUM SERPL-SCNC: 133 MMOL/L (ref 136–145)
VANCOMYCIN SERPL-MCNC: 15.4 UG/ML (ref 5–40)
WBC # BLD AUTO: 13.5 K/UL (ref 4.6–13.2)

## 2024-06-29 PROCEDURE — 2580000003 HC RX 258: Performed by: PHYSICIAN ASSISTANT

## 2024-06-29 PROCEDURE — 6370000000 HC RX 637 (ALT 250 FOR IP): Performed by: PHYSICIAN ASSISTANT

## 2024-06-29 PROCEDURE — 82962 GLUCOSE BLOOD TEST: CPT

## 2024-06-29 PROCEDURE — 1100000000 HC RM PRIVATE

## 2024-06-29 PROCEDURE — 6360000002 HC RX W HCPCS: Performed by: INTERNAL MEDICINE

## 2024-06-29 PROCEDURE — 80048 BASIC METABOLIC PNL TOTAL CA: CPT

## 2024-06-29 PROCEDURE — 97530 THERAPEUTIC ACTIVITIES: CPT

## 2024-06-29 PROCEDURE — 99232 SBSQ HOSP IP/OBS MODERATE 35: CPT | Performed by: EMERGENCY MEDICINE

## 2024-06-29 PROCEDURE — 36415 COLL VENOUS BLD VENIPUNCTURE: CPT

## 2024-06-29 PROCEDURE — 80202 ASSAY OF VANCOMYCIN: CPT

## 2024-06-29 PROCEDURE — 85025 COMPLETE CBC W/AUTO DIFF WBC: CPT

## 2024-06-29 PROCEDURE — 94761 N-INVAS EAR/PLS OXIMETRY MLT: CPT

## 2024-06-29 PROCEDURE — 90935 HEMODIALYSIS ONE EVALUATION: CPT

## 2024-06-29 PROCEDURE — 97535 SELF CARE MNGMENT TRAINING: CPT

## 2024-06-29 PROCEDURE — 6370000000 HC RX 637 (ALT 250 FOR IP): Performed by: INTERNAL MEDICINE

## 2024-06-29 PROCEDURE — 2580000003 HC RX 258: Performed by: INTERNAL MEDICINE

## 2024-06-29 PROCEDURE — 6360000002 HC RX W HCPCS: Performed by: EMERGENCY MEDICINE

## 2024-06-29 RX ORDER — LACTOBACILLUS RHAMNOSUS GG 10B CELL
1 CAPSULE ORAL
Status: DISCONTINUED | OUTPATIENT
Start: 2024-06-30 | End: 2024-07-18 | Stop reason: HOSPADM

## 2024-06-29 RX ORDER — HEPARIN SODIUM 5000 [USP'U]/ML
5000 INJECTION, SOLUTION INTRAVENOUS; SUBCUTANEOUS EVERY 8 HOURS SCHEDULED
Status: DISCONTINUED | OUTPATIENT
Start: 2024-06-29 | End: 2024-07-18 | Stop reason: HOSPADM

## 2024-06-29 RX ORDER — VANCOMYCIN 1.75 G/350ML
1250 INJECTION, SOLUTION INTRAVENOUS
Status: ACTIVE | OUTPATIENT
Start: 2024-06-29 | End: 2024-06-29

## 2024-06-29 RX ADMIN — OXYCODONE AND ACETAMINOPHEN 1 TABLET: 7.5; 325 TABLET ORAL at 20:17

## 2024-06-29 RX ADMIN — OXYCODONE AND ACETAMINOPHEN 1 TABLET: 7.5; 325 TABLET ORAL at 15:14

## 2024-06-29 RX ADMIN — ATORVASTATIN CALCIUM 10 MG: 10 TABLET, FILM COATED ORAL at 15:19

## 2024-06-29 RX ADMIN — OXYCODONE AND ACETAMINOPHEN 1 TABLET: 7.5; 325 TABLET ORAL at 05:21

## 2024-06-29 RX ADMIN — SODIUM CHLORIDE, PRESERVATIVE FREE 10 ML: 5 INJECTION INTRAVENOUS at 20:17

## 2024-06-29 RX ADMIN — ASPIRIN 81 MG CHEWABLE TABLET 81 MG: 81 TABLET CHEWABLE at 15:17

## 2024-06-29 RX ADMIN — INSULIN GLARGINE 10 UNITS: 100 INJECTION, SOLUTION SUBCUTANEOUS at 20:18

## 2024-06-29 RX ADMIN — TIMOLOL MALEATE 1 DROP: 5 SOLUTION OPHTHALMIC at 20:18

## 2024-06-29 RX ADMIN — DOCUSATE SODIUM 50 MG: 50 LIQUID ORAL at 20:17

## 2024-06-29 RX ADMIN — PANTOPRAZOLE SODIUM 40 MG: 40 TABLET, DELAYED RELEASE ORAL at 06:15

## 2024-06-29 RX ADMIN — FUROSEMIDE 80 MG: 40 TABLET ORAL at 15:17

## 2024-06-29 RX ADMIN — LATANOPROST 1 DROP: 50 SOLUTION/ DROPS OPHTHALMIC at 20:18

## 2024-06-29 RX ADMIN — SODIUM CHLORIDE, PRESERVATIVE FREE 10 ML: 5 INJECTION INTRAVENOUS at 10:34

## 2024-06-29 RX ADMIN — HEPARIN SODIUM 5000 UNITS: 5000 INJECTION INTRAVENOUS; SUBCUTANEOUS at 20:17

## 2024-06-29 RX ADMIN — CARVEDILOL 3.12 MG: 3.12 TABLET, FILM COATED ORAL at 17:33

## 2024-06-29 RX ADMIN — PIPERACILLIN AND TAZOBACTAM 3375 MG: 3; .375 INJECTION, POWDER, FOR SOLUTION INTRAVENOUS at 15:24

## 2024-06-29 RX ADMIN — SACUBITRIL AND VALSARTAN 1 TABLET: 24; 26 TABLET, FILM COATED ORAL at 20:17

## 2024-06-29 RX ADMIN — SACUBITRIL AND VALSARTAN 1 TABLET: 24; 26 TABLET, FILM COATED ORAL at 15:18

## 2024-06-29 RX ADMIN — HYDROMORPHONE HYDROCHLORIDE 0.25 MG: 1 INJECTION, SOLUTION INTRAMUSCULAR; INTRAVENOUS; SUBCUTANEOUS at 10:20

## 2024-06-29 RX ADMIN — TIMOLOL MALEATE 1 DROP: 5 SOLUTION OPHTHALMIC at 10:22

## 2024-06-29 ASSESSMENT — PAIN - FUNCTIONAL ASSESSMENT
PAIN_FUNCTIONAL_ASSESSMENT: ACTIVITIES ARE NOT PREVENTED

## 2024-06-29 ASSESSMENT — PAIN DESCRIPTION - ORIENTATION
ORIENTATION: RIGHT

## 2024-06-29 ASSESSMENT — PAIN DESCRIPTION - DESCRIPTORS
DESCRIPTORS: ACHING;DISCOMFORT
DESCRIPTORS: ACHING
DESCRIPTORS: THROBBING

## 2024-06-29 ASSESSMENT — PAIN DESCRIPTION - LOCATION
LOCATION: FOOT

## 2024-06-29 ASSESSMENT — PAIN DESCRIPTION - ONSET
ONSET: ON-GOING

## 2024-06-29 ASSESSMENT — PAIN SCALES - GENERAL
PAINLEVEL_OUTOF10: 5
PAINLEVEL_OUTOF10: 0
PAINLEVEL_OUTOF10: 3
PAINLEVEL_OUTOF10: 7
PAINLEVEL_OUTOF10: 5
PAINLEVEL_OUTOF10: 5
PAINLEVEL_OUTOF10: 6

## 2024-06-29 ASSESSMENT — PAIN DESCRIPTION - PAIN TYPE
TYPE: SURGICAL PAIN
TYPE: ACUTE PAIN;SURGICAL PAIN

## 2024-06-29 ASSESSMENT — PAIN DESCRIPTION - FREQUENCY
FREQUENCY: CONTINUOUS
FREQUENCY: INTERMITTENT

## 2024-06-29 NOTE — PROGRESS NOTES
Medicine Progress Note    Patient: Jigna Conner   Age:  68 y.o.  DOA: 6/25/2024   Admit Dx / CC: Hypokalemia [E87.6]  Hyperglycemia [R73.9]  ESRD (end stage renal disease) (HCC) [N18.6]  Diabetic foot infection (HCC) [E11.628, L08.9]  Anemia, unspecified type [D64.9]  LOS:  LOS: 4 days     Assessment/Plan   Principal Problem:    Diabetic foot infection (HCC)  Active Problems:    Hypertension    Anemia of renal disease    ESRD on dialysis (HCC)    Diabetes mellitus (HCC)    Hypokalemia    Chronic heart failure with preserved ejection fraction (HFpEF) (HCC)    Coronary artery disease involving native coronary artery of native heart without angina pectoris    History of MI (myocardial infarction)    Class 2 obesity in adult  Resolved Problems:    * No resolved hospital problems. *      Additional Plan notes     Diabetes Foot Infection S/P RIGHT FOOT INCISION AND DRAINAGE, PARTIAL RESECTION SECOND METATARSAL, DECOMPRESSION OF SOFT TISSUE GAS, BONE BIOPSY FROM HALLUX  POD# 0  - Right Foot I&D performed by Marcin Gabriel DPM on 6/26/2024  IV Vancomycin and IV Piperacillin-Tazobactam (a.k.a. Zosyn).  PRN Antiemetics, PRN Pain Control, and PRN Naloxone.  Follow cultures    Hypokalemia  Replete, monitor    Constipation  -Bowel regimen    End-Stage Renal Disease (a.k.a. ESRD) on Hemodialysis (a.k.a. HD)  Nephrological services consulted for routine Hemodialysis.  Status post hemodialysis today June 29     Chronic Heart Failure with Preserved Ejection Fraction (a.k.a. HFpEF)  - Pro-BNP 3,639 pg/mL on 6/27/2024  - CXR on 6/27/2024 shows \"Cardiomegaly without acute pulmonary process.\"  Strict I/Os, Daily Weight, Fluid Restriction 2.0 L/day, and continue home Furosemide, Carvedilol, and Sacubitril-Valsartan (a.k.a. Entresto).     Coronary Artery Disease (a.k.a. CAD)  Continue home Aspirin.     Hypertension  No current home medications for this issue.  HOLD home Midodrine at this time.  RESUME home Midodrine as is

## 2024-06-29 NOTE — DIALYSIS
HD Care plan  Time: 3 hrs  Dialysate: 2 K+  2.5 Ca++  Bath  Net UF: 2 L  Access: Aseptic care for ARELY AVF  Hemodynamic stability: Maintain BP WNL     Pre Dialysis:  Patient received from Alon Cage RN. Patient arrived on a bed, A+O X 4, on RA,  no s/s of acute distress noted. Wound vac noted in place in the right foot, ARELY AVF assessed, no abnormalities noted, bruit and thrill strong. AVf accessed using 15G needles without any difficulty. Good flow achieved from both needles.    Intra Dialysis:  Time out / safety process performed per policy. Tx initiated at 1045.    AVF flowing with ease. For hemodynamic stability UF goal set at 2000 ml as tolerated.  Pt offered assistance with repositioning every 2 hours/prn    Vascular access visible and line connections remained intact throughout entire duration of treatment.   Vital signs checked every 15 mins.     Post Dialysis:  Tx completed at 1345,   Tolerated well, 2 L  removed. De-accessed per protocol.    Clot time 6 minutes for arterial, and 6 minutes for venous.   Dry dressings applied. Bruit/Thrill present above and below dressings.  Post Dialysis report given to LUCERO Chi

## 2024-06-29 NOTE — PROGRESS NOTES
Continuous PRN       Review of Systems:      Complete 10-point review of systems were obtained and discussed in length  with the patient. Complete review of systems was negative/unremarkable  except as mentioned in HPI section.  Data Review:    Labs: Results:       Chemistry Recent Labs     06/27/24  0143 06/28/24 0347 06/29/24  0538   * 136 133*   K 4.4 3.8 4.2   CL 99* 99* 96*   CO2 28 30 30   BUN 24* 22* 33*        CBC w/Diff Recent Labs     06/27/24  0143 06/28/24  0347 06/29/24  0538   WBC 16.3* 13.4* 13.5*   RBC 3.38* 3.02* 3.16*   HGB 8.6* 7.8* 8.0*   HCT 26.7* 24.2* 25.5*    283 288        Coagulation No results for input(s): \"INR\", \"APTT\" in the last 72 hours.    Invalid input(s): \"PTP\"    Iron/Ferritin No results for input(s): \"IRON\" in the last 72 hours.    Invalid input(s): \"TIBCCALC\"     BNP Invalid input(s): \"BNPP\"   Cardiac Enzymes No results for input(s): \"CPK\" in the last 72 hours.    Invalid input(s): \"CKRMB\", \"CKND1\", \"TROIP\", \"ANKIT\"   Liver Enzymes No results for input(s): \"TP\" in the last 72 hours.    Invalid input(s): \"ALB\", \"TBIL\", \"AP\", \"SGOT\", \"GPT\", \"DBIL\"   Thyroid Studies No results found for: \"T4\", \"T3RU\", \"TSH\"      EKG: normal sinus rhythm.    Physical Assessment:   Visit Vitals  BP (!) 166/66   Pulse 75   Temp 97.9 °F (36.6 °C) (Oral)   Resp 18   Ht 1.626 m (5' 4\")   Wt 99.2 kg (218 lb 11.2 oz)   SpO2 99%   BMI 37.54 kg/m²     Weight change: -4.445 kg (-9 lb 12.8 oz)    Intake/Output Summary (Last 24 hours) at 6/29/2024 1504  Last data filed at 6/29/2024 1345  Gross per 24 hour   Intake 1400 ml   Output 2500 ml   Net -1100 ml       Physical Exam:   General: comfortable, no acute distress   HEENT sclera anicteric, supple neck, no thyromegaly  CVS: S1S2 heard,  no rub  RS: + air entry b/l,   Abd: Soft, Non tender, Not distended,   Neuro: non focal, awake, alert , CN II-XII are grossly intact  Extrm: R foot dressing  Skin: no visible  Rash  Musculoskeletal: right foot under

## 2024-06-29 NOTE — PLAN OF CARE
Problem: Discharge Planning  Goal: Discharge to home or other facility with appropriate resources  Outcome: Progressing  Flowsheets (Taken 6/28/2024 2030)  Discharge to home or other facility with appropriate resources: Identify barriers to discharge with patient and caregiver     Problem: Pain  Goal: Verbalizes/displays adequate comfort level or baseline comfort level  Outcome: Progressing     Problem: ABCDS Injury Assessment  Goal: Absence of physical injury  Outcome: Progressing     Problem: Safety - Adult  Goal: Free from fall injury  Outcome: Progressing     Problem: Chronic Conditions and Co-morbidities  Goal: Patient's chronic conditions and co-morbidity symptoms are monitored and maintained or improved  Outcome: Progressing  Flowsheets (Taken 6/28/2024 2030)  Care Plan - Patient's Chronic Conditions and Co-Morbidity Symptoms are Monitored and Maintained or Improved: Monitor and assess patient's chronic conditions and comorbid symptoms for stability, deterioration, or improvement     Problem: Skin/Tissue Integrity  Goal: Absence of new skin breakdown  Description: 1.  Monitor for areas of redness and/or skin breakdown  2.  Assess vascular access sites hourly  3.  Every 4-6 hours minimum:  Change oxygen saturation probe site  4.  Every 4-6 hours:  If on nasal continuous positive airway pressure, respiratory therapy assess nares and determine need for appliance change or resting period.  Outcome: Progressing     Problem: Cardiovascular - Adult  Goal: Maintains optimal cardiac output and hemodynamic stability  Outcome: Progressing  Goal: Absence of cardiac dysrhythmias or at baseline  Outcome: Progressing     Problem: Skin/Tissue Integrity - Adult  Goal: Skin integrity remains intact  Outcome: Progressing  Flowsheets (Taken 6/28/2024 2030)  Skin Integrity Remains Intact: Monitor for areas of redness and/or skin breakdown  Goal: Incisions, wounds, or drain sites healing without S/S of infection  Outcome:  Progressing  Goal: Oral mucous membranes remain intact  Outcome: Progressing     Problem: Musculoskeletal - Adult  Goal: Return mobility to safest level of function  Outcome: Progressing  Goal: Maintain proper alignment of affected body part  Outcome: Progressing  Goal: Return ADL status to a safe level of function  Outcome: Progressing     Problem: Gastrointestinal - Adult  Goal: Minimal or absence of nausea and vomiting  Outcome: Progressing  Goal: Maintains or returns to baseline bowel function  Outcome: Progressing  Goal: Maintains adequate nutritional intake  Outcome: Progressing  Goal: Establish and maintain optimal ostomy function  Outcome: Progressing     Problem: Genitourinary - Adult  Goal: Absence of urinary retention  Outcome: Progressing  Goal: Urinary catheter remains patent  Outcome: Progressing     Problem: Infection - Adult  Goal: Absence of infection at discharge  Outcome: Progressing  Goal: Absence of infection during hospitalization  Outcome: Progressing  Goal: Absence of fever/infection during anticipated neutropenic period  Outcome: Progressing     Problem: Hematologic - Adult  Goal: Maintains hematologic stability  Outcome: Progressing

## 2024-06-29 NOTE — PROGRESS NOTES
4 Eyes Skin Assessment     NAME:  Jigna Conner  YOB: 1955  MEDICAL RECORD NUMBER:  [id number]    The patient is being assessed for  Shift Handoff    I agree that at least one RN has performed a thorough Head to Toe Skin Assessment on the patient. ALL assessment sites listed below have been assessed.      Areas assessed by both nurses:    Head, Face, Ears, Shoulders, Back, Chest, Arms, Elbows, Hands, Sacrum. Buttock, Coccyx, Ischium, Legs. Feet and Heels, and Under Medical Devices         Does the Patient have a Wound? Yes wound(s) were present on assessment. LDA wound assessment was Initiated and completed by RN       Vinay Prevention initiated by RN: Yes  Wound Care Orders initiated by RN: Yes    Pressure Injury (Stage 3,4, Unstageable, DTI, NWPT, and Complex wounds) if present, place Wound referral order by RN under : Yes    New Ostomies, if present place, Ostomy referral order under : Yes     Nurse 1 eSignature: Electronically signed by Susana Kay RN on 6/29/24 at 7:33 AM EDT    **SHARE this note so that the co-signing nurse can place an eSignature**    Nurse 2 eSignature: Electronically signed by KAIT MARIE RN on 6/29/24 at 1:21 PM EDT

## 2024-06-29 NOTE — PLAN OF CARE
balance to improve functional activity tolerance for ADL carryover. STS transfer MOD A with RW x 2 trials in prep for BSC transfers. Pt was able to take lateral steps towards HOB MIN A w/ RW in prep for BR mobility. Pt was returned back to supine and positioned for comfort. She was left with R LE elevated on pillow for increased comfort, bed alarm active, and all needs within reach. RN made aware.  Progression toward goals:  []          Improving appropriately and progressing toward goals  [x]          Improving slowly and progressing toward goals  []          Not making progress toward goals and plan of care will be adjusted     PLAN:  Patient continues to benefit from skilled intervention to address the above impairments.  Continue treatment per established plan of care.    Further Equipment Recommendations for Discharge: RW, BSC, W/C 18 inches    AMPA: AM-PAC Inpatient Daily Activity Raw Score: 17    Current research shows that an AM-PAC score of 17 or less is not associated with a discharge to the patient's home setting.    This AMPAC score should be considered in conjunction with interdisciplinary team recommendations to determine the most appropriate discharge setting. Patient's social support, diagnosis, medical stability, and prior level of function should also be taken into consideration.     SUBJECTIVE:   Patient stated, \" I have a lot going on with my 's health right now.\"    OBJECTIVE DATA SUMMARY:     Functional Mobility and Transfers for ADLs:   Bed Mobility:  Bed Mobility Training  Bed Mobility Training: Yes  Supine to Sit: Stand-by assistance;Additional time  Sit to Supine: Stand-by assistance  Scooting: Supervision   Transfers:  Transfer Training  Transfer Training: Yes  Interventions: Tactile cues;Verbal cues;Demonstration  Sit to Stand: Moderate assistance  Stand to Sit: Minimum assistance    Balance:  Balance  Sitting: Intact  Standing: Impaired  Standing - Static: Fair  Standing -  Dynamic: Fair (-)    ADL Intervention:     Grooming: Supervision  UE Bathing: Stand by assistance     UE Dressing: Stand by assistance  LE Dressing: Stand by assistance donning L sock with leg cross method     Pain:  Intensity Pre-treatment: 8/10   Intensity Post-treatment: 8/10  Scale: Numeric Rating Scale  Location: Right Foot  Quality: Aching and Throbbing  Intervention(s): Nurse notified and Repositioning     Activity Tolerance:    Activity Tolerance: Patient limited by pain  Please refer to the flowsheet for vital signs taken during this treatment.  After treatment:   []  Patient left in no apparent distress sitting up in chair  [x]  Patient left in no apparent distress in bed  [x]  Call bell left within reach  [x]  Nursing notified  []  Caregiver present  [x]  Bed alarm activated    COMMUNICATION/EDUCATION:   Patient Education  Education Given To: Patient  Education Provided: Role of Therapy;Plan of Care;Energy Conservation;Transfer Training;ADL Adaptive Strategies;Precautions  Education Method: Demonstration;Verbal;Teach Back  Barriers to Learning: None  Education Outcome: Continued education needed      Thank you for this referral.  TATY Zarco  Minutes: 38

## 2024-06-29 NOTE — PROGRESS NOTES
[facility]   Pharmacy Pharmacokinetic Monitoring Service - Vancomycin    Indication: DM foot infection  Target Pre-Dialysis Concentration: 21-24 mg/L  Day of Therapy: 5  Additional Antimicrobials: pip-tazo    Pertinent Laboratory Values:   Wt Readings from Last 1 Encounters:   06/29/24 99.2 kg (218 lb 11.2 oz)     Temp Readings from Last 1 Encounters:   06/29/24 98.2 °F (36.8 °C) (Oral)     Recent Labs     06/28/24  0347 06/29/24  0538   WBC 13.4* 13.5*     Pertinent Cultures:  Date Source Results   6/25 blood NGTD   6/26 wound (r foot abscess) GPC; GPR   6/26 tissue (grt toe) diphtheroids    MRSA Nasal Swab: NA    Assessment:  Date Current Dose Concentration (mg/L) Dialysis Session   6/25 2,000 mg x1 - no   6/26 - - no   6/27 - 27.6 yes   6/28 - - no   6/29  15.4 yes     Plan:  ESRD on HD q TTS - dose by level  Vancomycin 1250 mg x 1 today at end of hemodialysis   Ordered a level for 7/2 with AM labs  Pharmacy will continue to monitor patient and adjust therapy as indicated    Thank you for the consult,  JUAN COLON, [facility]  6/29/2024

## 2024-06-29 NOTE — PROGRESS NOTES
06/29/24        Jigna Conner    Patient seen in dialysis.  Not in acute distress.    Past Medical History:   Diagnosis Date    Boil of buttock 06/24/2021    R Buttock    CAD (coronary artery disease)     Chronic heart failure with preserved ejection fraction (HFpEF) (HCC)     Diabetes mellitus (HCC) 6/24/2021    ESRD on dialysis (HCC) 6/24/2021    T, R, S    History of MI (myocardial infarction) 2020    Hypertension     Menopause     Renal insufficiency     Scoliosis      Past Surgical History:   Procedure Laterality Date    BREAST BIOPSY Right 08/09/2021    Stereo    COLONOSCOPY N/A 10/20/2023    COLONOSCOPY WITH BIOPSY performed by Gracia Tran Jr., MD at [facility] ENDOSCOPY    FOOT DEBRIDEMENT Right 6/26/2024    RIGHT FOOT INCISION AND DRAINAGE, PARTIAL RESECTION SECOND METATARSAL performed by Marcin Gabriel DPM at [facility] MAIN OR    CLEOPATRA STEROTACTIC LOC BREAST BIOPSY RIGHT Right 8/9/2021    CLEOPATRA STEROTACTIC LOC BREAST BIOPSY RIGHT 8/9/2021 [facility] RAD MAMMO    VASCULAR SURGERY       Patient Active Problem List   Diagnosis    Metabolic bone disease    Boil of buttock    Hypertension    Syncope    Anemia of renal disease    ESRD on dialysis (HCC)    Diabetes mellitus (HCC)    Fluid overload    Diabetic foot infection (HCC)    Hypokalemia    Chronic heart failure with preserved ejection fraction (HFpEF) (HCC)    Coronary artery disease involving native coronary artery of native heart without angina pectoris    History of MI (myocardial infarction)    Class 2 obesity in adult     Current Facility-Administered Medications   Medication Dose Route Frequency Provider Last Rate Last Admin    vancomycin (VANCOCIN) 1250 mg in 250 mL IVPB  1,250 mg IntraVENous Once in dialysis Swapna Montes, PA        oxyCODONE-acetaminophen (PERCOCET) 7.5-325 MG per tablet 1 tablet  1 tablet Oral Q6H PRN Ryan Bolanos DO   1 tablet at 06/29/24 0521    naloxone (NARCAN) injection 0.4 mg  0.4 mg IntraVENous PRN Ryan Bolanos DO

## 2024-06-29 NOTE — PROGRESS NOTES
4 Eyes Skin Assessment     NAME:  Jigna Conner  YOB: 1955  MEDICAL RECORD NUMBER:  [id number]    The patient is being assessed for  Shift Handoff    I agree that at least one RN has performed a thorough Head to Toe Skin Assessment on the patient. ALL assessment sites listed below have been assessed.      Areas assessed by both nurses:    Head, Face, Ears, Shoulders, Back, Chest, Arms, Elbows, Hands, Sacrum. Buttock, Coccyx, Ischium, and Legs. Feet and Heels        Does the Patient have a Wound? Yes wound(s) were present on assessment. LDA wound assessment was Initiated and completed by RN       Vinay Prevention initiated by RN: Yes  Wound Care Orders initiated by RN: No    Pressure Injury (Stage 3,4, Unstageable, DTI, NWPT, and Complex wounds) if present, place Wound referral order by RN under : No    New Ostomies, if present place, Ostomy referral order under : No     Nurse 1 eSignature: Electronically signed by KAIT MARIE RN on 6/29/24 at 7:28 PM EDT    **SHARE this note so that the co-signing nurse can place an eSignature**    Nurse 2 eSignature: Electronically signed by Mary Sprague RN on 6/29/24 at 7:55 PM EDT

## 2024-06-30 LAB
ANION GAP SERPL CALC-SCNC: 10 MMOL/L (ref 3–18)
BASOPHILS # BLD: 0 K/UL (ref 0–0.1)
BASOPHILS NFR BLD: 0 % (ref 0–2)
BUN SERPL-MCNC: 20 MG/DL (ref 7–18)
BUN/CREAT SERPL: 5 (ref 12–20)
CALCIUM SERPL-MCNC: 9.4 MG/DL (ref 8.5–10.1)
CHLORIDE SERPL-SCNC: 99 MMOL/L (ref 100–111)
CO2 SERPL-SCNC: 28 MMOL/L (ref 21–32)
CREAT SERPL-MCNC: 4.21 MG/DL (ref 0.6–1.3)
DIFFERENTIAL METHOD BLD: ABNORMAL
EOSINOPHIL # BLD: 0.2 K/UL (ref 0–0.4)
EOSINOPHIL NFR BLD: 2 % (ref 0–5)
ERYTHROCYTE [DISTWIDTH] IN BLOOD BY AUTOMATED COUNT: 17.5 % (ref 11.6–14.5)
GLUCOSE BLD STRIP.AUTO-MCNC: 122 MG/DL (ref 70–110)
GLUCOSE BLD STRIP.AUTO-MCNC: 126 MG/DL (ref 70–110)
GLUCOSE BLD STRIP.AUTO-MCNC: 131 MG/DL (ref 70–110)
GLUCOSE BLD STRIP.AUTO-MCNC: 133 MG/DL (ref 70–110)
GLUCOSE BLD STRIP.AUTO-MCNC: 76 MG/DL (ref 70–110)
GLUCOSE SERPL-MCNC: 73 MG/DL (ref 74–99)
HCT VFR BLD AUTO: 25.1 % (ref 35–45)
HGB BLD-MCNC: 7.8 G/DL (ref 12–16)
IMM GRANULOCYTES # BLD AUTO: 0.1 K/UL (ref 0–0.04)
IMM GRANULOCYTES NFR BLD AUTO: 1 % (ref 0–0.5)
LYMPHOCYTES # BLD: 1.5 K/UL (ref 0.9–3.6)
LYMPHOCYTES NFR BLD: 13 % (ref 21–52)
MCH RBC QN AUTO: 25.2 PG (ref 24–34)
MCHC RBC AUTO-ENTMCNC: 31.1 G/DL (ref 31–37)
MCV RBC AUTO: 81 FL (ref 78–100)
MONOCYTES # BLD: 1.2 K/UL (ref 0.05–1.2)
MONOCYTES NFR BLD: 10 % (ref 3–10)
NEUTS SEG # BLD: 9.2 K/UL (ref 1.8–8)
NEUTS SEG NFR BLD: 75 % (ref 40–73)
NRBC # BLD: 0 K/UL (ref 0–0.01)
NRBC BLD-RTO: 0 PER 100 WBC
PLATELET # BLD AUTO: 284 K/UL (ref 135–420)
PMV BLD AUTO: 10.2 FL (ref 9.2–11.8)
POTASSIUM SERPL-SCNC: 3.7 MMOL/L (ref 3.5–5.5)
RBC # BLD AUTO: 3.1 M/UL (ref 4.2–5.3)
SODIUM SERPL-SCNC: 137 MMOL/L (ref 136–145)
WBC # BLD AUTO: 12.2 K/UL (ref 4.6–13.2)

## 2024-06-30 PROCEDURE — 6360000002 HC RX W HCPCS: Performed by: PHYSICIAN ASSISTANT

## 2024-06-30 PROCEDURE — 36415 COLL VENOUS BLD VENIPUNCTURE: CPT

## 2024-06-30 PROCEDURE — 6370000000 HC RX 637 (ALT 250 FOR IP): Performed by: PHYSICIAN ASSISTANT

## 2024-06-30 PROCEDURE — 82962 GLUCOSE BLOOD TEST: CPT

## 2024-06-30 PROCEDURE — 97116 GAIT TRAINING THERAPY: CPT

## 2024-06-30 PROCEDURE — 97535 SELF CARE MNGMENT TRAINING: CPT

## 2024-06-30 PROCEDURE — 99232 SBSQ HOSP IP/OBS MODERATE 35: CPT | Performed by: EMERGENCY MEDICINE

## 2024-06-30 PROCEDURE — 6370000000 HC RX 637 (ALT 250 FOR IP): Performed by: EMERGENCY MEDICINE

## 2024-06-30 PROCEDURE — 6360000002 HC RX W HCPCS: Performed by: EMERGENCY MEDICINE

## 2024-06-30 PROCEDURE — 2580000003 HC RX 258: Performed by: PHYSICIAN ASSISTANT

## 2024-06-30 PROCEDURE — 1100000000 HC RM PRIVATE

## 2024-06-30 PROCEDURE — 94761 N-INVAS EAR/PLS OXIMETRY MLT: CPT

## 2024-06-30 PROCEDURE — 85025 COMPLETE CBC W/AUTO DIFF WBC: CPT

## 2024-06-30 PROCEDURE — 6370000000 HC RX 637 (ALT 250 FOR IP): Performed by: INTERNAL MEDICINE

## 2024-06-30 PROCEDURE — 2580000003 HC RX 258: Performed by: INTERNAL MEDICINE

## 2024-06-30 PROCEDURE — 80048 BASIC METABOLIC PNL TOTAL CA: CPT

## 2024-06-30 PROCEDURE — 97530 THERAPEUTIC ACTIVITIES: CPT

## 2024-06-30 PROCEDURE — 6360000002 HC RX W HCPCS: Performed by: INTERNAL MEDICINE

## 2024-06-30 RX ADMIN — VANCOMYCIN HYDROCHLORIDE 1000 MG: 1 INJECTION, POWDER, LYOPHILIZED, FOR SOLUTION INTRAVENOUS at 18:33

## 2024-06-30 RX ADMIN — CARVEDILOL 3.12 MG: 3.12 TABLET, FILM COATED ORAL at 09:44

## 2024-06-30 RX ADMIN — SODIUM CHLORIDE, PRESERVATIVE FREE 10 ML: 5 INJECTION INTRAVENOUS at 20:39

## 2024-06-30 RX ADMIN — Medication 1 CAPSULE: at 09:10

## 2024-06-30 RX ADMIN — SACUBITRIL AND VALSARTAN 1 TABLET: 24; 26 TABLET, FILM COATED ORAL at 20:37

## 2024-06-30 RX ADMIN — LATANOPROST 1 DROP: 50 SOLUTION/ DROPS OPHTHALMIC at 20:38

## 2024-06-30 RX ADMIN — DOCUSATE SODIUM 50 MG: 50 LIQUID ORAL at 20:37

## 2024-06-30 RX ADMIN — CARVEDILOL 3.12 MG: 3.12 TABLET, FILM COATED ORAL at 18:29

## 2024-06-30 RX ADMIN — SACUBITRIL AND VALSARTAN 1 TABLET: 24; 26 TABLET, FILM COATED ORAL at 09:10

## 2024-06-30 RX ADMIN — PANTOPRAZOLE SODIUM 40 MG: 40 TABLET, DELAYED RELEASE ORAL at 06:04

## 2024-06-30 RX ADMIN — DOCUSATE SODIUM 50 MG: 50 LIQUID ORAL at 09:10

## 2024-06-30 RX ADMIN — HEPARIN SODIUM 5000 UNITS: 5000 INJECTION INTRAVENOUS; SUBCUTANEOUS at 15:30

## 2024-06-30 RX ADMIN — PIPERACILLIN AND TAZOBACTAM 3375 MG: 3; .375 INJECTION, POWDER, FOR SOLUTION INTRAVENOUS at 00:15

## 2024-06-30 RX ADMIN — FUROSEMIDE 80 MG: 40 TABLET ORAL at 09:10

## 2024-06-30 RX ADMIN — INSULIN GLARGINE 10 UNITS: 100 INJECTION, SOLUTION SUBCUTANEOUS at 20:37

## 2024-06-30 RX ADMIN — OXYCODONE AND ACETAMINOPHEN 1 TABLET: 7.5; 325 TABLET ORAL at 04:42

## 2024-06-30 RX ADMIN — PIPERACILLIN AND TAZOBACTAM 3375 MG: 3; .375 INJECTION, POWDER, FOR SOLUTION INTRAVENOUS at 23:24

## 2024-06-30 RX ADMIN — HEPARIN SODIUM 5000 UNITS: 5000 INJECTION INTRAVENOUS; SUBCUTANEOUS at 06:04

## 2024-06-30 RX ADMIN — TIMOLOL MALEATE 1 DROP: 5 SOLUTION OPHTHALMIC at 09:46

## 2024-06-30 RX ADMIN — TIMOLOL MALEATE 1 DROP: 5 SOLUTION OPHTHALMIC at 20:38

## 2024-06-30 RX ADMIN — SODIUM CHLORIDE, PRESERVATIVE FREE 10 ML: 5 INJECTION INTRAVENOUS at 12:32

## 2024-06-30 RX ADMIN — OXYCODONE AND ACETAMINOPHEN 1 TABLET: 7.5; 325 TABLET ORAL at 09:50

## 2024-06-30 RX ADMIN — PIPERACILLIN AND TAZOBACTAM 3375 MG: 3; .375 INJECTION, POWDER, FOR SOLUTION INTRAVENOUS at 12:30

## 2024-06-30 RX ADMIN — ATORVASTATIN CALCIUM 10 MG: 10 TABLET, FILM COATED ORAL at 09:10

## 2024-06-30 RX ADMIN — ASPIRIN 81 MG CHEWABLE TABLET 81 MG: 81 TABLET CHEWABLE at 09:10

## 2024-06-30 RX ADMIN — OXYCODONE AND ACETAMINOPHEN 1 TABLET: 7.5; 325 TABLET ORAL at 20:37

## 2024-06-30 RX ADMIN — HEPARIN SODIUM 5000 UNITS: 5000 INJECTION INTRAVENOUS; SUBCUTANEOUS at 20:36

## 2024-06-30 ASSESSMENT — PAIN SCALES - GENERAL
PAINLEVEL_OUTOF10: 0
PAINLEVEL_OUTOF10: 5
PAINLEVEL_OUTOF10: 5
PAINLEVEL_OUTOF10: 0
PAINLEVEL_OUTOF10: 5

## 2024-06-30 ASSESSMENT — PAIN - FUNCTIONAL ASSESSMENT
PAIN_FUNCTIONAL_ASSESSMENT: ACTIVITIES ARE NOT PREVENTED
PAIN_FUNCTIONAL_ASSESSMENT: ACTIVITIES ARE NOT PREVENTED

## 2024-06-30 ASSESSMENT — PAIN DESCRIPTION - DESCRIPTORS
DESCRIPTORS: ACHING

## 2024-06-30 ASSESSMENT — PAIN SCALES - WONG BAKER
WONGBAKER_NUMERICALRESPONSE: NO HURT

## 2024-06-30 ASSESSMENT — PAIN DESCRIPTION - PAIN TYPE: TYPE: SURGICAL PAIN

## 2024-06-30 ASSESSMENT — PAIN DESCRIPTION - LOCATION
LOCATION: FOOT

## 2024-06-30 ASSESSMENT — PAIN DESCRIPTION - ORIENTATION
ORIENTATION: RIGHT
ORIENTATION: RIGHT

## 2024-06-30 ASSESSMENT — PAIN DESCRIPTION - ONSET: ONSET: GRADUAL

## 2024-06-30 NOTE — PROGRESS NOTES
Medicine Progress Note    Patient: Jigna Conner   Age:  68 y.o.  DOA: 6/25/2024   Admit Dx / CC: Hypokalemia [E87.6]  Hyperglycemia [R73.9]  ESRD (end stage renal disease) (HCC) [N18.6]  Diabetic foot infection (HCC) [E11.628, L08.9]  Anemia, unspecified type [D64.9]  LOS:  LOS: 5 days     Assessment/Plan   Principal Problem:    Diabetic foot infection (HCC)  Active Problems:    Hypertension    Anemia of renal disease    ESRD on dialysis (HCC)    Diabetes mellitus (HCC)    Hypokalemia    Chronic heart failure with preserved ejection fraction (HFpEF) (HCC)    Coronary artery disease involving native coronary artery of native heart without angina pectoris    History of MI (myocardial infarction)    Class 2 obesity in adult  Resolved Problems:    * No resolved hospital problems. *      Additional Plan notes     Diabetes Foot Infection S/P RIGHT FOOT INCISION AND DRAINAGE, PARTIAL RESECTION SECOND METATARSAL, DECOMPRESSION OF SOFT TISSUE GAS, BONE BIOPSY FROM HALLUX  POD# 0  - Right Foot I&D performed by Marcin Gabriel DPM on 6/26/2024  IV Vancomycin and IV Piperacillin-Tazobactam (a.k.a. Zosyn).  Follow cultures    Hypokalemia  Replete, monitor    Constipation  -Bowel regimen    End-Stage Renal Disease (a.k.a. ESRD) on Hemodialysis (a.k.a. HD)  Nephrological services consulted for routine Hemodialysis.  Status post hemodialysis today June 29     Chronic Heart Failure with Preserved Ejection Fraction (a.k.a. HFpEF)  - Pro-BNP 3,639 pg/mL on 6/27/2024  - CXR on 6/27/2024 shows \"Cardiomegaly without acute pulmonary process.\"  Strict I/Os, Daily Weight, Fluid Restriction 2.0 L/day, and continue home Furosemide, Carvedilol, and Sacubitril-Valsartan (a.k.a. Entresto).     Coronary Artery Disease (a.k.a. CAD)  Continue home Aspirin.     Hypertension  No current home medications for this issue.  HOLD home Midodrine at this time.  RESUME home Midodrine as is necessary.     Anemia of Renal Disease  Monitor.

## 2024-06-30 NOTE — PLAN OF CARE
Problem: Physical Therapy - Adult  Goal: By Discharge: Performs mobility at highest level of function for planned discharge setting.  See evaluation for individualized goals.  Description: Physical Therapy Goals:  Initiated 6/27/2024 to be met within 7-10 days.    1.  Patient will move from supine to sit and sit to supine , scoot up and down, and roll side to side in bed with independence in order to safely get into/out of bed.    2.  Patient will transfer from bed to chair and chair to bed with moderate assistance  using RW in order to safely partake in OOB mobility.  3.  Patient will perform sit to stand with moderate assistance  in order to safely partake in OOB mobility.  4.  Patient will ambulate with minimal assistance/contact guard assist for 15 feet using RW in order to safely navigate household distances.     PLOF: lives with  in 1 level home with ramped entry, independent at baseline using rollator in community and SPC in home      Outcome: Progressing   PHYSICAL THERAPY TREATMENT    Patient: Jigna Conner (68 y.o. female)  Date: 6/30/2024  Diagnosis: Hypokalemia [E87.6]  Hyperglycemia [R73.9]  ESRD (end stage renal disease) (HCC) [N18.6]  Diabetic foot infection (HCC) [E11.628, L08.9]  Anemia, unspecified type [D64.9] Diabetic foot infection (HCC)  Procedure(s) (LRB):  RIGHT FOOT INCISION AND DRAINAGE, PARTIAL RESECTION SECOND METATARSAL (Right) 4 Days Post-Op  Precautions: Fall Risk, Skin, Aspiration Risk, Right Lower Extremity Weight Bearing: Non Weight Bearing (R forefoot),  ,  ,  ,  ,  ,      ASSESSMENT:  Pt received in bed and agreeable to participate in PT session. Pt SBA to EOB where assist is provided to fabiano post-op shoe. Pt is able to recall WB precautions. Pt mod A x 2 sit to stand into RW with increase pain. Pt is able to take lateral steps in each direction along EOB with mod A for balance and RW management. Pt's pain increase and reports fatigue and wishes to sit down, stand

## 2024-06-30 NOTE — PROGRESS NOTES
Progress Note    68-year-old female with past medical history of ESRD, diabetes, heart failure with preserved ejection fraction admitted for diabetic foot infection, following for ESRD management.  Subjective      Overnight event noted  No complaints offered  BP Trending to lower side      IMPRESSION:   ESRD, Tuesday Thursday Saturday,   Access left arm AV fistula  Right foot wound infection,  Anemia, received blood transfusion during this admission  Diabetes  Hypertension  Heart failure with preserved ejection fraction.   PLAN:   Tolerating dialysis ok.Next dialysis on tuesday  Continue antibiotic per primary care, please let me know in advance for antibiotic choice as she goes to dialysis unit in [facility] .   Adjust medication per ESRD status.     Current Facility-Administered Medications   Medication Dose Route Frequency    heparin (porcine) injection 5,000 Units  5,000 Units SubCUTAneous 3 times per day    lactobacillus (CULTURELLE) capsule 1 capsule  1 capsule Oral Daily with breakfast    oxyCODONE-acetaminophen (PERCOCET) 7.5-325 MG per tablet 1 tablet  1 tablet Oral Q6H PRN    naloxone (NARCAN) injection 0.4 mg  0.4 mg IntraVENous PRN    ipratropium 0.5 mg-albuterol 2.5 mg (DUONEB) nebulizer solution 1 Dose  1 Dose Inhalation Q4H PRN    melatonin disintegrating tablet 5 mg  5 mg Oral Nightly PRN    docusate (COLACE) 50 MG/5ML liquid 50 mg  50 mg Oral BID    bisacodyl (DULCOLAX) suppository 10 mg  10 mg Rectal Daily PRN    senna (SENOKOT) tablet 8.6 mg  1 tablet Oral Nightly PRN    lactulose (CHRONULAC) 10 GM/15ML solution 30 g  30 g Oral Daily PRN    0.9 % sodium chloride infusion   IntraVENous PRN    0.9 % sodium chloride infusion   IntraVENous PRN    piperacillin-tazobactam (ZOSYN) 3,375 mg in sodium chloride 0.9 % 50 mL IVPB (mini-bag)  3,375 mg IntraVENous Q12H    aspirin chewable tablet 81 mg  81 mg Oral Daily    atorvastatin (LIPITOR) tablet 10 mg  10 mg Oral Daily    carvedilol (COREG) tablet 3.125  mg  3.125 mg Oral BID WC    [Held by provider] dorzolamide (TRUSOPT) 2 % ophthalmic solution 1 drop  1 drop Ophthalmic BID    insulin glargine (LANTUS) injection vial 10 Units  10 Units SubCUTAneous Nightly    latanoprost (XALATAN) 0.005 % ophthalmic solution 1 drop  1 drop Both Eyes Nightly    pantoprazole (PROTONIX) tablet 40 mg  40 mg Oral QAM AC    sacubitril-valsartan (ENTRESTO) 24-26 MG per tablet 1 tablet  1 tablet Oral BID    timolol (TIMOPTIC) 0.5 % ophthalmic solution 1 drop  1 drop Both Eyes BID    HYDROmorphone (DILAUDID) injection 0.25 mg  0.25 mg IntraVENous Q4H PRN    vancomycin (VANCOCIN) intermittent dosing (placeholder)   Other RX Placeholder    sodium chloride flush 0.9 % injection 5-40 mL  5-40 mL IntraVENous 2 times per day    sodium chloride flush 0.9 % injection 5-40 mL  5-40 mL IntraVENous PRN    0.9 % sodium chloride infusion   IntraVENous PRN    ondansetron (ZOFRAN-ODT) disintegrating tablet 4 mg  4 mg Oral Q8H PRN    Or    ondansetron (ZOFRAN) injection 4 mg  4 mg IntraVENous Q6H PRN    polyethylene glycol (GLYCOLAX) packet 17 g  17 g Oral Daily PRN    acetaminophen (TYLENOL) tablet 650 mg  650 mg Oral Q6H PRN    Or    acetaminophen (TYLENOL) suppository 650 mg  650 mg Rectal Q6H PRN    potassium chloride (KLOR-CON M) extended release tablet 40 mEq  40 mEq Oral Once    insulin lispro (HUMALOG,ADMELOG) injection vial 0-4 Units  0-4 Units SubCUTAneous TID WC    insulin lispro (HUMALOG,ADMELOG) injection vial 0-4 Units  0-4 Units SubCUTAneous Nightly    glucose chewable tablet 16 g  4 tablet Oral PRN    dextrose bolus 10% 125 mL  125 mL IntraVENous PRN    Or    dextrose bolus 10% 250 mL  250 mL IntraVENous PRN    glucagon injection 1 mg  1 mg SubCUTAneous PRN    dextrose 10 % infusion   IntraVENous Continuous PRN       Review of Systems:      Complete 10-point review of systems were obtained and discussed in length  with the patient. Complete review of systems was

## 2024-06-30 NOTE — PROGRESS NOTES
4 Eyes Skin Assessment     NAME:  Jigna Conner  YOB: 1955  MEDICAL RECORD NUMBER:  [id number]    The patient is being assessed for  Shift Handoff    I agree that at least one RN has performed a thorough Head to Toe Skin Assessment on the patient. ALL assessment sites listed below have been assessed.      Areas assessed by both nurses:    Head, Face, Ears, Shoulders, Back, Chest, Arms, Elbows, Hands, Sacrum. Buttock, Coccyx, Ischium, and Legs. Feet and Heels        Does the Patient have a Wound? Yes wound(s) were present on assessment. LDA wound assessment was Initiated and completed by RN       Vinay Prevention initiated by RN: Yes  Wound Care Orders initiated by RN: No    Pressure Injury (Stage 3,4, Unstageable, DTI, NWPT, and Complex wounds) if present, place Wound referral order by RN under : No    New Ostomies, if present place, Ostomy referral order under : No     Nurse 1 eSignature: Electronically signed by Mary Sprague RN on 6/30/24 at 6:52 AM EDT    **SHARE this note so that the co-signing nurse can place an eSignature**    Nurse 2 eSignature: Electronically signed by KAIT MARIE RN on 6/30/24 at 8:02 PM EDT

## 2024-06-30 NOTE — PLAN OF CARE
Problem: Discharge Planning  Goal: Discharge to home or other facility with appropriate resources  Outcome: Progressing     Problem: Pain  Goal: Verbalizes/displays adequate comfort level or baseline comfort level  Outcome: Progressing     Problem: ABCDS Injury Assessment  Goal: Absence of physical injury  Outcome: Progressing     Problem: Safety - Adult  Goal: Free from fall injury  Outcome: Progressing     Problem: Chronic Conditions and Co-morbidities  Goal: Patient's chronic conditions and co-morbidity symptoms are monitored and maintained or improved  Outcome: Progressing     Problem: Skin/Tissue Integrity  Goal: Absence of new skin breakdown  Description: 1.  Monitor for areas of redness and/or skin breakdown  2.  Assess vascular access sites hourly  3.  Every 4-6 hours minimum:  Change oxygen saturation probe site  4.  Every 4-6 hours:  If on nasal continuous positive airway pressure, respiratory therapy assess nares and determine need for appliance change or resting period.  Outcome: Progressing     Problem: Cardiovascular - Adult  Goal: Maintains optimal cardiac output and hemodynamic stability  Outcome: Progressing  Goal: Absence of cardiac dysrhythmias or at baseline  Outcome: Progressing     Problem: Skin/Tissue Integrity - Adult  Goal: Skin integrity remains intact  Outcome: Progressing  Goal: Incisions, wounds, or drain sites healing without S/S of infection  Outcome: Progressing  Goal: Oral mucous membranes remain intact  Outcome: Progressing     Problem: Musculoskeletal - Adult  Goal: Return mobility to safest level of function  Outcome: Progressing  Goal: Maintain proper alignment of affected body part  Outcome: Progressing  Goal: Return ADL status to a safe level of function  Outcome: Progressing     Problem: Gastrointestinal - Adult  Goal: Minimal or absence of nausea and vomiting  Outcome: Progressing  Goal: Maintains or returns to baseline bowel function  Outcome: Progressing  Goal: Maintains

## 2024-06-30 NOTE — PROGRESS NOTES
4 Eyes Skin Assessment     NAME:  Jigna Conner  YOB: 1955  MEDICAL RECORD NUMBER:  [id number]    The patient is being assessed for  Shift Handoff    I agree that at least one RN has performed a thorough Head to Toe Skin Assessment on the patient. ALL assessment sites listed below have been assessed.      Areas assessed by both nurses:    Head, Face, Ears, Shoulders, Back, Chest, Arms, Elbows, Hands, Sacrum. Buttock, Coccyx, Ischium, Legs. Feet and Heels, and Under Medical Devices         Does the Patient have a Wound? Yes wound(s) were present on assessment. LDA wound assessment was Initiated and completed by RN       Vinay Prevention initiated by RN: Yes  Wound Care Orders initiated by RN: No    Pressure Injury (Stage 3,4, Unstageable, DTI, NWPT, and Complex wounds) if present, place Wound referral order by RN under : No    New Ostomies, if present place, Ostomy referral order under : No     Nurse 1 eSignature: Electronically signed by KAIT MARIE RN on 6/30/24 at 7:09 PM EDT    **SHARE this note so that the co-signing nurse can place an eSignature**    Nurse 2 eSignature: Electronically signed by Mary Sprague RN on 7/1/24 at 6:38 AM EDT

## 2024-06-30 NOTE — PLAN OF CARE
Problem: Occupational Therapy - Adult  Goal: By Discharge: Performs self-care activities at highest level of function for planned discharge setting.  See evaluation for individualized goals.  Description: Occupational Therapy Goals:  Initiated 6/28/2024 to be met within 7-10 days.    1.  Patient will perform bathing with supervision/set-up.   2.  Patient will perform lower body dressing with supervision/set-up.  3.  Patient will perform BSC transfers with supervision/set-up using LRAD (sliding board vs FWW).  4.  Patient will perform all aspects of toileting with supervision/set-up.  5.  Patient will participate in upper extremity therapeutic exercise/activities with supervision/set-up for 8 minutes to improve endurance and UB strength needed for ADLs    6.  Patient will utilize energy conservation techniques during functional activities with verbal cues.    PLOF: Pt lives with spouse in [address] with ramp. Pt is independent with ADLs and functional mobility. Pt's spouse is disabled and has PCA assisting him with ADLs.  Outcome: Progressing   OCCUPATIONAL THERAPY TREATMENT    Patient: Jigna Conner (68 y.o. female)  Date: 6/30/2024  Diagnosis: Hypokalemia [E87.6]  Hyperglycemia [R73.9]  ESRD (end stage renal disease) (HCC) [N18.6]  Diabetic foot infection (HCC) [E11.628, L08.9]  Anemia, unspecified type [D64.9] Diabetic foot infection (HCC)  Procedure(s) (LRB):  RIGHT FOOT INCISION AND DRAINAGE, PARTIAL RESECTION SECOND METATARSAL (Right) 4 Days Post-Op  Precautions: Fall Risk, Skin, Aspiration Risk, Right Lower Extremity Weight Bearing: Non Weight Bearing (R forefoot),  ,  ,  ,  ,  ,      Chart, occupational therapy assessment, plan of care, and goals were reviewed.  ASSESSMENT:  Pt presented supine in bed upon entry and agreeable to work with OT. She transitioned self to EOB SBA in prep for functional tasks. Once sitting, she performed oral care and facial hygiene with Supervision. She was able to recall NWB

## 2024-06-30 NOTE — PROGRESS NOTES
[facility]   Pharmacy Pharmacokinetic Monitoring Service - Vancomycin    Indication: DM foot infection  Target Pre-Dialysis Concentration: 21-24 mg/L  Day of Therapy: 6  Additional Antimicrobials: pip-tazo    Pertinent Laboratory Values:   Wt Readings from Last 1 Encounters:   06/30/24 98.9 kg (218 lb)     Temp Readings from Last 1 Encounters:   06/30/24 98.2 °F (36.8 °C) (Oral)     Recent Labs     06/29/24  0538 06/30/24  0147   WBC 13.5* 12.2     Pertinent Cultures:  Date Source Results   6/25 blood NGTD   6/26 wound (r foot abscess) GPC; GPR   6/26 tissue (grt toe) diphtheroids    MRSA Nasal Swab: NA    Assessment:  Date Current Dose Concentration (mg/L) Dialysis Session   6/25 2,000 mg x1 - no   6/26 - - no   6/27 - 27.6 yes   6/28 - - no   6/29  15.4 yes   6/30 - - no     Plan:  ESRD on HD q TTS - dose by level  Ordered dose not given yesterday with dialysis  Will order Vancomycin 1000 mg x 1 to be given off dialysis today  Level on order for 7/2 with AM labs prior to next scheduled HD session  Pharmacy will continue to monitor patient and adjust therapy as indicated    Thank you for the consult,  JUAN COLON RP  6/30/2024

## 2024-07-01 LAB
ABO + RH BLD: NORMAL
ANION GAP SERPL CALC-SCNC: 8 MMOL/L (ref 3–18)
APTT PPP: 31.3 SEC (ref 23–36.4)
BACTERIA SPEC CULT: NORMAL
BACTERIA SPEC CULT: NORMAL
BASOPHILS # BLD: 0 K/UL (ref 0–0.1)
BASOPHILS NFR BLD: 0 % (ref 0–2)
BLOOD GROUP ANTIBODIES SERPL: NORMAL
BUN SERPL-MCNC: 31 MG/DL (ref 7–18)
BUN/CREAT SERPL: 6 (ref 12–20)
CALCIUM SERPL-MCNC: 9.7 MG/DL (ref 8.5–10.1)
CHLORIDE SERPL-SCNC: 96 MMOL/L (ref 100–111)
CO2 SERPL-SCNC: 27 MMOL/L (ref 21–32)
CREAT SERPL-MCNC: 5.49 MG/DL (ref 0.6–1.3)
DIFFERENTIAL METHOD BLD: ABNORMAL
ECHO BSA: 2.11 M2
EOSINOPHIL # BLD: 0.2 K/UL (ref 0–0.4)
EOSINOPHIL NFR BLD: 2 % (ref 0–5)
ERYTHROCYTE [DISTWIDTH] IN BLOOD BY AUTOMATED COUNT: 17.2 % (ref 11.6–14.5)
GLUCOSE BLD STRIP.AUTO-MCNC: 140 MG/DL (ref 70–110)
GLUCOSE BLD STRIP.AUTO-MCNC: 61 MG/DL (ref 70–110)
GLUCOSE BLD STRIP.AUTO-MCNC: 67 MG/DL (ref 70–110)
GLUCOSE BLD STRIP.AUTO-MCNC: 76 MG/DL (ref 70–110)
GLUCOSE BLD STRIP.AUTO-MCNC: 79 MG/DL (ref 70–110)
GLUCOSE SERPL-MCNC: 73 MG/DL (ref 74–99)
HCT VFR BLD AUTO: 21.9 % (ref 35–45)
HGB BLD-MCNC: 7.1 G/DL (ref 12–16)
IMM GRANULOCYTES # BLD AUTO: 0.1 K/UL (ref 0–0.04)
IMM GRANULOCYTES NFR BLD AUTO: 1 % (ref 0–0.5)
INR PPP: 1.1 (ref 0.9–1.1)
LYMPHOCYTES # BLD: 1.2 K/UL (ref 0.9–3.6)
LYMPHOCYTES NFR BLD: 12 % (ref 21–52)
MCH RBC QN AUTO: 25.6 PG (ref 24–34)
MCHC RBC AUTO-ENTMCNC: 32.4 G/DL (ref 31–37)
MCV RBC AUTO: 79.1 FL (ref 78–100)
MONOCYTES # BLD: 1.2 K/UL (ref 0.05–1.2)
MONOCYTES NFR BLD: 11 % (ref 3–10)
NEUTS SEG # BLD: 7.7 K/UL (ref 1.8–8)
NEUTS SEG NFR BLD: 74 % (ref 40–73)
NRBC # BLD: 0 K/UL (ref 0–0.01)
NRBC BLD-RTO: 0 PER 100 WBC
PLATELET # BLD AUTO: 271 K/UL (ref 135–420)
PMV BLD AUTO: 9.4 FL (ref 9.2–11.8)
POTASSIUM SERPL-SCNC: 4 MMOL/L (ref 3.5–5.5)
PROTHROMBIN TIME: 14.1 SEC (ref 11.9–14.9)
RBC # BLD AUTO: 2.77 M/UL (ref 4.2–5.3)
SERVICE CMNT-IMP: NORMAL
SERVICE CMNT-IMP: NORMAL
SODIUM SERPL-SCNC: 131 MMOL/L (ref 136–145)
SPECIMEN EXP DATE BLD: NORMAL
WBC # BLD AUTO: 10.4 K/UL (ref 4.6–13.2)

## 2024-07-01 PROCEDURE — 99152 MOD SED SAME PHYS/QHP 5/>YRS: CPT | Performed by: SURGERY

## 2024-07-01 PROCEDURE — 6360000002 HC RX W HCPCS: Performed by: SURGERY

## 2024-07-01 PROCEDURE — 99153 MOD SED SAME PHYS/QHP EA: CPT | Performed by: SURGERY

## 2024-07-01 PROCEDURE — 6360000002 HC RX W HCPCS: Performed by: INTERNAL MEDICINE

## 2024-07-01 PROCEDURE — B4101ZZ FLUOROSCOPY OF ABDOMINAL AORTA USING LOW OSMOLAR CONTRAST: ICD-10-PCS | Performed by: SURGERY

## 2024-07-01 PROCEDURE — 85610 PROTHROMBIN TIME: CPT

## 2024-07-01 PROCEDURE — B41F1ZZ FLUOROSCOPY OF RIGHT LOWER EXTREMITY ARTERIES USING LOW OSMOLAR CONTRAST: ICD-10-PCS | Performed by: SURGERY

## 2024-07-01 PROCEDURE — 2500000003 HC RX 250 WO HCPCS: Performed by: SURGERY

## 2024-07-01 PROCEDURE — 6370000000 HC RX 637 (ALT 250 FOR IP): Performed by: PHYSICIAN ASSISTANT

## 2024-07-01 PROCEDURE — 85730 THROMBOPLASTIN TIME PARTIAL: CPT

## 2024-07-01 PROCEDURE — 86850 RBC ANTIBODY SCREEN: CPT

## 2024-07-01 PROCEDURE — 2580000003 HC RX 258: Performed by: PHYSICIAN ASSISTANT

## 2024-07-01 PROCEDURE — C1769 GUIDE WIRE: HCPCS | Performed by: SURGERY

## 2024-07-01 PROCEDURE — 85025 COMPLETE CBC W/AUTO DIFF WBC: CPT

## 2024-07-01 PROCEDURE — 86901 BLOOD TYPING SEROLOGIC RH(D): CPT

## 2024-07-01 PROCEDURE — 80048 BASIC METABOLIC PNL TOTAL CA: CPT

## 2024-07-01 PROCEDURE — 36246 INS CATH ABD/L-EXT ART 2ND: CPT | Performed by: SURGERY

## 2024-07-01 PROCEDURE — 6360000002 HC RX W HCPCS: Performed by: EMERGENCY MEDICINE

## 2024-07-01 PROCEDURE — 6370000000 HC RX 637 (ALT 250 FOR IP): Performed by: INTERNAL MEDICINE

## 2024-07-01 PROCEDURE — 2580000003 HC RX 258: Performed by: INTERNAL MEDICINE

## 2024-07-01 PROCEDURE — C1894 INTRO/SHEATH, NON-LASER: HCPCS | Performed by: SURGERY

## 2024-07-01 PROCEDURE — 86900 BLOOD TYPING SEROLOGIC ABO: CPT

## 2024-07-01 PROCEDURE — 82962 GLUCOSE BLOOD TEST: CPT

## 2024-07-01 PROCEDURE — 94761 N-INVAS EAR/PLS OXIMETRY MLT: CPT

## 2024-07-01 PROCEDURE — 97606 NEG PRS WND THER DME>50 SQCM: CPT

## 2024-07-01 PROCEDURE — 75710 ARTERY X-RAYS ARM/LEG: CPT | Performed by: SURGERY

## 2024-07-01 PROCEDURE — 76937 US GUIDE VASCULAR ACCESS: CPT | Performed by: SURGERY

## 2024-07-01 PROCEDURE — 6360000004 HC RX CONTRAST MEDICATION: Performed by: SURGERY

## 2024-07-01 PROCEDURE — 76000 FLUOROSCOPY <1 HR PHYS/QHP: CPT | Performed by: SURGERY

## 2024-07-01 PROCEDURE — 2709999900 HC NON-CHARGEABLE SUPPLY: Performed by: SURGERY

## 2024-07-01 PROCEDURE — 36415 COLL VENOUS BLD VENIPUNCTURE: CPT

## 2024-07-01 PROCEDURE — 1100000000 HC RM PRIVATE

## 2024-07-01 PROCEDURE — 99232 SBSQ HOSP IP/OBS MODERATE 35: CPT | Performed by: INTERNAL MEDICINE

## 2024-07-01 RX ORDER — MIDAZOLAM HYDROCHLORIDE 1 MG/ML
INJECTION INTRAMUSCULAR; INTRAVENOUS PRN
Status: DISCONTINUED | OUTPATIENT
Start: 2024-07-01 | End: 2024-07-01 | Stop reason: HOSPADM

## 2024-07-01 RX ORDER — FENTANYL CITRATE 50 UG/ML
INJECTION, SOLUTION INTRAMUSCULAR; INTRAVENOUS PRN
Status: DISCONTINUED | OUTPATIENT
Start: 2024-07-01 | End: 2024-07-01 | Stop reason: HOSPADM

## 2024-07-01 RX ORDER — IODIXANOL 320 MG/ML
INJECTION, SOLUTION INTRAVASCULAR PRN
Status: DISCONTINUED | OUTPATIENT
Start: 2024-07-01 | End: 2024-07-01 | Stop reason: HOSPADM

## 2024-07-01 RX ADMIN — CARVEDILOL 3.12 MG: 3.12 TABLET, FILM COATED ORAL at 17:19

## 2024-07-01 RX ADMIN — HYDROMORPHONE HYDROCHLORIDE 0.25 MG: 1 INJECTION, SOLUTION INTRAMUSCULAR; INTRAVENOUS; SUBCUTANEOUS at 20:05

## 2024-07-01 RX ADMIN — ASPIRIN 81 MG CHEWABLE TABLET 81 MG: 81 TABLET CHEWABLE at 08:34

## 2024-07-01 RX ADMIN — OXYCODONE AND ACETAMINOPHEN 1 TABLET: 7.5; 325 TABLET ORAL at 04:13

## 2024-07-01 RX ADMIN — INSULIN GLARGINE 10 UNITS: 100 INJECTION, SOLUTION SUBCUTANEOUS at 22:00

## 2024-07-01 RX ADMIN — HEPARIN SODIUM 5000 UNITS: 5000 INJECTION INTRAVENOUS; SUBCUTANEOUS at 13:37

## 2024-07-01 RX ADMIN — TIMOLOL MALEATE 1 DROP: 5 SOLUTION OPHTHALMIC at 21:48

## 2024-07-01 RX ADMIN — PIPERACILLIN AND TAZOBACTAM 3375 MG: 3; .375 INJECTION, POWDER, FOR SOLUTION INTRAVENOUS at 22:04

## 2024-07-01 RX ADMIN — HEPARIN SODIUM 5000 UNITS: 5000 INJECTION INTRAVENOUS; SUBCUTANEOUS at 06:13

## 2024-07-01 RX ADMIN — DOCUSATE SODIUM 50 MG: 50 LIQUID ORAL at 21:13

## 2024-07-01 RX ADMIN — CARVEDILOL 3.12 MG: 3.12 TABLET, FILM COATED ORAL at 08:34

## 2024-07-01 RX ADMIN — LATANOPROST 1 DROP: 50 SOLUTION/ DROPS OPHTHALMIC at 21:48

## 2024-07-01 RX ADMIN — HEPARIN SODIUM 5000 UNITS: 5000 INJECTION INTRAVENOUS; SUBCUTANEOUS at 21:13

## 2024-07-01 RX ADMIN — SODIUM CHLORIDE, PRESERVATIVE FREE 10 ML: 5 INJECTION INTRAVENOUS at 08:38

## 2024-07-01 RX ADMIN — SACUBITRIL AND VALSARTAN 1 TABLET: 24; 26 TABLET, FILM COATED ORAL at 08:34

## 2024-07-01 RX ADMIN — SACUBITRIL AND VALSARTAN 1 TABLET: 24; 26 TABLET, FILM COATED ORAL at 21:13

## 2024-07-01 RX ADMIN — PANTOPRAZOLE SODIUM 40 MG: 40 TABLET, DELAYED RELEASE ORAL at 06:13

## 2024-07-01 RX ADMIN — HYDROMORPHONE HYDROCHLORIDE 0.25 MG: 1 INJECTION, SOLUTION INTRAMUSCULAR; INTRAVENOUS; SUBCUTANEOUS at 13:36

## 2024-07-01 RX ADMIN — TIMOLOL MALEATE 1 DROP: 5 SOLUTION OPHTHALMIC at 08:36

## 2024-07-01 RX ADMIN — SODIUM CHLORIDE, PRESERVATIVE FREE 10 ML: 5 INJECTION INTRAVENOUS at 21:17

## 2024-07-01 ASSESSMENT — PAIN SCALES - GENERAL
PAINLEVEL_OUTOF10: 0
PAINLEVEL_OUTOF10: 5
PAINLEVEL_OUTOF10: 0
PAINLEVEL_OUTOF10: 3
PAINLEVEL_OUTOF10: 3
PAINLEVEL_OUTOF10: 0
PAINLEVEL_OUTOF10: 0
PAINLEVEL_OUTOF10: 3
PAINLEVEL_OUTOF10: 5
PAINLEVEL_OUTOF10: 3
PAINLEVEL_OUTOF10: 0

## 2024-07-01 ASSESSMENT — PAIN DESCRIPTION - LOCATION
LOCATION: FOOT

## 2024-07-01 ASSESSMENT — PAIN DESCRIPTION - ORIENTATION
ORIENTATION: RIGHT

## 2024-07-01 ASSESSMENT — PAIN DESCRIPTION - ONSET
ONSET: ON-GOING
ONSET: ON-GOING

## 2024-07-01 ASSESSMENT — PAIN DESCRIPTION - DESCRIPTORS
DESCRIPTORS: ACHING
DESCRIPTORS: ACHING
DESCRIPTORS: DISCOMFORT
DESCRIPTORS: ACHING

## 2024-07-01 ASSESSMENT — PAIN DESCRIPTION - PAIN TYPE
TYPE: SURGICAL PAIN
TYPE: SURGICAL PAIN

## 2024-07-01 ASSESSMENT — PAIN - FUNCTIONAL ASSESSMENT
PAIN_FUNCTIONAL_ASSESSMENT: ACTIVITIES ARE NOT PREVENTED
PAIN_FUNCTIONAL_ASSESSMENT: PREVENTS OR INTERFERES SOME ACTIVE ACTIVITIES AND ADLS
PAIN_FUNCTIONAL_ASSESSMENT: ACTIVITIES ARE NOT PREVENTED
PAIN_FUNCTIONAL_ASSESSMENT: ACTIVITIES ARE NOT PREVENTED

## 2024-07-01 ASSESSMENT — PAIN SCALES - WONG BAKER
WONGBAKER_NUMERICALRESPONSE: HURTS A LITTLE BIT
WONGBAKER_NUMERICALRESPONSE: NO HURT

## 2024-07-01 ASSESSMENT — PAIN DESCRIPTION - FREQUENCY
FREQUENCY: INTERMITTENT
FREQUENCY: INTERMITTENT

## 2024-07-01 NOTE — PROGRESS NOTES
PT attempt x2. Patient was GIORGIO for procedure. Will continue to follow.   Cande Villela PT, DPT

## 2024-07-01 NOTE — WOUND CARE
Room #: 512      KESHAV: [id number]      Situation: Wound Care Consult    Background:    PMH:   Active Ambulatory Problems     Diagnosis Date Noted    Metabolic bone disease 02/15/2022    Boil of buttock 06/24/2021    Hypertension 02/15/2022    Syncope 02/14/2022    Anemia of renal disease 02/15/2022    ESRD on dialysis ([facility]) 06/24/2021    Diabetes mellitus ([facility]) 06/24/2021    Fluid overload 05/11/2022     Resolved Ambulatory Problems     Diagnosis Date Noted    No Resolved Ambulatory Problems     Past Medical History:   Diagnosis Date    CAD (coronary artery disease)     Chronic heart failure with preserved ejection fraction (HFpEF) ([facility])     History of MI (myocardial infarction) 2020    Menopause     Renal insufficiency     Scoliosis         Vinay Score: 18/23   BMI: Body mass index is 37.42 kg/m².   Preventive measures in place: limited layers    Assessment:   Patient found reclined.  Patient is Awake and alert, Oriented x person, place, time and situation, Pleasant and conversant, and Sleepy.     Wound(s) Description:           Negative Pressure Wound Therapy Foot Right (Active)   $ Standard NPWT >50 sq cm PER TX $ Yes 07/01/24 1443   Wound Type Surgical 06/30/24 1900   Unit Type Ulta 06/30/24 1900   Dressing Type Black Foam;White Foam 07/01/24 1443   Number of pieces used 8 07/01/24 1443   Cycle Continuous 07/01/24 1443   Target Pressure (mmHg) 150 07/01/24 1443   Canister changed? No 07/01/24 1443   Dressing Status Clean, dry & intact 07/01/24 0800   Dressing Changed Changed/New 07/01/24 1443   Drainage Amount Moderate 07/01/24 1443   Drainage Description Serosanguinous 07/01/24 1443   Dressing Change Due 07/03/24 07/01/24 1443   Laine-wound Assessment Hyperpigmented;Maceration 07/01/24 1443   Number of days: 4       Wound 06/27/24 Foot Right;Dorsal Middle wound, above 2nd toe amputation (Active)   Wound Image   07/01/24 1442   Wound Etiology Surgical 07/01/24 0800   Dressing Status New dressing applied

## 2024-07-01 NOTE — INTERVAL H&P NOTE
Update History & Physical    The patient's History and Physical of June 27, 2024 was reviewed with the patient and I examined the patient. There was no change. The surgical site was confirmed by the patient and me.     Plan: The risks, benefits, expected outcome, and alternative to the recommended procedure have been discussed with the patient. Patient understands and wants to proceed with the procedure.     Electronically signed by Elena James MD on 7/1/2024 at 10:19 AM

## 2024-07-01 NOTE — PRE SEDATION
Sedation Plan  ASA: class 3 - patient with severe systemic disease     Mallampati class: IV - only hard palate visible.    Sedation plan: local anesthesia and level 2-1: moderate/analgesia (conscious sedation)    Risks, benefits, and alternatives discussed with patient.  Use of blood products discussed with patient who consented to blood products.       Immediate reassessment prior to sedation:  Patient's status reviewed and vital signs assessed; acceptable to perform procedure and proceed to administer sedation as planned.

## 2024-07-01 NOTE — PLAN OF CARE
Monitor blood pressure and heart rate   Monitor urine output and notify Licensed Independent Practitioner for values outside of normal range  7/1/2024 0209 by Mary Sprague RN  Outcome: Progressing  Goal: Absence of cardiac dysrhythmias or at baseline  7/1/2024 1033 by Jayesh Bueno RN  Outcome: Progressing  Flowsheets (Taken 7/1/2024 0800)  Absence of cardiac dysrhythmias or at baseline: Assess for signs of decreased cardiac output  7/1/2024 0209 by Mary Sprague RN  Outcome: Progressing     Problem: Skin/Tissue Integrity - Adult  Goal: Skin integrity remains intact  7/1/2024 1033 by Jayesh Bueno RN  Outcome: Progressing  7/1/2024 0209 by Mary Sprague RN  Outcome: Progressing  Goal: Incisions, wounds, or drain sites healing without S/S of infection  7/1/2024 1033 by Jayesh Bueno RN  Outcome: Progressing  7/1/2024 0209 by Mary Sprague RN  Outcome: Progressing  Goal: Oral mucous membranes remain intact  7/1/2024 1033 by Jayesh Bueno RN  Outcome: Progressing  7/1/2024 0209 by Mary Sprague RN  Outcome: Progressing     Problem: Musculoskeletal - Adult  Goal: Return mobility to safest level of function  7/1/2024 1033 by Jayesh Bueno RN  Outcome: Progressing  Flowsheets (Taken 7/1/2024 0800)  Return Mobility to Safest Level of Function: Assist with transfers and ambulation using safe patient handling equipment as needed  7/1/2024 0209 by Mary Sprague RN  Outcome: Progressing  Goal: Maintain proper alignment of affected body part  7/1/2024 1033 by Jayesh Bueno RN  Outcome: Progressing  7/1/2024 0209 by Mary Sprague RN  Outcome: Progressing  Goal: Return ADL status to a safe level of function  7/1/2024 1033 by Jayesh Bueno RN  Outcome: Progressing  7/1/2024 0209 by Mary Sprague RN  Outcome: Progressing     Problem: Gastrointestinal - Adult  Goal: Minimal or absence of nausea and  RN  Outcome: Progressing  7/1/2024 0209 by Mary Sprague, RN  Outcome: Progressing

## 2024-07-01 NOTE — OP NOTE
Operative Note      Patient: Jigna Conner  YOB: 1955  MRN: [id number]    Date of Procedure: 7/1/2024    Pre-Op Diagnosis Codes:     * PAD (peripheral artery disease) ([facility]) [I73.9]     * Diabetic foot infection ([facility]) [E11.628, L08.9]     * Infection of skin and subcutaneous tissue [L08.9]  Critical limb ischemia with osteomyelitis and tissue loss right foot    Post-Op Diagnosis: Same       Procedure(s):  Angiography lower ext right    Ultrasound-guided  retrograde left common femoral artery access  2.  Aortoiliac angiogram with the catheter tip in the distal abdominal aorta  3.  Right lower extremity arteriogram with the catheter tip in the right distal external iliac artery    Surgeon(s):  Elena James MD    Assistant:   * No surgical staff found *    Anesthesia: IV Sedation: Versed-0.5 mg, fentanyl-50 mcg.  Sedation time 46 minutes  Contrast: 40 mL Visipaque    Estimated Blood Loss (mL): Minimal    Complications: None    Specimens:   * No specimens in log *    Implants:  * No implants in log *      Findings:  Infection Present At Time Of Surgery (PATOS) (choose all levels that have infection present):  - Organ Space infection (below fascia) present as evidenced by osteomyelitis, right foot  Other Findings:   Angiographic findings:  Severe scoliosis of the lumbar spine.  Patent distal abdominal aorta, bilateral common, external and internal iliac arteries.  High takeoff of the internal iliac arteries.  2.  Patent right common femoral artery, profunda femoris artery, and superficial femoral artery without significant disease  Patent right popliteal artery, with less than 50% stenosis in its above-knee location.  Patent right posterior tibial artery and peroneal artery, with runoff to the foot via the plantar artery.  Patent pedal arch.  The right anterior tibial artery is occluded beyond its proximal portion, with retrograde filling of the mid and the distal anterior tibial  artery.    Detailed Description of Procedure:   Indication for the procedure: The patient is a 68-year-old lady, with type 2 diabetes, peripheral sensory neuropathy, end-stage renal disease on hemodialysis, who was hospitalized with right foot acute osteomyelitis.  She was noted to have peripheral arterial disease, with decreased pulse volume recordings from the thigh distally bilaterally, right worse than the left side, with right ankle-brachial index of 0.7 and left ankle-brachial index of 0.8.  She had palpable bilateral femoral and popliteal arteries.  The pedal pulses were nonpalpable.  Hence the above-mentioned procedure is being performed.    Informed consent was obtained from the patient for the procedure.    The patient was correctly identified and placed supine on the angiographic table.  The left groin was cleaned and draped in a sterile fashion.  She was already on intravenous antibiotics and hence no further antibiotics were administered.  A timeout was performed.  Intravenous sedation was administered, under my direct supervision.  After administering local anesthesia with 1% lidocaine, under ultrasound guidance, the left common femoral artery was accessed in a retrograde manner, using a 21-gauge micropuncture needle, that was exchanged to 4 Tamazight micro sheath.  The micro sheath was exchanged to a 4 Tamazight sheath.  An Omni Flush catheter was advanced into the distal abdominal aorta over a Bentson wire and an aortoiliac angiogram was performed using the power injector.  Due to the severe scoliosis of the lumbar spine, there was significant angulation of the aortic bifurcation.  In addition the common iliac artery segments were short, with high bifurcation of the internal iliac arteries bilaterally, making it difficult to get across the aortic bifurcation.  After multiple attempts, a Rim catheter, along with a Glidewire were successful in getting across the aortic  bifurcation.  The catheter was placed

## 2024-07-01 NOTE — PROGRESS NOTES
4 Eyes Skin Assessment     NAME:  Jigna Conner  YOB: 1955  MEDICAL RECORD NUMBER:  [id number]    The patient is being assessed for  Shift Handoff    I agree that at least one RN has performed a thorough Head to Toe Skin Assessment on the patient. ALL assessment sites listed below have been assessed.      Areas assessed by both nurses:    Head, Face, Ears, Shoulders, Back, Chest, Arms, Elbows, Hands, Sacrum. Buttock, Coccyx, Ischium, and Legs. Feet and Heels        Does the Patient have a Wound? Yes wound(s) were present on assessment. LDA wound assessment was Initiated and completed by RN       Vinay Prevention initiated by RN: Yes  Wound Care Orders initiated by RN: No    Pressure Injury (Stage 3,4, Unstageable, DTI, NWPT, and Complex wounds) if present, place Wound referral order by RN under : No    New Ostomies, if present place, Ostomy referral order under : No     Nurse 1 eSignature: Electronically signed by Mary Sprague RN on 7/1/24 at 6:38 AM EDT    **SHARE this note so that the co-signing nurse can place an eSignature**    Nurse 2 eSignature: Electronically signed by Jayesh Wade RN on 7/1/24 at 7:22 PM EDT

## 2024-07-01 NOTE — PROGRESS NOTES
[facility]  (A Division of [facility])    Follow-up Note      Date of Admission: 2024       Date of Note:  2024          Current Antimicrobials:    Prior Antimicrobials:  Zosyn  to present  Vancomycin  to present      Assessment:         Osteomyelitis of the right foot:   - wound culture with mixed marimar including gram-negative rods   -Status post I&D right foot abscess and second metatarsal debridement on .  Diabetes mellitus type 2  Peripheral artery disease: Status post angiography   End-stage renal disease on dialysis    Plan:   Continue current wound care with wound VAC-will need home health  Will plan to transition to vancomycin plus cefepime upon discharge for 4 to 6-week course    -Will arrange antibiotic course with nephrology so that she may get it with dialysis.  For now, continue with vancomycin and Zosyn since cultures were nonspecific    Trend CBC, BMP, ESR, CRP at least twice weekly  Tight glucose control for optimal wound healing.    Discussed with Dr. Levy  Follow-up as outpatient 2 to 3 weeks    DO [facility]  [address], [address] [address]  Office: [phone], Ext [phone]       Microbiology:   right foot tissue culture: Diphtheroids   wound culture: Heavy mixed skin marimar with gram-negative rods   blood cultures no growth to date x 2    Lines / Catheters:  Peripheral    Subjective:   Patient seen and examined.   In PACU following vascular procedure today.  A little drowsy postprocedure.  Pain is controlled.  No new overnight events noted.    Tmax 99.7   WBC 10.4    Objective:      BP (!) 161/65   Pulse 72   Temp 97.3 °F (36.3 °C) (Oral)   Resp 18   Ht 1.626 m (5' 4\")   Wt 98.9 kg (218 lb)   SpO2 100%   BMI 37.42 kg/m²   Temp (24hrs), Av.4 °F (36.9 °C), Min:97.2 °F (36.2 °C), Max:99.7 °F (37.6 °C)        General:   awake alert and

## 2024-07-01 NOTE — PROGRESS NOTES
4 Eyes Skin Assessment     NAME:  Jigna Conner  YOB: 1955  MEDICAL RECORD NUMBER:  [id number]    The patient is being assessed for  Shift Handoff    I agree that at least one RN has performed a thorough Head to Toe Skin Assessment on the patient. ALL assessment sites listed below have been assessed.      Areas assessed by both nurses:    Head, Face, Ears, Shoulders, Back, Chest, Arms, Elbows, Hands, Sacrum. Buttock, Coccyx, Ischium, Legs. Feet and Heels, and Under Medical Devices         Does the Patient have a Wound? Yes wound(s) were present on assessment. LDA wound assessment was Initiated and completed by RN       Vinay Prevention initiated by RN: Yes  Wound Care Orders initiated by RN: Yes    Pressure Injury (Stage 3,4, Unstageable, DTI, NWPT, and Complex wounds) if present, place Wound referral order by RN under : Yes    New Ostomies, if present place, Ostomy referral order under : No     Nurse 1 eSignature: Electronically signed by Jayesh Wade RN on 7/1/24 at 7:23 PM EDT    **SHARE this note so that the co-signing nurse can place an eSignature**    Nurse 2 eSignature: {Esignature:[id number]}

## 2024-07-01 NOTE — PROGRESS NOTES
Cath holding summary:    1154: Verbal report received from Jayesh BARKER  on Jigna Conner being received from [address] [place] [address] for routine post-op. Report consisted of patient's Situation, Background, Assessment and Recommendations (SBAR). Information from the following report(s) Nurse Handoff Report, MAR, Neuro Assessment, and Event Log was reviewed with the receiving nurse. Pt A&O x4, no c/o pain. Opportunity for questions and clarification provided.    1030: Verbal report given to Gayla BARKER on Jigna Conner being transferred to Cath Lab for ordered procedure. Report consisted of patient's Situation, Background, Assessment and Recommendations (SBAR). Information from the following report(s) Nurse Handoff Report, MAR, Neuro Assessment, and Event Log was reviewed with the receiving nurse. Opportunity for questions and clarification was provided.    1154: Verbal report received from Catina  on Jigna Conner being received from Cath Lab for routine post-op. Report consisted of patient's Situation, Background, Assessment and Recommendations (SBAR). Information from the following report(s) Nurse Handoff Report, MAR, Neuro Assessment, and Event Log was reviewed with the receiving nurse. Pt A&O x4, no c/o pain. Opportunity for questions and clarification provided.  Procedure: Cardiac Cath  Intervention: No  Site: Left, Groin    1255: Verbal report given to Jayesh BARKER on Jigna Conner  being transferred to [address] [place] [address] for routine progression of patient care. Report consisted of patient's Situation, Background, Assessment and Recommendations (SBAR). Information from the following report(s) Nurse Handoff Report, MAR, Neuro Assessment, and Event Log was reviewed with the receiving nurse. Opportunity for questions and clarification was provided. Patient transported with: Tech

## 2024-07-01 NOTE — PROGRESS NOTES
Medicine Progress Note    Patient: Jigna Conner   Age:  68 y.o.  DOA: 6/25/2024   Admit Dx / CC: Hypokalemia [E87.6]  Hyperglycemia [R73.9]  ESRD (end stage renal disease) (HCC) [N18.6]  Diabetic foot infection (HCC) [E11.628, L08.9]  Anemia, unspecified type [D64.9]  LOS:  LOS: 6 days     Assessment/Plan   Principal Problem:    Diabetic foot infection (HCC)  Active Problems:    Hypertension    Anemia of renal disease    ESRD on dialysis (HCC)    Diabetes mellitus (HCC)    Hypokalemia    Chronic heart failure with preserved ejection fraction (HFpEF) (HCC)    Coronary artery disease involving native coronary artery of native heart without angina pectoris    History of MI (myocardial infarction)    Class 2 obesity in adult  Resolved Problems:    * No resolved hospital problems. *      Additional Plan notes     Diabetes Foot Infection S/P RIGHT FOOT INCISION AND DRAINAGE, PARTIAL RESECTION SECOND METATARSAL, DECOMPRESSION OF SOFT TISSUE GAS, BONE BIOPSY FROM HALLUX  POD# 0  - Right Foot I&D performed by Marcin Gabriel DPM on 6/26/2024  IV Vancomycin and IV Piperacillin-Tazobactam (a.k.a. Zosyn).  Follow cultures    Hypokalemia  Replete, monitor    Constipation  -Bowel regimen    End-Stage Renal Disease (a.k.a. ESRD) on Hemodialysis (a.k.a. HD)  Nephrological services consulted for routine Hemodialysis.  Status post hemodialysis today June 29     Chronic Heart Failure with Preserved Ejection Fraction (a.k.a. HFpEF)  - Pro-BNP 3,639 pg/mL on 6/27/2024  - CXR on 6/27/2024 shows \"Cardiomegaly without acute pulmonary process.\"  Strict I/Os, Daily Weight, Fluid Restriction 2.0 L/day, and continue home Furosemide, Carvedilol, and Sacubitril-Valsartan (a.k.a. Entresto).     Coronary Artery Disease (a.k.a. CAD)  Continue home Aspirin.     Hypertension  No current home medications for this issue.  HOLD home Midodrine at this time.  RESUME home Midodrine as is necessary.     Anemia of Renal Disease  Monitor.

## 2024-07-02 PROBLEM — K59.00 CONSTIPATION: Status: ACTIVE | Noted: 2024-07-02

## 2024-07-02 LAB
GLUCOSE BLD STRIP.AUTO-MCNC: 83 MG/DL (ref 70–110)
GLUCOSE BLD STRIP.AUTO-MCNC: 92 MG/DL (ref 70–110)
GLUCOSE BLD STRIP.AUTO-MCNC: 96 MG/DL (ref 70–110)
GLUCOSE BLD STRIP.AUTO-MCNC: 99 MG/DL (ref 70–110)
VANCOMYCIN SERPL-MCNC: 19.1 UG/ML (ref 5–40)

## 2024-07-02 PROCEDURE — 6370000000 HC RX 637 (ALT 250 FOR IP): Performed by: PHYSICIAN ASSISTANT

## 2024-07-02 PROCEDURE — 90935 HEMODIALYSIS ONE EVALUATION: CPT

## 2024-07-02 PROCEDURE — 6360000002 HC RX W HCPCS: Performed by: EMERGENCY MEDICINE

## 2024-07-02 PROCEDURE — 94761 N-INVAS EAR/PLS OXIMETRY MLT: CPT

## 2024-07-02 PROCEDURE — 6360000002 HC RX W HCPCS: Performed by: INTERNAL MEDICINE

## 2024-07-02 PROCEDURE — 36415 COLL VENOUS BLD VENIPUNCTURE: CPT

## 2024-07-02 PROCEDURE — 99232 SBSQ HOSP IP/OBS MODERATE 35: CPT | Performed by: INTERNAL MEDICINE

## 2024-07-02 PROCEDURE — 80202 ASSAY OF VANCOMYCIN: CPT

## 2024-07-02 PROCEDURE — 97530 THERAPEUTIC ACTIVITIES: CPT

## 2024-07-02 PROCEDURE — 97116 GAIT TRAINING THERAPY: CPT

## 2024-07-02 PROCEDURE — 2580000003 HC RX 258: Performed by: PHYSICIAN ASSISTANT

## 2024-07-02 PROCEDURE — 82962 GLUCOSE BLOOD TEST: CPT

## 2024-07-02 PROCEDURE — 6370000000 HC RX 637 (ALT 250 FOR IP): Performed by: INTERNAL MEDICINE

## 2024-07-02 PROCEDURE — 2580000003 HC RX 258: Performed by: INTERNAL MEDICINE

## 2024-07-02 PROCEDURE — 6370000000 HC RX 637 (ALT 250 FOR IP): Performed by: EMERGENCY MEDICINE

## 2024-07-02 PROCEDURE — 1100000000 HC RM PRIVATE

## 2024-07-02 RX ADMIN — HEPARIN SODIUM 5000 UNITS: 5000 INJECTION INTRAVENOUS; SUBCUTANEOUS at 21:50

## 2024-07-02 RX ADMIN — DOCUSATE SODIUM 50 MG: 50 LIQUID ORAL at 20:55

## 2024-07-02 RX ADMIN — INSULIN GLARGINE 10 UNITS: 100 INJECTION, SOLUTION SUBCUTANEOUS at 20:55

## 2024-07-02 RX ADMIN — ONDANSETRON 4 MG: 4 TABLET, ORALLY DISINTEGRATING ORAL at 09:15

## 2024-07-02 RX ADMIN — TIMOLOL MALEATE 1 DROP: 5 SOLUTION OPHTHALMIC at 09:15

## 2024-07-02 RX ADMIN — PIPERACILLIN AND TAZOBACTAM 3375 MG: 3; .375 INJECTION, POWDER, FOR SOLUTION INTRAVENOUS at 14:50

## 2024-07-02 RX ADMIN — HEPARIN SODIUM 5000 UNITS: 5000 INJECTION INTRAVENOUS; SUBCUTANEOUS at 14:51

## 2024-07-02 RX ADMIN — OXYCODONE AND ACETAMINOPHEN 1 TABLET: 7.5; 325 TABLET ORAL at 23:18

## 2024-07-02 RX ADMIN — SACUBITRIL AND VALSARTAN 1 TABLET: 24; 26 TABLET, FILM COATED ORAL at 20:55

## 2024-07-02 RX ADMIN — LATANOPROST 1 DROP: 50 SOLUTION/ DROPS OPHTHALMIC at 20:58

## 2024-07-02 RX ADMIN — SODIUM CHLORIDE, PRESERVATIVE FREE 10 ML: 5 INJECTION INTRAVENOUS at 20:58

## 2024-07-02 RX ADMIN — TIMOLOL MALEATE 1 DROP: 5 SOLUTION OPHTHALMIC at 20:58

## 2024-07-02 RX ADMIN — PANTOPRAZOLE SODIUM 40 MG: 40 TABLET, DELAYED RELEASE ORAL at 06:02

## 2024-07-02 RX ADMIN — HYDROMORPHONE HYDROCHLORIDE 0.25 MG: 1 INJECTION, SOLUTION INTRAMUSCULAR; INTRAVENOUS; SUBCUTANEOUS at 18:56

## 2024-07-02 RX ADMIN — DOCUSATE SODIUM 50 MG: 50 LIQUID ORAL at 09:14

## 2024-07-02 RX ADMIN — SODIUM CHLORIDE, PRESERVATIVE FREE 10 ML: 5 INJECTION INTRAVENOUS at 09:15

## 2024-07-02 RX ADMIN — Medication 1 CAPSULE: at 09:14

## 2024-07-02 RX ADMIN — OXYCODONE AND ACETAMINOPHEN 1 TABLET: 7.5; 325 TABLET ORAL at 06:08

## 2024-07-02 RX ADMIN — ATORVASTATIN CALCIUM 10 MG: 10 TABLET, FILM COATED ORAL at 09:14

## 2024-07-02 RX ADMIN — CARVEDILOL 3.12 MG: 3.12 TABLET, FILM COATED ORAL at 14:51

## 2024-07-02 RX ADMIN — HYDROMORPHONE HYDROCHLORIDE 0.25 MG: 1 INJECTION, SOLUTION INTRAMUSCULAR; INTRAVENOUS; SUBCUTANEOUS at 14:45

## 2024-07-02 RX ADMIN — SACUBITRIL AND VALSARTAN 1 TABLET: 24; 26 TABLET, FILM COATED ORAL at 14:51

## 2024-07-02 RX ADMIN — HEPARIN SODIUM 5000 UNITS: 5000 INJECTION INTRAVENOUS; SUBCUTANEOUS at 06:02

## 2024-07-02 RX ADMIN — VANCOMYCIN HYDROCHLORIDE 750 MG: 750 INJECTION, POWDER, LYOPHILIZED, FOR SOLUTION INTRAVENOUS at 18:05

## 2024-07-02 RX ADMIN — ASPIRIN 81 MG CHEWABLE TABLET 81 MG: 81 TABLET CHEWABLE at 09:14

## 2024-07-02 RX ADMIN — PIPERACILLIN AND TAZOBACTAM 3375 MG: 3; .375 INJECTION, POWDER, FOR SOLUTION INTRAVENOUS at 23:21

## 2024-07-02 ASSESSMENT — PAIN DESCRIPTION - DESCRIPTORS
DESCRIPTORS: THROBBING
DESCRIPTORS: ACHING
DESCRIPTORS: ACHING
DESCRIPTORS: BURNING;THROBBING
DESCRIPTORS: ACHING

## 2024-07-02 ASSESSMENT — PAIN DESCRIPTION - ONSET
ONSET: GRADUAL
ONSET: ON-GOING
ONSET: ON-GOING
ONSET: GRADUAL
ONSET: GRADUAL

## 2024-07-02 ASSESSMENT — PAIN DESCRIPTION - ORIENTATION
ORIENTATION: RIGHT

## 2024-07-02 ASSESSMENT — PAIN DESCRIPTION - LOCATION
LOCATION: FOOT

## 2024-07-02 ASSESSMENT — PAIN DESCRIPTION - PAIN TYPE
TYPE: SURGICAL PAIN

## 2024-07-02 ASSESSMENT — PAIN SCALES - GENERAL
PAINLEVEL_OUTOF10: 6
PAINLEVEL_OUTOF10: 0
PAINLEVEL_OUTOF10: 0
PAINLEVEL_OUTOF10: 4
PAINLEVEL_OUTOF10: 7
PAINLEVEL_OUTOF10: 7
PAINLEVEL_OUTOF10: 0
PAINLEVEL_OUTOF10: 8
PAINLEVEL_OUTOF10: 0

## 2024-07-02 ASSESSMENT — PAIN - FUNCTIONAL ASSESSMENT
PAIN_FUNCTIONAL_ASSESSMENT: PREVENTS OR INTERFERES SOME ACTIVE ACTIVITIES AND ADLS
PAIN_FUNCTIONAL_ASSESSMENT: ACTIVITIES ARE NOT PREVENTED
PAIN_FUNCTIONAL_ASSESSMENT: PREVENTS OR INTERFERES SOME ACTIVE ACTIVITIES AND ADLS

## 2024-07-02 ASSESSMENT — PAIN DESCRIPTION - FREQUENCY
FREQUENCY: INTERMITTENT

## 2024-07-02 NOTE — PROGRESS NOTES
Pt is alert an able to make needs know. RR even an non labored on room air. Pt has a great appetite. Taking all medications. Has a great appetite. No new skin issues. Assist x1 to bedside commode. Had a bowel movement today. Pt is still making some urine. Would vac is operating properly an had 20mL of output. Pain is control. No concern at this time.

## 2024-07-02 NOTE — PROGRESS NOTES
Medicine Progress Note    Patient: Jigna Conner   Age:  68 y.o.  DOA: 6/25/2024   Admit Dx / CC: Hypokalemia [E87.6]  Hyperglycemia [R73.9]  ESRD (end stage renal disease) (HCC) [N18.6]  Diabetic foot infection (HCC) [E11.628, L08.9]  Anemia, unspecified type [D64.9]  LOS:  LOS: 7 days     Assessment/Plan   Principal Problem:    Diabetic foot infection (HCC)  Active Problems:    Hypertension    Anemia of renal disease    ESRD on dialysis (HCC)    Diabetes mellitus (HCC)    Hypokalemia    Chronic heart failure with preserved ejection fraction (HFpEF) (HCC)    Coronary artery disease involving native coronary artery of native heart without angina pectoris    History of MI (myocardial infarction)    Class 2 obesity in adult  Resolved Problems:    * No resolved hospital problems. *      Additional Plan notes     Diabetes Foot Infection S/P RIGHT FOOT INCISION AND DRAINAGE, PARTIAL RESECTION SECOND METATARSAL, DECOMPRESSION OF SOFT TISSUE GAS, BONE BIOPSY FROM HALLUX  POD# 0  - Right Foot I&D performed by Marcin Gabriel DPM on 6/26/2024  IV Vancomycin and IV Piperacillin-Tazobactam (a.k.a. Zosyn).  Follow cultures    Hypokalemia  Replete, monitor    Constipation  -Bowel regimen    End-Stage Renal Disease (a.k.a. ESRD) on Hemodialysis (a.k.a. HD)  Nephrological services consulted for routine Hemodialysis.  Status post hemodialysis today June 29     Chronic Heart Failure with Preserved Ejection Fraction (a.k.a. HFpEF)  - Pro-BNP 3,639 pg/mL on 6/27/2024  - CXR on 6/27/2024 shows \"Cardiomegaly without acute pulmonary process.\"  Strict I/Os, Daily Weight, Fluid Restriction 2.0 L/day, and continue home Furosemide, Carvedilol, and Sacubitril-Valsartan (a.k.a. Entresto).     Coronary Artery Disease (a.k.a. CAD)  Continue home Aspirin.     Hypertension  No current home medications for this issue.  HOLD home Midodrine at this time.  RESUME home Midodrine as is necessary.     Anemia of Renal Disease  Monitor.

## 2024-07-02 NOTE — PLAN OF CARE
Problem: Discharge Planning  Goal: Discharge to home or other facility with appropriate resources  Outcome: Progressing     Problem: Pain  Goal: Verbalizes/displays adequate comfort level or baseline comfort level  7/2/2024 1220 by Berta العراقي RN  Outcome: Progressing  Flowsheets (Taken 7/2/2024 0339 by Cassi Gambino RN)  Verbalizes/displays adequate comfort level or baseline comfort level:   Encourage patient to monitor pain and request assistance   Assess pain using appropriate pain scale  7/2/2024 0307 by Cassi Gambino RN  Outcome: Progressing  Flowsheets  Taken 7/1/2024 2346  Verbalizes/displays adequate comfort level or baseline comfort level:   Encourage patient to monitor pain and request assistance   Assess pain using appropriate pain scale  Taken 7/1/2024 2035  Verbalizes/displays adequate comfort level or baseline comfort level:   Encourage patient to monitor pain and request assistance   Assess pain using appropriate pain scale  Taken 7/1/2024 2005  Verbalizes/displays adequate comfort level or baseline comfort level:   Encourage patient to monitor pain and request assistance   Assess pain using appropriate pain scale     Problem: ABCDS Injury Assessment  Goal: Absence of physical injury  7/2/2024 1220 by Berta العراقي RN  Outcome: Progressing  7/2/2024 0307 by Cassi Gambino RN  Outcome: Progressing     Problem: Safety - Adult  Goal: Free from fall injury  7/2/2024 1220 by Berta العراقي RN  Outcome: Progressing  7/2/2024 0307 by Cassi Gambino RN  Outcome: Progressing     Problem: Chronic Conditions and Co-morbidities  Goal: Patient's chronic conditions and co-morbidity symptoms are monitored and maintained or improved  7/2/2024 1220 by Berta العراقي RN  Outcome: Progressing  7/2/2024 1037 by Leonie Paul RN  Outcome: Progressing     Problem: Physical Therapy - Adult  Goal: By Discharge: Performs mobility at highest level of function for planned discharge setting.  See evaluation for

## 2024-07-02 NOTE — PROGRESS NOTES
[facility]  (A Division of [facility])    Follow-up Note      Date of Admission: 2024       Date of Note:  2024          Current Antimicrobials:    Prior Antimicrobials:  Zosyn  to present  Vancomycin  to present      Assessment:         Osteomyelitis of the right foot:   - wound culture with mixed marimar including gram-negative rods   -Status post I&D right foot abscess and second metatarsal debridement on .  Diabetes mellitus type 2  Peripheral artery disease: Status post angiography   End-stage renal disease on dialysis    Plan:   Continue current wound care with wound VAC-will need home health  Will plan to transition to vancomycin plus cefepime upon discharge. Stop date 24    -Will arrange antibiotic course with nephrology so that she may get it with dialysis.    For now, continue with vancomycin and Zosyn since cultures were nonspecific    Trend CBC, BMP, ESR, CRP at least twice weekly  Tight glucose control for optimal wound healing.    Discussed with Dr. Levy  Follow-up as outpatient 2 to 3 weeks    DO Clem [facility]  [address], [address] [address]  Office: [phone], Ext [phone]       Microbiology:   right foot tissue culture: Diphtheroids   wound culture: Heavy mixed skin marimar with gram-negative rods   blood cultures no growth to date x 2    Lines / Catheters:  Peripheral    Subjective:   Patient seen and examined.   Seen in dialysis this morning.  Overall doing okay.  Pain is controlled.  Has a modest amount of pain in her foot.  No pain in her groin today.  No overnight fevers.    Tmax 98.7  WBC 10.4    Objective:      BP (!) 144/57   Pulse 68   Temp 97.9 °F (36.6 °C)   Resp 18   Ht 1.626 m (5' 4\")   Wt 98.9 kg (218 lb 0.6 oz)   SpO2 97%   BMI 37.43 kg/m²   Temp (24hrs), Av.9 °F (36.6 °C), Min:97.2 °F (36.2 °C), Max:98.7 °F (37.1

## 2024-07-02 NOTE — PLAN OF CARE
INTERVENTION:  HEMODYNAMIC STABILIZATION  MAINTAIN BP WNL WHILE ON HD.     INTERVENTION:  FLUID MANAGEMENT  WILL ATTEMPT 2000 ML TOTAL FLUID REMOVAL AS TOLERATED.     INTERVENTION:  METABOLIC/ELECTROLYTE MANAGEMENT  2.0 POTASSIUM 2.5 CALCIUM DIALYSATE USED WITH HD TODAY.     INTERVENTION:  HEMODIALYSIS ACCESS SITE MANAGEMENT  LUE AVF ACCESSED WITH 15G NEEDLES USING ASEPTIC TECHNIQUE.     GOAL:  SIGNS AND SYMPTOMS OF LISTED POTENTIAL PROBLEMS WILL BE ABSENT OR MANAGEABLE.     OUTCOME:  PROGRESSING.     HD PLANNED FOR 3 HOURS TODAY.      Problem: Chronic Conditions and Co-morbidities  Goal: Patient's chronic conditions and co-morbidity symptoms are monitored and maintained or improved  Outcome: Progressing

## 2024-07-02 NOTE — PROGRESS NOTES
HD Care plan  Time: 3 hrs  Dialysate: 2K+  2.5Ca++  Bath  Net UF: 2L  Access: Aseptic care for LUE AVF  Hemodynamic stability: Maintain BP WNL     Pre Dialysis:  Patient received from YARIEL العراقي RN. Patient arrived on a bed, A+O X 4, on RA,  no s/s of acute distress noted. LUE AVF assessed, no abnormalities noted, bruit and thrill strong. AVF accessed using 15G needles without any difficulty. Good flow achieved from both needles.    Intra Dialysis:  Time out / safety process performed per policy. Tx initiated at 0955.    AVF flowing with ease. For hemodynamic stability UF goal set at 2000 ml as tolerated.  1035- Patient c/o R. Foot pain 7/10, patient requesting PRN pain medication, floor nurse notified.  Pt offered assistance with repositioning every 2 hours/prn    Vascular access visible and line connections remained intact throughout entire duration of treatment.   Vital signs checked every 15 mins.     Post Dialysis:  Tx completed at 1255,   Tolerated well, 2 L  removed. De-accessed per protocol.    Clot time 5 minutes for arterial, and 5 minutes for venous.   Dry dressings applied. Bruit/Thrill present above and below dressings.  Post Dialysis report given to unit RN

## 2024-07-02 NOTE — PLAN OF CARE
Problem: Physical Therapy - Adult  Goal: By Discharge: Performs mobility at highest level of function for planned discharge setting.  See evaluation for individualized goals.  Description: Physical Therapy Goals:  Initiated 6/27/2024 to be met within 7-10 days.    1.  Patient will move from supine to sit and sit to supine , scoot up and down, and roll side to side in bed with independence in order to safely get into/out of bed.    2.  Patient will transfer from bed to chair and chair to bed with moderate assistance  using RW in order to safely partake in OOB mobility.  3.  Patient will perform sit to stand with moderate assistance  in order to safely partake in OOB mobility.  4.  Patient will ambulate with minimal assistance/contact guard assist for 15 feet using RW in order to safely navigate household distances.     PLOF: lives with  in 1 level home with ramped entry, independent at baseline using rollator in community and SPC in home      Outcome: Progressing   PHYSICAL THERAPY TREATMENT    Patient: Jigna Conner (68 y.o. female)  Date: 7/2/2024  Diagnosis: Hypokalemia [E87.6]  Hyperglycemia [R73.9]  ESRD (end stage renal disease) (HCC) [N18.6]  Diabetic foot infection (HCC) [E11.628, L08.9]  Anemia, unspecified type [D64.9] Diabetic foot infection (HCC)  Procedure(s) (LRB):  Angiography lower ext right (N/A) 1 Day Post-Op  Precautions: Fall Risk, Skin, Aspiration Risk, Weight Bearing, Right Lower Extremity Weight Bearing: Non Weight Bearing (R Forefoot),  ,  ,  ,  ,  ,      ASSESSMENT:  Pt received in bed agreeable to PT session. Pt SBA with additional time to EOB and supervision to scoot. Assist provided to fabiano post-op shoe. Pt reports having to use the bathroom. Pt mod A stand pivot to BSC with cueing regarding WB precautions. Pt min  stand to sit. Pt displays good seated balance while on BSC. Pt then reports feeling lightheaded and nauseous. Pt had 1 episode on small amount of vomit in emesis

## 2024-07-02 NOTE — PROGRESS NOTES
Progress Note    68-year-old female with past medical history of ESRD, diabetes, heart failure with preserved ejection fraction admitted for diabetic foot infection, following for ESRD management.  Subjective      Overnight event noted  No complaints offered        IMPRESSION:   ESRD, Tuesday Thursday Saturday,   Access left arm AV fistula  Right foot wound infection,  Anemia, received blood transfusion during this admission  Diabetes  Hypertension  Heart failure with preserved ejection fraction.   PLAN:   HD per schedule  Continue antibiotic per primary care, please let me know in advance for antibiotic choice as she goes to dialysis unit in [facility] .   Adjust medication per ESRD status.     Current Facility-Administered Medications   Medication Dose Route Frequency    heparin (porcine) injection 5,000 Units  5,000 Units SubCUTAneous 3 times per day    lactobacillus (CULTURELLE) capsule 1 capsule  1 capsule Oral Daily with breakfast    oxyCODONE-acetaminophen (PERCOCET) 7.5-325 MG per tablet 1 tablet  1 tablet Oral Q6H PRN    naloxone (NARCAN) injection 0.4 mg  0.4 mg IntraVENous PRN    ipratropium 0.5 mg-albuterol 2.5 mg (DUONEB) nebulizer solution 1 Dose  1 Dose Inhalation Q4H PRN    melatonin disintegrating tablet 5 mg  5 mg Oral Nightly PRN    docusate (COLACE) 50 MG/5ML liquid 50 mg  50 mg Oral BID    bisacodyl (DULCOLAX) suppository 10 mg  10 mg Rectal Daily PRN    senna (SENOKOT) tablet 8.6 mg  1 tablet Oral Nightly PRN    lactulose (CHRONULAC) 10 GM/15ML solution 30 g  30 g Oral Daily PRN    0.9 % sodium chloride infusion   IntraVENous PRN    0.9 % sodium chloride infusion   IntraVENous PRN    piperacillin-tazobactam (ZOSYN) 3,375 mg in sodium chloride 0.9 % 50 mL IVPB (mini-bag)  3,375 mg IntraVENous Q12H    aspirin chewable tablet 81 mg  81 mg Oral Daily    atorvastatin (LIPITOR) tablet 10 mg  10 mg Oral Daily    carvedilol (COREG) tablet 3.125 mg  3.125 mg Oral BID WC    [Held by provider] dorzolamide

## 2024-07-02 NOTE — PROGRESS NOTES
Admit Date: 2024    POD 1 Day Post-Op    Procedure:  Procedure(s):  Angiography lower ext right    Subjective:   Patient seen, chart reviewed, all acute events noted.  Patient is awake alert and answers all question appropriate.  Patient moves all extremities to command.  Patient reports mild right foot pain but adequate relief with current pain regimen.  Patient reports that she has done well since her procedure, slept well, no groin pain.  Tolerating her diet no nausea vomiting.    Objective:     Blood pressure (!) 135/53, pulse 61, temperature 97.9 °F (36.6 °C), resp. rate 18, height 1.626 m (5' 4\"), weight 98.9 kg (218 lb 0.6 oz), SpO2 97 %.    Temp (24hrs), Av.8 °F (36.6 °C), Min:97.2 °F (36.2 °C), Max:98.7 °F (37.1 °C)      Physical Exam:    Physical Exam:   General:  Awake, alert  Cardiovascular:  S1S2+, RRR  Pulmonary:  CTA b/l  GI:  Soft, BS+, NT, ND  Extremities:   Right foot with dressing and wound VAC in place  Left femoral access site clean dry and intact.  No drainage or surrounding erythema noted.  Labs:   Recent Results (from the past 24 hour(s))   Invasive vascular procedure    Collection Time: 24 12:02 PM   Result Value Ref Range    Body Surface Area 2.11 m2   POCT Glucose    Collection Time: 24  2:03 PM   Result Value Ref Range    POC Glucose 61 (L) 70 - 110 mg/dL   POCT Glucose    Collection Time: 24  2:45 PM   Result Value Ref Range    POC Glucose 67 (L) 70 - 110 mg/dL   POCT Glucose    Collection Time: 24  3:27 PM   Result Value Ref Range    POC Glucose 76 70 - 110 mg/dL   POCT Glucose    Collection Time: 24  9:47 PM   Result Value Ref Range    POC Glucose 140 (H) 70 - 110 mg/dL   Vancomycin Level, Random    Collection Time: 24  5:22 AM   Result Value Ref Range    Vancomycin Rm 19.1 5.0 - 40.0 UG/ML   POCT Glucose    Collection Time: 24  6:26 AM   Result Value Ref Range    POC Glucose 99 70 - 110 mg/dL       Data Review   Operative Note

## 2024-07-02 NOTE — FLOWSHEET NOTE
07/02/24 1130   Pain Assessment   Pain Assessment 0-10   Pain Level 7   Patient's Stated Pain Goal 0 - No pain   Pain Location Foot   Pain Orientation Right   Pain Descriptors Aching   Functional Pain Assessment Prevents or interferes some active activities and ADLs   Pain Type Surgical pain   Pain Radiating Towards n/a   Pain Onset On-going   Non-Pharmaceutical Pain Intervention(s) Repositioned   Response to Pain Intervention Patient satisfied   Side Effects No reported side effects     Pt complained of pain in dialysis. Pt vitals were taking an meds were administered. Monitored pt for 15mins an she tolerated it fine. Informed dialysis nurse any issues to call me back asap

## 2024-07-02 NOTE — PLAN OF CARE
Problem: Discharge Planning  Goal: Discharge to home or other facility with appropriate resources  Recent Flowsheet Documentation  Taken 7/1/2024 2000 by Cassi Gambino RN  Discharge to home or other facility with appropriate resources:   Identify barriers to discharge with patient and caregiver   Arrange for needed discharge resources and transportation as appropriate     Problem: Pain  Goal: Verbalizes/displays adequate comfort level or baseline comfort level  Outcome: Progressing  Flowsheets  Taken 7/1/2024 2346  Verbalizes/displays adequate comfort level or baseline comfort level:   Encourage patient to monitor pain and request assistance   Assess pain using appropriate pain scale  Taken 7/1/2024 2035  Verbalizes/displays adequate comfort level or baseline comfort level:   Encourage patient to monitor pain and request assistance   Assess pain using appropriate pain scale  Taken 7/1/2024 2005  Verbalizes/displays adequate comfort level or baseline comfort level:   Encourage patient to monitor pain and request assistance   Assess pain using appropriate pain scale     Problem: ABCDS Injury Assessment  Goal: Absence of physical injury  Outcome: Progressing     Problem: Safety - Adult  Goal: Free from fall injury  Outcome: Progressing

## 2024-07-02 NOTE — PROGRESS NOTES
Attempted to see patient for skilled OT treatment session. Pt GIORGIO x 2 attempts this date; 1024 and 1208, HD. Will continue to follow and see pt when available/as appropriate.          Thank you   Kevin Johnson MS, OTR/L

## 2024-07-02 NOTE — CARE COORDINATION
Attempted to see pt but pt off the floor     1212  Sent message to GREGORIO Muñoz for wound vac order form to be signed.    Per Toni PERKINS, Dr. Gabriel will be signing wound vac order.    1223:  Per Dr. Gabriel, he will sign wound vac order.              Sonia Arevalo, WENDYN RN  Care Management

## 2024-07-02 NOTE — PROGRESS NOTES
[facility]   Pharmacy Pharmacokinetic Monitoring Service - Vancomycin    Indication: DM foot infection  Target Pre-Dialysis Concentration: 21-24 mg/L  Day of Therapy: 8  Additional Antimicrobials: pip-tazo    Pertinent Laboratory Values:   Wt Readings from Last 1 Encounters:   07/02/24 98.9 kg (218 lb 0.6 oz)     Temp Readings from Last 1 Encounters:   07/02/24 98.3 °F (36.8 °C)     Recent Labs     06/30/24  0147 07/01/24  0435   WBC 12.2 10.4     Pertinent Cultures:  Date Source Results   6/25 blood NGTD   6/26 wound (r foot abscess) GPC; GPR   6/26 tissue (grt toe) diphtheroids    MRSA Nasal Swab: NA    Assessment:  Date Current Dose Concentration (mg/L) Dialysis Session   6/25 2,000 mg x1 - no   6/26 - - no   6/27 - 27.6 yes   6/28 - - no   6/29  15.4 yes   6/30 - - no   7/1   no   7/2 750 mg x 1 19.1 yes     Plan:  ESRD on HD q TTS - dose by level  Vancomycin 750mg given off dialyis  Level on order for 7/4 with AM labs prior to next scheduled HD session  Pharmacy will continue to monitor patient and adjust therapy as indicated    Thank you for the consult,  Iqra Maciel, [facility]  7/2/2024

## 2024-07-03 ENCOUNTER — ANESTHESIA EVENT (OUTPATIENT)
Facility: HOSPITAL | Age: 69
End: 2024-07-03
Payer: MEDICARE

## 2024-07-03 ENCOUNTER — ANESTHESIA (OUTPATIENT)
Facility: HOSPITAL | Age: 69
End: 2024-07-03
Payer: MEDICARE

## 2024-07-03 LAB
ANION GAP SERPL CALC-SCNC: 7 MMOL/L (ref 3–18)
BASOPHILS # BLD: 0 K/UL (ref 0–0.1)
BASOPHILS NFR BLD: 0 % (ref 0–2)
BUN SERPL-MCNC: 25 MG/DL (ref 7–18)
BUN/CREAT SERPL: 5 (ref 12–20)
CALCIUM SERPL-MCNC: 9.8 MG/DL (ref 8.5–10.1)
CHLORIDE SERPL-SCNC: 97 MMOL/L (ref 100–111)
CO2 SERPL-SCNC: 27 MMOL/L (ref 21–32)
CREAT SERPL-MCNC: 5.18 MG/DL (ref 0.6–1.3)
DIFFERENTIAL METHOD BLD: ABNORMAL
EOSINOPHIL # BLD: 0.1 K/UL (ref 0–0.4)
EOSINOPHIL NFR BLD: 1 % (ref 0–5)
ERYTHROCYTE [DISTWIDTH] IN BLOOD BY AUTOMATED COUNT: 17.5 % (ref 11.6–14.5)
GLUCOSE BLD STRIP.AUTO-MCNC: 55 MG/DL (ref 70–110)
GLUCOSE BLD STRIP.AUTO-MCNC: 57 MG/DL (ref 70–110)
GLUCOSE BLD STRIP.AUTO-MCNC: 61 MG/DL (ref 70–110)
GLUCOSE BLD STRIP.AUTO-MCNC: 72 MG/DL (ref 70–110)
GLUCOSE BLD STRIP.AUTO-MCNC: 73 MG/DL (ref 70–110)
GLUCOSE BLD STRIP.AUTO-MCNC: 84 MG/DL (ref 70–110)
GLUCOSE BLD STRIP.AUTO-MCNC: 86 MG/DL (ref 70–110)
GLUCOSE BLD STRIP.AUTO-MCNC: 87 MG/DL (ref 70–110)
GLUCOSE BLD STRIP.AUTO-MCNC: 93 MG/DL (ref 70–110)
GLUCOSE BLD STRIP.AUTO-MCNC: 94 MG/DL (ref 70–110)
GLUCOSE SERPL-MCNC: 73 MG/DL (ref 74–99)
HCT VFR BLD AUTO: 24.7 % (ref 35–45)
HGB BLD-MCNC: 7.9 G/DL (ref 12–16)
IMM GRANULOCYTES # BLD AUTO: 0.1 K/UL (ref 0–0.04)
IMM GRANULOCYTES NFR BLD AUTO: 1 % (ref 0–0.5)
LYMPHOCYTES # BLD: 1.1 K/UL (ref 0.9–3.6)
LYMPHOCYTES NFR BLD: 10 % (ref 21–52)
MCH RBC QN AUTO: 25.8 PG (ref 24–34)
MCHC RBC AUTO-ENTMCNC: 32 G/DL (ref 31–37)
MCV RBC AUTO: 80.7 FL (ref 78–100)
MONOCYTES # BLD: 1.4 K/UL (ref 0.05–1.2)
MONOCYTES NFR BLD: 12 % (ref 3–10)
NEUTS SEG # BLD: 8.4 K/UL (ref 1.8–8)
NEUTS SEG NFR BLD: 76 % (ref 40–73)
NRBC # BLD: 0 K/UL (ref 0–0.01)
NRBC BLD-RTO: 0 PER 100 WBC
PLATELET # BLD AUTO: 358 K/UL (ref 135–420)
PMV BLD AUTO: 9.6 FL (ref 9.2–11.8)
POTASSIUM SERPL-SCNC: 4.2 MMOL/L (ref 3.5–5.5)
RBC # BLD AUTO: 3.06 M/UL (ref 4.2–5.3)
SODIUM SERPL-SCNC: 131 MMOL/L (ref 136–145)
WBC # BLD AUTO: 11.1 K/UL (ref 4.6–13.2)

## 2024-07-03 PROCEDURE — 2580000003 HC RX 258: Performed by: PODIATRIST

## 2024-07-03 PROCEDURE — 3700000001 HC ADD 15 MINUTES (ANESTHESIA): Performed by: PODIATRIST

## 2024-07-03 PROCEDURE — 6360000002 HC RX W HCPCS: Performed by: INTERNAL MEDICINE

## 2024-07-03 PROCEDURE — 2580000003 HC RX 258: Performed by: PHYSICIAN ASSISTANT

## 2024-07-03 PROCEDURE — 2580000003 HC RX 258: Performed by: INTERNAL MEDICINE

## 2024-07-03 PROCEDURE — A4217 STERILE WATER/SALINE, 500 ML: HCPCS | Performed by: PODIATRIST

## 2024-07-03 PROCEDURE — 3600000012 HC SURGERY LEVEL 2 ADDTL 15MIN: Performed by: PODIATRIST

## 2024-07-03 PROCEDURE — 3700000000 HC ANESTHESIA ATTENDED CARE: Performed by: PODIATRIST

## 2024-07-03 PROCEDURE — 1100000000 HC RM PRIVATE

## 2024-07-03 PROCEDURE — 6370000000 HC RX 637 (ALT 250 FOR IP): Performed by: INTERNAL MEDICINE

## 2024-07-03 PROCEDURE — 2500000003 HC RX 250 WO HCPCS: Performed by: ANESTHESIOLOGY

## 2024-07-03 PROCEDURE — 6360000002 HC RX W HCPCS: Performed by: EMERGENCY MEDICINE

## 2024-07-03 PROCEDURE — 80048 BASIC METABOLIC PNL TOTAL CA: CPT

## 2024-07-03 PROCEDURE — 2580000003 HC RX 258: Performed by: NURSE ANESTHETIST, CERTIFIED REGISTERED

## 2024-07-03 PROCEDURE — 6360000002 HC RX W HCPCS: Performed by: ANESTHESIOLOGY

## 2024-07-03 PROCEDURE — 2709999900 HC NON-CHARGEABLE SUPPLY: Performed by: PODIATRIST

## 2024-07-03 PROCEDURE — 6360000002 HC RX W HCPCS: Performed by: PODIATRIST

## 2024-07-03 PROCEDURE — 6370000000 HC RX 637 (ALT 250 FOR IP): Performed by: PHYSICIAN ASSISTANT

## 2024-07-03 PROCEDURE — 2580000003 HC RX 258: Performed by: ANESTHESIOLOGY

## 2024-07-03 PROCEDURE — C1713 ANCHOR/SCREW BN/BN,TIS/BN: HCPCS | Performed by: PODIATRIST

## 2024-07-03 PROCEDURE — 99232 SBSQ HOSP IP/OBS MODERATE 35: CPT | Performed by: INTERNAL MEDICINE

## 2024-07-03 PROCEDURE — 7100000001 HC PACU RECOVERY - ADDTL 15 MIN: Performed by: PODIATRIST

## 2024-07-03 PROCEDURE — 3600000002 HC SURGERY LEVEL 2 BASE: Performed by: PODIATRIST

## 2024-07-03 PROCEDURE — 85025 COMPLETE CBC W/AUTO DIFF WBC: CPT

## 2024-07-03 PROCEDURE — 82962 GLUCOSE BLOOD TEST: CPT

## 2024-07-03 PROCEDURE — 2500000003 HC RX 250 WO HCPCS: Performed by: PODIATRIST

## 2024-07-03 PROCEDURE — 7100000000 HC PACU RECOVERY - FIRST 15 MIN: Performed by: PODIATRIST

## 2024-07-03 PROCEDURE — 36415 COLL VENOUS BLD VENIPUNCTURE: CPT

## 2024-07-03 DEVICE — RESORBABLE BEAD KIT - FAST CURE
Type: IMPLANTABLE DEVICE | Site: FOOT | Status: FUNCTIONAL
Brand: OSTEOSET

## 2024-07-03 RX ORDER — IPRATROPIUM BROMIDE AND ALBUTEROL SULFATE 2.5; .5 MG/3ML; MG/3ML
1 SOLUTION RESPIRATORY (INHALATION)
Status: DISCONTINUED | OUTPATIENT
Start: 2024-07-03 | End: 2024-07-03

## 2024-07-03 RX ORDER — FENTANYL CITRATE 50 UG/ML
50 INJECTION, SOLUTION INTRAMUSCULAR; INTRAVENOUS EVERY 5 MIN PRN
Status: DISCONTINUED | OUTPATIENT
Start: 2024-07-03 | End: 2024-07-03

## 2024-07-03 RX ORDER — DEXTROSE MONOHYDRATE 50 MG/ML
INJECTION, SOLUTION INTRAVENOUS CONTINUOUS
Status: DISCONTINUED | OUTPATIENT
Start: 2024-07-03 | End: 2024-07-13

## 2024-07-03 RX ORDER — SODIUM CHLORIDE 9 MG/ML
INJECTION, SOLUTION INTRAVENOUS CONTINUOUS
Status: DISCONTINUED | OUTPATIENT
Start: 2024-07-03 | End: 2024-07-03

## 2024-07-03 RX ORDER — HYDRALAZINE HYDROCHLORIDE 20 MG/ML
10 INJECTION INTRAMUSCULAR; INTRAVENOUS
Status: DISCONTINUED | OUTPATIENT
Start: 2024-07-03 | End: 2024-07-03

## 2024-07-03 RX ORDER — ACETAMINOPHEN 160 MG/5ML
650 LIQUID ORAL
Status: DISCONTINUED | OUTPATIENT
Start: 2024-07-03 | End: 2024-07-03

## 2024-07-03 RX ORDER — DEXTROSE MONOHYDRATE 100 MG/ML
INJECTION, SOLUTION INTRAVENOUS CONTINUOUS PRN
Status: DISCONTINUED | OUTPATIENT
Start: 2024-07-03 | End: 2024-07-03

## 2024-07-03 RX ORDER — LIDOCAINE HYDROCHLORIDE 20 MG/ML
INJECTION, SOLUTION EPIDURAL; INFILTRATION; INTRACAUDAL; PERINEURAL PRN
Status: DISCONTINUED | OUTPATIENT
Start: 2024-07-03 | End: 2024-07-03 | Stop reason: SDUPTHER

## 2024-07-03 RX ORDER — ONDANSETRON 2 MG/ML
4 INJECTION INTRAMUSCULAR; INTRAVENOUS
Status: DISCONTINUED | OUTPATIENT
Start: 2024-07-03 | End: 2024-07-03

## 2024-07-03 RX ORDER — DROPERIDOL 2.5 MG/ML
0.62 INJECTION, SOLUTION INTRAMUSCULAR; INTRAVENOUS
Status: DISCONTINUED | OUTPATIENT
Start: 2024-07-03 | End: 2024-07-03

## 2024-07-03 RX ORDER — NALOXONE HYDROCHLORIDE 0.4 MG/ML
INJECTION, SOLUTION INTRAMUSCULAR; INTRAVENOUS; SUBCUTANEOUS PRN
Status: DISCONTINUED | OUTPATIENT
Start: 2024-07-03 | End: 2024-07-03

## 2024-07-03 RX ORDER — PROPOFOL 10 MG/ML
INJECTION, EMULSION INTRAVENOUS PRN
Status: DISCONTINUED | OUTPATIENT
Start: 2024-07-03 | End: 2024-07-03 | Stop reason: SDUPTHER

## 2024-07-03 RX ORDER — SODIUM CHLORIDE 0.9 % (FLUSH) 0.9 %
5-40 SYRINGE (ML) INJECTION EVERY 12 HOURS SCHEDULED
Status: DISCONTINUED | OUTPATIENT
Start: 2024-07-03 | End: 2024-07-03 | Stop reason: HOSPADM

## 2024-07-03 RX ORDER — FAMOTIDINE 20 MG/1
20 TABLET, FILM COATED ORAL ONCE
Status: DISCONTINUED | OUTPATIENT
Start: 2024-07-03 | End: 2024-07-03 | Stop reason: HOSPADM

## 2024-07-03 RX ORDER — SODIUM CHLORIDE 0.9 % (FLUSH) 0.9 %
5-40 SYRINGE (ML) INJECTION PRN
Status: DISCONTINUED | OUTPATIENT
Start: 2024-07-03 | End: 2024-07-03 | Stop reason: HOSPADM

## 2024-07-03 RX ORDER — FENTANYL CITRATE 50 UG/ML
25 INJECTION, SOLUTION INTRAMUSCULAR; INTRAVENOUS EVERY 5 MIN PRN
Status: DISCONTINUED | OUTPATIENT
Start: 2024-07-03 | End: 2024-07-03

## 2024-07-03 RX ORDER — DEXTROSE MONOHYDRATE 100 MG/ML
INJECTION, SOLUTION INTRAVENOUS CONTINUOUS PRN
Status: DISCONTINUED | OUTPATIENT
Start: 2024-07-03 | End: 2024-07-03 | Stop reason: HOSPADM

## 2024-07-03 RX ORDER — SODIUM CHLORIDE 0.9 % (FLUSH) 0.9 %
5-40 SYRINGE (ML) INJECTION EVERY 12 HOURS SCHEDULED
Status: DISCONTINUED | OUTPATIENT
Start: 2024-07-03 | End: 2024-07-03

## 2024-07-03 RX ORDER — SODIUM CHLORIDE 9 MG/ML
INJECTION, SOLUTION INTRAVENOUS CONTINUOUS
Status: DISCONTINUED | OUTPATIENT
Start: 2024-07-03 | End: 2024-07-03 | Stop reason: HOSPADM

## 2024-07-03 RX ORDER — INSULIN LISPRO 100 [IU]/ML
0-12 INJECTION, SOLUTION INTRAVENOUS; SUBCUTANEOUS ONCE
Status: DISCONTINUED | OUTPATIENT
Start: 2024-07-03 | End: 2024-07-03 | Stop reason: HOSPADM

## 2024-07-03 RX ORDER — DIPHENHYDRAMINE HYDROCHLORIDE 50 MG/ML
12.5 INJECTION INTRAMUSCULAR; INTRAVENOUS
Status: DISCONTINUED | OUTPATIENT
Start: 2024-07-03 | End: 2024-07-03

## 2024-07-03 RX ORDER — SODIUM CHLORIDE 9 MG/ML
INJECTION, SOLUTION INTRAVENOUS CONTINUOUS PRN
Status: DISCONTINUED | OUTPATIENT
Start: 2024-07-03 | End: 2024-07-03 | Stop reason: SDUPTHER

## 2024-07-03 RX ORDER — SODIUM CHLORIDE 9 MG/ML
INJECTION, SOLUTION INTRAVENOUS PRN
Status: DISCONTINUED | OUTPATIENT
Start: 2024-07-03 | End: 2024-07-03 | Stop reason: HOSPADM

## 2024-07-03 RX ADMIN — SODIUM CHLORIDE: 9 INJECTION, SOLUTION INTRAVENOUS at 13:28

## 2024-07-03 RX ADMIN — SODIUM CHLORIDE, PRESERVATIVE FREE 10 ML: 5 INJECTION INTRAVENOUS at 21:00

## 2024-07-03 RX ADMIN — SODIUM CHLORIDE, PRESERVATIVE FREE 10 ML: 5 INJECTION INTRAVENOUS at 10:19

## 2024-07-03 RX ADMIN — PIPERACILLIN AND TAZOBACTAM 3375 MG: 3; .375 INJECTION, POWDER, FOR SOLUTION INTRAVENOUS at 10:16

## 2024-07-03 RX ADMIN — INSULIN GLARGINE 10 UNITS: 100 INJECTION, SOLUTION SUBCUTANEOUS at 21:28

## 2024-07-03 RX ADMIN — SODIUM CHLORIDE: 9 INJECTION, SOLUTION INTRAVENOUS at 11:21

## 2024-07-03 RX ADMIN — SACUBITRIL AND VALSARTAN 1 TABLET: 24; 26 TABLET, FILM COATED ORAL at 10:04

## 2024-07-03 RX ADMIN — Medication 5 MG: at 21:40

## 2024-07-03 RX ADMIN — HEPARIN SODIUM 5000 UNITS: 5000 INJECTION INTRAVENOUS; SUBCUTANEOUS at 06:06

## 2024-07-03 RX ADMIN — HEPARIN SODIUM 5000 UNITS: 5000 INJECTION INTRAVENOUS; SUBCUTANEOUS at 21:39

## 2024-07-03 RX ADMIN — DOCUSATE SODIUM 50 MG: 50 LIQUID ORAL at 20:09

## 2024-07-03 RX ADMIN — Medication 16 G: at 02:48

## 2024-07-03 RX ADMIN — HYDROMORPHONE HYDROCHLORIDE 0.25 MG: 1 INJECTION, SOLUTION INTRAMUSCULAR; INTRAVENOUS; SUBCUTANEOUS at 05:47

## 2024-07-03 RX ADMIN — PIPERACILLIN AND TAZOBACTAM 3375 MG: 3; .375 INJECTION, POWDER, FOR SOLUTION INTRAVENOUS at 22:49

## 2024-07-03 RX ADMIN — HYDROMORPHONE HYDROCHLORIDE 0.25 MG: 1 INJECTION, SOLUTION INTRAMUSCULAR; INTRAVENOUS; SUBCUTANEOUS at 21:25

## 2024-07-03 RX ADMIN — TIMOLOL MALEATE 1 DROP: 5 SOLUTION OPHTHALMIC at 10:17

## 2024-07-03 RX ADMIN — OXYCODONE AND ACETAMINOPHEN 1 TABLET: 7.5; 325 TABLET ORAL at 19:29

## 2024-07-03 RX ADMIN — CARVEDILOL 3.12 MG: 3.12 TABLET, FILM COATED ORAL at 06:06

## 2024-07-03 RX ADMIN — PROPOFOL 50 MG: 10 INJECTION, EMULSION INTRAVENOUS at 13:34

## 2024-07-03 RX ADMIN — LIDOCAINE HYDROCHLORIDE 100 MG: 20 INJECTION, SOLUTION EPIDURAL; INFILTRATION; INTRACAUDAL; PERINEURAL at 13:31

## 2024-07-03 RX ADMIN — DEXTROSE MONOHYDRATE: 50 INJECTION, SOLUTION INTRAVENOUS at 18:40

## 2024-07-03 RX ADMIN — PANTOPRAZOLE SODIUM 40 MG: 40 TABLET, DELAYED RELEASE ORAL at 06:06

## 2024-07-03 RX ADMIN — SACUBITRIL AND VALSARTAN 1 TABLET: 24; 26 TABLET, FILM COATED ORAL at 21:40

## 2024-07-03 RX ADMIN — DEXTROSE AND SODIUM CHLORIDE 500 ML: 5; 900 INJECTION, SOLUTION INTRAVENOUS at 10:02

## 2024-07-03 RX ADMIN — PROPOFOL 20 MG: 10 INJECTION, EMULSION INTRAVENOUS at 13:43

## 2024-07-03 RX ADMIN — LACTULOSE 30 G: 20 SOLUTION ORAL at 21:30

## 2024-07-03 ASSESSMENT — PAIN SCALES - GENERAL
PAINLEVEL_OUTOF10: 0
PAINLEVEL_OUTOF10: 0
PAINLEVEL_OUTOF10: 5
PAINLEVEL_OUTOF10: 7
PAINLEVEL_OUTOF10: 0
PAINLEVEL_OUTOF10: 6
PAINLEVEL_OUTOF10: 4
PAINLEVEL_OUTOF10: 3

## 2024-07-03 ASSESSMENT — PAIN DESCRIPTION - FREQUENCY
FREQUENCY: INTERMITTENT

## 2024-07-03 ASSESSMENT — PAIN DESCRIPTION - ORIENTATION
ORIENTATION: RIGHT

## 2024-07-03 ASSESSMENT — PAIN DESCRIPTION - DESCRIPTORS
DESCRIPTORS: ACHING

## 2024-07-03 ASSESSMENT — PAIN DESCRIPTION - LOCATION
LOCATION: FOOT

## 2024-07-03 ASSESSMENT — PAIN DESCRIPTION - PAIN TYPE
TYPE: SURGICAL PAIN

## 2024-07-03 ASSESSMENT — PAIN SCALES - WONG BAKER: WONGBAKER_NUMERICALRESPONSE: NO HURT

## 2024-07-03 ASSESSMENT — PAIN DESCRIPTION - ONSET
ONSET: GRADUAL

## 2024-07-03 NOTE — PLAN OF CARE
Problem: Discharge Planning  Goal: Discharge to home or other facility with appropriate resources  7/3/2024 1655 by Cande Deng RN  Outcome: Progressing  7/3/2024 0455 by Melba Stone RN  Outcome: Progressing     Problem: Pain  Goal: Verbalizes/displays adequate comfort level or baseline comfort level  7/3/2024 1655 by Cande Deng RN  Outcome: Progressing  7/3/2024 0455 by Melba Stone RN  Outcome: Progressing     Problem: ABCDS Injury Assessment  Goal: Absence of physical injury  7/3/2024 1655 by Cande Deng RN  Outcome: Progressing  7/3/2024 0455 by Melba Stone RN  Outcome: Progressing     Problem: Safety - Adult  Goal: Free from fall injury  7/3/2024 1655 by Cande Deng RN  Outcome: Progressing  7/3/2024 0455 by Melba Stone RN  Outcome: Progressing     Problem: Chronic Conditions and Co-morbidities  Goal: Patient's chronic conditions and co-morbidity symptoms are monitored and maintained or improved  7/3/2024 1655 by Cande Deng RN  Outcome: Progressing  7/3/2024 0455 by Melba Stone RN  Outcome: Progressing

## 2024-07-03 NOTE — PROGRESS NOTES
Medicine Progress Note    Patient: Jigna Conner   Age:  68 y.o.  DOA: 6/25/2024   Admit Dx / CC: Hypokalemia [E87.6]  Hyperglycemia [R73.9]  ESRD (end stage renal disease) (HCC) [N18.6]  Diabetic foot infection (HCC) [E11.628, L08.9]  Anemia, unspecified type [D64.9]  LOS:  LOS: 8 days     Assessment/Plan   Principal Problem:    Diabetic foot infection (HCC)  Active Problems:    Hypertension    Anemia of renal disease    ESRD on dialysis (HCC)    Diabetes mellitus (HCC)    Hypokalemia    Chronic heart failure with preserved ejection fraction (HFpEF) (HCC)    Coronary artery disease involving native coronary artery of native heart without angina pectoris    History of MI (myocardial infarction)    Class 2 obesity in adult    Constipation  Resolved Problems:    * No resolved hospital problems. *      Additional Plan notes     Diabetes Foot Infection S/P RIGHT FOOT INCISION AND DRAINAGE, PARTIAL RESECTION SECOND METATARSAL, DECOMPRESSION OF SOFT TISSUE GAS, BONE BIOPSY FROM HALLUX  POD# 0  - Right Foot I&D performed by Marcin Gabriel DPM on 6/26/2024  IV Vancomycin and IV Piperacillin-Tazobactam (a.k.a. Zosyn).  Follow cultures    Hypokalemia  Replete, monitor    Constipation  -Bowel regimen    End-Stage Renal Disease (a.k.a. ESRD) on Hemodialysis (a.k.a. HD)  Nephrological services consulted for routine Hemodialysis.  Status post hemodialysis today June 29     Chronic Heart Failure with Preserved Ejection Fraction (a.k.a. HFpEF)  - Pro-BNP 3,639 pg/mL on 6/27/2024  - CXR on 6/27/2024 shows \"Cardiomegaly without acute pulmonary process.\"  Strict I/Os, Daily Weight, Fluid Restriction 2.0 L/day, and continue home Furosemide, Carvedilol, and Sacubitril-Valsartan (a.k.a. Entresto).     Coronary Artery Disease (a.k.a. CAD)  Continue home Aspirin.     Hypertension  No current home medications for this issue.  HOLD home Midodrine at this time.  RESUME home Midodrine as is necessary.     Anemia of Renal

## 2024-07-03 NOTE — PROGRESS NOTES
Progress Note    68-year-old female with past medical history of ESRD, diabetes, heart failure with preserved ejection fraction admitted for diabetic foot infection, following for ESRD management.  Subjective      Overnight event noted  No complaints offered        IMPRESSION:   ESRD, Tuesday Thursday Saturday,   Access left arm AV fistula  Right foot wound infection,  Anemia, received blood transfusion during this admission  Diabetes  Hypertension  Heart failure with preserved ejection fraction.   PLAN:   HD per schedule  Spoke with . Will call abx to her HD unit . Vancomycin 750 mg, cefepime 1 g with each session till 8/7.   Adjust medication per ESRD status.     Current Facility-Administered Medications   Medication Dose Route Frequency    dextrose 5 % and 0.9 % nacl bolus  500 mL IntraVENous Once    insulin lispro (HUMALOG,ADMELOG) injection vial 0-12 Units  0-12 Units SubCUTAneous Once    glucose chewable tablet 16 g  4 tablet Oral PRN    dextrose bolus 10% 125 mL  125 mL IntraVENous PRN    Or    dextrose bolus 10% 250 mL  250 mL IntraVENous PRN    glucagon injection 1 mg  1 mg SubCUTAneous PRN    dextrose 10 % infusion   IntraVENous Continuous PRN    famotidine (PEPCID) tablet 20 mg  20 mg Oral Once    sodium chloride flush 0.9 % injection 5-40 mL  5-40 mL IntraVENous 2 times per day    sodium chloride flush 0.9 % injection 5-40 mL  5-40 mL IntraVENous PRN    0.9 % sodium chloride infusion   IntraVENous PRN    0.9 % sodium chloride infusion   IntraVENous Continuous    heparin (porcine) injection 5,000 Units  5,000 Units SubCUTAneous 3 times per day    lactobacillus (CULTURELLE) capsule 1 capsule  1 capsule Oral Daily with breakfast    oxyCODONE-acetaminophen (PERCOCET) 7.5-325 MG per tablet 1 tablet  1 tablet Oral Q6H PRN    naloxone (NARCAN) injection 0.4 mg  0.4 mg IntraVENous PRN    ipratropium 0.5 mg-albuterol 2.5 mg (DUONEB) nebulizer solution 1 Dose  1 Dose Inhalation Q4H PRN    melatonin  distress   HEENT sclera anicteric, supple neck, no thyromegaly  CVS: S1S2 heard,  no rub  RS: + air entry b/l,   Abd: Soft, Non tender, Not distended,   Neuro: non focal, awake, alert , CN II-XII are grossly intact  Extrm: R foot dressing  Skin: no visible  Rash  Musculoskeletal: right foot under dressing.     Procedures/imaging: see electronic medical records for all procedures, Xrays and details which were not copied into this note but were reviewed prior to creation of Plan          RIO HANCOCK MD  July 3, 2024  [facility]  Office [phone]

## 2024-07-03 NOTE — CARE COORDINATION
CM attempted to meet with pt to discuss transitional care plan and pt stated she is on a call and CM should come back.      0926:    Sent message to Norma of [facility] to please review for placement.      WENDY MccarthyN RN  Care Management

## 2024-07-03 NOTE — PERIOP NOTE
TRANSFER - IN REPORT:    Verbal report received from LUCERO Castellon  on Jigna Conner  being received from Room 512 for ordered procedure      Report consisted of patient's Situation, Background, Assessment and   Recommendations(SBAR).     Information from the following report(s) Nurse Handoff Report was reviewed with the receiving nurse.    Opportunity for questions and clarification was provided.      Assessment completed upon patient's arrival to unit and care assumed.

## 2024-07-03 NOTE — PROGRESS NOTES
Pts BG was found to be 61. Pt asymptomatic. Pt given 8 oz OJ and rechecked at 84. Pt remain asymptomatic. Hx of dropping sugars, per pt, including this morning.

## 2024-07-03 NOTE — PROGRESS NOTES
Attempted pt for OT tx X 2. Pt unable to be seen 2/2 GIORGIO for procedure. Will continue to follow up as schedule allows. Thank you  CAIN Ortega/KENYA

## 2024-07-03 NOTE — PERIOP NOTE
TRANSFER - IN REPORT:    Verbal report received from Maureen BARKER on Jigna Conner  being received from [address] [place] [address] for ordered procedure      Report consisted of patient's Situation, Background, Assessment and   Recommendations(SBAR).     Information from the following report(s) Nurse Handoff Report was reviewed with the receiving nurse.    Opportunity for questions and clarification was provided.      Assessment completed upon patient's arrival to unit and care assumed.

## 2024-07-03 NOTE — ANESTHESIA POSTPROCEDURE EVALUATION
Department of Anesthesiology  Postprocedure Note    Patient: Jigna Conner  MRN: [id number]  YOB: 1955  Date of evaluation: 7/3/2024    Procedure Summary       Date: 07/03/24 Room / Location: [facility] MAIN 05 / [facility] MAIN OR    Anesthesia Start: 1326 Anesthesia Stop: 1429    Procedure: RIGHT FOOT IRRIGATION AND DEBRIDEMENT, REMOVAL OF INFECTED/NON-VIABLE TISSUE, WOUND VAC PLACEMENT (Right) Diagnosis:       Cellulitis of right foot      (Cellulitis of right foot [L03.115])    Surgeons: Marcin Gabriel DPM Responsible Provider: Andrey Bales MD    Anesthesia Type: MAC ASA Status: 4            Anesthesia Type: MAC    Fernando Phase I: Fernando Score: 10    Fernando Phase II:      Anesthesia Post Evaluation    Patient location during evaluation: PACU  Patient participation: complete - patient participated  Level of consciousness: sleepy but conscious  Pain score: 0  Airway patency: patent  Nausea & Vomiting: no nausea and no vomiting  Cardiovascular status: blood pressure returned to baseline  Respiratory status: acceptable  Hydration status: euvolemic  Pain management: adequate    No notable events documented.

## 2024-07-03 NOTE — PROGRESS NOTES
Podiatry Progress Note    Patient: Jigna Conner               Sex: female          DOA: [unfilled]       YOB: 1955      Age:  68 y.o.        LOS:  LOS: 8 days     POD Day of Surgery  Procedure:   Procedure(s):  RIGHT FOOT DEBRIDEMENT INCISION AND DRAINAGE      Subjective:     Patient seen resting quiet and comfortably and no apparent distress. Patient has intact dressings to right foot. Bone pathology returned positive for osteomyelitis and culture growing GNR. Currently on vanco and cefepime but ID recommendation. Wound vac cannister filled with purulence mixed with blood. Denies N/V/C/F.        Objective:       Physical Exam:  Right foot wounds noted to purulent discharge with tenderness on palpation. Exposure of abductor hallucis muscle/tendon medially which appeared fibrotic with soaked in purulence.       Assessment:     Principal Problem:    Diabetic foot infection (HCC)  Active Problems:    Hypertension    Anemia of renal disease    ESRD on dialysis (HCC)    Diabetes mellitus (HCC)    Hypokalemia    Chronic heart failure with preserved ejection fraction (HFpEF) (HCC)    Coronary artery disease involving native coronary artery of native heart without angina pectoris    History of MI (myocardial infarction)    Class 2 obesity in adult    Constipation  Resolved Problems:    * No resolved hospital problems. *      Plan/Recommendations/Medical Decision Making:     - Patient will need urgent OR debridement with removal of devitalized tendon and tissue and exposing healthy, granular tissue which would improve healing potential.   - Continue antibiotics per ID recommendation.   - will continue with wound vac therapy.   - Remain NWB to right foot.   - Medical management per primary team.

## 2024-07-03 NOTE — PROGRESS NOTES
Physical Therapy    Pt not seen for skilled PT due to:    []  Nausea/vomiting  []  Eating  []  Pain  []  Pt lethargic  [x]  Off Unit (1043, 1157) for scheduled procedure  Other:     Will f/u later as schedule allows. Thank you.  Alyssa Chowdary, PT, DPT

## 2024-07-03 NOTE — PERIOP NOTE
Patient /Family /Designee has been informed that [facility] is not responsible for patient belongings per policy and the signed [facility] Patient Agreement document.  Personal items should be sent home or checked in with security.  Patient /Family /Designee selected the following action:                            []  Send personal items home with a family member or friend                                                 []  Check in personal items with security, excluding clothing                            [x]  Maintain personal items at the bedside, against recommendation                                 by Dawson Hernandez [facility]                                   ** If patient /family /designee chooses to maintain personal items at the bedside,                                      Complete the patient belongings inventory in the EMR.     All items are currently located in pt's room 512

## 2024-07-03 NOTE — PLAN OF CARE
Problem: Discharge Planning  Goal: Discharge to home or other facility with appropriate resources  Outcome: Progressing     Problem: Pain  Goal: Verbalizes/displays adequate comfort level or baseline comfort level  Outcome: Progressing     Problem: ABCDS Injury Assessment  Goal: Absence of physical injury  Outcome: Progressing     Problem: Safety - Adult  Goal: Free from fall injury  Outcome: Progressing     Problem: Chronic Conditions and Co-morbidities  Goal: Patient's chronic conditions and co-morbidity symptoms are monitored and maintained or improved  Outcome: Progressing     Problem: Skin/Tissue Integrity  Goal: Absence of new skin breakdown  Description: 1.  Monitor for areas of redness and/or skin breakdown  2.  Assess vascular access sites hourly  3.  Every 4-6 hours minimum:  Change oxygen saturation probe site  4.  Every 4-6 hours:  If on nasal continuous positive airway pressure, respiratory therapy assess nares and determine need for appliance change or resting period.  Outcome: Progressing     Problem: Cardiovascular - Adult  Goal: Maintains optimal cardiac output and hemodynamic stability  Outcome: Progressing  Flowsheets (Taken 7/2/2024 2028)  Maintains optimal cardiac output and hemodynamic stability: Monitor blood pressure and heart rate  Goal: Absence of cardiac dysrhythmias or at baseline  Outcome: Progressing     Problem: Skin/Tissue Integrity - Adult  Goal: Skin integrity remains intact  Outcome: Progressing  Goal: Incisions, wounds, or drain sites healing without S/S of infection  Outcome: Progressing  Goal: Oral mucous membranes remain intact  Outcome: Progressing     Problem: Musculoskeletal - Adult  Goal: Return mobility to safest level of function  Outcome: Progressing  Goal: Maintain proper alignment of affected body part  Outcome: Progressing  Goal: Return ADL status to a safe level of function  Outcome: Progressing     Problem: Gastrointestinal - Adult  Goal: Minimal or absence of

## 2024-07-03 NOTE — PROGRESS NOTES
0233  Blood glucose level of 57 mg/dL    0240 A recheck, a blood glucose level of 55 mg/dL.    Patient is asymptomatic. Alert and oriented.     0248 16 gram of glucose tablet given. Will recheck blood glucose in 15 minutes.    0302 Rechecked, a glucose level of 73 mg/dL.     Patient remain alert and oriented.

## 2024-07-03 NOTE — PROGRESS NOTES
[facility]  (A Division of [facility])    Follow-up Note      Date of Admission: 2024       Date of Note:  7/3/2024          Current Antimicrobials:    Prior Antimicrobials:  Zosyn  to present  Vancomycin  to present      Assessment:         Osteomyelitis of the right foot:   - wound culture with mixed marimar including gram-negative rods   -Status post I&D right foot abscess and second metatarsal debridement on .  Diabetes mellitus type 2  Peripheral artery disease: Status post angiography   End-stage renal disease on dialysis    Plan:   Continue current wound care with wound VAC-will need home health  Will plan to transition to vancomycin plus cefepime upon discharge. Stop date 24    -Discussed ABX with Dr. Vargas- they have been arranged as above.    For now, continue with vancomycin and Zosyn since cultures were nonspecific    Trend CBC, BMP, ESR, CRP at least twice weekly  Tight glucose control for optimal wound healing.    Discussed with Dr. Levy  Follow-up as outpatient 2 to 3 weeks    John Trivedi DO  [facility]  [address] [address]  Office: [phone], Ext [phone]       Microbiology:   right foot tissue culture: Diphtheroids   wound culture: Heavy mixed skin marimar with gram-negative rods   blood cultures no growth to date x 2    Lines / Catheters:  Peripheral    Subjective:   Patient seen and examined.   Seen in Pre-op holding.  Overall doing okay.  Pain is controlled.  Has a modest amount of pain in her foot.  No overnight fevers.    Tmax 99.1  WBC 10.4    Objective:      /64   Pulse 67   Temp 98.7 °F (37.1 °C) (Oral)   Resp 18   Ht 1.626 m (5' 4\")   Wt 90.4 kg (199 lb 4.7 oz)   SpO2 100%   BMI 34.21 kg/m²   Temp (24hrs), Av.6 °F (37 °C), Min:98.2 °F (36.8 °C), Max:99.1 °F (37.3 °C)        General:   awake alert and oriented, non-toxic   Skin:     Status: Completed Specimen: Foot, Right Updated: 06/28/24 1610     Special Requests --        RIGHT FOOT  ABSCESS       Culture NO ANAEROBES ISOLATED       Culture, Blood 2 [[id number]] Collected: 06/25/24 1835    Order Status: Completed Specimen: Blood Updated: 07/01/24 0711     Special Requests NO SPECIAL REQUESTS        Culture NO GROWTH 6 DAYS       Culture, Blood 1 [[id number]] Collected: 06/25/24 1825    Order Status: Completed Specimen: Blood Updated: 07/01/24 0711     Special Requests NO SPECIAL REQUESTS        Culture NO GROWTH 6 DAYS                 John Trivedi DO   7/3/2024

## 2024-07-03 NOTE — ANESTHESIA PRE PROCEDURE
Automatic Reconciliation, Ar   sucroferric oxyhydroxide (VELPHORO) 500 MG CHEW chewable tablet Take 1 tablet by mouth 3 times daily (with meals)    Automatic Reconciliation, Ar   timolol (TIMOPTIC) 0.5 % ophthalmic solution Apply to eye 2 times daily 2/29/20   Automatic Reconciliation, Ar       Current medications:    Current Facility-Administered Medications   Medication Dose Route Frequency Provider Last Rate Last Admin    insulin lispro (HUMALOG,ADMELOG) injection vial 0-12 Units  0-12 Units SubCUTAneous Once Sunitha Palencia APRN - CRNA        glucose chewable tablet 16 g  4 tablet Oral PRN Sunitha Palencia, APRN - CRNA        dextrose bolus 10% 125 mL  125 mL IntraVENous PRN Sunitha Palencia, APRN - CRNA        Or    dextrose bolus 10% 250 mL  250 mL IntraVENous PRN Sunitha Palencia, APRN - CRNA        glucagon injection 1 mg  1 mg SubCUTAneous PRN Sunitha Palencia, APRN - CRNA        dextrose 10 % infusion   IntraVENous Continuous PRN Sunitha Palencia APRN - BUCKY        famotidine (PEPCID) tablet 20 mg  20 mg Oral Once Sunitha Palencia APRN - CRNA        sodium chloride flush 0.9 % injection 5-40 mL  5-40 mL IntraVENous 2 times per day Sunitha Palencia, APRN - CRNA        sodium chloride flush 0.9 % injection 5-40 mL  5-40 mL IntraVENous PRN Sunitha Palencia, APRN - CRNA        0.9 % sodium chloride infusion   IntraVENous PRN Sunitha Palencia, APRN - CRNA        0.9 % sodium chloride infusion   IntraVENous Continuous Sunitha Palencia APRN - CRNA 50 mL/hr at 07/03/24 1121 New Bag at 07/03/24 1121    heparin (porcine) injection 5,000 Units  5,000 Units SubCUTAneous 3 times per day Ben Muro MD   5,000 Units at 07/03/24 0606    lactobacillus (CULTURELLE) capsule 1 capsule  1 capsule Oral Daily with breakfast Ben Muro MD   1 capsule at 07/02/24 0914    oxyCODONE-acetaminophen (PERCOCET) 7.5-325 MG per tablet 1 tablet  1 tablet Oral Q6H PRN Ryan Bolanos DO   1 tablet at 07/02/24 0793

## 2024-07-03 NOTE — PROGRESS NOTES
[facility]   Pharmacy Pharmacokinetic Monitoring Service - Vancomycin    Indication: DM foot infection  Target Pre-Dialysis Concentration: 21-24 mg/L  Day of Therapy: 8  Additional Antimicrobials: pip-tazo    Pertinent Laboratory Values:   Wt Readings from Last 1 Encounters:   07/03/24 90.4 kg (199 lb 4.7 oz)     Temp Readings from Last 1 Encounters:   07/03/24 99.4 °F (37.4 °C) (Oral)     Recent Labs     07/01/24  0435   WBC 10.4     Pertinent Cultures:  Date Source Results   6/25 blood NGTD   6/26 wound (r foot abscess) GPC; GPR   6/26 tissue (grt toe) Diphtheroids    MRSA Nasal Swab: NA    Assessment:  Date Current Dose Concentration (mg/L) Dialysis Session   6/25 2,000 mg x1 - no   6/26 - - no   6/27 - 27.6 yes   6/28 - - no   6/29  15.4 yes   6/30 - - no   7/1   no   7/2 750 mg x 1 19.1 yes   7/3 - - no     Plan:  ESRD on HD q TTS - dose by level  No dose today  Level on order for 7/4 with AM labs  Pharmacy will continue to monitor patient and adjust therapy as indicated    Thank you for the consult,  Gianni Wolfe [facility]  7/3/2024

## 2024-07-03 NOTE — PROGRESS NOTES
ARU/IPR REFERRAL CONTACT NOTE  [facility]      Thank you for the opportunity to review this patient's case for admission to [facility].    Referral received: 7/3/2024 time:10:00am    Based on our pre-admission screening:                          [ x] Our Team/Medical Director is following this case.   Comments: Referral received from care manager. Will review with team.    Will require PT/OT updated notes for detailed review.    Again, Thank you for this referral. Should you have any questions please do not hesitate to call.     Sincerely,  Norma Barone    Clinical Liaison  [facility]  [phone]

## 2024-07-04 LAB
GLUCOSE BLD STRIP.AUTO-MCNC: 118 MG/DL (ref 70–110)
GLUCOSE BLD STRIP.AUTO-MCNC: 166 MG/DL (ref 70–110)
GLUCOSE BLD STRIP.AUTO-MCNC: 176 MG/DL (ref 70–110)
GLUCOSE BLD STRIP.AUTO-MCNC: 205 MG/DL (ref 70–110)
GLUCOSE BLD STRIP.AUTO-MCNC: 46 MG/DL (ref 70–110)
GLUCOSE BLD STRIP.AUTO-MCNC: 53 MG/DL (ref 70–110)
VANCOMYCIN SERPL-MCNC: 21.3 UG/ML (ref 5–40)

## 2024-07-04 PROCEDURE — 97164 PT RE-EVAL EST PLAN CARE: CPT

## 2024-07-04 PROCEDURE — 2580000003 HC RX 258: Performed by: PHYSICIAN ASSISTANT

## 2024-07-04 PROCEDURE — 6370000000 HC RX 637 (ALT 250 FOR IP): Performed by: INTERNAL MEDICINE

## 2024-07-04 PROCEDURE — 1100000000 HC RM PRIVATE

## 2024-07-04 PROCEDURE — 6370000000 HC RX 637 (ALT 250 FOR IP): Performed by: PHYSICIAN ASSISTANT

## 2024-07-04 PROCEDURE — 0LBV0ZZ EXCISION OF RIGHT FOOT TENDON, OPEN APPROACH: ICD-10-PCS | Performed by: PODIATRIST

## 2024-07-04 PROCEDURE — 6360000002 HC RX W HCPCS: Performed by: INTERNAL MEDICINE

## 2024-07-04 PROCEDURE — 80202 ASSAY OF VANCOMYCIN: CPT

## 2024-07-04 PROCEDURE — 6360000002 HC RX W HCPCS: Performed by: EMERGENCY MEDICINE

## 2024-07-04 PROCEDURE — 36415 COLL VENOUS BLD VENIPUNCTURE: CPT

## 2024-07-04 PROCEDURE — 97535 SELF CARE MNGMENT TRAINING: CPT

## 2024-07-04 PROCEDURE — 3E0V329 INTRODUCTION OF OTHER ANTI-INFECTIVE INTO BONES, PERCUTANEOUS APPROACH: ICD-10-PCS | Performed by: PODIATRIST

## 2024-07-04 PROCEDURE — 99232 SBSQ HOSP IP/OBS MODERATE 35: CPT | Performed by: INTERNAL MEDICINE

## 2024-07-04 PROCEDURE — 94761 N-INVAS EAR/PLS OXIMETRY MLT: CPT

## 2024-07-04 PROCEDURE — 97530 THERAPEUTIC ACTIVITIES: CPT

## 2024-07-04 PROCEDURE — 2580000003 HC RX 258: Performed by: INTERNAL MEDICINE

## 2024-07-04 PROCEDURE — 82962 GLUCOSE BLOOD TEST: CPT

## 2024-07-04 PROCEDURE — 90935 HEMODIALYSIS ONE EVALUATION: CPT

## 2024-07-04 PROCEDURE — 6370000000 HC RX 637 (ALT 250 FOR IP): Performed by: EMERGENCY MEDICINE

## 2024-07-04 RX ORDER — SEVELAMER CARBONATE 800 MG/1
800 TABLET, FILM COATED ORAL
Status: DISCONTINUED | OUTPATIENT
Start: 2024-07-04 | End: 2024-07-18 | Stop reason: HOSPADM

## 2024-07-04 RX ADMIN — HEPARIN SODIUM 5000 UNITS: 5000 INJECTION INTRAVENOUS; SUBCUTANEOUS at 22:02

## 2024-07-04 RX ADMIN — ASPIRIN 81 MG CHEWABLE TABLET 81 MG: 81 TABLET CHEWABLE at 09:48

## 2024-07-04 RX ADMIN — HEPARIN SODIUM 5000 UNITS: 5000 INJECTION INTRAVENOUS; SUBCUTANEOUS at 04:46

## 2024-07-04 RX ADMIN — VANCOMYCIN HYDROCHLORIDE 750 MG: 750 INJECTION, POWDER, LYOPHILIZED, FOR SOLUTION INTRAVENOUS at 16:26

## 2024-07-04 RX ADMIN — HEPARIN SODIUM 5000 UNITS: 5000 INJECTION INTRAVENOUS; SUBCUTANEOUS at 18:20

## 2024-07-04 RX ADMIN — SACUBITRIL AND VALSARTAN 1 TABLET: 24; 26 TABLET, FILM COATED ORAL at 09:48

## 2024-07-04 RX ADMIN — PIPERACILLIN AND TAZOBACTAM 3375 MG: 3; .375 INJECTION, POWDER, FOR SOLUTION INTRAVENOUS at 22:04

## 2024-07-04 RX ADMIN — TIMOLOL MALEATE 1 DROP: 5 SOLUTION OPHTHALMIC at 02:25

## 2024-07-04 RX ADMIN — INSULIN GLARGINE 10 UNITS: 100 INJECTION, SOLUTION SUBCUTANEOUS at 21:59

## 2024-07-04 RX ADMIN — EPOETIN ALFA-EPBX 10000 UNITS: 10000 INJECTION, SOLUTION INTRAVENOUS; SUBCUTANEOUS at 21:59

## 2024-07-04 RX ADMIN — SEVELAMER CARBONATE 800 MG: 800 TABLET, FILM COATED ORAL at 18:21

## 2024-07-04 RX ADMIN — SODIUM CHLORIDE, PRESERVATIVE FREE 10 ML: 5 INJECTION INTRAVENOUS at 22:04

## 2024-07-04 RX ADMIN — ATORVASTATIN CALCIUM 10 MG: 10 TABLET, FILM COATED ORAL at 09:47

## 2024-07-04 RX ADMIN — DOCUSATE SODIUM 50 MG: 50 LIQUID ORAL at 22:03

## 2024-07-04 RX ADMIN — LATANOPROST 1 DROP: 50 SOLUTION/ DROPS OPHTHALMIC at 22:03

## 2024-07-04 RX ADMIN — Medication 5 MG: at 22:03

## 2024-07-04 RX ADMIN — TIMOLOL MALEATE 1 DROP: 5 SOLUTION OPHTHALMIC at 09:59

## 2024-07-04 RX ADMIN — OXYCODONE AND ACETAMINOPHEN 1 TABLET: 7.5; 325 TABLET ORAL at 09:27

## 2024-07-04 RX ADMIN — Medication 1 CAPSULE: at 09:48

## 2024-07-04 RX ADMIN — TIMOLOL MALEATE 1 DROP: 5 SOLUTION OPHTHALMIC at 22:04

## 2024-07-04 RX ADMIN — PANTOPRAZOLE SODIUM 40 MG: 40 TABLET, DELAYED RELEASE ORAL at 10:02

## 2024-07-04 RX ADMIN — PIPERACILLIN AND TAZOBACTAM 3375 MG: 3; .375 INJECTION, POWDER, FOR SOLUTION INTRAVENOUS at 09:57

## 2024-07-04 RX ADMIN — SACUBITRIL AND VALSARTAN 1 TABLET: 24; 26 TABLET, FILM COATED ORAL at 22:03

## 2024-07-04 RX ADMIN — SODIUM CHLORIDE, PRESERVATIVE FREE 10 ML: 5 INJECTION INTRAVENOUS at 09:59

## 2024-07-04 RX ADMIN — SENNOSIDES 8.6 MG: 8.6 TABLET, FILM COATED ORAL at 05:41

## 2024-07-04 ASSESSMENT — PAIN SCALES - GENERAL
PAINLEVEL_OUTOF10: 8
PAINLEVEL_OUTOF10: 0
PAINLEVEL_OUTOF10: 0

## 2024-07-04 ASSESSMENT — PAIN SCALES - WONG BAKER: WONGBAKER_NUMERICALRESPONSE: NO HURT

## 2024-07-04 ASSESSMENT — PAIN DESCRIPTION - LOCATION: LOCATION: LEG

## 2024-07-04 ASSESSMENT — PAIN DESCRIPTION - DESCRIPTORS: DESCRIPTORS: ACHING

## 2024-07-04 ASSESSMENT — PAIN - FUNCTIONAL ASSESSMENT: PAIN_FUNCTIONAL_ASSESSMENT: ACTIVITIES ARE NOT PREVENTED

## 2024-07-04 NOTE — PLAN OF CARE
Problem: Chronic Conditions and Co-morbidities  Goal: Patient's chronic conditions and co-morbidity symptoms are monitored and maintained or improved  Outcome: Progressing  Flowsheets (Taken 7/4/2024 1009 by Lalitha Norton, RN)  Care Plan - Patient's Chronic Conditions and Co-Morbidity Symptoms are Monitored and Maintained or Improved: Monitor and assess patient's chronic conditions and comorbid symptoms for stability, deterioration, or improvement  Note: HD Care plan  Time: 3 hrs  Dialysate: 2K+  2.5Ca++  [place]  Net UF: 2L as tolerated  Access: Aseptic care of ARELY AVF  Hemodynamic stability: Maintain BP WNL

## 2024-07-04 NOTE — PROGRESS NOTES
Medicine Progress Note    Patient: Jigna Conner   Age:  68 y.o.  DOA: 6/25/2024   Admit Dx / CC: Hypokalemia [E87.6]  Hyperglycemia [R73.9]  ESRD (end stage renal disease) (HCC) [N18.6]  Diabetic foot infection (HCC) [E11.628, L08.9]  Anemia, unspecified type [D64.9]  LOS:  LOS: 9 days     Assessment/Plan   Principal Problem:    Diabetic foot infection (HCC)  Active Problems:    Hypertension    Anemia of renal disease    ESRD on dialysis (HCC)    Diabetes mellitus (HCC)    Hypokalemia    Chronic heart failure with preserved ejection fraction (HFpEF) (HCC)    Coronary artery disease involving native coronary artery of native heart without angina pectoris    History of MI (myocardial infarction)    Class 2 obesity in adult    Constipation  Resolved Problems:    * No resolved hospital problems. *      Additional Plan notes     Diabetes Foot Infection S/P RIGHT FOOT INCISION AND DRAINAGE, PARTIAL RESECTION SECOND METATARSAL, DECOMPRESSION OF SOFT TISSUE GAS, BONE BIOPSY FROM HALLUX  POD# 0  - Right Foot I&D performed by Marcin Gabriel DPM on 6/26/2024  IV Vancomycin and IV Piperacillin-Tazobactam (a.k.a. Zosyn).  Follow cultures    Hypokalemia  Replete, monitor    Constipation  -Bowel regimen    End-Stage Renal Disease (a.k.a. ESRD) on Hemodialysis (a.k.a. HD)  Nephrological services consulted for routine Hemodialysis.  Status post hemodialysis today June 29     Chronic Heart Failure with Preserved Ejection Fraction (a.k.a. HFpEF)  - Pro-BNP 3,639 pg/mL on 6/27/2024  - CXR on 6/27/2024 shows \"Cardiomegaly without acute pulmonary process.\"  Strict I/Os, Daily Weight, Fluid Restriction 2.0 L/day, and continue home Furosemide, Carvedilol, and Sacubitril-Valsartan (a.k.a. Entresto).     Coronary Artery Disease (a.k.a. CAD)  Continue home Aspirin.     Hypertension  No current home medications for this issue.  HOLD home Midodrine at this time.  RESUME home Midodrine as is necessary.     Anemia of Renal  Disease  Monitor.     Gastroesophageal Reflux Disease (a.k.a. GERD)  Continue home PPI therapy.     Diabetes Mellitus  POC Glucose checks qACHS with SSI coverage.  Long-Acting Insulin 10 Units.  Continue home Atorvastatin.    Peripheral arterial disease-vascular surgery is planning arteriogram for Monday, July 1     DVT Prophylaxis  Mechanoprophylaxis is achieved with Unilateral SCD (LLE ONLY)  Will add subcu heparin    6/29-continue broad-spectrum antibiotics.  Leukocytosis is improving.  Vascular surgery is planning arteriogram on Monday, July 1.  Hemodialysis per nephrology.  Add subcu heparin for DVT prophylaxis.  I discussed the care plan with the patient    6/30-continue antibiotics.  Plans for arteriogram tomorrow by vascular.  Nephrology is following.  I discussed care plan with the patient    7/1/2024  -No new changes follow-up with vascular-   -s/p Angiography today-right lower extremity    7/2/2024  -Patient is alert awake oriented no new changes  -S/p angiogram again since angiography she is feeling comfortable  -End-stage renal disease on hemodialysis will continue hemodialysis  -For now patient is on Vanco and Zosyn per ID later on will be switched to different antibiotic which can be given during hemodialysis    7/3/2024    -Patient is alert awake oriented  -Her leg wound is open, discharge is mucopurulent  -Discussed with podiatry-patient is going for revision surgery probably will need wound VAC  -Discussed with nursing and patient and   -Discharge planning depends on patient's condition post operative  -Continue antibiotic as per ID  - Blood pressure is on Lower side - start IVF NS for 1 days , Hold BP meds     7/4/2024    -Patient is s/p right foot wound washout, wound VAC placed again by podiatry  -Antibiotics per ID  -Discussed with renal-patient is going for hemodialysis today  - Postprocedure patient is doing very well  - Working on PT OT  - Eventually patient will be placed to

## 2024-07-04 NOTE — PLAN OF CARE
69 Y/o female full code  Arrived: 06/25/2 c/o foot pain (right)  With a wound and drainage for months  Had already seen podiatry and taken oral antibiotics.  Sent her to the ER to be admitted for IV antibiotics and an I and D.  Allergies Influenza vaccine, levofloxacin, NSAID  Diagnosed: diabetic foot infection, HTN, anemia of renal disease ESRD (dialysis Monday, Wednesday, Friday) CHF, CAD  History: HTN ESRD CHF CAD, myocardial infarction, constipation    Neuro alert and oriented, no fevers pain in right foot where it was debrided given percocet one and dilaudid once  Resp room air clear lungs  Cardiac sub Q heparin no tele box  GI ACHS during the day yesterday dropped below normal limits she is now on D5W at 50 ml hr adult regular diabetic diet   last BM 07/01/24  Skin right foot area above 2nd toe amputated, amputated area on right foot above 4th toe   wound vac, a puncture from cardiac cath on 07/01/24 Right femoral  Lines fistula left upper arm and 20 gauge in right upper arm, possible plan for picc line long term antibiotics wait to consult nephrology            Problem: Discharge Planning  Goal: Discharge to home or other facility with appropriate resources  7/4/2024 0232 by Jose Álvarez, RN  Outcome: Progressing  7/3/2024 1655 by Cande Deng, RN  Outcome: Progressing     Problem: Pain  Goal: Verbalizes/displays adequate comfort level or baseline comfort level  7/4/2024 0232 by Jose Álvarez, RN  Outcome: Progressing  Flowsheets  Taken 7/4/2024 0216  Verbalizes/displays adequate comfort level or baseline comfort level:   Encourage patient to monitor pain and request assistance   Assess pain using appropriate pain scale   Administer analgesics based on type and severity of pain and evaluate response  Taken 7/4/2024 0000  Verbalizes/displays adequate comfort level or baseline comfort level: Encourage patient to monitor pain and request assistance  Taken 7/3/2024 2000  Verbalizes/displays

## 2024-07-04 NOTE — CARE COORDINATION
Dr Vargas let CM know that patient will be getting Dialysis at [facility], and that IV Antibiotic orders/notes will need to be faxed to   [facility], due to they do not service the [facility] area.       CM notified and updated Camilla ROWLAND.           Norma Estrada, RN  Case Management [phone]

## 2024-07-04 NOTE — PROGRESS NOTES
[facility]  (A Division of [facility])    Follow-up Note      Date of Admission: 2024       Date of Note:  2024          Current Antimicrobials:    Prior Antimicrobials:  Zosyn  to present  Vancomycin  to present      Assessment:         Osteomyelitis of the right foot:   - wound culture with mixed mariamr including gram-negative rods   -Status post I&D right foot abscess and second metatarsal debridement on .  Diabetes mellitus type 2  Peripheral artery disease: Status post angiography   End-stage renal disease on dialysis    Plan:   Continue current wound care with wound VAC-will need home health  Will plan to transition to vancomycin plus cefepime upon discharge.   - Stop date 24    -Discussed ABX with Dr. Vargas- they have been arranged as above.    continue with vancomycin and Zosyn since cultures were nonspecific    Trend CBC, BMP, ESR, CRP at least twice weekly  Tight glucose control for optimal wound healing.    Discussed with Dr. Levy  Follow-up as outpatient 2 to 3 weeks    DO Clem [facility]  [address], [address], [place] [address]  Office: [phone], Ext [phone]       Microbiology:   right foot tissue culture: Diphtheroids   wound culture: Heavy mixed skin marimar with gram-negative rods   blood cultures no growth to date x 2    Lines / Catheters:  Peripheral    Subjective:   Patient seen and examined.   Overall doing okay.  Pain is controlled.  Has a modest amount of pain in her foot.  No overnight fevers.    Tmax 99.1  WBC 10.4    Objective:      BP (!) 140/74   Pulse 70   Temp 98 °F (36.7 °C) (Oral)   Resp 18   Ht 1.624 m (5' 3.94\")   Wt 90.4 kg (199 lb 4.7 oz)   SpO2 98%   BMI 34.28 kg/m²   Temp (24hrs), Av.5 °F (36.9 °C), Min:98 °F (36.7 °C), Max:99.4 °F (37.4 °C)        General:   awake alert and oriented, non-toxic   Skin:    dry and warm, wound VAC  in place over right foot in postsurgical setting   HEENT:  No scleral icterus or pallor; oral mucosa moist, lips moist   Lymph Nodes:   not assessed today   Lungs:   Non-labored; bilaterally clear to aspiration- no crackles wheezes rales or rhonchi   Heart:  RRR, s1 and s2; no murmurs rubs or gallops; no edema, + pedal pulses   Abdomen:  soft, non-distended, active bowel sounds, non-tender   Genitourinary:  deferred   Extremities:   average muscle tone; no contractures, no joint effusions; surgical dressings in place over left groin   Neurologic:  No gross focal motor or sensory abnormalities; CN 2-12 intact;  Follows commands.    Psychiatric:   appropriate and interactive.         Lab results:  Chemistry  Recent Labs     07/03/24  1730   *   K 4.2   CL 97*   CO2 27   BUN 25*       CBC w/ Diff  Recent Labs     07/03/24  1730   WBC 11.1   RBC 3.06*   HGB 7.9*   HCT 24.7*          Microbiology  Results       Procedure Component Value Units Date/Time    Culture, Tissue [[id number]]  (Abnormal) Collected: 06/26/24 1835    Order Status: Completed Specimen: Foot, Right Updated: 06/28/24 1047     Special Requests --        TISSUE  GRT TOE       Gram Stain NO WBC'S SEEN         NO DEFINITE ORGANISM SEEN        Culture FEW DIPHTHEROIDS       Culture, Anaerobic [[id number]] Collected: 06/26/24 1835    Order Status: Completed Specimen: Foot, Right Updated: 06/28/24 1033     Special Requests --        TISSUE  GRT TOE       Culture NO ANAEROBES ISOLATED       Culture, Wound [[id number]] Collected: 06/26/24 1819    Order Status: Completed Specimen: Foot, Right Updated: 06/28/24 1658     Special Requests --        RIGHT FOOT  ABSCESS       Gram Stain OCCASIONAL WBCS SEEN         2+ GRAM POSITIVE RODS               1+ GRAM POSITIVE COCCI IN PAIRS           Culture       HEAVY Mixed skin marimar including gram negative rods          Culture, Anaerobic [[id number]] Collected: 06/26/24 1819    Order Status: Completed

## 2024-07-04 NOTE — PROGRESS NOTES
4 Eyes Skin Assessment     NAME:  Jigna Conner  YOB: 1955  MEDICAL RECORD NUMBER:  [id number]    The patient is being assessed for  Shift Handoff    I agree that at least one RN has performed a thorough Head to Toe Skin Assessment on the patient. ALL assessment sites listed below have been assessed.      Areas assessed by both nurses:    Head, Face, Ears, Shoulders, Back, Chest, Arms, Elbows, Hands, Sacrum. Buttock, Coccyx, Ischium, and Legs. Feet and Heels        Does the Patient have a Wound? No noted wound(s)       Vinay Prevention initiated by RN: Yes  Wound Care Orders initiated by RN: No    Pressure Injury (Stage 3,4, Unstageable, DTI, NWPT, and Complex wounds) if present, place Wound referral order by RN under : {YES/NO:19726}    New Ostomies, if present place, Ostomy referral order under : No     Nurse 1 eSignature: Electronically signed by Lalitah Norton RN on 7/4/24 at 3:47 PM EDT    **SHARE this note so that the co-signing nurse can place an eSignature**    Nurse 2 eSignature: {Esignature:[id number]}

## 2024-07-04 NOTE — PLAN OF CARE
Problem: Physical Therapy - Adult  Goal: By Discharge: Performs mobility at highest level of function for planned discharge setting.  See evaluation for individualized goals.  Description: Physical Therapy Goals:  Initiated 6/27/2024 to be met within 7-10 days.  Reviewed and Updated: 7/4/2024    1.  Patient will move from supine to sit and sit to supine , scoot up and down, and roll side to side in bed with independence in order to safely get into/out of bed.    2.  Patient will transfer from bed to chair and chair to bed with minimal assistance  using RW in order to safely partake in OOB mobility.  3.  Patient will perform sit to stand with minimal assistance  in order to safely partake in OOB mobility.  4.  Patient will ambulate with minimal assistance/contact guard assist for 15 feet using RW in order to safely navigate household distances.     PLOF: lives with  in 1 level home with ramped entry, independent at baseline using rollator in community and SPC in home      Outcome: Progressing    PHYSICAL THERAPY RE-EVALUATION    Patient: Jigna Conner (68 y.o. female)  Date: 7/4/2024  Primary Diagnosis: Hypokalemia [E87.6]  Hyperglycemia [R73.9]  ESRD (end stage renal disease) (HCC) [N18.6]  Diabetic foot infection (HCC) [E11.628, L08.9]  Anemia, unspecified type [D64.9]  Procedure(s) (LRB):  RIGHT FOOT IRRIGATION AND DEBRIDEMENT, REMOVAL OF INFECTED/NON-VIABLE TISSUE, WOUND VAC PLACEMENT (Right) 1 Day Post-Op   Precautions: Fall Risk, Skin, Aspiration Risk, Weight Bearing, Right Lower Extremity Weight Bearing: Non Weight Bearing (R Forefoot),    ASSESSMENT :  Pt was cleared by nursing prior to session. Pt was sitting up in bed in NAD and agreeable to tx when therapist entered room. Pt reported being on bed pan and was ahving trouble having a BM and requested to trial BSC. Pt required stand by assist and additional time to transfer to EOB and manage lines. Cast shoe donned. Pt recalled weightbearing

## 2024-07-04 NOTE — PLAN OF CARE
Problem: Occupational Therapy - Adult  Goal: By Discharge: Performs self-care activities at highest level of function for planned discharge setting.  See evaluation for individualized goals.  Description: Occupational Therapy Goals:  Initiated 6/28/2024 to be met within 7-10 days.    1.  Patient will perform bathing with supervision/set-up.   2.  Patient will perform lower body dressing with supervision/set-up.  3.  Patient will perform BSC transfers with supervision/set-up using LRAD (sliding board vs FWW).  4.  Patient will perform all aspects of toileting with supervision/set-up.  5.  Patient will participate in upper extremity therapeutic exercise/activities with supervision/set-up for 8 minutes to improve endurance and UB strength needed for ADLs    6.  Patient will utilize energy conservation techniques during functional activities with verbal cues.    PLOF: Pt lives with spouse in 1SH with ramp. Pt is independent with ADLs and functional mobility. Pt's spouse is disabled and has PCA assisting him with ADLs.  Outcome: Progressing   OCCUPATIONAL THERAPY TREATMENT    Patient: Jigna Conner (68 y.o. female)  Date: 7/4/2024  Diagnosis: Hypokalemia [E87.6]  Hyperglycemia [R73.9]  ESRD (end stage renal disease) (HCC) [N18.6]  Diabetic foot infection (HCC) [E11.628, L08.9]  Anemia, unspecified type [D64.9] Diabetic foot infection (HCC)  Procedure(s) (LRB):  RIGHT FOOT IRRIGATION AND DEBRIDEMENT, REMOVAL OF INFECTED/NON-VIABLE TISSUE, WOUND VAC PLACEMENT (Right) 1 Day Post-Op  Precautions: Fall Risk, Skin, Aspiration Risk, Weight Bearing, Right Lower Extremity Weight Bearing: Non Weight Bearing (R Forefoot),  ,  ,  ,  ,  ,      Chart, occupational therapy assessment, plan of care, and goals were reviewed.  ASSESSMENT:  Pt presented supine in bed upon entry on bed pan, wound vac on R foot intact, and agreeable to work with OT. Pt rolled for bed pan removal and transitioned to EOB SBA w/ increased time in prep

## 2024-07-04 NOTE — CARE COORDINATION
KASHIF called [facility] at  and spoke with Zohra she gave CM F# [phone] to send IV Antibiotic orders/notes per provider request.     CM faxed clinicals to   to include Jacky H&P, Dr Vargas and Dr Trivedi most recent progress notes.    1527   KASHIF confirmed with Zohra at [facility] that the clinicals/IV abx order was received

## 2024-07-04 NOTE — DIALYSIS
HD Care plan  Time: 3 hrs  Dialysate: 2K+  2.5Ca++  Bath  Net UF: 2L  Access: Aseptic care for ARELY AVF  Hemodynamic stability: Maintain BP WNL     Pre Dialysis:  Patient received from Lalitha Norton RN. Patient arrived on a bed, A+O X 4, on RA,  no s/s of acute distress noted. Wound vac noted in place in the surgical area in the right foot. ARELY AVF assessed, no abnormalities noted, bruit and thrill strong. AVF accessed using 15G needles without any difficulty. Good flow achieved from both needles.     Intra Dialysis:  Time out / safety process performed per policy. Tx initiated at 1430.    AVF flowing with ease. For hemodynamic stability UF goal set at 2000 ml as tolerated.  Pt offered assistance with repositioning every 2 hours/prn    Vascular access visible and line connections remained intact throughout entire duration of treatment.   Vital signs checked every 15 mins.  Due dose of Vancomycin 750 mg / 250 ml given last hr of dialysis     Post Dialysis:  Tx completed at 1730,   Tolerated well, 2 L  removed. De-accessed per protocol.    Clot time 8 minutes for arterial, and 8 minutes for venous.   Dry dressings applied. Bruit/Thrill present above and below dressings.  Post Dialysis report given to unit LUCERO Doty

## 2024-07-04 NOTE — PROGRESS NOTES
Progress Note    68-year-old female with past medical history of ESRD, diabetes, heart failure with preserved ejection fraction admitted for diabetic foot infection, following for ESRD management.  Subjective      Overnight event noted  No complaints offered  No chest pain or sob reported      IMPRESSION:   ESRD, Tuesday Thursday Saturday,   Access left arm AV fistula  Right foot wound infection,  Anemia, received blood transfusion during this admission  Diabetes  Hypertension  Heart failure with preserved ejection fraction.   PLAN:   HD today  Spoke with . CM will fax the abx.  aware. Vancomycin 750 mg, cefepime 1 g with each session till 8/7.   Epogen for anemia.  Renvela for phos binding. Get phos in am.  Adjust medication per ESRD status.     Current Facility-Administered Medications   Medication Dose Route Frequency    vancomycin (VANCOCIN) 750 mg in sodium chloride 0.9 % 250 mL IVPB ([id number])  750 mg IntraVENous Once in dialysis    dextrose 5 % solution   IntraVENous Continuous    heparin (porcine) injection 5,000 Units  5,000 Units SubCUTAneous 3 times per day    lactobacillus (CULTURELLE) capsule 1 capsule  1 capsule Oral Daily with breakfast    oxyCODONE-acetaminophen (PERCOCET) 7.5-325 MG per tablet 1 tablet  1 tablet Oral Q6H PRN    naloxone (NARCAN) injection 0.4 mg  0.4 mg IntraVENous PRN    ipratropium 0.5 mg-albuterol 2.5 mg (DUONEB) nebulizer solution 1 Dose  1 Dose Inhalation Q4H PRN    melatonin disintegrating tablet 5 mg  5 mg Oral Nightly PRN    docusate (COLACE) 50 MG/5ML liquid 50 mg  50 mg Oral BID    bisacodyl (DULCOLAX) suppository 10 mg  10 mg Rectal Daily PRN    senna (SENOKOT) tablet 8.6 mg  1 tablet Oral Nightly PRN    lactulose (CHRONULAC) 10 GM/15ML solution 30 g  30 g Oral Daily PRN    0.9 % sodium chloride infusion   IntraVENous PRN    piperacillin-tazobactam (ZOSYN) 3,375 mg in sodium chloride 0.9 % 50 mL IVPB (mini-bag)  3,375 mg IntraVENous Q12H    aspirin  2024  [facility]  Office [phone]

## 2024-07-05 LAB
GLUCOSE BLD STRIP.AUTO-MCNC: 135 MG/DL (ref 70–110)
GLUCOSE BLD STRIP.AUTO-MCNC: 137 MG/DL (ref 70–110)
GLUCOSE BLD STRIP.AUTO-MCNC: 160 MG/DL (ref 70–110)
GLUCOSE BLD STRIP.AUTO-MCNC: 172 MG/DL (ref 70–110)
PHOSPHATE SERPL-MCNC: 4.7 MG/DL (ref 2.5–4.9)

## 2024-07-05 PROCEDURE — 94761 N-INVAS EAR/PLS OXIMETRY MLT: CPT

## 2024-07-05 PROCEDURE — 6360000002 HC RX W HCPCS: Performed by: INTERNAL MEDICINE

## 2024-07-05 PROCEDURE — 6370000000 HC RX 637 (ALT 250 FOR IP): Performed by: EMERGENCY MEDICINE

## 2024-07-05 PROCEDURE — 6370000000 HC RX 637 (ALT 250 FOR IP): Performed by: INTERNAL MEDICINE

## 2024-07-05 PROCEDURE — 6370000000 HC RX 637 (ALT 250 FOR IP): Performed by: PHYSICIAN ASSISTANT

## 2024-07-05 PROCEDURE — 97530 THERAPEUTIC ACTIVITIES: CPT

## 2024-07-05 PROCEDURE — 84100 ASSAY OF PHOSPHORUS: CPT

## 2024-07-05 PROCEDURE — 2580000003 HC RX 258: Performed by: INTERNAL MEDICINE

## 2024-07-05 PROCEDURE — 6360000002 HC RX W HCPCS: Performed by: EMERGENCY MEDICINE

## 2024-07-05 PROCEDURE — 1100000000 HC RM PRIVATE

## 2024-07-05 PROCEDURE — 36415 COLL VENOUS BLD VENIPUNCTURE: CPT

## 2024-07-05 PROCEDURE — 2580000003 HC RX 258: Performed by: PHYSICIAN ASSISTANT

## 2024-07-05 PROCEDURE — 82962 GLUCOSE BLOOD TEST: CPT

## 2024-07-05 PROCEDURE — 97535 SELF CARE MNGMENT TRAINING: CPT

## 2024-07-05 PROCEDURE — 99232 SBSQ HOSP IP/OBS MODERATE 35: CPT | Performed by: FAMILY MEDICINE

## 2024-07-05 RX ADMIN — HEPARIN SODIUM 5000 UNITS: 5000 INJECTION INTRAVENOUS; SUBCUTANEOUS at 06:13

## 2024-07-05 RX ADMIN — PIPERACILLIN AND TAZOBACTAM 3375 MG: 3; .375 INJECTION, POWDER, FOR SOLUTION INTRAVENOUS at 12:18

## 2024-07-05 RX ADMIN — SEVELAMER CARBONATE 800 MG: 800 TABLET, FILM COATED ORAL at 16:49

## 2024-07-05 RX ADMIN — SODIUM CHLORIDE, PRESERVATIVE FREE 10 ML: 5 INJECTION INTRAVENOUS at 09:59

## 2024-07-05 RX ADMIN — HEPARIN SODIUM 5000 UNITS: 5000 INJECTION INTRAVENOUS; SUBCUTANEOUS at 16:50

## 2024-07-05 RX ADMIN — DOCUSATE SODIUM 50 MG: 50 LIQUID ORAL at 09:54

## 2024-07-05 RX ADMIN — SODIUM CHLORIDE, PRESERVATIVE FREE 10 ML: 5 INJECTION INTRAVENOUS at 23:21

## 2024-07-05 RX ADMIN — OXYCODONE AND ACETAMINOPHEN 1 TABLET: 7.5; 325 TABLET ORAL at 23:10

## 2024-07-05 RX ADMIN — HEPARIN SODIUM 5000 UNITS: 5000 INJECTION INTRAVENOUS; SUBCUTANEOUS at 23:09

## 2024-07-05 RX ADMIN — LATANOPROST 1 DROP: 50 SOLUTION/ DROPS OPHTHALMIC at 23:22

## 2024-07-05 RX ADMIN — SEVELAMER CARBONATE 800 MG: 800 TABLET, FILM COATED ORAL at 12:14

## 2024-07-05 RX ADMIN — SACUBITRIL AND VALSARTAN 1 TABLET: 24; 26 TABLET, FILM COATED ORAL at 23:10

## 2024-07-05 RX ADMIN — ASPIRIN 81 MG CHEWABLE TABLET 81 MG: 81 TABLET CHEWABLE at 09:54

## 2024-07-05 RX ADMIN — SODIUM CHLORIDE: 9 INJECTION, SOLUTION INTRAVENOUS at 23:19

## 2024-07-05 RX ADMIN — ATORVASTATIN CALCIUM 10 MG: 10 TABLET, FILM COATED ORAL at 09:54

## 2024-07-05 RX ADMIN — TIMOLOL MALEATE 1 DROP: 5 SOLUTION OPHTHALMIC at 23:23

## 2024-07-05 RX ADMIN — Medication 1 CAPSULE: at 09:54

## 2024-07-05 RX ADMIN — SEVELAMER CARBONATE 800 MG: 800 TABLET, FILM COATED ORAL at 09:54

## 2024-07-05 RX ADMIN — SACUBITRIL AND VALSARTAN 1 TABLET: 24; 26 TABLET, FILM COATED ORAL at 09:54

## 2024-07-05 RX ADMIN — PIPERACILLIN AND TAZOBACTAM 3375 MG: 3; .375 INJECTION, POWDER, FOR SOLUTION INTRAVENOUS at 23:21

## 2024-07-05 RX ADMIN — PANTOPRAZOLE SODIUM 40 MG: 40 TABLET, DELAYED RELEASE ORAL at 06:13

## 2024-07-05 RX ADMIN — INSULIN GLARGINE 10 UNITS: 100 INJECTION, SOLUTION SUBCUTANEOUS at 23:09

## 2024-07-05 RX ADMIN — DOCUSATE SODIUM 50 MG: 50 LIQUID ORAL at 23:09

## 2024-07-05 RX ADMIN — HYDROMORPHONE HYDROCHLORIDE 0.25 MG: 1 INJECTION, SOLUTION INTRAMUSCULAR; INTRAVENOUS; SUBCUTANEOUS at 18:18

## 2024-07-05 RX ADMIN — TIMOLOL MALEATE 1 DROP: 5 SOLUTION OPHTHALMIC at 09:58

## 2024-07-05 ASSESSMENT — PAIN DESCRIPTION - FREQUENCY
FREQUENCY: INTERMITTENT
FREQUENCY: INTERMITTENT

## 2024-07-05 ASSESSMENT — PAIN SCALES - GENERAL
PAINLEVEL_OUTOF10: 0
PAINLEVEL_OUTOF10: 9
PAINLEVEL_OUTOF10: 5
PAINLEVEL_OUTOF10: 0
PAINLEVEL_OUTOF10: 3
PAINLEVEL_OUTOF10: 0
PAINLEVEL_OUTOF10: 0

## 2024-07-05 ASSESSMENT — PAIN - FUNCTIONAL ASSESSMENT
PAIN_FUNCTIONAL_ASSESSMENT: ACTIVITIES ARE NOT PREVENTED

## 2024-07-05 ASSESSMENT — PAIN DESCRIPTION - ONSET
ONSET: GRADUAL
ONSET: GRADUAL

## 2024-07-05 ASSESSMENT — PAIN DESCRIPTION - PAIN TYPE
TYPE: SURGICAL PAIN
TYPE: SURGICAL PAIN

## 2024-07-05 ASSESSMENT — PAIN SCALES - WONG BAKER
WONGBAKER_NUMERICALRESPONSE: NO HURT
WONGBAKER_NUMERICALRESPONSE: NO HURT

## 2024-07-05 ASSESSMENT — PAIN DESCRIPTION - LOCATION
LOCATION: FOOT

## 2024-07-05 ASSESSMENT — PAIN DESCRIPTION - ORIENTATION
ORIENTATION: RIGHT

## 2024-07-05 ASSESSMENT — PAIN DESCRIPTION - DESCRIPTORS
DESCRIPTORS: THROBBING;BURNING
DESCRIPTORS: ACHING;BURNING;DISCOMFORT

## 2024-07-05 NOTE — PLAN OF CARE
Problem: Discharge Planning  Goal: Discharge to home or other facility with appropriate resources  Outcome: Progressing  Flowsheets (Taken 7/4/2024 1954)  Discharge to home or other facility with appropriate resources: Identify barriers to discharge with patient and caregiver     Problem: Pain  Goal: Verbalizes/displays adequate comfort level or baseline comfort level  Outcome: Progressing     Problem: ABCDS Injury Assessment  Goal: Absence of physical injury  Outcome: Progressing     Problem: Safety - Adult  Goal: Free from fall injury  Outcome: Progressing     Problem: Chronic Conditions and Co-morbidities  Goal: Patient's chronic conditions and co-morbidity symptoms are monitored and maintained or improved  7/5/2024 0040 by Susana Kay, RN  Outcome: Progressing  Flowsheets (Taken 7/4/2024 1954)  Care Plan - Patient's Chronic Conditions and Co-Morbidity Symptoms are Monitored and Maintained or Improved: Monitor and assess patient's chronic conditions and comorbid symptoms for stability, deterioration, or improvement  7/4/2024 1712 by Kerry Alejo, RN  Outcome: Progressing  Flowsheets (Taken 7/4/2024 1009 by Lalitha Norton, RN)  Care Plan - Patient's Chronic Conditions and Co-Morbidity Symptoms are Monitored and Maintained or Improved: Monitor and assess patient's chronic conditions and comorbid symptoms for stability, deterioration, or improvement  Note: HD Care plan  Time: 3 hrs  Dialysate: 2K+  2.5Ca++  Bath  Net UF: 2L  Access: Aseptic care for ARELY AVF  Hemodynamic stability: Maintain BP WNL     Problem: Physical Therapy - Adult  Goal: By Discharge: Performs mobility at highest level of function for planned discharge setting.  See evaluation for individualized goals.  Description: Physical Therapy Goals:  Initiated 6/27/2024 to be met within 7-10 days.  Reviewed and Updated: 7/4/2024    1.  Patient will move from supine to sit and sit to supine , scoot up and down, and roll side to side in bed

## 2024-07-05 NOTE — PROGRESS NOTES
[facility]  (A Division of [facility])    Follow-up Note      Date of Admission: 2024       Date of Note:  2024          Current Antimicrobials:    Prior Antimicrobials:  Zosyn  to present  Vancomycin  to present      Assessment:         Osteomyelitis of the right foot:   - wound culture with mixed marimar including gram-negative rods   -Status post I&D right foot abscess and second metatarsal debridement on .  Diabetes mellitus type 2  Peripheral artery disease: Status post angiography   End-stage renal disease on dialysis    Plan:   Continue current wound care with wound VAC-will need home health  Will plan to transition to vancomycin plus cefepime upon discharge.   - Stop date 24    -Discussed ABX with Dr. Vargas- they have been arranged as above.    Trend CBC, BMP, ESR, CRP at least twice weekly  Tight glucose control for optimal wound healing.    Discussed with Dr. Savage  Follow-up as outpatient 2 to 3 weeks    DO Clem [facility]  [address], [address] [address]  Office: [phone], Ext [phone]       Microbiology:   right foot tissue culture: Diphtheroids   wound culture: Heavy mixed skin marimar with gram-negative rods   blood cultures no growth to date x 2    Lines / Catheters:  Peripheral    Subjective:   Patient seen and examined.   Pain is controlled. No overnight fevers. Hoping for discharge today.    Tmax 99.5  WBC none today    Objective:      BP (!) 128/54   Pulse 68   Temp 98.6 °F (37 °C) (Oral)   Resp 18   Ht 1.624 m (5' 3.94\")   Wt 96.7 kg (213 lb 3 oz)   SpO2 100%   BMI 36.67 kg/m²   Temp (24hrs), Av.7 °F (37.1 °C), Min:97.8 °F (36.6 °C), Max:99.5 °F (37.5 °C)        General:   awake alert and oriented, non-toxic   Skin:    dry and warm, wound VAC in place over right foot in postsurgical setting   HEENT:  No scleral icterus or pallor; oral mucosa  Culture NO ANAEROBES ISOLATED       Culture, Blood 2 [[id number]] Collected: 06/25/24 1835    Order Status: Completed Specimen: Blood Updated: 07/01/24 0711     Special Requests NO SPECIAL REQUESTS        Culture NO GROWTH 6 DAYS       Culture, Blood 1 [[id number]] Collected: 06/25/24 1825    Order Status: Completed Specimen: Blood Updated: 07/01/24 0711     Special Requests NO SPECIAL REQUESTS        Culture NO GROWTH 6 DAYS                 John Trivedi DO   7/5/2024

## 2024-07-05 NOTE — OP NOTE
Operative Note      Patient: Jigna Conner  YOB: 1955  MRN: [id number]    Date of Procedure: 7/3/2024    Pre-Op Diagnosis:  Right foot cellulitis, open wounds with exposed necrotic tendon, purulent discharge    Post-Op Diagnosis: Same       Procedure(s):  RIGHT FOOT IRRIGATION AND DEBRIDEMENT, REMOVAL OF INFECTED/NON-VIABLE TISSUE, WOUND VAC PLACEMENT, ANTIBIOTIC BEAD PLACEMENT.    Surgeon(s):  Marcin Gabriel DPM    Assistant:   Surgical Assistant: Mar Henry    Anesthesia: Monitor Anesthesia Care    Estimated Blood Loss (mL): Minimal    Complications: None    Specimens:   * No specimens in log *    Implants:  Implant Name Type Inv. Item Serial No.  Lot No. LRB No. Used Action   KIT BNE GRFT SUB 25CC BEAD FAST CURE RESRB INCL 2 MOLD - [id number]  KIT BNE GRFT SUB 25CC BEAD FAST CURE RESRB INCL 2 MOLD  [employer]- [id number] Right 1 Implanted         Drains:   Negative Pressure Wound Therapy Foot Right (Active)   $ Standard NPWT >50 sq cm PER TX $ Yes 07/01/24 1443   Wound Type Surgical 07/04/24 1954   Unit Type Ulta 07/03/24 2017   Dressing Type Black Foam;White Foam 07/04/24 1954   Number of pieces used 8 07/03/24 2017   Cycle Continuous 07/04/24 1954   Target Pressure (mmHg) 125 07/04/24 1954   Canister changed? No 07/03/24 2017   Dressing Status Clean, dry & intact 07/03/24 2017   Dressing Changed Changed/New 07/01/24 1443   Drainage Amount Moderate 07/03/24 2017   Drainage Description Serosanguinous 07/03/24 2017   Dressing Change Due 07/03/24 07/01/24 1443   Output (ml) 40 ml 07/04/24 1009   Laine-wound Assessment Hyperpigmented 07/03/24 2017   Odor None 07/03/24 2017       Negative Pressure Wound Therapy Foot Right;Anterior (Active)   Dressing Type Black Foam 07/04/24 1954   Number of pieces used 3 07/03/24 1455   Cycle Continuous 07/04/24 1009   Target Pressure (mmHg) 125 07/04/24 1009   Irrigation Solution Sodium chloride 0.9% 07/03/24 1411   Canister

## 2024-07-05 NOTE — CARE COORDINATION
Chart reviewed.  Met with pt.  Discussed transitional care plan.  Norma of [facility] met with pt earlier today.  Pt stated she is leaning towards ARU.  She just wants to confirm with her son.  She stated she will let Norma johnson [facility] know her decision this afternoon.      Gave pt IMM letter.  She stated CM to come back for it.            Sonia Arevalo, WENDYN RN  Care Management

## 2024-07-05 NOTE — PROGRESS NOTES
[facility] Hospitalist Group  Progress Note    Patient: Jigna Conner Age: 68 y.o. : 1955 MR#: [id number] SSN: [id number]      Subjective/24-hour events:     No new complaints.    Assessment:   Diabetic foot infection with left foot osteomyelitis  DM2  PAD  ESRD, HD dependent    Plan:   Continue antibiotics per ID.  Long-term IV antibiotics with dialysis already arranged.  Wound care per podiatry.  Wound VAC remains in place.  PT/OT,mobilize.  Disposition soon.    Anticipated discharge: 1 to 2 days/SNF    Case discussed with:  [x]Patient  []Family  [x] Nursing  [x]Case Management  DVT Prophylaxis:  []Lovenox  [x]Hep SQ  []SCDs  []Coumadin   []On Heparin gtt []PO anticoagulant    Objective:   VS: BP (!) 128/54   Pulse 68   Temp 98.6 °F (37 °C) (Oral)   Resp 18   Ht 1.624 m (5' 3.94\")   Wt 96.7 kg (213 lb 3 oz)   SpO2 100%   BMI 36.67 kg/m²      Tmax/24hrs: Temp (24hrs), Av.7 °F (37.1 °C), Min:97.8 °F (36.6 °C), Max:99.5 °F (37.5 °C)    Intake/Output Summary (Last 24 hours) at 2024 1041  Last data filed at 2024 0902  Gross per 24 hour   Intake 740 ml   Output 2500 ml   Net -1760 ml       Gen:  In NAD.  Nontoxic-appearing.  Lungs: Clear, no wheezes  Effort nonlabored.  CV: RRR.  Abdomen: Soft, NTTP.  Extremities: Warm, no pitting edema or ischemia.  Neuro:  Awake and alert, moves extremities spontaneously.    Current Facility-Administered Medications   Medication Dose Route Frequency    epoetin laurence-epbx (RETACRIT) injection 10,000 Units  10,000 Units SubCUTAneous Once per day on  Sat    sevelamer (RENVELA) tablet 800 mg  800 mg Oral TID WC    dextrose 5 % solution   IntraVENous Continuous    heparin (porcine) injection 5,000 Units  5,000 Units SubCUTAneous 3 times per day    lactobacillus (CULTURELLE) capsule 1 capsule  1 capsule Oral Daily with breakfast    oxyCODONE-acetaminophen (PERCOCET) 7.5-325 MG per tablet 1 tablet  1 tablet Oral Q6H PRN

## 2024-07-05 NOTE — PLAN OF CARE
Problem: Occupational Therapy - Adult  Goal: By Discharge: Performs self-care activities at highest level of function for planned discharge setting.  See evaluation for individualized goals.  Description: Occupational Therapy Goals:  Initiated 6/28/2024 to be met within 7-10 days.    1.  Patient will perform bathing with supervision/set-up.   2.  Patient will perform lower body dressing with supervision/set-up.  3.  Patient will perform BSC transfers with supervision/set-up using LRAD (sliding board vs FWW).  4.  Patient will perform all aspects of toileting with supervision/set-up.  5.  Patient will participate in upper extremity therapeutic exercise/activities with supervision/set-up for 8 minutes to improve endurance and UB strength needed for ADLs    6.  Patient will utilize energy conservation techniques during functional activities with verbal cues.    PLOF: Pt lives with spouse in 1SH with ramp. Pt is independent with ADLs and functional mobility. Pt's spouse is disabled and has PCA assisting him with ADLs.  Outcome: Progressing   OCCUPATIONAL THERAPY TREATMENT    Patient: Jigna Conner (68 y.o. female)  Date: 7/5/2024  Diagnosis: Hypokalemia [E87.6]  Hyperglycemia [R73.9]  ESRD (end stage renal disease) (HCC) [N18.6]  Diabetic foot infection (HCC) [E11.628, L08.9]  Anemia, unspecified type [D64.9] Diabetic foot infection (HCC)  Procedure(s) (LRB):  RIGHT FOOT IRRIGATION AND DEBRIDEMENT, REMOVAL OF INFECTED/NON-VIABLE TISSUE, WOUND VAC PLACEMENT (Right) 2 Days Post-Op  Precautions: Fall Risk, Skin, Aspiration Risk, Weight Bearing, Right Lower Extremity Weight Bearing: Non Weight Bearing (R Forefoot),  ,  ,  ,  ,  ,      Chart, occupational therapy assessment, plan of care, and goals were reviewed.  ASSESSMENT:  Pt presented supine in bed upon entry and requesting to use BSC. Pt came to EOB SBA in prep for toileting ADL. Reviewed NWB on R forefoot and assisted pt donning R post op shoe. STS transfer  assistance (SPT to BSC with RW)    Balance:  Balance  Sitting: Intact  Standing: Impaired;With support  Standing - Static: Fair  Standing - Dynamic: Fair    ADL Intervention:    Toileting: Maximum assistance     Pain:  Intensity Pre-treatment: 3/10   Intensity Post-treatment: 3/10  Scale: Numeric Rating Scale  Location: Right Foot  Quality: Aching  Intervention(s): Nurse notified and Repositioning     Activity Tolerance:    Activity Tolerance: Patient tolerated treatment well  Please refer to the flowsheet for vital signs taken during this treatment.  After treatment:   []  Patient left in no apparent distress sitting up in chair  [x]  Patient left in no apparent distress in bed  [x]  Call bell left within reach  [x]  Nursing notified  []  Caregiver present  [x]  Bed alarm activated    COMMUNICATION/EDUCATION:   Patient Education  Education Given To: Patient  Education Provided: Role of Therapy;Plan of Care;Energy Conservation;Transfer Training;ADL Adaptive Strategies;Precautions  Education Method: Demonstration;Verbal;Teach Back  Barriers to Learning: None  Education Outcome: Continued education needed      Thank you for this referral.  TATY Zarco  Minutes: 24

## 2024-07-05 NOTE — PROGRESS NOTES
4 Eyes Skin Assessment     NAME:  Jigna Conner  YOB: 1955  MEDICAL RECORD NUMBER:  [id number]    The patient is being assessed for  Shift Handoff    I agree that at least one RN has performed a thorough Head to Toe Skin Assessment on the patient. ALL assessment sites listed below have been assessed.      Areas assessed by both nurses:    Head, Face, Ears, Shoulders, Back, Chest, Arms, Elbows, Hands, Sacrum. Buttock, Coccyx, Ischium, Legs. Feet and Heels, and Under Medical Devices         Does the Patient have a Wound? Yes wound(s) were present on assessment. LDA wound assessment was Initiated and completed by RN       Vinay Prevention initiated by RN: Yes  Wound Care Orders initiated by RN: Yes    Pressure Injury (Stage 3,4, Unstageable, DTI, NWPT, and Complex wounds) if present, place Wound referral order by RN under : Yes    New Ostomies, if present place, Ostomy referral order under : Yes     Nurse 1 eSignature: Electronically signed by Susana Kay RN on 7/5/24 at 7:07 AM EDT    **SHARE this note so that the co-signing nurse can place an eSignature**    Nurse 2 eSignature: {Esignature:[id number]}

## 2024-07-05 NOTE — PLAN OF CARE
Problem: Discharge Planning  Goal: Discharge to home or other facility with appropriate resources  Outcome: Progressing     Problem: Pain  Goal: Verbalizes/displays adequate comfort level or baseline comfort level  Outcome: Progressing     Problem: ABCDS Injury Assessment  Goal: Absence of physical injury  Outcome: Progressing     Problem: Safety - Adult  Goal: Free from fall injury  Outcome: Progressing     Problem: Occupational Therapy - Adult  Goal: By Discharge: Performs self-care activities at highest level of function for planned discharge setting.  See evaluation for individualized goals.  Description: Occupational Therapy Goals:  Initiated 6/28/2024 to be met within 7-10 days.    1.  Patient will perform bathing with supervision/set-up.   2.  Patient will perform lower body dressing with supervision/set-up.  3.  Patient will perform BSC transfers with supervision/set-up using LRAD (sliding board vs FWW).  4.  Patient will perform all aspects of toileting with supervision/set-up.  5.  Patient will participate in upper extremity therapeutic exercise/activities with supervision/set-up for 8 minutes to improve endurance and UB strength needed for ADLs    6.  Patient will utilize energy conservation techniques during functional activities with verbal cues.    PLOF: Pt lives with spouse in 1SH with ramp. Pt is independent with ADLs and functional mobility. Pt's spouse is disabled and has PCA assisting him with ADLs.  7/5/2024 1300 by Lia Domingo OTA  Outcome: Progressing     Problem: Skin/Tissue Integrity  Goal: Absence of new skin breakdown  Description: 1.  Monitor for areas of redness and/or skin breakdown  2.  Assess vascular access sites hourly  3.  Every 4-6 hours minimum:  Change oxygen saturation probe site  4.  Every 4-6 hours:  If on nasal continuous positive airway pressure, respiratory therapy assess nares and determine need for appliance change or resting period.  Outcome: Progressing      Problem: Cardiovascular - Adult  Goal: Maintains optimal cardiac output and hemodynamic stability  Outcome: Progressing     Problem: Skin/Tissue Integrity - Adult  Goal: Oral mucous membranes remain intact  Outcome: Progressing     Problem: Musculoskeletal - Adult  Goal: Return mobility to safest level of function  Outcome: Progressing

## 2024-07-05 NOTE — PROGRESS NOTES
[facility]   Pharmacy Pharmacokinetic Monitoring Service - Vancomycin    Indication: DM foot infection  Target Pre-Dialysis Concentration: 21-24 mg/L  Day of Therapy: 11  Additional Antimicrobials: pip-tazo    Pertinent Laboratory Values:   Wt Readings from Last 1 Encounters:   07/05/24 96.7 kg (213 lb 3 oz)     Temp Readings from Last 1 Encounters:   07/05/24 98.6 °F (37 °C) (Oral)     Recent Labs     07/03/24  1730   WBC 11.1     Pertinent Cultures:  Date Source Results   6/25 blood NGF   6/26 wound GPC, GPR   6/26 tissue Diphtheroids    MRSA Nasal Swab: NA    Assessment:  Date Current Dose Concentration (mg/L) Dialysis Session   6/25 2,000 mg x1 - no   6/26 - - no   6/27 - 27.6 yes   6/28 - - no   6/29  15.4 yes   6/30 - - no   7/1   no   7/2 750 mg x1 19.1 yes   7/3 - - no   7/4 750 mg x1 21.3 yes   7/5 - - no     Plan:  ESRD on HD q TTS - dose by level  No dose today  Level on order for 7/6 with AM labs  Pharmacy will continue to monitor patient and adjust therapy as indicated    Thank you for the consult,  Gianni Wolfe [facility]  7/5/2024

## 2024-07-06 LAB
GLUCOSE BLD STRIP.AUTO-MCNC: 111 MG/DL (ref 70–110)
GLUCOSE BLD STRIP.AUTO-MCNC: 144 MG/DL (ref 70–110)
GLUCOSE BLD STRIP.AUTO-MCNC: 173 MG/DL (ref 70–110)
GLUCOSE BLD STRIP.AUTO-MCNC: 190 MG/DL (ref 70–110)
GLUCOSE BLD STRIP.AUTO-MCNC: 85 MG/DL (ref 70–110)
VANCOMYCIN SERPL-MCNC: 20.4 UG/ML (ref 5–40)

## 2024-07-06 PROCEDURE — 2580000003 HC RX 258: Performed by: PHYSICIAN ASSISTANT

## 2024-07-06 PROCEDURE — 6370000000 HC RX 637 (ALT 250 FOR IP): Performed by: INTERNAL MEDICINE

## 2024-07-06 PROCEDURE — 99231 SBSQ HOSP IP/OBS SF/LOW 25: CPT | Performed by: FAMILY MEDICINE

## 2024-07-06 PROCEDURE — 6360000002 HC RX W HCPCS: Performed by: INTERNAL MEDICINE

## 2024-07-06 PROCEDURE — 6370000000 HC RX 637 (ALT 250 FOR IP): Performed by: PHYSICIAN ASSISTANT

## 2024-07-06 PROCEDURE — 82962 GLUCOSE BLOOD TEST: CPT

## 2024-07-06 PROCEDURE — 2580000003 HC RX 258: Performed by: INTERNAL MEDICINE

## 2024-07-06 PROCEDURE — 6360000002 HC RX W HCPCS: Performed by: EMERGENCY MEDICINE

## 2024-07-06 PROCEDURE — 36415 COLL VENOUS BLD VENIPUNCTURE: CPT

## 2024-07-06 PROCEDURE — 80202 ASSAY OF VANCOMYCIN: CPT

## 2024-07-06 PROCEDURE — 94761 N-INVAS EAR/PLS OXIMETRY MLT: CPT

## 2024-07-06 PROCEDURE — 1100000000 HC RM PRIVATE

## 2024-07-06 PROCEDURE — 90935 HEMODIALYSIS ONE EVALUATION: CPT

## 2024-07-06 RX ADMIN — PIPERACILLIN AND TAZOBACTAM 3375 MG: 3; .375 INJECTION, POWDER, FOR SOLUTION INTRAVENOUS at 17:30

## 2024-07-06 RX ADMIN — SEVELAMER CARBONATE 800 MG: 800 TABLET, FILM COATED ORAL at 17:46

## 2024-07-06 RX ADMIN — INSULIN GLARGINE 10 UNITS: 100 INJECTION, SOLUTION SUBCUTANEOUS at 23:00

## 2024-07-06 RX ADMIN — SACUBITRIL AND VALSARTAN 1 TABLET: 24; 26 TABLET, FILM COATED ORAL at 23:00

## 2024-07-06 RX ADMIN — PANTOPRAZOLE SODIUM 40 MG: 40 TABLET, DELAYED RELEASE ORAL at 06:43

## 2024-07-06 RX ADMIN — TIMOLOL MALEATE 1 DROP: 5 SOLUTION OPHTHALMIC at 23:10

## 2024-07-06 RX ADMIN — HEPARIN SODIUM 5000 UNITS: 5000 INJECTION INTRAVENOUS; SUBCUTANEOUS at 23:01

## 2024-07-06 RX ADMIN — DOCUSATE SODIUM 50 MG: 50 LIQUID ORAL at 17:48

## 2024-07-06 RX ADMIN — PIPERACILLIN AND TAZOBACTAM 3375 MG: 3; .375 INJECTION, POWDER, FOR SOLUTION INTRAVENOUS at 23:05

## 2024-07-06 RX ADMIN — LATANOPROST 1 DROP: 50 SOLUTION/ DROPS OPHTHALMIC at 23:11

## 2024-07-06 RX ADMIN — SODIUM CHLORIDE: 9 INJECTION, SOLUTION INTRAVENOUS at 23:04

## 2024-07-06 RX ADMIN — HEPARIN SODIUM 5000 UNITS: 5000 INJECTION INTRAVENOUS; SUBCUTANEOUS at 06:43

## 2024-07-06 RX ADMIN — DOCUSATE SODIUM 50 MG: 50 LIQUID ORAL at 23:00

## 2024-07-06 RX ADMIN — VANCOMYCIN HYDROCHLORIDE 750 MG: 750 INJECTION, POWDER, LYOPHILIZED, FOR SOLUTION INTRAVENOUS at 14:19

## 2024-07-06 RX ADMIN — SODIUM CHLORIDE, PRESERVATIVE FREE 10 ML: 5 INJECTION INTRAVENOUS at 23:00

## 2024-07-06 ASSESSMENT — PAIN SCALES - GENERAL
PAINLEVEL_OUTOF10: 0

## 2024-07-06 ASSESSMENT — PAIN SCALES - WONG BAKER: WONGBAKER_NUMERICALRESPONSE: NO HURT

## 2024-07-06 NOTE — PLAN OF CARE

## 2024-07-06 NOTE — PROGRESS NOTES
[facility] Hospitalist [facility]  Progress Note    Patient: Jigna Conner Age: 69 y.o. : 1955 MR#: [id number] SSN: [id number]      Subjective/24-hour events:     Resting comfortably, no new complaints.    Assessment:   Diabetic foot infection with left foot osteomyelitis  DM2  PAD  ESRD, HD dependent    Plan:   Long-term IV antibiotic therapy to be continued with hemodialysis.  Already arranged.  Continue wound care/wound VAC per podiatry.  Therapy as tolerated, mobilize.  SNF disposition plan being finalized.   Disposition soon.    Anticipated discharge: 1 to 2 days/SNF    Case discussed with:  [x]Patient  []Family  [x] Nursing  [x]Case Management  DVT Prophylaxis:  []Lovenox  [x]Hep SQ  []SCDs  []Coumadin   []On Heparin gtt []PO anticoagulant    Objective:   VS: BP (!) 148/70   Pulse 64   Temp 98.8 °F (37.1 °C) (Oral)   Resp 16   Ht 1.624 m (5' 3.94\")   Wt 96.7 kg (213 lb 3 oz)   SpO2 100%   BMI 36.67 kg/m²      Tmax/24hrs: Temp (24hrs), Av.8 °F (37.1 °C), Min:98.2 °F (36.8 °C), Max:99.6 °F (37.6 °C)    Intake/Output Summary (Last 24 hours) at 2024 0944  Last data filed at 2024 0716  Gross per 24 hour   Intake 1548.45 ml   Output 0 ml   Net 1548.45 ml       Gen:  In NAD.  Nontoxic-appearing.  Lungs: Clear, no wheezes  Effort nonlabored.  CV: RRR.  Abdomen: Soft, NTTP.  Extremities: Warm, no pitting edema or ischemia.  Neuro:  Awake and alert, moves extremities spontaneously.    Current Facility-Administered Medications   Medication Dose Route Frequency    vancomycin (VANCOCIN) 750 mg in sodium chloride 0.9 % 250 mL IVPB ([id number])  750 mg IntraVENous Once in dialysis    epoetin laurence-epbx (RETACRIT) injection 10,000 Units  10,000 Units SubCUTAneous Once per day on  Sat    sevelamer (RENVELA) tablet 800 mg  800 mg Oral TID WC    dextrose 5 % solution   IntraVENous Continuous    heparin (porcine) injection 5,000 Units  5,000 Units SubCUTAneous 3 times per  PRN    Or    ondansetron (ZOFRAN) injection 4 mg  4 mg IntraVENous Q6H PRN    polyethylene glycol (GLYCOLAX) packet 17 g  17 g Oral Daily PRN    acetaminophen (TYLENOL) tablet 650 mg  650 mg Oral Q6H PRN    Or    acetaminophen (TYLENOL) suppository 650 mg  650 mg Rectal Q6H PRN    potassium chloride (KLOR-CON M) extended release tablet 40 mEq  40 mEq Oral Once    insulin lispro (HUMALOG,ADMELOG) injection vial 0-4 Units  0-4 Units SubCUTAneous TID WC    insulin lispro (HUMALOG,ADMELOG) injection vial 0-4 Units  0-4 Units SubCUTAneous Nightly    glucose chewable tablet 16 g  4 tablet Oral PRN    dextrose bolus 10% 125 mL  125 mL IntraVENous PRN    Or    dextrose bolus 10% 250 mL  250 mL IntraVENous PRN    glucagon injection 1 mg  1 mg SubCUTAneous PRN    dextrose 10 % infusion   IntraVENous Continuous PRN        Labs:    Recent Results (from the past 24 hour(s))   POCT Glucose    Collection Time: 07/05/24 11:57 AM   Result Value Ref Range    POC Glucose 160 (H) 70 - 110 mg/dL   POCT Glucose    Collection Time: 07/05/24  4:48 PM   Result Value Ref Range    POC Glucose 135 (H) 70 - 110 mg/dL   POCT Glucose    Collection Time: 07/05/24 10:55 PM   Result Value Ref Range    POC Glucose 137 (H) 70 - 110 mg/dL   Vancomycin Level, Random    Collection Time: 07/06/24  4:58 AM   Result Value Ref Range    Vancomycin Rm 20.4 5.0 - 40.0 UG/ML   POCT Glucose    Collection Time: 07/06/24  8:42 AM   Result Value Ref Range    POC Glucose 111 (H) 70 - 110 mg/dL         Signed By: Julius Logan MD

## 2024-07-06 NOTE — PROGRESS NOTES
HD Care plan  Time: 3 hrs  Dialysate:  2K 2.5Ca  Bath  Net UF: 2L  Access: Aseptically care for left upper extremity AVF.   Hemodynamic stability: Maintain BP WNL     Pre Dialysis:  Pt received from Unit Nurse JUSTICE Deng RN. Pt arrived from transport to dialysis suite on a bed. A+O X 4, no s/s of distress noted, on RA .   No active bleeding or s/sx of infection observed from AVF site. + B/T noted.      Intra Dialysis:  Tx initiated at 1215.    For hemodynamic stability UF goal set at net 2000ml as tolerated   Pt offered assistance with repositioning every 2 hours/prn    Vascular access visible and line connections remained intact throughout entire duration of treatment.   Vital signs checked every 15 mins, WNL  Vancomycin given during last hour of HD. 250ml NS infused wth ABX, accounted for in fluid removal totals.      Post Dialysis:  Tx completed at 1517.    Tolerated well , 2 net liters removed.  AVF de-accessed per protocol.    Post Dialysis report given to JUSTICE Deng RN.

## 2024-07-06 NOTE — PROGRESS NOTES
Progress Note    68-year-old female with past medical history of ESRD, diabetes, heart failure with preserved ejection fraction admitted for diabetic foot infection, following for ESRD management.  Subjective      Overnight event noted  No complaints offered  No chest pain or sob reported      IMPRESSION:   ESRD, Tuesday Thursday Saturday,   Access left arm AV fistula  Right foot wound infection,  Anemia, received blood transfusion during this admission  Diabetes  Hypertension  Heart failure with preserved ejection fraction.   PLAN:   Seen during dialysis,tolerating well,stable bp and pulse  Spoke with . CM will fax the abx.  aware. Vancomycin 750 mg, cefepime 1 g with each session till 8/7.   Epogen for anemia.  Renvela for phos binding.   Adjust medication per ESRD status.     Current Facility-Administered Medications   Medication Dose Route Frequency    vancomycin (VANCOCIN) 750 mg in sodium chloride 0.9 % 250 mL IVPB ([id number])  750 mg IntraVENous Once in dialysis    epoetin laurence-epbx (RETACRIT) injection 10,000 Units  10,000 Units SubCUTAneous Once per day on Tue Thu Sat    sevelamer (RENVELA) tablet 800 mg  800 mg Oral TID WC    dextrose 5 % solution   IntraVENous Continuous    heparin (porcine) injection 5,000 Units  5,000 Units SubCUTAneous 3 times per day    lactobacillus (CULTURELLE) capsule 1 capsule  1 capsule Oral Daily with breakfast    oxyCODONE-acetaminophen (PERCOCET) 7.5-325 MG per tablet 1 tablet  1 tablet Oral Q6H PRN    naloxone (NARCAN) injection 0.4 mg  0.4 mg IntraVENous PRN    ipratropium 0.5 mg-albuterol 2.5 mg (DUONEB) nebulizer solution 1 Dose  1 Dose Inhalation Q4H PRN    melatonin disintegrating tablet 5 mg  5 mg Oral Nightly PRN    docusate (COLACE) 50 MG/5ML liquid 50 mg  50 mg Oral BID    bisacodyl (DULCOLAX) suppository 10 mg  10 mg Rectal Daily PRN    senna (SENOKOT) tablet 8.6 mg  1 tablet Oral Nightly PRN    lactulose (CHRONULAC) 10 GM/15ML solution 30 g  30

## 2024-07-06 NOTE — PROGRESS NOTES
Podiatry Progress Note    Patient: Jigna Conner               Sex: female          DOA: [unfilled]       YOB: 1955      Age:  69 y.o.        LOS:  LOS: 11 days     POD 3 Days Post-Op  Procedure:   Procedure(s):  RIGHT FOOT IRRIGATION AND DEBRIDEMENT, REMOVAL OF INFECTED/NON-VIABLE TISSUE, WOUND VAC PLACEMENT      Subjective:     Patient seen resting quiet and comfortably and no apparent distress. Patient has wound vac dressings to right foot. Denies N/V/C/F. Awaiting d/c to rehab.       Objective:       Physical Exam:  Right foot wounds dressed with wound vac dressings. No fluid noted in cannister, it was just changed by nurse half hour earlier.     Assessment:     Principal Problem:    Diabetic foot infection (HCC)  Active Problems:    Hypertension    Anemia of renal disease    ESRD on dialysis (HCC)    Diabetes mellitus (HCC)    Hypokalemia    Chronic heart failure with preserved ejection fraction (HFpEF) (HCC)    Coronary artery disease involving native coronary artery of native heart without angina pectoris    History of MI (myocardial infarction)    Class 2 obesity in adult    Constipation  Resolved Problems:    * No resolved hospital problems. *      Plan/Recommendations/Medical Decision Making:     - Continue with wound vac therapy.   - Antibiotics per ID recommendation.   - Remain NWB to right foot.   - Medical management per primary team.

## 2024-07-06 NOTE — PROGRESS NOTES
INTERVENTION: HEMODYNAMIC STABILIZATION  MAINTAIN BP WNL WHILE ON HD.    INTERVENTION: FLUID MANAGEMENT  WILL ATTEMPT 2000 ML TOTAL FLUID REMOVAL AS TOLERATED.    INTERVENTION: METABOLIC/ELECTROLYTE MANAGEMENT  2.0 POTASSIUM 2.5 CALCIUM DIALYSATE USED WITH HD TODAY.    INTERVENTION: HEMODIALYSIS ACCESS SITE MANAGEMENT  LEFT AVF ACCESSED WITH 15G NEEDLE USING ASEPTIC TECHNIQUE.    GOAL: SIGNS AND SYMPTOMS OF LISTED POTENTIAL PROBLEMS WILL BE ABSENT OR MANAGEABLE.    OUTCOME: PROGRESSING    HD PLANNED FOR 3 HOURS TODAY.

## 2024-07-06 NOTE — PROGRESS NOTES
[facility]   Pharmacy Pharmacokinetic Monitoring Service - Vancomycin    Indication: DM foot infection  Target Pre-Dialysis Concentration: 21-24 mg/L  Day of Therapy: 12  Additional Antimicrobials: pip-tazo    Pertinent Laboratory Values:   Wt Readings from Last 1 Encounters:   07/05/24 96.7 kg (213 lb 3 oz)     Temp Readings from Last 1 Encounters:   07/06/24 99.1 °F (37.3 °C) (Oral)     Recent Labs     07/03/24  1730   WBC 11.1     Pertinent Cultures:  Date Source Results   6/25 blood NGF   6/26 wound GPC, GPR   6/26 tissue Diphtheroids    MRSA Nasal Swab: NA    Assessment:  Date Current Dose Concentration (mg/L) Dialysis Session   6/25 2,000 mg x1 - no   6/26 - - no   6/27 - 27.6 yes   6/28 - - no   6/29  15.4 yes   6/30 - - no   7/1   no   7/2 750 mg x1 19.1 yes   7/3 - - no   7/4 750 mg x1 21.3 yes   7/5 - - no   7/6 750 mg x 1 20.4 yes     Plan:  ESRD on HD q TTS - dose by level  750 mg x 1 today  Level on order for 7/9 with AM labs  Pharmacy will continue to monitor patient and adjust therapy as indicated    Thank you for the consult,  Heladio Farrell [facility]  7/6/2024

## 2024-07-07 LAB
GLUCOSE BLD STRIP.AUTO-MCNC: 112 MG/DL (ref 70–110)
GLUCOSE BLD STRIP.AUTO-MCNC: 120 MG/DL (ref 70–110)
GLUCOSE BLD STRIP.AUTO-MCNC: 153 MG/DL (ref 70–110)
GLUCOSE BLD STRIP.AUTO-MCNC: 82 MG/DL (ref 70–110)

## 2024-07-07 PROCEDURE — 82962 GLUCOSE BLOOD TEST: CPT

## 2024-07-07 PROCEDURE — 6370000000 HC RX 637 (ALT 250 FOR IP): Performed by: INTERNAL MEDICINE

## 2024-07-07 PROCEDURE — 97535 SELF CARE MNGMENT TRAINING: CPT

## 2024-07-07 PROCEDURE — 97116 GAIT TRAINING THERAPY: CPT

## 2024-07-07 PROCEDURE — 6370000000 HC RX 637 (ALT 250 FOR IP): Performed by: EMERGENCY MEDICINE

## 2024-07-07 PROCEDURE — 2580000003 HC RX 258: Performed by: INTERNAL MEDICINE

## 2024-07-07 PROCEDURE — 6360000002 HC RX W HCPCS: Performed by: INTERNAL MEDICINE

## 2024-07-07 PROCEDURE — 99231 SBSQ HOSP IP/OBS SF/LOW 25: CPT | Performed by: FAMILY MEDICINE

## 2024-07-07 PROCEDURE — 6370000000 HC RX 637 (ALT 250 FOR IP): Performed by: PHYSICIAN ASSISTANT

## 2024-07-07 PROCEDURE — 97530 THERAPEUTIC ACTIVITIES: CPT

## 2024-07-07 PROCEDURE — 1100000000 HC RM PRIVATE

## 2024-07-07 PROCEDURE — 2580000003 HC RX 258: Performed by: PHYSICIAN ASSISTANT

## 2024-07-07 PROCEDURE — 6360000002 HC RX W HCPCS: Performed by: EMERGENCY MEDICINE

## 2024-07-07 PROCEDURE — 94761 N-INVAS EAR/PLS OXIMETRY MLT: CPT

## 2024-07-07 RX ADMIN — INSULIN GLARGINE 10 UNITS: 100 INJECTION, SOLUTION SUBCUTANEOUS at 21:00

## 2024-07-07 RX ADMIN — OXYCODONE AND ACETAMINOPHEN 1 TABLET: 7.5; 325 TABLET ORAL at 19:58

## 2024-07-07 RX ADMIN — PIPERACILLIN AND TAZOBACTAM 3375 MG: 3; .375 INJECTION, POWDER, FOR SOLUTION INTRAVENOUS at 23:00

## 2024-07-07 RX ADMIN — SACUBITRIL AND VALSARTAN 1 TABLET: 24; 26 TABLET, FILM COATED ORAL at 21:45

## 2024-07-07 RX ADMIN — TIMOLOL MALEATE 1 DROP: 5 SOLUTION OPHTHALMIC at 21:46

## 2024-07-07 RX ADMIN — EPOETIN ALFA-EPBX 10000 UNITS: 10000 INJECTION, SOLUTION INTRAVENOUS; SUBCUTANEOUS at 01:22

## 2024-07-07 RX ADMIN — PANTOPRAZOLE SODIUM 40 MG: 40 TABLET, DELAYED RELEASE ORAL at 07:26

## 2024-07-07 RX ADMIN — TIMOLOL MALEATE 1 DROP: 5 SOLUTION OPHTHALMIC at 09:51

## 2024-07-07 RX ADMIN — ASPIRIN 81 MG CHEWABLE TABLET 81 MG: 81 TABLET CHEWABLE at 09:42

## 2024-07-07 RX ADMIN — HEPARIN SODIUM 5000 UNITS: 5000 INJECTION INTRAVENOUS; SUBCUTANEOUS at 21:43

## 2024-07-07 RX ADMIN — SEVELAMER CARBONATE 800 MG: 800 TABLET, FILM COATED ORAL at 17:18

## 2024-07-07 RX ADMIN — HEPARIN SODIUM 5000 UNITS: 5000 INJECTION INTRAVENOUS; SUBCUTANEOUS at 07:25

## 2024-07-07 RX ADMIN — HEPARIN SODIUM 5000 UNITS: 5000 INJECTION INTRAVENOUS; SUBCUTANEOUS at 17:18

## 2024-07-07 RX ADMIN — PIPERACILLIN AND TAZOBACTAM 3375 MG: 3; .375 INJECTION, POWDER, FOR SOLUTION INTRAVENOUS at 13:09

## 2024-07-07 RX ADMIN — SEVELAMER CARBONATE 800 MG: 800 TABLET, FILM COATED ORAL at 13:08

## 2024-07-07 RX ADMIN — DOCUSATE SODIUM 50 MG: 50 LIQUID ORAL at 09:42

## 2024-07-07 RX ADMIN — SODIUM CHLORIDE, PRESERVATIVE FREE 10 ML: 5 INJECTION INTRAVENOUS at 09:52

## 2024-07-07 RX ADMIN — DOCUSATE SODIUM 50 MG: 50 LIQUID ORAL at 21:43

## 2024-07-07 RX ADMIN — ATORVASTATIN CALCIUM 10 MG: 10 TABLET, FILM COATED ORAL at 09:42

## 2024-07-07 RX ADMIN — SEVELAMER CARBONATE 800 MG: 800 TABLET, FILM COATED ORAL at 09:42

## 2024-07-07 RX ADMIN — Medication 1 CAPSULE: at 09:42

## 2024-07-07 RX ADMIN — LATANOPROST 1 DROP: 50 SOLUTION/ DROPS OPHTHALMIC at 21:48

## 2024-07-07 RX ADMIN — SODIUM CHLORIDE, PRESERVATIVE FREE 10 ML: 5 INJECTION INTRAVENOUS at 21:45

## 2024-07-07 RX ADMIN — SACUBITRIL AND VALSARTAN 1 TABLET: 24; 26 TABLET, FILM COATED ORAL at 09:42

## 2024-07-07 RX ADMIN — HYDROMORPHONE HYDROCHLORIDE 0.25 MG: 1 INJECTION, SOLUTION INTRAMUSCULAR; INTRAVENOUS; SUBCUTANEOUS at 22:01

## 2024-07-07 ASSESSMENT — PAIN DESCRIPTION - ONSET
ONSET: GRADUAL
ONSET: GRADUAL

## 2024-07-07 ASSESSMENT — PAIN SCALES - GENERAL
PAINLEVEL_OUTOF10: 0
PAINLEVEL_OUTOF10: 7
PAINLEVEL_OUTOF10: 0
PAINLEVEL_OUTOF10: 5

## 2024-07-07 ASSESSMENT — PAIN DESCRIPTION - LOCATION
LOCATION: LEG
LOCATION: LEG

## 2024-07-07 ASSESSMENT — PAIN DESCRIPTION - FREQUENCY
FREQUENCY: INTERMITTENT
FREQUENCY: INTERMITTENT

## 2024-07-07 ASSESSMENT — PAIN DESCRIPTION - DESCRIPTORS
DESCRIPTORS: ACHING;DISCOMFORT
DESCRIPTORS: ACHING;BURNING

## 2024-07-07 ASSESSMENT — PAIN DESCRIPTION - PAIN TYPE
TYPE: ACUTE PAIN
TYPE: ACUTE PAIN

## 2024-07-07 ASSESSMENT — PAIN DESCRIPTION - ORIENTATION
ORIENTATION: RIGHT
ORIENTATION: RIGHT

## 2024-07-07 NOTE — PROGRESS NOTES
[facility]  Progress Note    Patient: Jigna Conner Age: 69 y.o. : 1955 MR#: [id number] SSN: [id number]      Subjective/24-hour events:     Had low-grade fever to 100.8 overnight.  No new issues otherwise - reports still feeling well overall.    Assessment:   Diabetic foot infection with left foot osteomyelitis  DM2  PAD  ESRD, HD dependent    Plan:   Antibiotics per ID, already arranged with hemodialysis.  Continue wound care per podiatry.  Dialysis per nephrology.  SNF disposition pending.  Will follow-up with CM in AM.    Anticipated discharge: 1 to 2 days/SNF    Case discussed with:  [x]Patient  []Family  [x] Nursing  [x]Case Management  DVT Prophylaxis:  []Lovenox  [x]Hep SQ  []SCDs  []Coumadin   []On Heparin gtt []PO anticoagulant    Objective:   VS: /70   Pulse 66   Temp 98 °F (36.7 °C) (Oral)   Resp 16   Ht 1.624 m (5' 3.94\")   Wt 96.7 kg (213 lb 3 oz)   SpO2 99%   BMI 36.67 kg/m²      Tmax/24hrs: Temp (24hrs), Av.8 °F (37.1 °C), Min:98 °F (36.7 °C), Max:100.8 °F (38.2 °C)    Intake/Output Summary (Last 24 hours) at 2024 0829  Last data filed at 2024 0754  Gross per 24 hour   Intake 957.91 ml   Output 2750 ml   Net -1792.09 ml       Gen:  In NAD.  Nontoxic-appearing.  Lungs: Clear, no wheezes  Effort nonlabored.  CV: RRR.  Abdomen: Soft, NTTP.  Extremities: Warm, no pitting edema or ischemia.  Neuro:  Awake and alert, moves extremities spontaneously.    Current Facility-Administered Medications   Medication Dose Route Frequency    epoetin laurence-epbx (RETACRIT) injection 10,000 Units  10,000 Units SubCUTAneous Once per day on  Sat    sevelamer (RENVELA) tablet 800 mg  800 mg Oral TID WC    dextrose 5 % solution   IntraVENous Continuous    heparin (porcine) injection 5,000 Units  5,000 Units SubCUTAneous 3 times per day    lactobacillus (CULTURELLE) capsule 1 capsule  1 capsule Oral Daily with breakfast

## 2024-07-07 NOTE — PROGRESS NOTES
Oral temperature 100.8, reassessed 100.1 orally    2300 Administered PRN acetaminophen 650 mg PO    0100 Oral temperature 99.1     0335 Oral temperature 98.0

## 2024-07-07 NOTE — PLAN OF CARE
Problem: Physical Therapy - Adult  Goal: By Discharge: Performs mobility at highest level of function for planned discharge setting.  See evaluation for individualized goals.  Description: Physical Therapy Goals:  Initiated 6/27/2024 to be met within 7-10 days.  Reviewed and Updated: 7/4/2024    1.  Patient will move from supine to sit and sit to supine , scoot up and down, and roll side to side in bed with independence in order to safely get into/out of bed.    2.  Patient will transfer from bed to chair and chair to bed with minimal assistance  using RW in order to safely partake in OOB mobility.  3.  Patient will perform sit to stand with minimal assistance  in order to safely partake in OOB mobility.  4.  Patient will ambulate with minimal assistance/contact guard assist for 15 feet using RW in order to safely navigate household distances.     PLOF: lives with  in 1 level home with ramped entry, independent at baseline using rollator in community and SPC in home      Outcome: Progressing     PHYSICAL THERAPY TREATMENT    Patient: Jigna Conner (69 y.o. female)  Date: 7/7/2024  Diagnosis: Hypokalemia [E87.6]  Hyperglycemia [R73.9]  ESRD (end stage renal disease) (HCC) [N18.6]  Diabetic foot infection (HCC) [E11.628, L08.9]  Anemia, unspecified type [D64.9] Diabetic foot infection (HCC)  Procedure(s) (LRB):  RIGHT FOOT IRRIGATION AND DEBRIDEMENT, REMOVAL OF INFECTED/NON-VIABLE TISSUE, WOUND VAC PLACEMENT (Right) 4 Days Post-Op  Precautions: Fall Risk, Skin, Aspiration Risk, Weight Bearing, Right Lower Extremity Weight Bearing: Non Weight Bearing (R Forefoot),  ,  ,  ,  ,  ,      ASSESSMENT:  Pt is progressing well, as evidenced by ability to mobilize with SBA this session. Pt able to ambulate 5 ft with RW while maintaining NWB on (R) forefoot. Pt eager to get to bathroom initially; however, reported feeling too weak when standing up. No other symptomatic complaints noted during session. Pt in chair  Impaired;With support  Standing - Static: Fair  Standing - Dynamic: Fair   Wheelchair Mobility:      Ambulation/Gait Training:     Gait  Gait Training: Yes  Right Side Weight Bearing: Non-weight bearing (forefoot)  Distance (ft): 5 Feet  Assistive Device: Walker, rolling  Interventions: Safety awareness training;Weight shifting training/pressure relief  Gait Abnormalities: Antalgic;Decreased step clearance    Pain:  Intensity Pre-treatment: 1/10   Intensity Post-treatment: 1/10  Scale: Numeric Rating Scale  Location: Right Foot  Quality: Aching  Intervention(s): Repositioning     Activity Tolerance:   Activity Tolerance: Patient tolerated treatment well  Please refer to the flowsheet for vital signs taken during this treatment.  After treatment:   [x] Patient left in no apparent distress sitting up in chair  [] Patient left in no apparent distress in bed  [x] Call bell left within reach  [x] Nursing notified  [] Caregiver present  [] Bed alarm activated  [] SCDs applied      COMMUNICATION/EDUCATION:   Patient Education  Education Given To: Patient  Education Provided: Role of Therapy;Plan of Care;Transfer Training;Precautions  Education Method: Verbal;Teach Back;Demonstration  Barriers to Learning: None  Education Outcome: Verbalized understanding;Demonstrated understanding;Continued education needed      Lia Nunez, PT  Minutes: 25

## 2024-07-07 NOTE — PLAN OF CARE
Problem: Occupational Therapy - Adult  Goal: By Discharge: Performs self-care activities at highest level of function for planned discharge setting.  See evaluation for individualized goals.  Description: Occupational Therapy Goals:  Initiated 6/28/2024 to be met within 7-10 days.    1.  Patient will perform bathing with supervision/set-up.   2.  Patient will perform lower body dressing with supervision/set-up.  3.  Patient will perform BSC transfers with supervision/set-up using LRAD (sliding board vs FWW).  4.  Patient will perform all aspects of toileting with supervision/set-up.  5.  Patient will participate in upper extremity therapeutic exercise/activities with supervision/set-up for 8 minutes to improve endurance and UB strength needed for ADLs    6.  Patient will utilize energy conservation techniques during functional activities with verbal cues.    PLOF: Pt lives with spouse in 1SH with ramp. Pt is independent with ADLs and functional mobility. Pt's spouse is disabled and has PCA assisting him with ADLs.  Outcome: Progressing    OCCUPATIONAL THERAPY TREATMENT    Patient: Jigna Conner (69 y.o. female)  Date: 7/7/2024  Diagnosis: Hypokalemia [E87.6]  Hyperglycemia [R73.9]  ESRD (end stage renal disease) (HCC) [N18.6]  Diabetic foot infection (HCC) [E11.628, L08.9]  Anemia, unspecified type [D64.9] Diabetic foot infection (HCC)  Procedure(s) (LRB):  RIGHT FOOT IRRIGATION AND DEBRIDEMENT, REMOVAL OF INFECTED/NON-VIABLE TISSUE, WOUND VAC PLACEMENT (Right) 4 Days Post-Op  Precautions: Fall Risk, Skin, Aspiration Risk, Weight Bearing, Right Lower Extremity Weight Bearing: Non Weight Bearing (R Forefoot)    Chart, occupational therapy assessment, plan of care, and goals were reviewed.  ASSESSMENT:  Pt cleared for OT tx and pt agreeable to participate. SBA for bed mobility to sit EOB, good balance at EOB. SBA for STS txr, initially agreeable to complete grooming tasks at sinkside however, once in standing  she requested to txr to recliner to complete tasks. S/U for simple grooming tasks. Able to verbalize and maintain WB precautions. Left pt sitting up in recliner chair with all needs met and call bell within reach.     Progression toward goals:  [x]          Improving appropriately and progressing toward goals  []          Improving slowly and progressing toward goals  []          Not making progress toward goals and plan of care will be adjusted     PLAN:  Patient continues to benefit from skilled intervention to address the above impairments.  Continue treatment per established plan of care.    Further Equipment Recommendations for Discharge: bedside commode, shower chair, and rolling walker    AMPA: AM-PAC Inpatient Daily Activity Raw Score: 15    Current research shows that an AM-PAC score of 17 or less is not associated with a discharge to the patient's home setting.    This AMPAC score should be considered in conjunction with interdisciplinary team recommendations to determine the most appropriate discharge setting. Patient's social support, diagnosis, medical stability, and prior level of function should also be taken into consideration.     SUBJECTIVE:   Patient stated, \"I don't think I can make it into the bathroom.\"    OBJECTIVE DATA SUMMARY:   Cognitive/Behavioral Status:  Orientation  Overall Orientation Status: Within Functional Limits  Orientation Level: Oriented X4  Cognition  Overall Cognitive Status: WFL    Functional Mobility and Transfers for ADLs:   Bed Mobility:  Bed Mobility Training  Bed Mobility Training: Yes  Supine to Sit: Stand-by assistance;Additional time  Sit to Supine: Stand-by assistance   Transfers:  Transfer Training  Transfer Training: Yes  Sit to Stand: Stand-by assistance  Stand to Sit: Stand-by assistance    Balance:  Balance  Sitting: Intact  Standing: Impaired;With support  Standing - Static: Fair  Standing - Dynamic: Fair    ADL Intervention:  Grooming:

## 2024-07-07 NOTE — PLAN OF CARE
Problem: Discharge Planning  Goal: Discharge to home or other facility with appropriate resources  Outcome: Progressing     Problem: Pain  Goal: Verbalizes/displays adequate comfort level or baseline comfort level  Outcome: Progressing     Problem: ABCDS Injury Assessment  Goal: Absence of physical injury  Outcome: Progressing     Problem: Safety - Adult  Goal: Free from fall injury  Outcome: Progressing     Problem: Chronic Conditions and Co-morbidities  Goal: Patient's chronic conditions and co-morbidity symptoms are monitored and maintained or improved  Outcome: Progressing     Problem: Cardiovascular - Adult  Goal: Maintains optimal cardiac output and hemodynamic stability  Outcome: Progressing     Problem: Skin/Tissue Integrity - Adult  Goal: Skin integrity remains intact  Outcome: Progressing     Problem: Skin/Tissue Integrity - Adult  Goal: Incisions, wounds, or drain sites healing without S/S of infection  Outcome: Progressing

## 2024-07-08 LAB
GLUCOSE BLD STRIP.AUTO-MCNC: 113 MG/DL (ref 70–110)
GLUCOSE BLD STRIP.AUTO-MCNC: 121 MG/DL (ref 70–110)
GLUCOSE BLD STRIP.AUTO-MCNC: 124 MG/DL (ref 70–110)
GLUCOSE BLD STRIP.AUTO-MCNC: 138 MG/DL (ref 70–110)

## 2024-07-08 PROCEDURE — 6360000002 HC RX W HCPCS: Performed by: EMERGENCY MEDICINE

## 2024-07-08 PROCEDURE — 6370000000 HC RX 637 (ALT 250 FOR IP): Performed by: INTERNAL MEDICINE

## 2024-07-08 PROCEDURE — 6370000000 HC RX 637 (ALT 250 FOR IP): Performed by: PHYSICIAN ASSISTANT

## 2024-07-08 PROCEDURE — 2580000003 HC RX 258: Performed by: PHYSICIAN ASSISTANT

## 2024-07-08 PROCEDURE — 99233 SBSQ HOSP IP/OBS HIGH 50: CPT | Performed by: FAMILY MEDICINE

## 2024-07-08 PROCEDURE — 94761 N-INVAS EAR/PLS OXIMETRY MLT: CPT

## 2024-07-08 PROCEDURE — 82962 GLUCOSE BLOOD TEST: CPT

## 2024-07-08 PROCEDURE — 6360000002 HC RX W HCPCS: Performed by: INTERNAL MEDICINE

## 2024-07-08 PROCEDURE — 6370000000 HC RX 637 (ALT 250 FOR IP): Performed by: EMERGENCY MEDICINE

## 2024-07-08 PROCEDURE — 1100000000 HC RM PRIVATE

## 2024-07-08 PROCEDURE — 2580000003 HC RX 258: Performed by: INTERNAL MEDICINE

## 2024-07-08 RX ADMIN — SODIUM CHLORIDE: 9 INJECTION, SOLUTION INTRAVENOUS at 22:30

## 2024-07-08 RX ADMIN — SEVELAMER CARBONATE 800 MG: 800 TABLET, FILM COATED ORAL at 12:13

## 2024-07-08 RX ADMIN — HEPARIN SODIUM 5000 UNITS: 5000 INJECTION INTRAVENOUS; SUBCUTANEOUS at 22:00

## 2024-07-08 RX ADMIN — ATORVASTATIN CALCIUM 10 MG: 10 TABLET, FILM COATED ORAL at 09:14

## 2024-07-08 RX ADMIN — LATANOPROST 1 DROP: 50 SOLUTION/ DROPS OPHTHALMIC at 22:48

## 2024-07-08 RX ADMIN — HEPARIN SODIUM 5000 UNITS: 5000 INJECTION INTRAVENOUS; SUBCUTANEOUS at 06:00

## 2024-07-08 RX ADMIN — SODIUM CHLORIDE, PRESERVATIVE FREE 10 ML: 5 INJECTION INTRAVENOUS at 22:36

## 2024-07-08 RX ADMIN — TIMOLOL MALEATE 1 DROP: 5 SOLUTION OPHTHALMIC at 09:15

## 2024-07-08 RX ADMIN — TIMOLOL MALEATE 1 DROP: 5 SOLUTION OPHTHALMIC at 22:48

## 2024-07-08 RX ADMIN — PIPERACILLIN AND TAZOBACTAM 3375 MG: 3; .375 INJECTION, POWDER, FOR SOLUTION INTRAVENOUS at 12:14

## 2024-07-08 RX ADMIN — SEVELAMER CARBONATE 800 MG: 800 TABLET, FILM COATED ORAL at 09:14

## 2024-07-08 RX ADMIN — SEVELAMER CARBONATE 800 MG: 800 TABLET, FILM COATED ORAL at 17:07

## 2024-07-08 RX ADMIN — SACUBITRIL AND VALSARTAN 1 TABLET: 24; 26 TABLET, FILM COATED ORAL at 09:14

## 2024-07-08 RX ADMIN — PIPERACILLIN AND TAZOBACTAM 3375 MG: 3; .375 INJECTION, POWDER, FOR SOLUTION INTRAVENOUS at 22:32

## 2024-07-08 RX ADMIN — DOCUSATE SODIUM 50 MG: 50 LIQUID ORAL at 22:33

## 2024-07-08 RX ADMIN — PANTOPRAZOLE SODIUM 40 MG: 40 TABLET, DELAYED RELEASE ORAL at 06:33

## 2024-07-08 RX ADMIN — INSULIN GLARGINE 10 UNITS: 100 INJECTION, SOLUTION SUBCUTANEOUS at 21:00

## 2024-07-08 RX ADMIN — DOCUSATE SODIUM 50 MG: 50 LIQUID ORAL at 09:14

## 2024-07-08 RX ADMIN — Medication 1 CAPSULE: at 09:14

## 2024-07-08 RX ADMIN — HYDROMORPHONE HYDROCHLORIDE 0.25 MG: 1 INJECTION, SOLUTION INTRAMUSCULAR; INTRAVENOUS; SUBCUTANEOUS at 22:24

## 2024-07-08 RX ADMIN — ASPIRIN 81 MG CHEWABLE TABLET 81 MG: 81 TABLET CHEWABLE at 09:14

## 2024-07-08 RX ADMIN — SODIUM CHLORIDE, PRESERVATIVE FREE 10 ML: 5 INJECTION INTRAVENOUS at 09:14

## 2024-07-08 RX ADMIN — OXYCODONE AND ACETAMINOPHEN 1 TABLET: 7.5; 325 TABLET ORAL at 18:16

## 2024-07-08 RX ADMIN — HEPARIN SODIUM 5000 UNITS: 5000 INJECTION INTRAVENOUS; SUBCUTANEOUS at 14:45

## 2024-07-08 RX ADMIN — SACUBITRIL AND VALSARTAN 1 TABLET: 24; 26 TABLET, FILM COATED ORAL at 22:33

## 2024-07-08 ASSESSMENT — PAIN SCALES - GENERAL
PAINLEVEL_OUTOF10: 0
PAINLEVEL_OUTOF10: 4
PAINLEVEL_OUTOF10: 0
PAINLEVEL_OUTOF10: 7
PAINLEVEL_OUTOF10: 0

## 2024-07-08 ASSESSMENT — PAIN DESCRIPTION - FREQUENCY: FREQUENCY: INTERMITTENT

## 2024-07-08 ASSESSMENT — PAIN DESCRIPTION - ORIENTATION
ORIENTATION: RIGHT
ORIENTATION: RIGHT

## 2024-07-08 ASSESSMENT — PAIN DESCRIPTION - LOCATION
LOCATION: FOOT
LOCATION: FOOT

## 2024-07-08 ASSESSMENT — PAIN DESCRIPTION - DESCRIPTORS: DESCRIPTORS: ACHING;DISCOMFORT

## 2024-07-08 ASSESSMENT — PAIN DESCRIPTION - PAIN TYPE: TYPE: ACUTE PAIN

## 2024-07-08 ASSESSMENT — PAIN DESCRIPTION - ONSET: ONSET: GRADUAL

## 2024-07-08 ASSESSMENT — PAIN - FUNCTIONAL ASSESSMENT: PAIN_FUNCTIONAL_ASSESSMENT: ACTIVITIES ARE NOT PREVENTED

## 2024-07-08 NOTE — PLAN OF CARE
Problem: Discharge Planning  Goal: Discharge to home or other facility with appropriate resources  Outcome: Progressing     Problem: Pain  Goal: Verbalizes/displays adequate comfort level or baseline comfort level  7/8/2024 1403 by Kathya Deng RN  Outcome: Progressing  7/8/2024 0204 by Safia Morin RN  Outcome: Progressing     Problem: ABCDS Injury Assessment  Goal: Absence of physical injury  Outcome: Progressing     Problem: Safety - Adult  Goal: Free from fall injury  Outcome: Progressing     Problem: Chronic Conditions and Co-morbidities  Goal: Patient's chronic conditions and co-morbidity symptoms are monitored and maintained or improved  Outcome: Progressing

## 2024-07-08 NOTE — PLAN OF CARE
Problem: Discharge Planning  Goal: Discharge to home or other facility with appropriate resources  7/7/2024 1411 by Cande Deng, RN  Outcome: Progressing  Flowsheets (Taken 7/7/2024 0834)  Discharge to home or other facility with appropriate resources: Identify barriers to discharge with patient and caregiver

## 2024-07-08 NOTE — CARE COORDINATION
Per Dr. Logan, auth was denied to ARU after P2P.      Pt has some SNF acceptance but not close to pt's home and dialysis center.  Uploaded pt's clinicals to some closer SNF in [facility].            WENDY MccarthyN RN  Care Management

## 2024-07-08 NOTE — PROGRESS NOTES
ARU/IPR REFERRAL CONTACT NOTE  [facility]      Thank you for the opportunity to review this patient's case for admission to [facility].      Based on our pre-admission screening:                          [ X ] Other: The peer to peer was completed with the patient's insurance company.     Unfortunately, the insurance denied the peer to peer for ARU admission.      The patient does have the right to appeal if she would like to.     Insurance company does state that they would aproved a SNF if the request is submitted within the next 24 hours.      p. [phone]  f. [phone].     CM notified.       Again, Thank you for this referral. Should you have any questions please do not hesitate to call.     Sincerely,  Gabbi Grewal RN    Clinical Liaison  [facility]  [phone]

## 2024-07-08 NOTE — PROGRESS NOTES
Assumed care of patient from LUCERO Baig (off going nurse). Patient alert and oriented. No apparent distress noted. Patient offers no concerns and can verbalize needs. Assessment to follow. Call bell within reach. Bed in lowest, locked position.

## 2024-07-08 NOTE — PROGRESS NOTES
[facility]  Progress Note    Patient: Jigna Conner Age: 69 y.o. : 1955 MR#: [id number] SSN: [id number]      Subjective/24-hour events:     No new complaints overnight.    Assessment:   Diabetic foot infection with left foot osteomyelitis  DM2  PAD  ESRD, HD dependent    Plan:   Continue current medical management as ordered.  Antibiotic therapy per ID, wound care per podiatry.  Patient accepted to inpatient rehab.  Swru-pc-agdl completed earlier today but patient denied for inpatient rehabilitative services as expected given her insurance coverage.  Medically stable for discharge once placement arranged.  Discussed with care management on unit today.  Follow.    Anticipated discharge: Medically stable/SNF    Case discussed with:  [x]Patient  []Family  [x] Nursing  [x]Case Management  DVT Prophylaxis:  []Lovenox  [x]Hep SQ  []SCDs  []Coumadin   []On Heparin gtt []PO anticoagulant    Objective:   VS: BP (!) 146/60   Pulse 61   Temp 97.7 °F (36.5 °C) (Oral)   Resp 18   Ht 1.624 m (5' 3.94\")   Wt 96 kg (211 lb 10.3 oz)   SpO2 97%   BMI 36.40 kg/m²      Tmax/24hrs: Temp (24hrs), Av.4 °F (36.9 °C), Min:97.7 °F (36.5 °C), Max:98.7 °F (37.1 °C)    Intake/Output Summary (Last 24 hours) at 2024 1637  Last data filed at 2024 1210  Gross per 24 hour   Intake 480 ml   Output --   Net 480 ml       Gen:  In NAD.  Nontoxic-appearing.  Lungs: Clear, no wheezes  Effort nonlabored.  CV: RRR.  Abdomen: Soft, NTTP.  Extremities: Warm.  Wound VAC in place left foot.  Neuro:  Awake and alert, moves extremities spontaneously.    Current Facility-Administered Medications   Medication Dose Route Frequency    epoetin laurence-epbx (RETACRIT) injection 10,000 Units  10,000 Units SubCUTAneous Once per day on  Sat    sevelamer (RENVELA) tablet 800 mg  800 mg Oral TID WC    dextrose 5 % solution   IntraVENous Continuous    heparin (porcine) injection 5,000 Units

## 2024-07-08 NOTE — CARE COORDINATION
Per pt, she does not want [facility].  She stated it is either [facility] or [facility] in [place] with phone number [phone].    1515:  Called above number and spoke with Jeanine in Admissions.  She requested for CM to fax pt's clinicals to [phone].  She stated they have in-house dialysis.    1520:  Clinicals faxed to [facility] in [facility].            WENDY MccarthyN RN  Care Management

## 2024-07-08 NOTE — PROGRESS NOTES
ARU/IPR REFERRAL CONTACT NOTE  [facility] for [facility]      Thank you for the opportunity to review this patient's case for admission to [facility].      Based on our pre-admission screening:                          [x ] Our Team/Medical Director is following this case.       Comments:     [facility] is offering a peer to peer with deadline today at 12 pm.       Messaged provided who is agreeable to the peer to peer.       Number to call is [phone] option 5    Reference number: [id number]       Again, Thank you for this referral. Should you have any questions please do not hesitate to call.     Sincerely,  Gabbi Grewal RN    Clinical Liaison  [facility]  [phone]

## 2024-07-08 NOTE — CARE COORDINATION
07/08/24 0924   IMM Letter   IMM Letter given to Patient/Family/Significant other/Guardian/POA/by: Sonia Arevalo   IMM Letter date given: 07/05/24   IMM Letter time given: 1540         Medicare pt has received, reviewed, and signed 2nd IM letter informing them of their right to appeal the discharge.  Signed copy has been placed on pt bedside chart.        ALY Mccarthy RN  Care Management

## 2024-07-08 NOTE — PROGRESS NOTES
.4 Eyes Skin Assessment     NAME:  Jigna Conner  YOB: 1955  MEDICAL RECORD NUMBER:  [id number]    The patient is being assessed for  Shift Handoff    I agree that at least one RN has performed a thorough Head to Toe Skin Assessment on the patient. ALL assessment sites listed below have been assessed.      Areas assessed by both nurses:    {Pressure Areas Assessed:46240}        Does the Patient have a Wound? {Action Wound:56870}       Vinay Prevention initiated by RN: {YES/NO:19726}  Wound Care Orders initiated by RN: {YES/NO:19726}    Pressure Injury (Stage 3,4, Unstageable, DTI, NWPT, and Complex wounds) if present, place Wound referral order by RN under : {YES/NO:19726}    New Ostomies, if present place, Ostomy referral order under : {YES/NO:19726}     Nurse 1 eSignature: Electronically signed by Cande Deng RN on 7/7/24 at 8:43 PM EDT    **SHARE this note so that the co-signing nurse can place an eSignature**    Nurse 2 eSignature: {Esignature:[id number]}

## 2024-07-08 NOTE — CARE COORDINATION
Called [facility] [phone] and spoke with Minnie in Admissions.  She stated she will get nursing to review and will get back to CM.  She stated if they accept pt, pt must have a ride to and from dialysis.      9494  Called [facility]  [phone] and spoke with Khoa.  He requested CM to fax pt's clinicals to [phone] because they are not in [facility].      1430:  Clinicals faxed to [facility] in [facility].        Sonia Arevalo, WENDYN RN  Care Management

## 2024-07-08 NOTE — PROGRESS NOTES
[facility]   Pharmacy Pharmacokinetic Monitoring Service - Vancomycin    Indication: DM foot infection  Target Pre-Dialysis Concentration: 21-24 mg/L  Day of Therapy: 14  Additional Antimicrobials: pip-tazo    Pertinent Laboratory Values:   Wt Readings from Last 1 Encounters:   07/08/24 96 kg (211 lb 10.3 oz)     Temp Readings from Last 1 Encounters:   07/08/24 98.5 °F (36.9 °C) (Oral)     No results for input(s): \"WBC\" in the last 72 hours.    Pertinent Cultures:  Date Source Results   6/25 blood NGF   6/26 wound GPC, GPR   6/26 tissue Diphtheroids    MRSA Nasal Swab: NA    Assessment:  Date Current Dose Concentration (mg/L) Dialysis Session   6/25 2,000 mg x1 - no   6/26 - - no   6/27 - 27.6 yes   6/28 - - no   6/29  15.4 yes   6/30 - - no   7/1   no   7/2 750 mg x1 19.1 yes   7/3 - - no   7/4 750 mg x1 21.3 yes   7/5 - - no   7/6 750 mg x 1 20.4 yes   7/7 - - no   7/8 - - no     Plan:  ESRD on HD q TTS - dose by level  No dose today  Level on ordered for 7/9 with AM labs  Pharmacy will continue to monitor patient and adjust therapy as indicated    Thank you for the consult,  Gianni Wolfe ZEUS  7/8/2024

## 2024-07-08 NOTE — PROGRESS NOTES
[facility]  (A Division of [facility])    Follow-up Note      Date of Admission: 2024       Date of Note:  2024          Current Antimicrobials:    Prior Antimicrobials:  Zosyn  to present  Vancomycin  to present      Assessment:         Osteomyelitis of the right foot:   - wound culture with mixed marimar including gram-negative rods   -Status post I&D right foot abscess and second metatarsal debridement on .  Diabetes mellitus type 2  Peripheral artery disease: Status post angiography   End-stage renal disease on dialysis    Plan:   Continue current wound care with wound VAC-will need home health  Will plan to transition to vancomycin plus cefepime upon discharge.   - Stop date 24    -Discussed ABX with Dr. Vargas- they have been arranged as above.    Trend CBC, BMP, ESR, CRP at least twice weekly  Tight glucose control for optimal wound healing.    Discussed with Dr. Savage  Follow-up as outpatient 2 to 3 weeks    DO Clem [facility]  [address], [address] [address]  Office: [phone], Ext [phone]       Microbiology:   right foot tissue culture: Diphtheroids   wound culture: Heavy mixed skin marimar with gram-negative rods   blood cultures no growth to date x 2    Lines / Catheters:  Peripheral    Subjective:   Patient seen and examined.   Pain is controlled. No overnight fevers.     Tmax 98.7  WBC none today    Objective:      BP (!) 146/60   Pulse 61   Temp 97.7 °F (36.5 °C) (Oral)   Resp 18   Ht 1.624 m (5' 3.94\")   Wt 96 kg (211 lb 10.3 oz)   SpO2 97%   BMI 36.40 kg/m²   Temp (24hrs), Av.4 °F (36.9 °C), Min:97.7 °F (36.5 °C), Max:98.7 °F (37.1 °C)        General:   awake alert and oriented, non-toxic   Skin:    dry and warm, wound VAC in place over right foot in postsurgical setting   HEENT:  No scleral icterus or pallor; oral mucosa moist, lips moist  Order Status: Completed Specimen: Foot, Right Updated: 06/28/24 1610     Special Requests --        RIGHT FOOT  ABSCESS       Culture NO ANAEROBES ISOLATED       Culture, Blood 2 [[id number]] Collected: 06/25/24 1835    Order Status: Completed Specimen: Blood Updated: 07/01/24 0711     Special Requests NO SPECIAL REQUESTS        Culture NO GROWTH 6 DAYS       Culture, Blood 1 [[id number]] Collected: 06/25/24 1825    Order Status: Completed Specimen: Blood Updated: 07/01/24 0711     Special Requests NO SPECIAL REQUESTS        Culture NO GROWTH 6 DAYS                 John Trivedi DO   7/8/2024

## 2024-07-09 LAB
GLUCOSE BLD STRIP.AUTO-MCNC: 101 MG/DL (ref 70–110)
GLUCOSE BLD STRIP.AUTO-MCNC: 119 MG/DL (ref 70–110)
GLUCOSE BLD STRIP.AUTO-MCNC: 126 MG/DL (ref 70–110)
GLUCOSE BLD STRIP.AUTO-MCNC: 89 MG/DL (ref 70–110)
VANCOMYCIN SERPL-MCNC: 16.6 UG/ML (ref 5–40)

## 2024-07-09 PROCEDURE — 97530 THERAPEUTIC ACTIVITIES: CPT

## 2024-07-09 PROCEDURE — 6370000000 HC RX 637 (ALT 250 FOR IP): Performed by: EMERGENCY MEDICINE

## 2024-07-09 PROCEDURE — 97168 OT RE-EVAL EST PLAN CARE: CPT

## 2024-07-09 PROCEDURE — 80202 ASSAY OF VANCOMYCIN: CPT

## 2024-07-09 PROCEDURE — 2580000003 HC RX 258: Performed by: INTERNAL MEDICINE

## 2024-07-09 PROCEDURE — 6360000002 HC RX W HCPCS: Performed by: INTERNAL MEDICINE

## 2024-07-09 PROCEDURE — 6370000000 HC RX 637 (ALT 250 FOR IP): Performed by: INTERNAL MEDICINE

## 2024-07-09 PROCEDURE — 6360000002 HC RX W HCPCS: Performed by: EMERGENCY MEDICINE

## 2024-07-09 PROCEDURE — 90935 HEMODIALYSIS ONE EVALUATION: CPT

## 2024-07-09 PROCEDURE — 82962 GLUCOSE BLOOD TEST: CPT

## 2024-07-09 PROCEDURE — 6370000000 HC RX 637 (ALT 250 FOR IP): Performed by: PHYSICIAN ASSISTANT

## 2024-07-09 PROCEDURE — 36415 COLL VENOUS BLD VENIPUNCTURE: CPT

## 2024-07-09 PROCEDURE — 99231 SBSQ HOSP IP/OBS SF/LOW 25: CPT | Performed by: FAMILY MEDICINE

## 2024-07-09 PROCEDURE — 6360000002 HC RX W HCPCS: Performed by: PHYSICIAN ASSISTANT

## 2024-07-09 PROCEDURE — 94761 N-INVAS EAR/PLS OXIMETRY MLT: CPT

## 2024-07-09 PROCEDURE — 1100000000 HC RM PRIVATE

## 2024-07-09 PROCEDURE — 2580000003 HC RX 258: Performed by: PHYSICIAN ASSISTANT

## 2024-07-09 RX ADMIN — PIPERACILLIN AND TAZOBACTAM 3375 MG: 3; .375 INJECTION, POWDER, FOR SOLUTION INTRAVENOUS at 21:53

## 2024-07-09 RX ADMIN — Medication 1 CAPSULE: at 13:47

## 2024-07-09 RX ADMIN — SEVELAMER CARBONATE 800 MG: 800 TABLET, FILM COATED ORAL at 18:03

## 2024-07-09 RX ADMIN — OXYCODONE AND ACETAMINOPHEN 1 TABLET: 7.5; 325 TABLET ORAL at 21:44

## 2024-07-09 RX ADMIN — VANCOMYCIN HYDROCHLORIDE 1000 MG: 1 INJECTION, POWDER, LYOPHILIZED, FOR SOLUTION INTRAVENOUS at 10:54

## 2024-07-09 RX ADMIN — ASPIRIN 81 MG CHEWABLE TABLET 81 MG: 81 TABLET CHEWABLE at 13:47

## 2024-07-09 RX ADMIN — SODIUM CHLORIDE, PRESERVATIVE FREE 10 ML: 5 INJECTION INTRAVENOUS at 09:00

## 2024-07-09 RX ADMIN — PIPERACILLIN AND TAZOBACTAM 3375 MG: 3; .375 INJECTION, POWDER, FOR SOLUTION INTRAVENOUS at 13:52

## 2024-07-09 RX ADMIN — HEPARIN SODIUM 5000 UNITS: 5000 INJECTION INTRAVENOUS; SUBCUTANEOUS at 21:44

## 2024-07-09 RX ADMIN — SEVELAMER CARBONATE 800 MG: 800 TABLET, FILM COATED ORAL at 13:50

## 2024-07-09 RX ADMIN — EPOETIN ALFA 10000 UNITS: 10000 SOLUTION INTRAVENOUS; SUBCUTANEOUS at 21:56

## 2024-07-09 RX ADMIN — PANTOPRAZOLE SODIUM 40 MG: 40 TABLET, DELAYED RELEASE ORAL at 05:10

## 2024-07-09 RX ADMIN — ATORVASTATIN CALCIUM 10 MG: 10 TABLET, FILM COATED ORAL at 13:48

## 2024-07-09 RX ADMIN — INSULIN GLARGINE 10 UNITS: 100 INJECTION, SOLUTION SUBCUTANEOUS at 21:45

## 2024-07-09 RX ADMIN — SODIUM CHLORIDE, PRESERVATIVE FREE 10 ML: 5 INJECTION INTRAVENOUS at 13:54

## 2024-07-09 RX ADMIN — HEPARIN SODIUM 5000 UNITS: 5000 INJECTION INTRAVENOUS; SUBCUTANEOUS at 06:10

## 2024-07-09 RX ADMIN — ONDANSETRON 4 MG: 2 INJECTION INTRAMUSCULAR; INTRAVENOUS at 08:01

## 2024-07-09 RX ADMIN — SACUBITRIL AND VALSARTAN 1 TABLET: 24; 26 TABLET, FILM COATED ORAL at 21:45

## 2024-07-09 RX ADMIN — HEPARIN SODIUM 5000 UNITS: 5000 INJECTION INTRAVENOUS; SUBCUTANEOUS at 13:50

## 2024-07-09 RX ADMIN — TIMOLOL MALEATE 1 DROP: 5 SOLUTION OPHTHALMIC at 21:48

## 2024-07-09 RX ADMIN — DOCUSATE SODIUM 50 MG: 50 LIQUID ORAL at 21:43

## 2024-07-09 RX ADMIN — LATANOPROST 1 DROP: 50 SOLUTION/ DROPS OPHTHALMIC at 21:48

## 2024-07-09 RX ADMIN — SODIUM CHLORIDE, PRESERVATIVE FREE 10 ML: 5 INJECTION INTRAVENOUS at 21:59

## 2024-07-09 RX ADMIN — SACUBITRIL AND VALSARTAN 1 TABLET: 24; 26 TABLET, FILM COATED ORAL at 13:47

## 2024-07-09 ASSESSMENT — PAIN DESCRIPTION - LOCATION
LOCATION: FOOT

## 2024-07-09 ASSESSMENT — PAIN DESCRIPTION - PAIN TYPE
TYPE: SURGICAL PAIN

## 2024-07-09 ASSESSMENT — PAIN DESCRIPTION - ORIENTATION
ORIENTATION: RIGHT

## 2024-07-09 ASSESSMENT — PAIN SCALES - GENERAL
PAINLEVEL_OUTOF10: 0
PAINLEVEL_OUTOF10: 4
PAINLEVEL_OUTOF10: 0

## 2024-07-09 ASSESSMENT — PAIN DESCRIPTION - FREQUENCY
FREQUENCY: INTERMITTENT

## 2024-07-09 ASSESSMENT — PAIN DESCRIPTION - ONSET
ONSET: GRADUAL

## 2024-07-09 ASSESSMENT — PAIN DESCRIPTION - DESCRIPTORS
DESCRIPTORS: ACHING

## 2024-07-09 ASSESSMENT — PAIN - FUNCTIONAL ASSESSMENT
PAIN_FUNCTIONAL_ASSESSMENT: ACTIVITIES ARE NOT PREVENTED

## 2024-07-09 NOTE — PROGRESS NOTES
Comprehensive Nutrition Assessment    Type and Reason for Visit:  Reassess, RD Nutrition Re-Screen/LOS    Nutrition Recommendations/Plan:   Continue current diet as tolerated.  Continue oral supplements: Glucerna (each provides 220 kcal, 10g protein) daily  Continue to monitor tolerance of PO, compliance of oral supplements, weight, labs, and plan of care during admission.     Malnutrition Assessment:  Malnutrition Status:  Insufficient data (24 1621)    Context:  Chronic Illness     Findings of the 6 clinical characteristics of malnutrition:  Energy Intake:  Unable to assess  Weight Loss:  No significant weight loss     Body Fat Loss:  Unable to assess     Muscle Mass Loss:  Unable to assess    Fluid Accumulation:  No significant fluid accumulation     Strength:  Not Performed    Nutrition Assessment:    Admitted for DFU,+osteomyelitis. Clinical course significant for: (, 7/3) I/D of R foot with eventual wound vac placement. Last HD , 2L removed. Pt seen for - LOS. At initial assessment (7/3) Wt hx reviewed, no significant drops. I/O finding intakes % of meals, unable to conduct phone interview but Nsg notes finding pt  \"has a great appetite\". Glucerna initiated. () f/u LOS, noted pt medically stable for d/c, pending SNF placement. Intakes have improved to now % of meals x4d, no documentation of ONS intakes. No wt loss per measured wts either. Continue current regimen.      Nutrition Related Findings:    Pertinent Meds: Colace 2x/d, lantus, SSI, culturelle, PPI, Zosyn, renvela. vancomycin.   Pertinent Labs: No recent to review, BG 89-138mg/dL x24hrs.   Last BM: 24  Skin: Wound Type: Surgical Incision, Diabetic Ulcer  Edema: +1 pitting b/l LE          Temp: 96.8 °F (36 °C)  Temp (24hrs), Av.5 °F (36.4 °C), Min:96.8 °F (36 °C), Max:98.5 °F (36.9 °C)       Current Nutrition Intake & Therapies:    Average Meal Intake: 51-75%, %  Average Supplements Intake: Unable to

## 2024-07-09 NOTE — PROGRESS NOTES
[facility]   Pharmacy Pharmacokinetic Monitoring Service - Vancomycin    Indication: DM foot infection  Target Pre-Dialysis Concentration: 21-24 mg/L  Day of Therapy: 15  Additional Antimicrobials: pip-tazo    Pertinent Laboratory Values:   Wt Readings from Last 1 Encounters:   07/09/24 96 kg (211 lb 10.3 oz)     Temp Readings from Last 1 Encounters:   07/09/24 98.5 °F (36.9 °C) (Oral)     No results for input(s): \"WBC\" in the last 72 hours.    Pertinent Cultures:  Date Source Results   6/25 blood NGF   6/26 wound GPC, GPR   6/26 tissue Diphtheroids    MRSA Nasal Swab: NA    Assessment:  Date Current Dose Concentration (mg/L) Dialysis Session   6/25 2,000 mg x1 - no   6/26 - - no   6/27 - 27.6 yes   6/28 - - no   6/29  15.4 yes   6/30 - - no   7/1   no   7/2 750 mg x1 19.1 yes   7/3 - - no   7/4 750 mg x1 21.3 yes   7/5 - - no   7/6 750 mg x 1 20.4 yes   7/7 - - no   7/8 - - no   7/9 1,000 mg x1 16.6      Plan:  ESRD on HD q TTS - dose by level  Dose: 1,000 mg over the last 60 min of HD  No level ordered at this time  Pharmacy will continue to monitor patient and adjust therapy as indicated    Thank you for the consult,  Gianni Wolfe [facility]  7/9/2024

## 2024-07-09 NOTE — CARE COORDINATION
Called [facility] [phone] and left a message for Jeanine to please call  back with decision for pt's placement.    1200:    Spoke with Camilla of [facility] [phone].  She stated she will call [facility] to see if they have a dialysis bed.     1335:    Called [facility] in [place] and spoke with Alyson, .  She wanted  to refax clinicals to them.  CM asked that kind of facility and she stated it is acute rehab.  CM informed her pt was denied for our acute rehab and she stated insurance will deny them too.    Tried to update pt but she was on the phone.    1600:  Met with pt.   Informed her that [facility] that she wanted declined her.  Informed her that [facility] and [facility] she did not want are asking for more information to start ordering her wound vac.  She is now in agreement for  to send clinicals as requested to [facility] and [facility].    1617:  Sent message to JERAD Gabriel for wound vac orders and he stated he will order it in am tomorrow.    Sent message to [facility] and [facility] that we will send wound vac orders.        Sonia Arevalo, WENDYN RN  Care Management

## 2024-07-09 NOTE — PLAN OF CARE
Problem: Occupational Therapy - Adult  Goal: By Discharge: Performs self-care activities at highest level of function for planned discharge setting.  See evaluation for individualized goals.  Description: Occupational Therapy Goals:  Initiated 6/28/2024 to be met within 7-10 days.  Re-eval completed 6/9/24, pt making consistent progress. Goals continue to be appropriate.     1.  Patient will perform bathing with supervision/set-up.   2.  Patient will perform lower body dressing with supervision/set-up.  3.  Patient will perform BSC transfers with supervision/set-up using LRAD (sliding board vs FWW).  4.  Patient will perform all aspects of toileting with supervision/set-up.  5.  Patient will participate in upper extremity therapeutic exercise/activities with supervision/set-up for 8 minutes to improve endurance and UB strength needed for ADLs    6.  Patient will utilize energy conservation techniques during functional activities with verbal cues.    PLOF: Pt lives with spouse in [place] with ramp. Pt is independent with ADLs and functional mobility. Pt's spouse is disabled and has PCA assisting him with ADLs.  Outcome: Progressing    OCCUPATIONAL THERAPY RE-EVALUATION    Patient: Jigna Conner (69 y.o. female)  Date: 7/9/2024  Primary Diagnosis: Hypokalemia [E87.6]  Hyperglycemia [R73.9]  ESRD (end stage renal disease) (HCC) [N18.6]  Diabetic foot infection (HCC) [E11.628, L08.9]  Anemia, unspecified type [D64.9]  Procedure(s) (LRB):  RIGHT FOOT IRRIGATION AND DEBRIDEMENT, REMOVAL OF INFECTED/NON-VIABLE TISSUE, WOUND VAC PLACEMENT (Right) 6 Days Post-Op   Precautions: Fall Risk, Skin, Aspiration Risk, Weight Bearing, Right Lower Extremity Weight Bearing: Non Weight Bearing,  ,  ,  ,  ,  ,      ASSESSMENT :  Pt cleared for OT re-eval and pt agreeable to participate. Pt reports feeling lethargic after dialysis but up to short OOB tasks. SBA to get to EOB with good balance, SBA for STS txr and functional mobility

## 2024-07-09 NOTE — PROGRESS NOTES
Podiatry Progress Note    Patient: Jigna Conner               Sex: female          DOA: [unfilled]       YOB: 1955      Age:  69 y.o.        LOS:  LOS: 13 days     POD 5 Days Post-Op  Procedure:   Procedure(s):  RIGHT FOOT IRRIGATION AND DEBRIDEMENT, REMOVAL OF INFECTED/NON-VIABLE TISSUE, WOUND VAC PLACEMENT      Subjective:     Patient seen resting quiet and comfortably and no apparent distress.  Has intact wound vac dressings to right foot. About 30 cc of fluid collected in cannister. Denies N/V/C/F.      Objective:       Physical Exam:  Right foot dressed with wound vac dressings. Dressings are without any air leak. Denies any pedal pain.     Assessment:     Principal Problem:    Diabetic foot infection (HCC)  Active Problems:    Hypertension    Anemia of renal disease    ESRD on dialysis (HCC)    Diabetes mellitus (HCC)    Hypokalemia    Chronic heart failure with preserved ejection fraction (HFpEF) (HCC)    Coronary artery disease involving native coronary artery of native heart without angina pectoris    History of MI (myocardial infarction)    Class 2 obesity in adult    Constipation  Resolved Problems:    * No resolved hospital problems. *      Plan/Recommendations/Medical Decision Making:     - Continue with wound vac therapy.   - Patient is being discharged to inpatient rehab. Will continue to follow there.  - Antibiotics per ID recommendation.   - Remain NWB to right foot.   - Medical management per primary team.

## 2024-07-09 NOTE — PROGRESS NOTES
Order Status: Completed Specimen: Foot, Right Updated: 06/28/24 1610     Special Requests --        RIGHT FOOT  ABSCESS       Culture NO ANAEROBES ISOLATED       Culture, Blood 2 [[id number]] Collected: 06/25/24 1835    Order Status: Completed Specimen: Blood Updated: 07/01/24 0711     Special Requests NO SPECIAL REQUESTS        Culture NO GROWTH 6 DAYS       Culture, Blood 1 [[id number]] Collected: 06/25/24 1825    Order Status: Completed Specimen: Blood Updated: 07/01/24 0711     Special Requests NO SPECIAL REQUESTS        Culture NO GROWTH 6 DAYS                 John Trivedi DO   7/9/2024

## 2024-07-09 NOTE — PROGRESS NOTES
Occupational Therapy  Pt not seen for skilled OT due to:    []  Nausea/vomiting  []  Eating  []  Pain  []  Pt lethargic  [x]  Off Unit @ dialysis (1032)    Will f/u later as schedule allows. Thank you.  Lauren Ramirez, OTD, OTR/L

## 2024-07-09 NOTE — PROGRESS NOTES
[facility] Hospitalist Group  Progress Note    Patient: Jigna Conner Age: 69 y.o. : 1955 MR#: [id number] SSN: [id number]      Subjective/24-hour events:     On HD currently.  No new complaints.    Assessment:   Diabetic foot infection with left foot osteomyelitis  DM2  PAD  ESRD, HD dependent    Plan:   Medical management unchanged.  Patient medically stable for discharge pending SNF placement.    Anticipated discharge: Medically stable/SNF    Case discussed with:  [x]Patient  []Family  [x] Nursing  [x]Case Management  DVT Prophylaxis:  []Lovenox  [x]Hep SQ  []SCDs  []Coumadin   []On Heparin gtt []PO anticoagulant    Objective:   VS: BP (!) 170/61   Pulse 67   Temp 98.5 °F (36.9 °C) (Oral)   Resp 16   Ht 1.624 m (5' 3.94\")   Wt 96 kg (211 lb 10.3 oz)   SpO2 99%   BMI 36.40 kg/m²      Tmax/24hrs: Temp (24hrs), Av.9 °F (36.6 °C), Min:97 °F (36.1 °C), Max:98.6 °F (37 °C)    Intake/Output Summary (Last 24 hours) at 2024 0756  Last data filed at 2024 1852  Gross per 24 hour   Intake 720 ml   Output 100 ml   Net 620 ml       Gen:  In NAD.  Nontoxic-appearing.  Lungs: Clear, no wheezes  Effort nonlabored.  CV: RRR.  Abdomen: Soft, NTTP.  Extremities: Warm.  Wound VAC in place left foot.  Neuro:  Awake and alert, moves extremities spontaneously.    Current Facility-Administered Medications   Medication Dose Route Frequency    epoetin laurence-epbx (RETACRIT) injection 10,000 Units  10,000 Units SubCUTAneous Once per day on  Sat    sevelamer (RENVELA) tablet 800 mg  800 mg Oral TID WC    dextrose 5 % solution   IntraVENous Continuous    heparin (porcine) injection 5,000 Units  5,000 Units SubCUTAneous 3 times per day    lactobacillus (CULTURELLE) capsule 1 capsule  1 capsule Oral Daily with breakfast    oxyCODONE-acetaminophen (PERCOCET) 7.5-325 MG per tablet 1 tablet  1 tablet Oral Q6H PRN    naloxone (NARCAN) injection 0.4 mg  0.4 mg IntraVENous PRN     ipratropium 0.5 mg-albuterol 2.5 mg (DUONEB) nebulizer solution 1 Dose  1 Dose Inhalation Q4H PRN    melatonin disintegrating tablet 5 mg  5 mg Oral Nightly PRN    docusate (COLACE) 50 MG/5ML liquid 50 mg  50 mg Oral BID    bisacodyl (DULCOLAX) suppository 10 mg  10 mg Rectal Daily PRN    senna (SENOKOT) tablet 8.6 mg  1 tablet Oral Nightly PRN    lactulose (CHRONULAC) 10 GM/15ML solution 30 g  30 g Oral Daily PRN    0.9 % sodium chloride infusion   IntraVENous PRN    piperacillin-tazobactam (ZOSYN) 3,375 mg in sodium chloride 0.9 % 50 mL IVPB (mini-bag)  3,375 mg IntraVENous Q12H    aspirin chewable tablet 81 mg  81 mg Oral Daily    atorvastatin (LIPITOR) tablet 10 mg  10 mg Oral Daily    [Held by provider] carvedilol (COREG) tablet 3.125 mg  3.125 mg Oral BID WC    [Held by provider] dorzolamide (TRUSOPT) 2 % ophthalmic solution 1 drop  1 drop Ophthalmic BID    insulin glargine (LANTUS) injection vial 10 Units  10 Units SubCUTAneous Nightly    latanoprost (XALATAN) 0.005 % ophthalmic solution 1 drop  1 drop Both Eyes Nightly    pantoprazole (PROTONIX) tablet 40 mg  40 mg Oral QAM AC    sacubitril-valsartan (ENTRESTO) 24-26 MG per tablet 1 tablet  1 tablet Oral BID    timolol (TIMOPTIC) 0.5 % ophthalmic solution 1 drop  1 drop Both Eyes BID    HYDROmorphone (DILAUDID) injection 0.25 mg  0.25 mg IntraVENous Q4H PRN    vancomycin (VANCOCIN) intermittent dosing (placeholder)   Other RX Placeholder    sodium chloride flush 0.9 % injection 5-40 mL  5-40 mL IntraVENous 2 times per day    sodium chloride flush 0.9 % injection 5-40 mL  5-40 mL IntraVENous PRN    0.9 % sodium chloride infusion   IntraVENous PRN    ondansetron (ZOFRAN-ODT) disintegrating tablet 4 mg  4 mg Oral Q8H PRN    Or    ondansetron (ZOFRAN) injection 4 mg  4 mg IntraVENous Q6H PRN    polyethylene glycol (GLYCOLAX) packet 17 g  17 g Oral Daily PRN    acetaminophen (TYLENOL) tablet 650 mg  650 mg Oral Q6H PRN    Or    acetaminophen (TYLENOL) suppository

## 2024-07-09 NOTE — CARE COORDINATION
SW placed patient in four other SNF's in the  [place] location via [facility].    Ling Rosa BSW   Case Management

## 2024-07-09 NOTE — PROGRESS NOTES
Progress Note    68-year-old female with past medical history of ESRD, diabetes, heart failure with preserved ejection fraction admitted for diabetic foot infection, following for ESRD management.  Subjective      Overnight event noted  No complaints offered  No chest pain or sob reported      IMPRESSION:   ESRD, Tuesday Thursday Saturday,   Access left arm AV fistula  Right foot wound infection,  Anemia, received blood transfusion during this admission  Diabetes  Hypertension  Heart failure with preserved ejection fraction.   PLAN:   Seen during dialysis, on 7/9/2024 11 am,tolerating well,stable bp and pulse  CM will fax the abx to her dialysis unit.  aware. Vancomycin 750 mg, cefepime 1 g with each session till 8/7.   Epogen for anemia.  Renvela for phos binding.   Adjust medication per ESRD status.     Current Facility-Administered Medications   Medication Dose Route Frequency    epoetin laurence (EPOGEN;PROCRIT) injection 10,000 Units  10,000 Units SubCUTAneous Once per day on Tue Thu Sat    vancomycin (VANCOCIN) 1,000 mg in sodium chloride 0.9 % 250 mL IVPB ([id number])  1,000 mg IntraVENous Once    sevelamer (RENVELA) tablet 800 mg  800 mg Oral TID WC    dextrose 5 % solution   IntraVENous Continuous    heparin (porcine) injection 5,000 Units  5,000 Units SubCUTAneous 3 times per day    lactobacillus (CULTURELLE) capsule 1 capsule  1 capsule Oral Daily with breakfast    oxyCODONE-acetaminophen (PERCOCET) 7.5-325 MG per tablet 1 tablet  1 tablet Oral Q6H PRN    naloxone (NARCAN) injection 0.4 mg  0.4 mg IntraVENous PRN    ipratropium 0.5 mg-albuterol 2.5 mg (DUONEB) nebulizer solution 1 Dose  1 Dose Inhalation Q4H PRN    melatonin disintegrating tablet 5 mg  5 mg Oral Nightly PRN    docusate (COLACE) 50 MG/5ML liquid 50 mg  50 mg Oral BID    bisacodyl (DULCOLAX) suppository 10 mg  10 mg Rectal Daily PRN    senna (SENOKOT) tablet 8.6 mg  1 tablet Oral Nightly PRN    lactulose (CHRONULAC) 10 GM/15ML

## 2024-07-09 NOTE — PROGRESS NOTES
HD Care plan  Time: 3 hrs  Dialysate: 2K+  2.5Ca++  Bath  Net UF: 2L  Access: Aseptic care for LUE AVF  Hemodynamic stability: Maintain BP WNL     Pre Dialysis:  Patient received from YARIEL العراقي RN. Patient arrived on a bed, A+O X 3, on RA,  no s/s of acute distress noted. LUE AVF assessed, no abnormalities noted, bruit and thrill strong. AVF accessed using 15G needles without any difficulty. Good flow achieved from both needles.    Intra Dialysis:  Time out / safety process performed per policy. Tx initiated at 0851.    AVF flowing with ease. For hemodynamic stability UF goal set at 2000 ml as tolerated.  1055- Vancomycin administered per order during last hour of dialysis.   Pt offered assistance with repositioning every 2 hours/prn    Vascular access visible and line connections remained intact throughout entire duration of treatment.   Vital signs checked every 15 mins.     Post Dialysis:  Tx completed at 1200,   Tolerated well, 2 L  removed. De-accessed per protocol.    Clot time 5 minutes for arterial, and 5 minutes for venous.   Dry dressings applied. Bruit/Thrill present above and below dressings.  Post Dialysis report given to YARIEL العراقي RN

## 2024-07-10 LAB
GLUCOSE BLD STRIP.AUTO-MCNC: 110 MG/DL (ref 70–110)
GLUCOSE BLD STRIP.AUTO-MCNC: 130 MG/DL (ref 70–110)
GLUCOSE BLD STRIP.AUTO-MCNC: 88 MG/DL (ref 70–110)
GLUCOSE BLD STRIP.AUTO-MCNC: 91 MG/DL (ref 70–110)

## 2024-07-10 PROCEDURE — 97535 SELF CARE MNGMENT TRAINING: CPT

## 2024-07-10 PROCEDURE — 6370000000 HC RX 637 (ALT 250 FOR IP): Performed by: INTERNAL MEDICINE

## 2024-07-10 PROCEDURE — 2580000003 HC RX 258: Performed by: PHYSICIAN ASSISTANT

## 2024-07-10 PROCEDURE — 2580000003 HC RX 258: Performed by: INTERNAL MEDICINE

## 2024-07-10 PROCEDURE — 6360000002 HC RX W HCPCS: Performed by: EMERGENCY MEDICINE

## 2024-07-10 PROCEDURE — 1100000000 HC RM PRIVATE

## 2024-07-10 PROCEDURE — 82962 GLUCOSE BLOOD TEST: CPT

## 2024-07-10 PROCEDURE — 99231 SBSQ HOSP IP/OBS SF/LOW 25: CPT | Performed by: FAMILY MEDICINE

## 2024-07-10 PROCEDURE — 94761 N-INVAS EAR/PLS OXIMETRY MLT: CPT

## 2024-07-10 PROCEDURE — 6370000000 HC RX 637 (ALT 250 FOR IP): Performed by: PHYSICIAN ASSISTANT

## 2024-07-10 PROCEDURE — 6360000002 HC RX W HCPCS: Performed by: INTERNAL MEDICINE

## 2024-07-10 PROCEDURE — 97164 PT RE-EVAL EST PLAN CARE: CPT

## 2024-07-10 PROCEDURE — 97116 GAIT TRAINING THERAPY: CPT

## 2024-07-10 PROCEDURE — 6370000000 HC RX 637 (ALT 250 FOR IP): Performed by: EMERGENCY MEDICINE

## 2024-07-10 RX ADMIN — PIPERACILLIN AND TAZOBACTAM 3375 MG: 3; .375 INJECTION, POWDER, FOR SOLUTION INTRAVENOUS at 23:04

## 2024-07-10 RX ADMIN — SODIUM CHLORIDE, PRESERVATIVE FREE 10 ML: 5 INJECTION INTRAVENOUS at 21:12

## 2024-07-10 RX ADMIN — ATORVASTATIN CALCIUM 10 MG: 10 TABLET, FILM COATED ORAL at 08:49

## 2024-07-10 RX ADMIN — MICONAZOLE NITRATE 1 APPLICATOR: 20 CREAM VAGINAL at 21:09

## 2024-07-10 RX ADMIN — LATANOPROST 1 DROP: 50 SOLUTION/ DROPS OPHTHALMIC at 21:11

## 2024-07-10 RX ADMIN — DOCUSATE SODIUM 50 MG: 50 LIQUID ORAL at 08:49

## 2024-07-10 RX ADMIN — PIPERACILLIN AND TAZOBACTAM 3375 MG: 3; .375 INJECTION, POWDER, FOR SOLUTION INTRAVENOUS at 12:11

## 2024-07-10 RX ADMIN — SODIUM CHLORIDE, PRESERVATIVE FREE 10 ML: 5 INJECTION INTRAVENOUS at 08:54

## 2024-07-10 RX ADMIN — SACUBITRIL AND VALSARTAN 1 TABLET: 24; 26 TABLET, FILM COATED ORAL at 08:50

## 2024-07-10 RX ADMIN — DOCUSATE SODIUM 50 MG: 50 LIQUID ORAL at 21:07

## 2024-07-10 RX ADMIN — SEVELAMER CARBONATE 800 MG: 800 TABLET, FILM COATED ORAL at 08:49

## 2024-07-10 RX ADMIN — SEVELAMER CARBONATE 800 MG: 800 TABLET, FILM COATED ORAL at 17:11

## 2024-07-10 RX ADMIN — HYDROMORPHONE HYDROCHLORIDE 0.25 MG: 1 INJECTION, SOLUTION INTRAMUSCULAR; INTRAVENOUS; SUBCUTANEOUS at 19:33

## 2024-07-10 RX ADMIN — MICONAZOLE NITRATE 1 APPLICATOR: 20 CREAM VAGINAL at 02:32

## 2024-07-10 RX ADMIN — ASPIRIN 81 MG CHEWABLE TABLET 81 MG: 81 TABLET CHEWABLE at 08:49

## 2024-07-10 RX ADMIN — TIMOLOL MALEATE 1 DROP: 5 SOLUTION OPHTHALMIC at 21:10

## 2024-07-10 RX ADMIN — HEPARIN SODIUM 5000 UNITS: 5000 INJECTION INTRAVENOUS; SUBCUTANEOUS at 06:42

## 2024-07-10 RX ADMIN — SACUBITRIL AND VALSARTAN 1 TABLET: 24; 26 TABLET, FILM COATED ORAL at 21:16

## 2024-07-10 RX ADMIN — INSULIN GLARGINE 10 UNITS: 100 INJECTION, SOLUTION SUBCUTANEOUS at 21:06

## 2024-07-10 RX ADMIN — Medication 1 CAPSULE: at 08:49

## 2024-07-10 RX ADMIN — SEVELAMER CARBONATE 800 MG: 800 TABLET, FILM COATED ORAL at 12:07

## 2024-07-10 RX ADMIN — HEPARIN SODIUM 5000 UNITS: 5000 INJECTION INTRAVENOUS; SUBCUTANEOUS at 21:17

## 2024-07-10 RX ADMIN — TIMOLOL MALEATE 1 DROP: 5 SOLUTION OPHTHALMIC at 08:55

## 2024-07-10 RX ADMIN — HEPARIN SODIUM 5000 UNITS: 5000 INJECTION INTRAVENOUS; SUBCUTANEOUS at 14:46

## 2024-07-10 RX ADMIN — PANTOPRAZOLE SODIUM 40 MG: 40 TABLET, DELAYED RELEASE ORAL at 06:42

## 2024-07-10 ASSESSMENT — PAIN SCALES - GENERAL
PAINLEVEL_OUTOF10: 5
PAINLEVEL_OUTOF10: 5
PAINLEVEL_OUTOF10: 0

## 2024-07-10 ASSESSMENT — PAIN SCALES - WONG BAKER: WONGBAKER_NUMERICALRESPONSE: NO HURT

## 2024-07-10 ASSESSMENT — PAIN DESCRIPTION - LOCATION: LOCATION: FOOT

## 2024-07-10 ASSESSMENT — PAIN DESCRIPTION - DESCRIPTORS: DESCRIPTORS: ACHING;GNAWING

## 2024-07-10 ASSESSMENT — PAIN DESCRIPTION - ORIENTATION: ORIENTATION: RIGHT

## 2024-07-10 ASSESSMENT — PAIN - FUNCTIONAL ASSESSMENT: PAIN_FUNCTIONAL_ASSESSMENT: ACTIVITIES ARE NOT PREVENTED

## 2024-07-10 NOTE — PLAN OF CARE
educated on  RLE NWB precautions. Patient simulated LB bathing with Max A . Patient stood from chair using FWW with VC for NWB  and  Min A for stand pivot transfer to BSC<>chair. Patient demonstrates willingness to participate. Patient is making good progress towards ADL goals. Callbell within reach . Nurse notified.    Progression toward goals:  [x]          Improving appropriately and progressing toward goals  []          Improving slowly and progressing toward goals  []          Not making progress toward goals and plan of care will be adjusted     PLAN:  Patient continues to benefit from skilled intervention to address the above impairments.  Continue treatment per established plan of care.    Further Equipment Recommendations for Discharge: TBD  AMPA: AM-PAC Inpatient Daily Activity Raw Score: 16    Current research shows that an AM-PAC score of 17 or less is not associated with a discharge to the patient's home setting and the patient demonstrates the potential endurance and/or tolerance for 3 hours of therapy each day.    This AMPA score should be considered in conjunction with interdisciplinary team recommendations to determine the most appropriate discharge setting. Patient's social support, diagnosis, medical stability, and prior level of function should also be taken into consideration.     SUBJECTIVE:   Patient stated, \"I am determine to get better and walk like before.\"    OBJECTIVE DATA SUMMARY:   Cognitive/Behavioral Status:  Orientation  Orientation Level: Oriented X4  Cognition  Overall Cognitive Status: WFL    Functional Mobility and Transfers for ADLs:   Bed Mobility:  Bed Mobility Training  Supine to Sit: Supervision  Scooting: Supervision   Transfers:  Transfer Training  Transfer Training: Yes  Sit to Stand: Contact-guard assistance  Stand to Sit: Contact-guard assistance  Toilet Transfer: Contact-guard assistance    Balance:  Balance  Sitting: Intact  Standing: Impaired  Standing - Static:  Fair  Standing - Dynamic: Fair    ADL Intervention:           LE Bathing: Moderate assistance     LE Dressing: Maximum assistance  Toileting: Maximum assistance             Neuro Re-Education:  Neuromuscular Education  Neuromuscular education: No       UE Therapeutic Exercises:       Pain:  Intensity Pre-treatment: 0/10   Intensity Post-treatment: 0/10    Activity Tolerance:    Activity Tolerance: Patient tolerated treatment well  Please refer to the flowsheet for vital signs taken during this treatment.  After treatment:   [x]  Patient left in no apparent distress sitting up in chair  []  Patient left in no apparent distress in bed  [x]  Call bell left within reach  [x]  Nursing notified  []  Caregiver present  []  Bed alarm activated    COMMUNICATION/EDUCATION:   Patient Education  Education Provided: Role of Therapy;Plan of Care;Energy Conservation;Transfer Training;ADL Adaptive Strategies;Precautions  Education Method: Demonstration;Verbal;Teach Back  Barriers to Learning: None      Thank you for this referral.  TATY Hernandez  Minutes: 32

## 2024-07-10 NOTE — PLAN OF CARE
Problem: Pain  Goal: Verbalizes/displays adequate comfort level or baseline comfort level  Outcome: Progressing     Problem: ABCDS Injury Assessment  Goal: Absence of physical injury  Outcome: Progressing     Problem: Safety - Adult  Goal: Free from fall injury  Outcome: Progressing     Problem: Chronic Conditions and Co-morbidities  Goal: Patient's chronic conditions and co-morbidity symptoms are monitored and maintained or improved  Outcome: Progressing     Problem: Skin/Tissue Integrity  Goal: Absence of new skin breakdown  Outcome: Progressing     Problem: Occupational Therapy - Adult  Goal: By Discharge: Performs self-care activities at highest level of function for planned discharge setting.  See evaluation for individualized goals.  7/9/2024 1436 by Lauren Ramirez, OT  Outcome: Progressing     Problem: Musculoskeletal - Adult  Goal: Return mobility to safest level of function  Outcome: Progressing     Problem: Gastrointestinal - Adult  Goal: Minimal or absence of nausea and vomiting  Outcome: Progressing     Problem: Genitourinary - Adult  Goal: Absence of urinary retention  Outcome: Progressing     Problem: Infection - Adult  Goal: Absence of infection at discharge  Outcome: Progressing

## 2024-07-10 NOTE — PLAN OF CARE
left within reach.     DEFICITS/IMPAIRMENTS:    , Body Structures, Functions, Activity Limitations Requiring Skilled Therapeutic Intervention: Decreased functional mobility ;Decreased tolerance to work activity;Decreased strength;Decreased endurance;Decreased balance;Increased pain    Patient will benefit from skilled intervention to address the above impairments.  Patient's rehabilitation potential/ .  Factors which may influence rehabilitation potential include:   []         None noted  []         Mental ability/status  [x]         Medical condition  [x]         Home/family situation and support systems  [x]         Safety awareness  []         Pain tolerance/management  []         Other:      PLAN :  Recommendations and Planned Interventions:   [x]           Bed Mobility Training             [x]    Neuromuscular Re-Education  [x]           Transfer Training                   []    Orthotic/Prosthetic Training  [x]           Gait Training                          []    Modalities  [x]           Therapeutic Exercises           []    Edema Management/Control  [x]           Therapeutic Activities            [x]    Family Training/Education  [x]           Patient Education  []           Other (comment):    Frequency/Duration: Patient will be followed by physical therapy to address goals, 1-2 times per day/3-5 days per week to address goals.    Further Equipment Recommendations for Discharge: JESUS    AMPAC:    17    Current research shows that an AM-PAC score of 17 (13 without stairs) or less is not associated with a discharge to the patient's home setting and this patient demonstrates the potential endurance and/or tolerance for 3 hours of therapy each day at discharge.    This AMPAC score should be considered in conjunction with interdisciplinary team recommendations to determine the most appropriate discharge setting. Patient's social support, diagnosis, medical stability, and prior level of function should also be  taken into consideration.     SUBJECTIVE:   Patient stated “Im tired of this bed.”    OBJECTIVE DATA SUMMARY:   Hospital course since last seen and reason for re-evaluation s/p prolonged admission. Goals reviewed and updated as appropriate.   Past Medical History:   Diagnosis Date    Boil of buttock 06/24/2021    R Buttock    CAD (coronary artery disease)     Chronic heart failure with preserved ejection fraction (HFpEF) (HCC)     Diabetes mellitus (HCC) 6/24/2021    ESRD on dialysis (HCC) 6/24/2021    T, R, S    History of MI (myocardial infarction) 2020    Hypertension     Menopause     Renal insufficiency     Scoliosis      Past Surgical History:   Procedure Laterality Date    BREAST BIOPSY Right 08/09/2021    Stereo    COLONOSCOPY N/A 10/20/2023    COLONOSCOPY WITH BIOPSY performed by Gracia Tran Jr., MD at [facility] ENDOSCOPY    FOOT DEBRIDEMENT Right 6/26/2024    RIGHT FOOT INCISION AND DRAINAGE, PARTIAL RESECTION SECOND METATARSAL performed by Marcin Gabriel DPM at [facility] MAIN OR    FOOT DEBRIDEMENT Right 7/3/2024    RIGHT FOOT IRRIGATION AND DEBRIDEMENT, REMOVAL OF INFECTED/NON-VIABLE TISSUE, WOUND VAC PLACEMENT performed by Marcin Gabriel DPM at [facility] MAIN OR    INVASIVE VASCULAR N/A 7/1/2024    Angiography lower ext right performed by Elena James MD at [facility] CARDIAC CATH LAB    [facility] STEROTACTIC LOC BREAST BIOPSY RIGHT Right 8/9/2021    CLEOPATRA STEROTACTIC LOC BREAST BIOPSY RIGHT 8/9/2021 [facility] RAD MAMMO    VASCULAR SURGERY         Home Situation:  Social/Functional History  Lives With: Spouse  Type of Home: House  Home Layout: One level  Home Access: Ramped entrance  Bathroom Shower/Tub: Tub/Shower unit  Bathroom Toilet: Handicap height  Bathroom Accessibility: Accessible  Home Equipment: Rollator, Cane  Has the patient had two or more falls in the past year or any fall with injury in the past year?: No  Receives Help From: Family  ADL Assistance: Independent  Homemaking Assistance: Independent  Homemaking

## 2024-07-10 NOTE — PROGRESS NOTES
[facility]  (A Division of [facility])    Follow-up Note      Date of Admission: 2024       Date of Note:  7/10/2024          Current Antimicrobials:    Prior Antimicrobials:  Zosyn  to present  Vancomycin  to present      Assessment:         Osteomyelitis of the right foot:   - wound culture with mixed marimar including gram-negative rods   -Status post I&D right foot abscess and second metatarsal debridement on .  Diabetes mellitus type 2  Peripheral artery disease: Status post angiography   End-stage renal disease on dialysis    Plan:   Continue current wound care with wound VAC-will need home health  Will plan to transition to vancomycin plus cefepime upon discharge.   - Stop date 24   Trend CBC, BMP, ESR, CRP at least twice weekly  Tight glucose control for optimal wound healing.    Discussed with Dr. Savage  Follow-up as outpatient 2 to 3 weeks    DO [facility]  [address], [address] [address]  Office: [phone], Ext [phone]       Microbiology:   right foot tissue culture: Diphtheroids   wound culture: Heavy mixed skin marimar with gram-negative rods   blood cultures no growth to date x 2    Lines / Catheters:  Peripheral    Subjective:   Patient seen and examined.   Pain is controlled. No overnight fevers. Still pending placement    Tmax 99.0  WBC none today    Objective:      BP (!) 155/64   Pulse 67   Temp 98.6 °F (37 °C) (Oral)   Resp 20   Ht 1.624 m (5' 3.94\")   Wt 96 kg (211 lb 10.3 oz)   SpO2 100%   BMI 36.40 kg/m²   Temp (24hrs), Av.3 °F (36.8 °C), Min:96.8 °F (36 °C), Max:99 °F (37.2 °C)        General:   awake alert and oriented, non-toxic   Skin:    dry and warm, wound VAC in place over right foot    HEENT:  No scleral icterus or pallor; oral mucosa moist, lips moist   Lymph Nodes:   not assessed today   Lungs:   Non-labored; bilaterally clear

## 2024-07-10 NOTE — PROGRESS NOTES
[facility]  Progress Note    Patient: Jigna Conner Age: 69 y.o. : 1955 MR#: [id number] SSN: [id number]      Subjective/24-hour events:     On HD currently.  No new complaints.    Assessment:   Diabetic foot infection with left foot osteomyelitis  DM2  PAD  ESRD, HD dependent    Plan:   Remains medically stable for discharge once placement finalized.  Continue supportive care as ordered in interim.    Anticipated discharge: Medically stable/SNF    Case discussed with:  [x]Patient  []Family  [x] Nursing  [x]Case Management  DVT Prophylaxis:  []Lovenox  [x]Hep SQ  []SCDs  []Coumadin   []On Heparin gtt []PO anticoagulant    Objective:   VS: BP (!) 155/64   Pulse 67   Temp 98.6 °F (37 °C) (Oral)   Resp 20   Ht 1.624 m (5' 3.94\")   Wt 96 kg (211 lb 10.3 oz)   SpO2 100%   BMI 36.40 kg/m²      Tmax/24hrs: Temp (24hrs), Av.6 °F (37 °C), Min:97.9 °F (36.6 °C), Max:99 °F (37.2 °C)    Intake/Output Summary (Last 24 hours) at 7/10/2024 1306  Last data filed at 7/10/2024 1102  Gross per 24 hour   Intake 820 ml   Output 300 ml   Net 520 ml       Gen:  In NAD.  Nontoxic-appearing.  Lungs: Clear, no wheezes  Effort nonlabored.  CV: RRR.  Abdomen: Soft, NTTP.  Extremities: Warm.  Wound VAC in place left foot.  Neuro:  Awake and alert, moves extremities spontaneously.    Current Facility-Administered Medications   Medication Dose Route Frequency    epoetin laurence (EPOGEN;PROCRIT) injection 10,000 Units  10,000 Units SubCUTAneous Once per day on  Sat    miconazole (MICOTIN) 2 % vaginal cream 1 applicator  1 applicator Vaginal Nightly    sevelamer (RENVELA) tablet 800 mg  800 mg Oral TID WC    dextrose 5 % solution   IntraVENous Continuous    heparin (porcine) injection 5,000 Units  5,000 Units SubCUTAneous 3 times per day    lactobacillus (CULTURELLE) capsule 1 capsule  1 capsule Oral Daily with breakfast    oxyCODONE-acetaminophen (PERCOCET) 7.5-325 MG per  tablet 1 tablet  1 tablet Oral Q6H PRN    naloxone (NARCAN) injection 0.4 mg  0.4 mg IntraVENous PRN    ipratropium 0.5 mg-albuterol 2.5 mg (DUONEB) nebulizer solution 1 Dose  1 Dose Inhalation Q4H PRN    melatonin disintegrating tablet 5 mg  5 mg Oral Nightly PRN    docusate (COLACE) 50 MG/5ML liquid 50 mg  50 mg Oral BID    bisacodyl (DULCOLAX) suppository 10 mg  10 mg Rectal Daily PRN    senna (SENOKOT) tablet 8.6 mg  1 tablet Oral Nightly PRN    lactulose (CHRONULAC) 10 GM/15ML solution 30 g  30 g Oral Daily PRN    0.9 % sodium chloride infusion   IntraVENous PRN    piperacillin-tazobactam (ZOSYN) 3,375 mg in sodium chloride 0.9 % 50 mL IVPB (mini-bag)  3,375 mg IntraVENous Q12H    aspirin chewable tablet 81 mg  81 mg Oral Daily    atorvastatin (LIPITOR) tablet 10 mg  10 mg Oral Daily    [Held by provider] carvedilol (COREG) tablet 3.125 mg  3.125 mg Oral BID WC    [Held by provider] dorzolamide (TRUSOPT) 2 % ophthalmic solution 1 drop  1 drop Ophthalmic BID    insulin glargine (LANTUS) injection vial 10 Units  10 Units SubCUTAneous Nightly    latanoprost (XALATAN) 0.005 % ophthalmic solution 1 drop  1 drop Both Eyes Nightly    pantoprazole (PROTONIX) tablet 40 mg  40 mg Oral QAM AC    sacubitril-valsartan (ENTRESTO) 24-26 MG per tablet 1 tablet  1 tablet Oral BID    timolol (TIMOPTIC) 0.5 % ophthalmic solution 1 drop  1 drop Both Eyes BID    HYDROmorphone (DILAUDID) injection 0.25 mg  0.25 mg IntraVENous Q4H PRN    vancomycin (VANCOCIN) intermittent dosing (placeholder)   Other RX Placeholder    sodium chloride flush 0.9 % injection 5-40 mL  5-40 mL IntraVENous 2 times per day    sodium chloride flush 0.9 % injection 5-40 mL  5-40 mL IntraVENous PRN    0.9 % sodium chloride infusion   IntraVENous PRN    ondansetron (ZOFRAN-ODT) disintegrating tablet 4 mg  4 mg Oral Q8H PRN    Or    ondansetron (ZOFRAN) injection 4 mg  4 mg IntraVENous Q6H PRN    polyethylene glycol (GLYCOLAX) packet 17 g  17 g Oral Daily PRN

## 2024-07-11 LAB
ANION GAP SERPL CALC-SCNC: 7 MMOL/L (ref 3–18)
BUN SERPL-MCNC: 30 MG/DL (ref 7–18)
BUN/CREAT SERPL: 5 (ref 12–20)
CALCIUM SERPL-MCNC: 9.7 MG/DL (ref 8.5–10.1)
CHLORIDE SERPL-SCNC: 102 MMOL/L (ref 100–111)
CO2 SERPL-SCNC: 28 MMOL/L (ref 21–32)
CREAT SERPL-MCNC: 5.93 MG/DL (ref 0.6–1.3)
ERYTHROCYTE [DISTWIDTH] IN BLOOD BY AUTOMATED COUNT: 18.6 % (ref 11.6–14.5)
GLUCOSE BLD STRIP.AUTO-MCNC: 133 MG/DL (ref 70–110)
GLUCOSE BLD STRIP.AUTO-MCNC: 97 MG/DL (ref 70–110)
GLUCOSE BLD STRIP.AUTO-MCNC: 97 MG/DL (ref 70–110)
GLUCOSE BLD STRIP.AUTO-MCNC: 98 MG/DL (ref 70–110)
GLUCOSE SERPL-MCNC: 98 MG/DL (ref 74–99)
HCT VFR BLD AUTO: 20.3 % (ref 35–45)
HCT VFR BLD AUTO: 21.6 % (ref 35–45)
HGB BLD-MCNC: 6.2 G/DL (ref 12–16)
HGB BLD-MCNC: 6.3 G/DL (ref 12–16)
HISTORY CHECK: NORMAL
MCH RBC QN AUTO: 25.1 PG (ref 24–34)
MCHC RBC AUTO-ENTMCNC: 30.5 G/DL (ref 31–37)
MCV RBC AUTO: 82.2 FL (ref 78–100)
NRBC # BLD: 0 K/UL (ref 0–0.01)
NRBC BLD-RTO: 0 PER 100 WBC
PLATELET # BLD AUTO: 377 K/UL (ref 135–420)
PMV BLD AUTO: 9.3 FL (ref 9.2–11.8)
POTASSIUM SERPL-SCNC: 4.9 MMOL/L (ref 3.5–5.5)
RBC # BLD AUTO: 2.47 M/UL (ref 4.2–5.3)
SODIUM SERPL-SCNC: 137 MMOL/L (ref 136–145)
VANCOMYCIN SERPL-MCNC: 17.5 UG/ML (ref 5–40)
WBC # BLD AUTO: 8.9 K/UL (ref 4.6–13.2)

## 2024-07-11 PROCEDURE — 6370000000 HC RX 637 (ALT 250 FOR IP): Performed by: EMERGENCY MEDICINE

## 2024-07-11 PROCEDURE — 2580000003 HC RX 258: Performed by: PHYSICIAN ASSISTANT

## 2024-07-11 PROCEDURE — 86900 BLOOD TYPING SEROLOGIC ABO: CPT

## 2024-07-11 PROCEDURE — 6370000000 HC RX 637 (ALT 250 FOR IP): Performed by: PHYSICIAN ASSISTANT

## 2024-07-11 PROCEDURE — 80048 BASIC METABOLIC PNL TOTAL CA: CPT

## 2024-07-11 PROCEDURE — 97530 THERAPEUTIC ACTIVITIES: CPT

## 2024-07-11 PROCEDURE — 36430 TRANSFUSION BLD/BLD COMPNT: CPT

## 2024-07-11 PROCEDURE — 1100000000 HC RM PRIVATE

## 2024-07-11 PROCEDURE — 6370000000 HC RX 637 (ALT 250 FOR IP): Performed by: INTERNAL MEDICINE

## 2024-07-11 PROCEDURE — 82962 GLUCOSE BLOOD TEST: CPT

## 2024-07-11 PROCEDURE — 94761 N-INVAS EAR/PLS OXIMETRY MLT: CPT

## 2024-07-11 PROCEDURE — 2580000003 HC RX 258: Performed by: INTERNAL MEDICINE

## 2024-07-11 PROCEDURE — 86923 COMPATIBILITY TEST ELECTRIC: CPT

## 2024-07-11 PROCEDURE — 85018 HEMOGLOBIN: CPT

## 2024-07-11 PROCEDURE — 80202 ASSAY OF VANCOMYCIN: CPT

## 2024-07-11 PROCEDURE — 90935 HEMODIALYSIS ONE EVALUATION: CPT

## 2024-07-11 PROCEDURE — 86850 RBC ANTIBODY SCREEN: CPT

## 2024-07-11 PROCEDURE — 6360000002 HC RX W HCPCS: Performed by: EMERGENCY MEDICINE

## 2024-07-11 PROCEDURE — 97535 SELF CARE MNGMENT TRAINING: CPT

## 2024-07-11 PROCEDURE — 86901 BLOOD TYPING SEROLOGIC RH(D): CPT

## 2024-07-11 PROCEDURE — 6360000002 HC RX W HCPCS: Performed by: INTERNAL MEDICINE

## 2024-07-11 PROCEDURE — P9016 RBC LEUKOCYTES REDUCED: HCPCS

## 2024-07-11 PROCEDURE — 36415 COLL VENOUS BLD VENIPUNCTURE: CPT

## 2024-07-11 PROCEDURE — 99233 SBSQ HOSP IP/OBS HIGH 50: CPT | Performed by: FAMILY MEDICINE

## 2024-07-11 PROCEDURE — 85014 HEMATOCRIT: CPT

## 2024-07-11 PROCEDURE — 85027 COMPLETE CBC AUTOMATED: CPT

## 2024-07-11 RX ORDER — VANCOMYCIN 1.75 G/350ML
1250 INJECTION, SOLUTION INTRAVENOUS
Status: COMPLETED | OUTPATIENT
Start: 2024-07-11 | End: 2024-07-11

## 2024-07-11 RX ORDER — SODIUM CHLORIDE 9 MG/ML
INJECTION, SOLUTION INTRAVENOUS PRN
Status: DISCONTINUED | OUTPATIENT
Start: 2024-07-11 | End: 2024-07-18 | Stop reason: HOSPADM

## 2024-07-11 RX ADMIN — HEPARIN SODIUM 5000 UNITS: 5000 INJECTION INTRAVENOUS; SUBCUTANEOUS at 06:30

## 2024-07-11 RX ADMIN — LATANOPROST 1 DROP: 50 SOLUTION/ DROPS OPHTHALMIC at 21:04

## 2024-07-11 RX ADMIN — SODIUM CHLORIDE, PRESERVATIVE FREE 10 ML: 5 INJECTION INTRAVENOUS at 21:07

## 2024-07-11 RX ADMIN — MICONAZOLE NITRATE 1 APPLICATOR: 20 CREAM VAGINAL at 21:00

## 2024-07-11 RX ADMIN — ATORVASTATIN CALCIUM 10 MG: 10 TABLET, FILM COATED ORAL at 09:52

## 2024-07-11 RX ADMIN — PIPERACILLIN AND TAZOBACTAM 3375 MG: 3; .375 INJECTION, POWDER, FOR SOLUTION INTRAVENOUS at 23:07

## 2024-07-11 RX ADMIN — HEPARIN SODIUM 5000 UNITS: 5000 INJECTION INTRAVENOUS; SUBCUTANEOUS at 15:51

## 2024-07-11 RX ADMIN — DOCUSATE SODIUM 50 MG: 50 LIQUID ORAL at 09:52

## 2024-07-11 RX ADMIN — Medication 1 CAPSULE: at 09:53

## 2024-07-11 RX ADMIN — OXYCODONE AND ACETAMINOPHEN 1 TABLET: 7.5; 325 TABLET ORAL at 18:57

## 2024-07-11 RX ADMIN — SACUBITRIL AND VALSARTAN 1 TABLET: 24; 26 TABLET, FILM COATED ORAL at 09:52

## 2024-07-11 RX ADMIN — EPOETIN ALFA 10000 UNITS: 10000 SOLUTION INTRAVENOUS; SUBCUTANEOUS at 20:57

## 2024-07-11 RX ADMIN — HEPARIN SODIUM 5000 UNITS: 5000 INJECTION INTRAVENOUS; SUBCUTANEOUS at 22:30

## 2024-07-11 RX ADMIN — SACUBITRIL AND VALSARTAN 1 TABLET: 24; 26 TABLET, FILM COATED ORAL at 20:56

## 2024-07-11 RX ADMIN — SODIUM CHLORIDE, PRESERVATIVE FREE 10 ML: 5 INJECTION INTRAVENOUS at 09:56

## 2024-07-11 RX ADMIN — TIMOLOL MALEATE 1 DROP: 5 SOLUTION OPHTHALMIC at 09:53

## 2024-07-11 RX ADMIN — SEVELAMER CARBONATE 800 MG: 800 TABLET, FILM COATED ORAL at 15:51

## 2024-07-11 RX ADMIN — PANTOPRAZOLE SODIUM 40 MG: 40 TABLET, DELAYED RELEASE ORAL at 06:30

## 2024-07-11 RX ADMIN — OXYCODONE AND ACETAMINOPHEN 1 TABLET: 7.5; 325 TABLET ORAL at 10:37

## 2024-07-11 RX ADMIN — SEVELAMER CARBONATE 800 MG: 800 TABLET, FILM COATED ORAL at 09:52

## 2024-07-11 RX ADMIN — TIMOLOL MALEATE 1 DROP: 5 SOLUTION OPHTHALMIC at 21:04

## 2024-07-11 RX ADMIN — DOCUSATE SODIUM 50 MG: 50 LIQUID ORAL at 20:56

## 2024-07-11 RX ADMIN — VANCOMYCIN 1250 MG: 1.75 INJECTION, SOLUTION INTRAVENOUS at 12:33

## 2024-07-11 RX ADMIN — ASPIRIN 81 MG CHEWABLE TABLET 81 MG: 81 TABLET CHEWABLE at 09:52

## 2024-07-11 ASSESSMENT — PAIN SCALES - WONG BAKER
WONGBAKER_NUMERICALRESPONSE: NO HURT

## 2024-07-11 ASSESSMENT — PAIN SCALES - GENERAL
PAINLEVEL_OUTOF10: 0
PAINLEVEL_OUTOF10: 0
PAINLEVEL_OUTOF10: 5
PAINLEVEL_OUTOF10: 0
PAINLEVEL_OUTOF10: 4
PAINLEVEL_OUTOF10: 0
PAINLEVEL_OUTOF10: 4
PAINLEVEL_OUTOF10: 0

## 2024-07-11 ASSESSMENT — PAIN DESCRIPTION - LOCATION
LOCATION: FOOT

## 2024-07-11 ASSESSMENT — PAIN DESCRIPTION - DESCRIPTORS
DESCRIPTORS: ACHING

## 2024-07-11 ASSESSMENT — PAIN DESCRIPTION - ORIENTATION
ORIENTATION: RIGHT

## 2024-07-11 NOTE — PROGRESS NOTES
[facility]   Pharmacy Pharmacokinetic Monitoring Service - Vancomycin    Indication: DM foot infection  Target Pre-Dialysis Concentration: 21-24 mg/L  Day of Therapy: 17  Additional Antimicrobials: pip-tazo    Pertinent Laboratory Values:   Wt Readings from Last 1 Encounters:   07/09/24 96 kg (211 lb 10.3 oz)     Temp Readings from Last 1 Encounters:   07/11/24 98 °F (36.7 °C) (Oral)     Recent Labs     07/11/24  0144   WBC 8.9       Pertinent Cultures:  Date Source Results   6/25 blood NGF   6/26 wound GPC, GPR   6/26 tissue Diphtheroids    MRSA Nasal Swab: NA    Assessment:  Date Current Dose Concentration (mg/L) Dialysis Session   6/25 2,000 mg x1 - no   6/26 - - no   6/27 - 27.6 yes   6/28 - - no   6/29  15.4 yes   6/30 - - no   7/1   no   7/2 750 mg x1 19.1 yes   7/3 - - no   7/4 750 mg x1 21.3 yes   7/5 - - no   7/6 750 mg x 1 20.4 yes   7/7 - - no   7/8 - - no   7/9 1,000 mg x1 16.6 yes   7/10 - - no   7/11 1,250 mg x1 17.5 yes     Plan:  ESRD on HD q TTS - dose by level  Dose: 1,250 mg over the last 90 min of HD  No level ordered at this time  Pharmacy will continue to monitor patient and adjust therapy as indicated    Thank you for the consult,  Gianni Wolfe [facility]  7/11/2024

## 2024-07-11 NOTE — PROGRESS NOTES
Blood transfusion completed at 1910. Vital signs taken and recorded. Stable vital signs. AOX4 on room air, not in distress. H and H due 1 hour post BT per MD's order.

## 2024-07-11 NOTE — CARE COORDINATION
Wound vac orders was sent to [facility] yesterday.  [phone] to let us know when bed becomes available at [facility].    0920:  Uploaded updated notes to [facility] in [facility].  Per [facility], they will initiate auth.          WENDY MccarthyN RN  Care Management

## 2024-07-11 NOTE — PROGRESS NOTES
0420 Pt H/H is 6.2/20.3  notified Dr Grace MD agreed with RN to do repeat H/H. And MD order todo type and screen.

## 2024-07-11 NOTE — PLAN OF CARE
Problem: Discharge Planning  Goal: Discharge to home or other facility with appropriate resources  Outcome: Progressing  Flowsheets (Taken 7/11/2024 0840)  Discharge to home or other facility with appropriate resources: Identify barriers to discharge with patient and caregiver     Problem: Pain  Goal: Verbalizes/displays adequate comfort level or baseline comfort level  Outcome: Progressing     Problem: ABCDS Injury Assessment  Goal: Absence of physical injury  Outcome: Progressing     Problem: Safety - Adult  Goal: Free from fall injury  Outcome: Progressing     Problem: Chronic Conditions and Co-morbidities  Goal: Patient's chronic conditions and co-morbidity symptoms are monitored and maintained or improved  7/11/2024 1704 by Amarilis Jerez RN  Outcome: Progressing  7/11/2024 1125 by Angelica Butts RN  Outcome: Progressing  Flowsheets  Taken 7/11/2024 1125 by Angelica Butts RN  Care Plan - Patient's Chronic Conditions and Co-Morbidity Symptoms are Monitored and Maintained or Improved:   Monitor and assess patient's chronic conditions and comorbid symptoms for stability, deterioration, or improvement   Update acute care plan with appropriate goals if chronic or comorbid symptoms are exacerbated and prevent overall improvement and discharge   Collaborate with multidisciplinary team to address chronic and comorbid conditions and prevent exacerbation or deterioration  Taken 7/11/2024 0840 by Amarilis Jerez RN  Care Plan - Patient's Chronic Conditions and Co-Morbidity Symptoms are Monitored and Maintained or Improved: Monitor and assess patient's chronic conditions and comorbid symptoms for stability, deterioration, or improvement     Problem: Skin/Tissue Integrity  Goal: Absence of new skin breakdown  Description: 1.  Monitor for areas of redness and/or skin breakdown  2.  Assess vascular access sites hourly  3.  Every 4-6 hours minimum:  Change oxygen saturation probe site  4.  Every 4-6

## 2024-07-11 NOTE — PROGRESS NOTES
Informed Consent for Blood Component Transfusion Note    I have discussed with the patient the rationale for blood component transfusion; its benefits in treating or preventing fatigue, organ damage, or death; and its risk which includes mild transfusion reactions, rare risk of blood borne infection, or more serious but rare reactions. I have discussed the alternatives to transfusion, including the risk and consequences of not receiving transfusion. The patient had an opportunity to ask questions and had agreed to proceed with transfusion of blood components.    Electronically signed by Julius Logan MD on 7/11/24 at 4:19 PM EDT

## 2024-07-11 NOTE — PROGRESS NOTES
[facility]  (A Division of [facility])    Follow-up Note      Date of Admission: 2024       Date of Note:  2024          Current Antimicrobials:    Prior Antimicrobials:  Zosyn  to present  Vancomycin  to present      Assessment:         Osteomyelitis of the right foot:   - wound culture with mixed marimar including gram-negative rods   -Status post I&D right foot abscess and second metatarsal debridement on .  Diabetes mellitus type 2  Peripheral artery disease: Status post angiography   End-stage renal disease on dialysis    Plan:   Continue current wound care with wound VAC-will need home health  Will plan to transition to vancomycin plus cefepime upon discharge.   - Stop date 24   Trend CBC, BMP, ESR, CRP at least twice weekly  Tight glucose control for optimal wound healing.    Discussed with Dr. Savage  Follow-up as outpatient 2 to 3 weeks    DO [facility]  [address], [address] [place] [address]  Office: [phone], Ext [phone]       Microbiology:   right foot tissue culture: Diphtheroids   wound culture: Heavy mixed skin marimar with gram-negative rods   blood cultures no growth to date x 2    Lines / Catheters:  Peripheral    Subjective:   Patient seen and examined.  Getting transfusion.  Pain is controlled. No overnight fevers. Still pending placement and auth.    Tmax 98.3  WBC none today    Objective:      BP (!) 163/67   Pulse 77   Temp 98.2 °F (36.8 °C)   Resp 15   Ht 1.624 m (5' 3.94\")   Wt 99 kg (218 lb 4.1 oz)   SpO2 (!) 10%   BMI 37.54 kg/m²   Temp (24hrs), Av.1 °F (36.7 °C), Min:97.8 °F (36.6 °C), Max:98.3 °F (36.8 °C)        General:   awake alert and oriented, non-toxic   Skin:    dry and warm, wound VAC in place over right foot    HEENT:  No scleral icterus or pallor; oral mucosa moist, lips moist   Lymph Nodes:   not assessed today   Lungs:    Non-labored; bilaterally clear to aspiration- no crackles wheezes rales or rhonchi   Heart:  RRR, s1 and s2; no murmurs rubs or gallops; no edema, + pedal pulses   Abdomen:  soft, non-distended, active bowel sounds, non-tender   Genitourinary:  deferred   Extremities:   average muscle tone; no contractures, no joint effusions; surgical dressings in place over left groin   Neurologic:  No gross focal motor or sensory abnormalities; CN 2-12 intact;  Follows commands.    Psychiatric:   appropriate and interactive.         Lab results:  Chemistry  Recent Labs     07/11/24  1103      K 4.9      CO2 28   BUN 30*         CBC w/ Diff  Recent Labs     07/11/24  0144 07/11/24  0415   WBC 8.9  --    RBC 2.47*  --    HGB 6.2* 6.3*   HCT 20.3* 21.6*     --          Microbiology  Results       ** No results found for the last 336 hours. **              John Trivedi DO   7/11/2024

## 2024-07-11 NOTE — PROGRESS NOTES
Progress Note    68-year-old female with past medical history of ESRD, diabetes, heart failure with preserved ejection fraction admitted for diabetic foot infection, following for ESRD management.  Subjective      Overnight event noted  No complaints offered  No chest pain or sob reported      IMPRESSION:   ESRD, Tuesday Thursday Saturday,   Access left arm AV fistula  Right foot wound infection,  Anemia, received blood transfusion during this admission  Diabetes  Hypertension  Heart failure with preserved ejection fraction.   PLAN:   Transfuse during dialysis today,pt is agreeable  CM will fax the abx to her dialysis unit.  aware. Vancomycin 750 mg, cefepime 1 g with each session till 8/7.   Epogen for anemia.  Renvela for phos binding.   Adjust medication per ESRD status.  Discussed with dr malave     Current Facility-Administered Medications   Medication Dose Route Frequency    vancomycin (VANCOCIN) 1250 mg in 250 mL IVPB  1,250 mg IntraVENous Once in dialysis    epoetin laurence (EPOGEN;PROCRIT) injection 10,000 Units  10,000 Units SubCUTAneous Once per day on Tue Thu Sat    miconazole (MICOTIN) 2 % vaginal cream 1 applicator  1 applicator Vaginal Nightly    sevelamer (RENVELA) tablet 800 mg  800 mg Oral TID WC    dextrose 5 % solution   IntraVENous Continuous    heparin (porcine) injection 5,000 Units  5,000 Units SubCUTAneous 3 times per day    lactobacillus (CULTURELLE) capsule 1 capsule  1 capsule Oral Daily with breakfast    oxyCODONE-acetaminophen (PERCOCET) 7.5-325 MG per tablet 1 tablet  1 tablet Oral Q6H PRN    naloxone (NARCAN) injection 0.4 mg  0.4 mg IntraVENous PRN    ipratropium 0.5 mg-albuterol 2.5 mg (DUONEB) nebulizer solution 1 Dose  1 Dose Inhalation Q4H PRN    melatonin disintegrating tablet 5 mg  5 mg Oral Nightly PRN    docusate (COLACE) 50 MG/5ML liquid 50 mg  50 mg Oral BID    bisacodyl (DULCOLAX) suppository 10 mg  10 mg Rectal Daily PRN    senna (SENOKOT) tablet 8.6 mg  1

## 2024-07-11 NOTE — PROGRESS NOTES
4 Eyes Skin Assessment     NAME:  Jigna Conner  YOB: 1955  MEDICAL RECORD NUMBER:  [id number]    The patient is being assessed for  Shift Handoff    I agree that at least one RN has performed a thorough Head to Toe Skin Assessment on the patient. ALL assessment sites listed below have been assessed.      Areas assessed by both nurses:    Head, Face, Ears, Shoulders, Back, Chest, Arms, Elbows, Hands, Sacrum. Buttock, Coccyx, Ischium, Legs. Feet and Heels, Under Medical Devices , and Other ***        Does the Patient have a Wound? Yes wound(s) were present on assessment. LDA wound assessment was Initiated and completed by RN       Vinay Prevention initiated by RN: No  Wound Care Orders initiated by RN: No    Pressure Injury (Stage 3,4, Unstageable, DTI, NWPT, and Complex wounds) if present, place Wound referral order by RN under : No    New Ostomies, if present place, Ostomy referral order under : No     Nurse 1 eSignature: Electronically signed by Amarilis Jerez RN on 7/11/24 at 7:35 PM EDT    **SHARE this note so that the co-signing nurse can place an eSignature**    Nurse 2 eSignature: {Esignature:[id number]}

## 2024-07-11 NOTE — PROGRESS NOTES
Received pre HD report from  JUSTICE Jerez RN.        Pt in bed, A+O x4, no s/s of distress noted.      Accessed AVF Left upper arm w/15G needle w/o difficulty.    Tx initiated at 1103.      AVF flowing with ease.  For hemodynamic stability UF goal 2500 ml.      Offered assistance with repositioning every 2 hours.  Vascular access visible at all times throughout entire duration of treatment and line connections remain intact throughout entire duration of treatment.     Treatment ended 19 minutes early due to clotting venous chamber.  Pt blood returned.    Tx completed at 1348, tolerated well 1.7L removed.  De-accessed per protocol.    Clot time 5 minutes for arterial, and 5 minutes for venous.      Patient returned to unit in no acute distress.  Unit nurse JUSTICE Jerez RN given report.

## 2024-07-11 NOTE — PROGRESS NOTES
Physical Therapy    Pt not seen for skilled PT due to:    []  Nausea/vomiting  []  Eating  []  Pain  []  Pt lethargic  [x]  Off Unit (leaving unit with transport for HD 1035, 1154)   Other:     Will f/u later as schedule allows. Thank you.  Alyssa Chowdary, PT, DPT

## 2024-07-11 NOTE — PLAN OF CARE
Problem: Occupational Therapy - Adult  Goal: By Discharge: Performs self-care activities at highest level of function for planned discharge setting.  See evaluation for individualized goals.  Description: Occupational Therapy Goals:  Initiated 6/28/2024 to be met within 7-10 days.  Re-eval completed 6/9/24, pt making consistent progress. Goals continue to be appropriate.     1.  Patient will perform bathing with supervision/set-up.   2.  Patient will perform lower body dressing with supervision/set-up.  3.  Patient will perform BSC transfers with supervision/set-up using LRAD (sliding board vs FWW).  4.  Patient will perform all aspects of toileting with supervision/set-up.  5.  Patient will participate in upper extremity therapeutic exercise/activities with supervision/set-up for 8 minutes to improve endurance and UB strength needed for ADLs    6.  Patient will utilize energy conservation techniques during functional activities with verbal cues.    PLOF: Pt lives with spouse in [address] with ramp. Pt is independent with ADLs and functional mobility. Pt's spouse is disabled and has PCA assisting him with ADLs.  Outcome: Progressing   OCCUPATIONAL THERAPY TREATMENT    Patient: Jigna Conner (69 y.o. female)  Date: 7/11/2024  Diagnosis: Hypokalemia [E87.6]  Hyperglycemia [R73.9]  ESRD (end stage renal disease) (HCC) [N18.6]  Diabetic foot infection (HCC) [E11.628, L08.9]  Anemia, unspecified type [D64.9] Diabetic foot infection (HCC)  Procedure(s) (LRB):  RIGHT FOOT IRRIGATION AND DEBRIDEMENT, REMOVAL OF INFECTED/NON-VIABLE TISSUE, WOUND VAC PLACEMENT (Right) 8 Days Post-Op  Precautions: Fall Risk, Skin, Aspiration Risk, Weight Bearing, Right Lower Extremity Weight Bearing: Non Weight Bearing,  ,  ,  ,  ,  ,      Chart, occupational therapy assessment, plan of care, and goals were reviewed.  ASSESSMENT:  Pt presented sitting EOB upon entry and agreeable for participation. Reviewed NWB on R LE. Pt continues to

## 2024-07-11 NOTE — PLAN OF CARE
INTERVENTION:  HEMODYNAMIC STABILIZATION  MAINTAIN BP WNL WHILE ON HD.    INTERVENTION:  FLUID MANAGEMENT  WILL ATTEMPT 2500 ML TOTAL FLUID REMOVAL AS TOLERATED.    INTERVENTION:  METABOLIC/ELECTROLYTE MANAGEMENT  2.0 POTASSIUM 2.5 CALCIUM DIALYSATE USED WITH HD TODAY.    INTERVENTION:  HEMODIALYSIS ACCESS SITE MANAGEMENT  LEFT UPPER ARM AVF ACCESSED WITH 15G NEEDLE USING ASEPTIC TECHNIQUE.    GOAL:  SIGNS AND SYMPTOMS OF LISTED POTENTIAL PROBLEMS WILL BE ABSENT OR MANAGEABLE.    OUTCOME:  PROGRESSING.    HD PLANNED FOR 3 HOURS TODAY.        Problem: Chronic Conditions and Co-morbidities  Goal: Patient's chronic conditions and co-morbidity symptoms are monitored and maintained or improved  7/11/2024 1125 by Angelica Butts RN  Outcome: Progressing  Flowsheets (Taken 7/11/2024 1125)  Care Plan - Patient's Chronic Conditions and Co-Morbidity Symptoms are Monitored and Maintained or Improved:   Monitor and assess patient's chronic conditions and comorbid symptoms for stability, deterioration, or improvement   Update acute care plan with appropriate goals if chronic or comorbid symptoms are exacerbated and prevent overall improvement and discharge   Collaborate with multidisciplinary team to address chronic and comorbid conditions and prevent exacerbation or deterioration

## 2024-07-11 NOTE — PLAN OF CARE
Problem: Discharge Planning  Goal: Discharge to home or other facility with appropriate resources  Outcome: Progressing  Flowsheets (Taken 7/10/2024 0830 by Willa Singer, RN)  Discharge to home or other facility with appropriate resources:   Identify barriers to discharge with patient and caregiver   Arrange for needed discharge resources and transportation as appropriate   Identify discharge learning needs (meds, wound care, etc)   Refer to discharge planning if patient needs post-hospital services based on physician order or complex needs related to functional status, cognitive ability or social support system     Problem: Pain  Goal: Verbalizes/displays adequate comfort level or baseline comfort level  Outcome: Progressing     Problem: ABCDS Injury Assessment  Goal: Absence of physical injury  Outcome: Progressing     Problem: Safety - Adult  Goal: Free from fall injury  Outcome: Progressing     Problem: Chronic Conditions and Co-morbidities  Goal: Patient's chronic conditions and co-morbidity symptoms are monitored and maintained or improved  Outcome: Progressing  Flowsheets (Taken 7/10/2024 0830 by Willa Singer, RN)  Care Plan - Patient's Chronic Conditions and Co-Morbidity Symptoms are Monitored and Maintained or Improved:   Monitor and assess patient's chronic conditions and comorbid symptoms for stability, deterioration, or improvement   Collaborate with multidisciplinary team to address chronic and comorbid conditions and prevent exacerbation or deterioration   Update acute care plan with appropriate goals if chronic or comorbid symptoms are exacerbated and prevent overall improvement and discharge     Problem: Physical Therapy - Adult  Goal: By Discharge: Performs mobility at highest level of function for planned discharge setting.  See evaluation for individualized goals.  Description: Physical Therapy Goals:  Initiated 6/27/2024 to be met within 7-10 days.  Reviewed and Updated: 7/4/2024    1.   during hospitalization  Outcome: Progressing  Flowsheets (Taken 7/10/2024 0830 by Willa Singer, RN)  Absence of infection during hospitalization:   Assess and monitor for signs and symptoms of infection   Monitor lab/diagnostic results  Goal: Absence of fever/infection during anticipated neutropenic period  Outcome: Progressing  Flowsheets (Taken 7/10/2024 0830 by Willa Singer, RN)  Absence of fever/infection during anticipated neutropenic period: Monitor white blood cell count     Problem: Hematologic - Adult  Goal: Maintains hematologic stability  Outcome: Progressing  Flowsheets (Taken 7/10/2024 0830 by Willa Singer, RN)  Maintains hematologic stability: Assess for signs and symptoms of bleeding or hemorrhage     Problem: Nutrition Deficit:  Goal: Optimize nutritional status  Outcome: Progressing

## 2024-07-11 NOTE — PROGRESS NOTES
[facility] Hospitalist Group  Progress Note    Patient: Jigna Conner Age: 69 y.o. : 1955 MR#: [id number] SSN: [id number]      Subjective/24-hour events:     No new complaints.  Denies chest pain and SOB but Hgb came back < 7 this AM.    Assessment:   Diabetic foot infection with left foot osteomyelitis  DM2  PAD  ESRD, HD dependent    Plan:   Transfuse 1 unit PRBCs.  HD today per usual schedule.  Remains stable o/w.  Disposition arrangements still being worked on.    Anticipated discharge: Medically stable/SNF    Case discussed with:  [x]Patient  []Family  [x] Nursing  [x]Case Management  DVT Prophylaxis:  []Lovenox  [x]Hep SQ  []SCDs  []Coumadin   []On Heparin gtt []PO anticoagulant    Objective:   VS: BP (!) 147/60   Pulse 74   Temp 98 °F (36.7 °C) (Oral)   Resp 18   Ht 1.624 m (5' 3.94\")   Wt 96 kg (211 lb 10.3 oz)   SpO2 97%   BMI 36.40 kg/m²      Tmax/24hrs: Temp (24hrs), Av.3 °F (36.8 °C), Min:98 °F (36.7 °C), Max:98.7 °F (37.1 °C)    Intake/Output Summary (Last 24 hours) at 2024 0749  Last data filed at 7/10/2024 1930  Gross per 24 hour   Intake 1020 ml   Output 750 ml   Net 270 ml       Gen:  In NAD.  Nontoxic-appearing.  Lungs: Clear, no wheezes  Effort nonlabored.  CV: RRR.  Abdomen: Soft, NTTP.  Extremities: Warm.  Wound VAC in place left foot.  Neuro:  Awake and alert, moves extremities spontaneously.    Current Facility-Administered Medications   Medication Dose Route Frequency    epoetin laurence (EPOGEN;PROCRIT) injection 10,000 Units  10,000 Units SubCUTAneous Once per day on  Sat    miconazole (MICOTIN) 2 % vaginal cream 1 applicator  1 applicator Vaginal Nightly    sevelamer (RENVELA) tablet 800 mg  800 mg Oral TID WC    dextrose 5 % solution   IntraVENous Continuous    heparin (porcine) injection 5,000 Units  5,000 Units SubCUTAneous 3 times per day    lactobacillus (CULTURELLE) capsule 1 capsule  1 capsule Oral Daily with breakfast     glycol (GLYCOLAX) packet 17 g  17 g Oral Daily PRN    acetaminophen (TYLENOL) tablet 650 mg  650 mg Oral Q6H PRN    Or    acetaminophen (TYLENOL) suppository 650 mg  650 mg Rectal Q6H PRN    potassium chloride (KLOR-CON M) extended release tablet 40 mEq  40 mEq Oral Once    insulin lispro (HUMALOG,ADMELOG) injection vial 0-4 Units  0-4 Units SubCUTAneous TID WC    insulin lispro (HUMALOG,ADMELOG) injection vial 0-4 Units  0-4 Units SubCUTAneous Nightly    glucose chewable tablet 16 g  4 tablet Oral PRN    dextrose bolus 10% 125 mL  125 mL IntraVENous PRN    Or    dextrose bolus 10% 250 mL  250 mL IntraVENous PRN    glucagon injection 1 mg  1 mg SubCUTAneous PRN    dextrose 10 % infusion   IntraVENous Continuous PRN        Labs:    Recent Results (from the past 24 hour(s))   POCT Glucose    Collection Time: 07/10/24 11:00 AM   Result Value Ref Range    POC Glucose 91 70 - 110 mg/dL   POCT Glucose    Collection Time: 07/10/24  3:16 PM   Result Value Ref Range    POC Glucose 110 70 - 110 mg/dL   POCT Glucose    Collection Time: 07/10/24  8:31 PM   Result Value Ref Range    POC Glucose 130 (H) 70 - 110 mg/dL   Vancomycin Level, Random    Collection Time: 07/11/24  1:44 AM   Result Value Ref Range    Vancomycin Rm 17.5 5.0 - 40.0 UG/ML   CBC    Collection Time: 07/11/24  1:44 AM   Result Value Ref Range    WBC 8.9 4.6 - 13.2 K/uL    RBC 2.47 (L) 4.20 - 5.30 M/uL    Hemoglobin 6.2 (L) 12.0 - 16.0 g/dL    Hematocrit 20.3 (L) 35.0 - 45.0 %    MCV 82.2 78.0 - 100.0 FL    MCH 25.1 24.0 - 34.0 PG    MCHC 30.5 (L) 31.0 - 37.0 g/dL    RDW 18.6 (H) 11.6 - 14.5 %    Platelets 377 135 - 420 K/uL    MPV 9.3 9.2 - 11.8 FL    Nucleated RBCs 0.0 0  WBC    nRBC 0.00 0.00 - 0.01 K/uL   Hemoglobin and Hematocrit    Collection Time: 07/11/24  4:15 AM   Result Value Ref Range    Hemoglobin 6.3 (L) 12.0 - 16.0 g/dL    Hematocrit 21.6 (L) 35.0 - 45.0 %   POCT Glucose    Collection Time: 07/11/24  6:29 AM   Result Value Ref Range    POC

## 2024-07-11 NOTE — PROGRESS NOTES
During rounds MD verbalized the patient will have blood transfusion at HD however there is no active BT orders. Paged Dr. Logan while Mirtha, Charge Nurse, sent him a message thru [employer]. Still waiting for a reply from MD.

## 2024-07-12 LAB
GLUCOSE BLD STRIP.AUTO-MCNC: 123 MG/DL (ref 70–110)
GLUCOSE BLD STRIP.AUTO-MCNC: 134 MG/DL (ref 70–110)
GLUCOSE BLD STRIP.AUTO-MCNC: 178 MG/DL (ref 70–110)
GLUCOSE BLD STRIP.AUTO-MCNC: 96 MG/DL (ref 70–110)
HCT VFR BLD AUTO: 23.6 % (ref 35–45)
HGB BLD-MCNC: 7.6 G/DL (ref 12–16)

## 2024-07-12 PROCEDURE — 6370000000 HC RX 637 (ALT 250 FOR IP): Performed by: PHYSICIAN ASSISTANT

## 2024-07-12 PROCEDURE — 6370000000 HC RX 637 (ALT 250 FOR IP): Performed by: INTERNAL MEDICINE

## 2024-07-12 PROCEDURE — 6360000002 HC RX W HCPCS: Performed by: EMERGENCY MEDICINE

## 2024-07-12 PROCEDURE — 6360000002 HC RX W HCPCS: Performed by: INTERNAL MEDICINE

## 2024-07-12 PROCEDURE — 2580000003 HC RX 258: Performed by: INTERNAL MEDICINE

## 2024-07-12 PROCEDURE — 99232 SBSQ HOSP IP/OBS MODERATE 35: CPT | Performed by: INTERNAL MEDICINE

## 2024-07-12 PROCEDURE — 94761 N-INVAS EAR/PLS OXIMETRY MLT: CPT

## 2024-07-12 PROCEDURE — 85014 HEMATOCRIT: CPT

## 2024-07-12 PROCEDURE — 97530 THERAPEUTIC ACTIVITIES: CPT

## 2024-07-12 PROCEDURE — 85018 HEMOGLOBIN: CPT

## 2024-07-12 PROCEDURE — 97116 GAIT TRAINING THERAPY: CPT

## 2024-07-12 PROCEDURE — 97535 SELF CARE MNGMENT TRAINING: CPT

## 2024-07-12 PROCEDURE — 6370000000 HC RX 637 (ALT 250 FOR IP): Performed by: EMERGENCY MEDICINE

## 2024-07-12 PROCEDURE — 97110 THERAPEUTIC EXERCISES: CPT

## 2024-07-12 PROCEDURE — 2580000003 HC RX 258: Performed by: PHYSICIAN ASSISTANT

## 2024-07-12 PROCEDURE — 1100000000 HC RM PRIVATE

## 2024-07-12 PROCEDURE — 82962 GLUCOSE BLOOD TEST: CPT

## 2024-07-12 RX ADMIN — SODIUM CHLORIDE, PRESERVATIVE FREE 10 ML: 5 INJECTION INTRAVENOUS at 21:21

## 2024-07-12 RX ADMIN — PIPERACILLIN AND TAZOBACTAM 3375 MG: 3; .375 INJECTION, POWDER, FOR SOLUTION INTRAVENOUS at 23:15

## 2024-07-12 RX ADMIN — ATORVASTATIN CALCIUM 10 MG: 10 TABLET, FILM COATED ORAL at 09:40

## 2024-07-12 RX ADMIN — TIMOLOL MALEATE 1 DROP: 5 SOLUTION OPHTHALMIC at 21:24

## 2024-07-12 RX ADMIN — SACUBITRIL AND VALSARTAN 1 TABLET: 24; 26 TABLET, FILM COATED ORAL at 09:40

## 2024-07-12 RX ADMIN — HEPARIN SODIUM 5000 UNITS: 5000 INJECTION INTRAVENOUS; SUBCUTANEOUS at 16:04

## 2024-07-12 RX ADMIN — LATANOPROST 1 DROP: 50 SOLUTION/ DROPS OPHTHALMIC at 21:24

## 2024-07-12 RX ADMIN — DOCUSATE SODIUM 50 MG: 50 LIQUID ORAL at 09:40

## 2024-07-12 RX ADMIN — HEPARIN SODIUM 5000 UNITS: 5000 INJECTION INTRAVENOUS; SUBCUTANEOUS at 06:40

## 2024-07-12 RX ADMIN — PIPERACILLIN AND TAZOBACTAM 3375 MG: 3; .375 INJECTION, POWDER, FOR SOLUTION INTRAVENOUS at 16:04

## 2024-07-12 RX ADMIN — SEVELAMER CARBONATE 800 MG: 800 TABLET, FILM COATED ORAL at 16:04

## 2024-07-12 RX ADMIN — SODIUM CHLORIDE, PRESERVATIVE FREE 10 ML: 5 INJECTION INTRAVENOUS at 09:47

## 2024-07-12 RX ADMIN — MICONAZOLE NITRATE 1 APPLICATOR: 20 CREAM VAGINAL at 21:25

## 2024-07-12 RX ADMIN — SACUBITRIL AND VALSARTAN 1 TABLET: 24; 26 TABLET, FILM COATED ORAL at 21:20

## 2024-07-12 RX ADMIN — ASPIRIN 81 MG CHEWABLE TABLET 81 MG: 81 TABLET CHEWABLE at 09:40

## 2024-07-12 RX ADMIN — TIMOLOL MALEATE 1 DROP: 5 SOLUTION OPHTHALMIC at 09:45

## 2024-07-12 RX ADMIN — DOCUSATE SODIUM 50 MG: 50 LIQUID ORAL at 21:20

## 2024-07-12 RX ADMIN — INSULIN GLARGINE 10 UNITS: 100 INJECTION, SOLUTION SUBCUTANEOUS at 21:20

## 2024-07-12 RX ADMIN — HEPARIN SODIUM 5000 UNITS: 5000 INJECTION INTRAVENOUS; SUBCUTANEOUS at 21:20

## 2024-07-12 RX ADMIN — PANTOPRAZOLE SODIUM 40 MG: 40 TABLET, DELAYED RELEASE ORAL at 06:40

## 2024-07-12 RX ADMIN — Medication 1 CAPSULE: at 09:40

## 2024-07-12 RX ADMIN — SEVELAMER CARBONATE 800 MG: 800 TABLET, FILM COATED ORAL at 09:40

## 2024-07-12 ASSESSMENT — PAIN SCALES - WONG BAKER
WONGBAKER_NUMERICALRESPONSE: NO HURT

## 2024-07-12 ASSESSMENT — PAIN SCALES - GENERAL
PAINLEVEL_OUTOF10: 0

## 2024-07-12 NOTE — PLAN OF CARE
Problem: Physical Therapy - Adult  Goal: By Discharge: Performs mobility at highest level of function for planned discharge setting.  See evaluation for individualized goals.  Description: Physical Therapy Goals:  Initiated 6/27/2024 to be met within 7-10 days.  Reviewed and Updated: 7/4/2024    1.  Patient will move from supine to sit and sit to supine , scoot up and down, and roll side to side in bed with independence in order to safely get into/out of bed.    2.  Patient will transfer from bed to chair and chair to bed with minimal assistance  using RW in order to safely partake in OOB mobility.  3.  Patient will perform sit to stand with minimal assistance  in order to safely partake in OOB mobility.  4.  Patient will ambulate with minimal assistance/contact guard assist for 15 feet using RW in order to safely navigate household distances.     PLOF: lives with  in 1 level home with ramped entry, independent at baseline using rollator in community and SPC in home      Outcome: Progressing   PHYSICAL THERAPY TREATMENT    Patient: Jigna Conner (69 y.o. female)  Date: 7/12/2024  Diagnosis: Hypokalemia [E87.6]  Hyperglycemia [R73.9]  ESRD (end stage renal disease) (HCC) [N18.6]  Diabetic foot infection (HCC) [E11.628, L08.9]  Anemia, unspecified type [D64.9] Diabetic foot infection (HCC)  Procedure(s) (LRB):  RIGHT FOOT IRRIGATION AND DEBRIDEMENT, REMOVAL OF INFECTED/NON-VIABLE TISSUE, WOUND VAC PLACEMENT (Right) 9 Days Post-Op  Precautions: Fall Risk, Aspiration Risk, Weight Bearing, Right Lower Extremity Weight Bearing: Non Weight Bearing (right forefoot),    ASSESSMENT:  Pt is sitting up in recliner on arrival and agreeable to PT treatment.  Pt performed LE exercises as noted below.  Pt require min A to standing from the recliner and ambulated 12 feet with RW and CGA with occasional cues to ensure NWB on right forefoot. Pt was left sitting up in the recliner with leg rest elevated and needs in  Active Assist   Passive Self ROM   Comments   Ankle Pumps    [] [] [] []    Glut Sets 1 15  [x] [] [] [] Hold for 5 secs   Hamstring Sets   [] [] [] []    Short Arc Quads   [] [] [] []    Heel Slides in sitting 1 15 [x] [] [] []    Straight Leg Raises   [] [] [] []    Hip Add   [] [] [] [] Hold for 5 secs, w/ pillow squeeze   Long Arc Quads 1 15 [x] [] [] []    Seated Marching 1 15 [x] [] [] []    Standing Marching   [] [] [] []       [] [] [] []        Pain:  Intensity Pre-treatment: 2/10   Intensity Post-treatment: 2/10  Scale: Numeric Rating Scale  Location: Right Foot  Quality: Aching  Intervention(s): Repositioning  and Rest      Activity Tolerance:   Activity Tolerance: Patient tolerated treatment well  Please refer to the flowsheet for vital signs taken during this treatment.  After treatment:   [x] Patient left in no apparent distress sitting up in chair  [] Patient left in no apparent distress in bed  [x] Call bell left within reach  [x] Nursing notified  [] Caregiver present  [] Bed alarm activated  [] SCDs applied      COMMUNICATION/EDUCATION:   Patient Education  Education Given To: Patient  Education Provided: Plan of Care;Home Exercise Program;Transfer Training;Precautions  Education Method: Verbal;Teach Back;Demonstration  Barriers to Learning: None  Education Outcome: Verbalized understanding;Demonstrated understanding;Continued education needed      Shona Aguilar PT  Minutes: 25

## 2024-07-12 NOTE — PROGRESS NOTES
4 Eyes Skin Assessment     NAME:  Jigna Conner  YOB: 1955  MEDICAL RECORD NUMBER:  [id number]    The patient is being assessed for  Shift Handoff    I agree that at least one RN has performed a thorough Head to Toe Skin Assessment on the patient. ALL assessment sites listed below have been assessed.      Areas assessed by both nurses:    Head, Face, Ears, Shoulders, Back, Chest, Arms, Elbows, Hands, Sacrum. Buttock, Coccyx, Ischium, Legs. Feet and Heels, and Under Medical Devices         Does the Patient have a Wound? Yes wound(s) were present on assessment. LDA wound assessment was Initiated and completed by RN       Vinay Prevention initiated by RN: Yes  Wound Care Orders initiated by RN: Yes    Pressure Injury (Stage 3,4, Unstageable, DTI, NWPT, and Complex wounds) if present, place Wound referral order by RN under : Yes    New Ostomies, if present place, Ostomy referral order under : Yes     Nurse 1 eSignature: Electronically signed by Shirley Masters RN on 7/12/24 at 6:00 AM EDT    **SHARE this note so that the co-signing nurse can place an eSignature**    Nurse 2 eSignature: {Esignature:[id number]}

## 2024-07-12 NOTE — CARE COORDINATION
Per Dr. Bolanos, pt 's wound vac is discontinued.  He stated per pt, [facility] HR declined her because of wound vac and now she will like to go there without wound vac.  Informed Dr. Bolanos that we have been on this since Monday and waiting for auth from [facility].  Informed him [facility] will have to review again .      1525:  Called [facility] [phone] and spoke with Khoa.  He stated pt just called him.  He stated CM and resend clinicals.  He stated he does not have beds for now.  CM asked if he can review and let us know before 4:30pm and he stated no.  Pt's clinicals reopened to [facility] in [facility] and updated clinicals uploaded.    1534:  Called Minnie of [facility].  They are still waiting for auth # [id number]        Sonia Arevalo, WENDYN RN  Care Management

## 2024-07-12 NOTE — PLAN OF CARE
Problem: Discharge Planning  Goal: Discharge to home or other facility with appropriate resources  7/11/2024 2009 by Shirley Masters RN  Outcome: Progressing  7/11/2024 1704 by Amarilis Jerez RN  Outcome: Progressing  Flowsheets (Taken 7/11/2024 0840)  Discharge to home or other facility with appropriate resources: Identify barriers to discharge with patient and caregiver     Problem: Pain  Goal: Verbalizes/displays adequate comfort level or baseline comfort level  7/11/2024 2009 by Shirley Masters RN  Outcome: Progressing  7/11/2024 1704 by Amarilis Jerez RN  Outcome: Progressing     Problem: ABCDS Injury Assessment  Goal: Absence of physical injury  7/11/2024 2009 by Shirley Masters RN  Outcome: Progressing  7/11/2024 1704 by Amarilis Jerez RN  Outcome: Progressing     Problem: Safety - Adult  Goal: Free from fall injury  7/11/2024 2009 by Shirley Masters RN  Outcome: Progressing  7/11/2024 1704 by Amarilis Jerez RN  Outcome: Progressing     Problem: Chronic Conditions and Co-morbidities  Goal: Patient's chronic conditions and co-morbidity symptoms are monitored and maintained or improved  7/11/2024 2009 by Shirley Masters RN  Outcome: Progressing  7/11/2024 1704 by Amarilis Jerez RN  Outcome: Progressing  7/11/2024 1125 by Angelica Butts RN  Outcome: Progressing  Flowsheets  Taken 7/11/2024 1125 by Angelica Butts RN  Care Plan - Patient's Chronic Conditions and Co-Morbidity Symptoms are Monitored and Maintained or Improved:   Monitor and assess patient's chronic conditions and comorbid symptoms for stability, deterioration, or improvement   Update acute care plan with appropriate goals if chronic or comorbid symptoms are exacerbated and prevent overall improvement and discharge   Collaborate with multidisciplinary team to address chronic and comorbid conditions and prevent exacerbation or deterioration  Taken 7/11/2024 0840 by Amarilis Jerez

## 2024-07-12 NOTE — PROGRESS NOTES
Or    dextrose bolus 10% 250 mL  250 mL IntraVENous PRN    glucagon injection 1 mg  1 mg SubCUTAneous PRN    dextrose 10 % infusion   IntraVENous Continuous PRN       Review of Systems:      Complete 10-point review of systems were obtained and discussed in length  with the patient. Complete review of systems was negative/unremarkable  except as mentioned in HPI section.  Data Review:    Labs: Results:       Chemistry Recent Labs     07/11/24  1103      K 4.9      CO2 28   BUN 30*        CBC w/Diff Recent Labs     07/11/24  0144 07/11/24  0415 07/12/24  0442   WBC 8.9  --   --    RBC 2.47*  --   --    HGB 6.2* 6.3* 7.6*   HCT 20.3* 21.6* 23.6*     --   --         Coagulation No results for input(s): \"INR\", \"APTT\" in the last 72 hours.    Invalid input(s): \"PTP\"      Iron/Ferritin No results for input(s): \"IRON\" in the last 72 hours.    Invalid input(s): \"TIBCCALC\"     BNP Invalid input(s): \"BNPP\"   Cardiac Enzymes No results for input(s): \"CPK\" in the last 72 hours.    Invalid input(s): \"CKRMB\", \"CKND1\", \"TROIP\", \"ANKIT\"   Liver Enzymes No results for input(s): \"TP\" in the last 72 hours.    Invalid input(s): \"ALB\", \"TBIL\", \"AP\", \"SGOT\", \"GPT\", \"DBIL\"   Thyroid Studies No results found for: \"T4\", \"T3RU\", \"TSH\"      EKG: normal sinus rhythm.    Physical Assessment:   Visit Vitals  BP (!) 150/60   Pulse 61   Temp 98.2 °F (36.8 °C) (Oral)   Resp 18   Ht 1.624 m (5' 3.94\")   Wt 99 kg (218 lb 4.1 oz)   SpO2 100%   BMI 37.54 kg/m²     Weight change:     Intake/Output Summary (Last 24 hours) at 7/12/2024 1205  Last data filed at 7/12/2024 0922  Gross per 24 hour   Intake 1177.08 ml   Output 2573 ml   Net -1395.92 ml       Physical Exam:   General: comfortable, no acute distress   HEENT sclera anicteric, supple neck, no thyromegaly  CVS: S1S2 heard,  no rub  RS: + air entry b/l,   Abd: Soft, Non tender, Not distended,   Neuro: non focal, awake, alert , CN II-XII are grossly intact  Extrm: R foot  dressing  Skin: no visible  Rash  Musculoskeletal: right foot under dressing.     Procedures/imaging: see electronic medical records for all procedures, Xrays and details which were not copied into this note but were reviewed prior to creation of Plan          RIO HANCOCK MD  July 12, 2024  [facility]  Office [phone]

## 2024-07-12 NOTE — PROGRESS NOTES
Podiatry Progress Note    Patient: Jigna Conner               Sex: female          DOA: [unfilled]       YOB: 1955      Age:  69 y.o.        LOS:  LOS: 16 days     POD 8 Days Post-Op  Procedure:   Procedure(s):  RIGHT FOOT IRRIGATION AND DEBRIDEMENT, REMOVAL OF INFECTED/NON-VIABLE TISSUE, WOUND VAC PLACEMENT      Subjective:     Patient seen resting quiet and comfortably and no apparent distress.  Patient has wound vac dressings on right foot wounds. Denies any new pedal complaint. Denies N/V/C/F. Awaiting discharge pending on SNF placement.       Objective:       Physical Exam:  Right medial foot wound with macerated skin and fibrous slough at first metatarsal head medially. Also wound at second ray amputation stump. Antibiotic beads intact. No purulence noted. Right third toe dusky in appearance.       Assessment:     Principal Problem:    Diabetic foot infection (HCC)  Active Problems:    Hypertension    Anemia of renal disease    ESRD on dialysis (HCC)    Diabetes mellitus (HCC)    Hypokalemia    Chronic heart failure with preserved ejection fraction (HFpEF) (HCC)    Coronary artery disease involving native coronary artery of native heart without angina pectoris    History of MI (myocardial infarction)    Class 2 obesity in adult    Constipation  Resolved Problems:    * No resolved hospital problems. *      Plan/Recommendations/Medical Decision Making:     - Will hold off on wound vac. Dressed with wet to dry dressings.   - Remain NWB to right forefoot.   - Antibiotics per ID recommendation.   - Patient at risk of more proximal amputation, will continue local wound care for now. Will need repeat debridement.   - Medical management per primary team.

## 2024-07-12 NOTE — PROGRESS NOTES
[facility]  (A Division of [facility])    Follow-up Note      Date of Admission: 2024       Date of Note:  2024          Current Antimicrobials:    Prior Antimicrobials:  Zosyn  to present  Vancomycin  to present      Assessment:         Osteomyelitis of the right foot:   - wound culture with mixed marimar including gram-negative rods   -Status post I&D right foot abscess and second metatarsal debridement on .  Diabetes mellitus type 2  Peripheral artery disease: Status post angiography   End-stage renal disease on dialysis    Plan:   Continue current wound care with wound VAC-will need home health  Will plan to transition to vancomycin plus cefepime upon discharge.   - Stop date 24   Trend CBC, BMP, ESR, CRP at least twice weekly  Tight glucose control for optimal wound healing.    Discussed with Dr. Bolanos  Follow-up as outpatient 2 to 3 weeks    DO [facility]  [address], [address], [place] [address]  Office: [phone], Ext [phone]       Microbiology:   right foot tissue culture: Diphtheroids   wound culture: Heavy mixed skin marimar with gram-negative rods   blood cultures no growth to date x 2    Lines / Catheters:  Peripheral    Subjective:   Patient seen and examined.  No complaints.  Pain is controlled. No overnight fevers. Still pending placement and auth.    Tmax 98.6  WBC none today    Objective:      /68   Pulse 71   Temp 98.4 °F (36.9 °C) (Oral)   Resp 20   Ht 1.624 m (5' 3.94\")   Wt 99 kg (218 lb 4.1 oz)   SpO2 96%   BMI 37.54 kg/m²   Temp (24hrs), Av.2 °F (36.8 °C), Min:97.7 °F (36.5 °C), Max:98.6 °F (37 °C)        General:   awake alert and oriented, non-toxic   Skin:    dry and warm, wound VAC in place over right foot    HEENT:  No scleral icterus or pallor; oral mucosa moist, lips moist   Lymph Nodes:   not assessed today   Lungs:

## 2024-07-12 NOTE — PROGRESS NOTES
[facility]   Pharmacy Pharmacokinetic Monitoring Service - Vancomycin    Indication: DM foot infection  Target Pre-Dialysis Concentration: 21-24 mg/L  Day of Therapy: 18  Additional Antimicrobials: pip-tazo    Pertinent Laboratory Values:   Wt Readings from Last 1 Encounters:   07/11/24 99 kg (218 lb 4.1 oz)     Temp Readings from Last 1 Encounters:   07/12/24 98.2 °F (36.8 °C) (Oral)     Recent Labs     07/11/24  0144   WBC 8.9     Pertinent Cultures:  Date Source Results   6/25 blood NGF   6/26 wound GPC, GPR   6/26 tissue Diphtheroids    MRSA Nasal Swab: NA    Assessment:  Date Current Dose Concentration (mg/L) Dialysis Session   6/25 2,000 mg x1 - no   6/26 - - no   6/27 - 27.6 yes   6/28 - - no   6/29  15.4 yes   6/30 - - no   7/1   no   7/2 750 mg x1 19.1 yes   7/3 - - no   7/4 750 mg x1 21.3 yes   7/5 - - no   7/6 750 mg x 1 20.4 yes   7/7 - - no   7/8 - - no   7/9 1,000 mg x1 16.6 yes   7/10 - - no   7/11 1,250 mg x1 17.5 yes   7/12 - - no     Plan:  ESRD on HD q TTS - dose by level  No dose today  Ordered a level for 7/13 with AM labs  Pharmacy will continue to monitor patient and adjust therapy as indicated    Thank you for the consult,  Gianni Wolfe [facility]  7/12/2024

## 2024-07-12 NOTE — PLAN OF CARE
Problem: Occupational Therapy - Adult  Goal: By Discharge: Performs self-care activities at highest level of function for planned discharge setting.  See evaluation for individualized goals.  Description: Occupational Therapy Goals:  Initiated 6/28/2024 to be met within 7-10 days.  Re-eval completed 6/9/24, pt making consistent progress. Goals continue to be appropriate.     1.  Patient will perform bathing with supervision/set-up.   2.  Patient will perform lower body dressing with supervision/set-up.  3.  Patient will perform BSC transfers with supervision/set-up using LRAD (sliding board vs FWW).  4.  Patient will perform all aspects of toileting with supervision/set-up.  5.  Patient will participate in upper extremity therapeutic exercise/activities with supervision/set-up for 8 minutes to improve endurance and UB strength needed for ADLs    6.  Patient will utilize energy conservation techniques during functional activities with verbal cues.    PLOF: Pt lives with spouse in 1SH with ramp. Pt is independent with ADLs and functional mobility. Pt's spouse is disabled and has PCA assisting him with ADLs.  Outcome: Progressing   OCCUPATIONAL THERAPY TREATMENT    Patient: Jigna Conner (69 y.o. female)  Date: 7/12/2024  Diagnosis: Hypokalemia [E87.6]  Hyperglycemia [R73.9]  ESRD (end stage renal disease) (HCC) [N18.6]  Diabetic foot infection (HCC) [E11.628, L08.9]  Anemia, unspecified type [D64.9] Diabetic foot infection (HCC)  Procedure(s) (LRB):  RIGHT FOOT IRRIGATION AND DEBRIDEMENT, REMOVAL OF INFECTED/NON-VIABLE TISSUE, WOUND VAC PLACEMENT (Right) 9 Days Post-Op  Precautions: Fall Risk, Skin, Aspiration Risk, Weight Bearing, Right Lower Extremity Weight Bearing: Non Weight Bearing,  ,  ,  ,  ,  ,      Chart, occupational therapy assessment, plan of care, and goals were reviewed.  ASSESSMENT:  Pt presented supine in bed upon entry, wound vac unattached, and agreeable to work with OT. She came to EOB SBA  Contact-guard assistance    Balance:  Balance  Sitting: Intact  Standing: Impaired  Standing - Static: Fair  Standing - Dynamic: Fair    ADL Intervention:           LE Bathing: Stand by assistance;Verbal cueing;Adaptive equipment     Pain:  Intensity Pre-treatment: 4/10   Intensity Post-treatment: 4/10  Scale: Numeric Rating Scale  Location: Right Foot  Quality: Aching  Intervention(s): Nurse notified and Repositioning       Activity Tolerance:    Activity Tolerance: Patient tolerated treatment well  Please refer to the flowsheet for vital signs taken during this treatment.  After treatment:   [x]  Patient left in no apparent distress sitting up in chair  []  Patient left in no apparent distress in bed  [x]  Call bell left within reach  [x]  Nursing notified  []  Caregiver present  []  Bed alarm activated    COMMUNICATION/EDUCATION:   Patient Education  Education Given To: Patient  Education Provided: Role of Therapy;Plan of Care;Energy Conservation;Transfer Training;ADL Adaptive Strategies;Precautions  Education Method: Demonstration;Verbal;Teach Back  Barriers to Learning: None  Education Outcome: Continued education needed      Thank you for this referral.  TATY Zarco  Minutes: 24

## 2024-07-12 NOTE — PROGRESS NOTES
No changes during this shift, patient tolerate medications and care well. Patient is alert and oriented times 4, no distress or discomfort.

## 2024-07-12 NOTE — CARE COORDINATION
Contacted Minnie Funes of [facility].  They are still waiting for auth.          Sonia Arevalo, WENDYN RN  Care Management

## 2024-07-13 ENCOUNTER — ANESTHESIA EVENT (OUTPATIENT)
Facility: HOSPITAL | Age: 69
End: 2024-07-13
Payer: MEDICARE

## 2024-07-13 ENCOUNTER — APPOINTMENT (OUTPATIENT)
Facility: HOSPITAL | Age: 69
End: 2024-07-13
Payer: MEDICARE

## 2024-07-13 LAB
ABO + RH BLD: NORMAL
ALBUMIN SERPL-MCNC: 2 G/DL (ref 3.4–5)
ALBUMIN/GLOB SERPL: 0.5 (ref 0.8–1.7)
ALP SERPL-CCNC: 52 U/L (ref 45–117)
ALT SERPL-CCNC: 11 U/L (ref 13–56)
ANION GAP SERPL CALC-SCNC: 10 MMOL/L (ref 3–18)
AST SERPL-CCNC: 13 U/L (ref 10–38)
BASOPHILS # BLD: 0 K/UL (ref 0–0.1)
BASOPHILS NFR BLD: 1 % (ref 0–2)
BILIRUB SERPL-MCNC: 0.3 MG/DL (ref 0.2–1)
BLD PROD TYP BPU: NORMAL
BLOOD BANK BLOOD PRODUCT EXPIRATION DATE: NORMAL
BLOOD BANK DISPENSE STATUS: NORMAL
BLOOD BANK ISBT PRODUCT BLOOD TYPE: 5100
BLOOD BANK PRODUCT CODE: NORMAL
BLOOD BANK UNIT TYPE AND RH: NORMAL
BLOOD GROUP ANTIBODIES SERPL: NORMAL
BPU ID: NORMAL
BUN SERPL-MCNC: 31 MG/DL (ref 7–18)
BUN/CREAT SERPL: 5 (ref 12–20)
CALCIUM SERPL-MCNC: 10.3 MG/DL (ref 8.5–10.1)
CALLED TO: NORMAL
CHLORIDE SERPL-SCNC: 101 MMOL/L (ref 100–111)
CO2 SERPL-SCNC: 26 MMOL/L (ref 21–32)
CREAT SERPL-MCNC: 5.69 MG/DL (ref 0.6–1.3)
CROSSMATCH RESULT: NORMAL
DIFFERENTIAL METHOD BLD: ABNORMAL
EOSINOPHIL # BLD: 0.3 K/UL (ref 0–0.4)
EOSINOPHIL NFR BLD: 3 % (ref 0–5)
ERYTHROCYTE [DISTWIDTH] IN BLOOD BY AUTOMATED COUNT: 18.6 % (ref 11.6–14.5)
GLOBULIN SER CALC-MCNC: 4.3 G/DL (ref 2–4)
GLUCOSE BLD STRIP.AUTO-MCNC: 82 MG/DL (ref 70–110)
GLUCOSE BLD STRIP.AUTO-MCNC: 95 MG/DL (ref 70–110)
GLUCOSE BLD STRIP.AUTO-MCNC: 99 MG/DL (ref 70–110)
GLUCOSE SERPL-MCNC: 84 MG/DL (ref 74–99)
HCT VFR BLD AUTO: 23.7 % (ref 35–45)
HGB BLD-MCNC: 7.5 G/DL (ref 12–16)
IMM GRANULOCYTES # BLD AUTO: 0 K/UL (ref 0–0.04)
IMM GRANULOCYTES NFR BLD AUTO: 0 % (ref 0–0.5)
LYMPHOCYTES # BLD: 1.5 K/UL (ref 0.9–3.6)
LYMPHOCYTES NFR BLD: 17 % (ref 21–52)
MCH RBC QN AUTO: 25.5 PG (ref 24–34)
MCHC RBC AUTO-ENTMCNC: 31.6 G/DL (ref 31–37)
MCV RBC AUTO: 80.6 FL (ref 78–100)
MONOCYTES # BLD: 1.1 K/UL (ref 0.05–1.2)
MONOCYTES NFR BLD: 12 % (ref 3–10)
NEUTS SEG # BLD: 5.8 K/UL (ref 1.8–8)
NEUTS SEG NFR BLD: 67 % (ref 40–73)
NRBC # BLD: 0 K/UL (ref 0–0.01)
NRBC BLD-RTO: 0 PER 100 WBC
PLATELET # BLD AUTO: 373 K/UL (ref 135–420)
PMV BLD AUTO: 9.3 FL (ref 9.2–11.8)
POTASSIUM SERPL-SCNC: 4.4 MMOL/L (ref 3.5–5.5)
PROT SERPL-MCNC: 6.3 G/DL (ref 6.4–8.2)
RBC # BLD AUTO: 2.94 M/UL (ref 4.2–5.3)
SODIUM SERPL-SCNC: 137 MMOL/L (ref 136–145)
SPECIMEN EXP DATE BLD: NORMAL
UNIT DIVISION: 0
UNIT ISSUE DATE/TIME: NORMAL
VANCOMYCIN SERPL-MCNC: 24.9 UG/ML (ref 5–40)
WBC # BLD AUTO: 8.7 K/UL (ref 4.6–13.2)

## 2024-07-13 PROCEDURE — 6370000000 HC RX 637 (ALT 250 FOR IP): Performed by: PHYSICIAN ASSISTANT

## 2024-07-13 PROCEDURE — 6360000002 HC RX W HCPCS: Performed by: EMERGENCY MEDICINE

## 2024-07-13 PROCEDURE — 6370000000 HC RX 637 (ALT 250 FOR IP): Performed by: INTERNAL MEDICINE

## 2024-07-13 PROCEDURE — 80053 COMPREHEN METABOLIC PANEL: CPT

## 2024-07-13 PROCEDURE — 97535 SELF CARE MNGMENT TRAINING: CPT

## 2024-07-13 PROCEDURE — 2580000003 HC RX 258: Performed by: PHYSICIAN ASSISTANT

## 2024-07-13 PROCEDURE — 82962 GLUCOSE BLOOD TEST: CPT

## 2024-07-13 PROCEDURE — 1100000000 HC RM PRIVATE

## 2024-07-13 PROCEDURE — 80202 ASSAY OF VANCOMYCIN: CPT

## 2024-07-13 PROCEDURE — 99232 SBSQ HOSP IP/OBS MODERATE 35: CPT | Performed by: INTERNAL MEDICINE

## 2024-07-13 PROCEDURE — 2580000003 HC RX 258: Performed by: INTERNAL MEDICINE

## 2024-07-13 PROCEDURE — 6360000002 HC RX W HCPCS: Performed by: INTERNAL MEDICINE

## 2024-07-13 PROCEDURE — 94761 N-INVAS EAR/PLS OXIMETRY MLT: CPT

## 2024-07-13 PROCEDURE — 36415 COLL VENOUS BLD VENIPUNCTURE: CPT

## 2024-07-13 PROCEDURE — 90935 HEMODIALYSIS ONE EVALUATION: CPT

## 2024-07-13 PROCEDURE — 97110 THERAPEUTIC EXERCISES: CPT

## 2024-07-13 PROCEDURE — 85025 COMPLETE CBC W/AUTO DIFF WBC: CPT

## 2024-07-13 RX ADMIN — INSULIN GLARGINE 10 UNITS: 100 INJECTION, SOLUTION SUBCUTANEOUS at 21:08

## 2024-07-13 RX ADMIN — PIPERACILLIN AND TAZOBACTAM 3375 MG: 3; .375 INJECTION, POWDER, FOR SOLUTION INTRAVENOUS at 18:54

## 2024-07-13 RX ADMIN — SODIUM CHLORIDE, PRESERVATIVE FREE 10 ML: 5 INJECTION INTRAVENOUS at 21:04

## 2024-07-13 RX ADMIN — PANTOPRAZOLE SODIUM 40 MG: 40 TABLET, DELAYED RELEASE ORAL at 06:25

## 2024-07-13 RX ADMIN — MICONAZOLE NITRATE 1 APPLICATOR: 20 CREAM VAGINAL at 21:04

## 2024-07-13 RX ADMIN — LATANOPROST 1 DROP: 50 SOLUTION/ DROPS OPHTHALMIC at 21:05

## 2024-07-13 RX ADMIN — DOCUSATE SODIUM 50 MG: 50 LIQUID ORAL at 21:00

## 2024-07-13 RX ADMIN — HEPARIN SODIUM 5000 UNITS: 5000 INJECTION INTRAVENOUS; SUBCUTANEOUS at 06:23

## 2024-07-13 RX ADMIN — TIMOLOL MALEATE 1 DROP: 5 SOLUTION OPHTHALMIC at 21:05

## 2024-07-13 RX ADMIN — SACUBITRIL AND VALSARTAN 1 TABLET: 24; 26 TABLET, FILM COATED ORAL at 21:00

## 2024-07-13 RX ADMIN — HEPARIN SODIUM 5000 UNITS: 5000 INJECTION INTRAVENOUS; SUBCUTANEOUS at 22:13

## 2024-07-13 RX ADMIN — OXYCODONE AND ACETAMINOPHEN 1 TABLET: 7.5; 325 TABLET ORAL at 10:35

## 2024-07-13 RX ADMIN — EPOETIN ALFA 10000 UNITS: 10000 SOLUTION INTRAVENOUS; SUBCUTANEOUS at 22:15

## 2024-07-13 RX ADMIN — SODIUM CHLORIDE, PRESERVATIVE FREE 10 ML: 5 INJECTION INTRAVENOUS at 18:56

## 2024-07-13 ASSESSMENT — PAIN DESCRIPTION - PAIN TYPE: TYPE: SURGICAL PAIN

## 2024-07-13 ASSESSMENT — PAIN SCALES - GENERAL
PAINLEVEL_OUTOF10: 0
PAINLEVEL_OUTOF10: 5
PAINLEVEL_OUTOF10: 0

## 2024-07-13 ASSESSMENT — PAIN DESCRIPTION - ONSET: ONSET: GRADUAL

## 2024-07-13 ASSESSMENT — PAIN DESCRIPTION - DIRECTION: RADIATING_TOWARDS: NO

## 2024-07-13 ASSESSMENT — PAIN SCALES - WONG BAKER
WONGBAKER_NUMERICALRESPONSE: NO HURT
WONGBAKER_NUMERICALRESPONSE: NO HURT

## 2024-07-13 ASSESSMENT — PAIN DESCRIPTION - LOCATION: LOCATION: FOOT

## 2024-07-13 ASSESSMENT — PAIN - FUNCTIONAL ASSESSMENT: PAIN_FUNCTIONAL_ASSESSMENT: ACTIVITIES ARE NOT PREVENTED

## 2024-07-13 ASSESSMENT — PAIN DESCRIPTION - DESCRIPTORS: DESCRIPTORS: ACHING

## 2024-07-13 ASSESSMENT — PAIN DESCRIPTION - ORIENTATION: ORIENTATION: RIGHT

## 2024-07-13 NOTE — PLAN OF CARE
Problem: Occupational Therapy - Adult  Goal: By Discharge: Performs self-care activities at highest level of function for planned discharge setting.  See evaluation for individualized goals.  Description: Occupational Therapy Goals:  Initiated 6/28/2024 to be met within 7-10 days.  Re-eval completed 6/9/24, pt making consistent progress. Goals continue to be appropriate.     1.  Patient will perform bathing with supervision/set-up.   2.  Patient will perform lower body dressing with supervision/set-up.  3.  Patient will perform BSC transfers with supervision/set-up using LRAD (sliding board vs FWW).  4.  Patient will perform all aspects of toileting with supervision/set-up.  5.  Patient will participate in upper extremity therapeutic exercise/activities with supervision/set-up for 8 minutes to improve endurance and UB strength needed for ADLs    6.  Patient will utilize energy conservation techniques during functional activities with verbal cues.    PLOF: Pt lives with spouse in 1SH with ramp. Pt is independent with ADLs and functional mobility. Pt's spouse is disabled and has PCA assisting him with ADLs.  Outcome: Progressing   OCCUPATIONAL THERAPY TREATMENT    Patient: Jigna Conner (69 y.o. female)  Date: 7/13/2024  Diagnosis: Hypokalemia [E87.6]  Hyperglycemia [R73.9]  ESRD (end stage renal disease) (HCC) [N18.6]  Diabetic foot infection (HCC) [E11.628, L08.9]  Anemia, unspecified type [D64.9] Diabetic foot infection (HCC)  Procedure(s) (LRB):  RIGHT FOOT IRRIGATION AND DEBRIDEMENT, REMOVAL OF INFECTED/NON-VIABLE TISSUE, WOUND VAC PLACEMENT (Right) 10 Days Post-Op  Precautions: Fall Risk, Aspiration Risk, Weight Bearing, Right Lower Extremity Weight Bearing: Non Weight Bearing (R forefoot),    Chart, occupational therapy assessment, plan of care, and goals were reviewed.  ASSESSMENT:  Pt cleared for OT session. Pt self feeding at bed level upon OT arrival.  Pt stated  she worked out a lot with PT

## 2024-07-13 NOTE — PROGRESS NOTES
Podiatry Progress Note    Patient: Jigna Connre               Sex: female          DOA: [unfilled]       YOB: 1955      Age:  69 y.o.        LOS:  LOS: 18 days     POD 10 Days Post-Op  Procedure:   Procedure(s):  RIGHT FOOT IRRIGATION AND DEBRIDEMENT, REMOVAL OF INFECTED/NON-VIABLE TISSUE, WOUND VAC PLACEMENT      Subjective:     Patient seen resting quiet and comfortably and no apparent distress. Patient has intact dressings to right foot. Her third toe is turning darker. Denies any pedal pain. Denies N/V/C/F.       Objective:       Physical Exam:  Right medial foot wound and dorsal foot wound with antibiotic beads. Fibrotic slough noted to medial first metatarsal head. Also dusky appearance of third toe with early gangrenous changes.     Assessment:     Principal Problem:    Diabetic foot infection (HCC)  Active Problems:    Hypertension    Anemia of renal disease    ESRD on dialysis (HCC)    Diabetes mellitus (HCC)    Hypokalemia    Chronic heart failure with preserved ejection fraction (HFpEF) (HCC)    Coronary artery disease involving native coronary artery of native heart without angina pectoris    History of MI (myocardial infarction)    Class 2 obesity in adult    Constipation  Resolved Problems:    * No resolved hospital problems. *      Plan/Recommendations/Medical Decision Making:     - Patient will need repeat debridement with amputation of third toe. It can be arranged outpatient if needed. However if patient is staying here till Monday then plan to do procedure on Monday.   - Continue antibiotics per ID recommendation.   - Remain NWB to right forefoot.  - Medical management per primary team.

## 2024-07-13 NOTE — DIALYSIS
HD Care plan  Time: 3 hrs  Dialysate: 2 K+  2.5 Ca++  Bath  Net UF: 2 L  Access: Aseptic care for ARELY  AVF  Hemodynamic stability: Maintain BP WNL     Pre Dialysis:  Patient received from Cande Deng RN. Patient arrived on a bed, A+O X 4, on RA,  no s/s of acute distress noted. ARELY AVF assessed, no abnormalities noted, bruit and thrill strong. AVF accessed using 15G needles without any difficulty. Good flow achieved from both needles.    Intra Dialysis:  Time out / safety process performed per policy. Tx initiated at 1515.    AVF flowing with ease. For hemodynamic stability UF goal set at 2000 ml as tolerated.  Pt offered assistance with repositioning every 2 hours/prn    Vascular access visible and line connections remained intact throughout entire duration of treatment.   Vital signs checked every 15 mins. WNL  1715: An hour to completion of full treatment ,  abruptly very high to 400, blood noted to have clotted in the venous chamber, , clots expelled from the venous needle, unable to flush , Dr Vargas updated , said it was ok to disc tx at this time., pt will benefit with heparin use during HD  Unable to administer due dose of vancomycin, primary nurse updated     Post Dialysis:  Tx Discontinued at 1715,   Tolerated well despite the clotting, 1.1 L  removed. De-accessed per protocol.    Clot time 8 minutes for arterial, and 8 minutes for venous.   Dry dressings applied. Bruit/Thrill present above and below dressings.  Post Dialysis report given to LUCERO Castellon

## 2024-07-13 NOTE — PROGRESS NOTES
Patient is alert and oriented times 4, able to communicate needs. No changes during this shift, patient tolerate medications and care well. No distress or discomfort.

## 2024-07-13 NOTE — PROGRESS NOTES
Medicine Progress Note    Patient: Jigna Conner   Age:  69 y.o.  DOA: 6/25/2024   Admit Dx / CC: Hypokalemia [E87.6]  Hyperglycemia [R73.9]  ESRD (end stage renal disease) (HCC) [N18.6]  Diabetic foot infection (HCC) [E11.628, L08.9]  Anemia, unspecified type [D64.9]  LOS:  LOS: 18 days     Assessment/Plan   Principal Problem:    Diabetic foot infection (HCC)  Active Problems:    Anemia of renal disease    Hypokalemia    Hypertension    ESRD on dialysis (HCC)    Diabetes mellitus (HCC)    Chronic heart failure with preserved ejection fraction (HFpEF) (HCC)    Coronary artery disease involving native coronary artery of native heart without angina pectoris    History of MI (myocardial infarction)    Class 2 obesity in adult    Constipation  Resolved Problems:    * No resolved hospital problems. *      Additional Plan notes     Diabetic Foot Wound with Osteomyelitis of the Right Foot  IV Vancomycin, IV Piperacillin-Tazobactam (a.k.a. Zosyn).  See Infectious Disease Note for Discharge follow-up and antibiotic regimen.  See Podiatric Note for Discharge follow-up and instructions.    End-Stage Renal Disease (a.k.a. ESRD) on Hemodialysis (a.k.a. HD)  Nephrologist is following.    Peripheral Arterial Disease (a.k.a. PAD)  Continue Aspirin.    Diabetes Mellitus Type 2  POC Glucose checks qACHS with SSI coverage.    DISPO   -Pt unsafe for discharge at this time for reasons addressed above, continued hospitalization for ongoing assessment and treatment indicated      Anticipated Date of Discharge: PENDING authorization  Anticipated Disposition (home, SNF): SNF    Subjective:     Patient expresses a desire to be discharged to [facility] rather than [place].  Current plan by Podiatric services does not include Wound Vacuum.    Objective:   Visit Vitals  BP (!) 155/62   Pulse 68   Temp 98 °F (36.7 °C) (Oral)   Resp 18   Ht 1.624 m (5' 3.94\")   Wt 99 kg (218 lb 4.1 oz)   SpO2 99%   BMI 37.54 kg/m²       Physical  dextrose 5 % solution   IntraVENous Continuous    heparin (porcine) injection 5,000 Units  5,000 Units SubCUTAneous 3 times per day    lactobacillus (CULTURELLE) capsule 1 capsule  1 capsule Oral Daily with breakfast    oxyCODONE-acetaminophen (PERCOCET) 7.5-325 MG per tablet 1 tablet  1 tablet Oral Q6H PRN    naloxone (NARCAN) injection 0.4 mg  0.4 mg IntraVENous PRN    ipratropium 0.5 mg-albuterol 2.5 mg (DUONEB) nebulizer solution 1 Dose  1 Dose Inhalation Q4H PRN    melatonin disintegrating tablet 5 mg  5 mg Oral Nightly PRN    docusate (COLACE) 50 MG/5ML liquid 50 mg  50 mg Oral BID    bisacodyl (DULCOLAX) suppository 10 mg  10 mg Rectal Daily PRN    senna (SENOKOT) tablet 8.6 mg  1 tablet Oral Nightly PRN    lactulose (CHRONULAC) 10 GM/15ML solution 30 g  30 g Oral Daily PRN    0.9 % sodium chloride infusion   IntraVENous PRN    piperacillin-tazobactam (ZOSYN) 3,375 mg in sodium chloride 0.9 % 50 mL IVPB (mini-bag)  3,375 mg IntraVENous Q12H    aspirin chewable tablet 81 mg  81 mg Oral Daily    atorvastatin (LIPITOR) tablet 10 mg  10 mg Oral Daily    [Held by provider] carvedilol (COREG) tablet 3.125 mg  3.125 mg Oral BID WC    [Held by provider] dorzolamide (TRUSOPT) 2 % ophthalmic solution 1 drop  1 drop Ophthalmic BID    insulin glargine (LANTUS) injection vial 10 Units  10 Units SubCUTAneous Nightly    latanoprost (XALATAN) 0.005 % ophthalmic solution 1 drop  1 drop Both Eyes Nightly    pantoprazole (PROTONIX) tablet 40 mg  40 mg Oral QAM AC    sacubitril-valsartan (ENTRESTO) 24-26 MG per tablet 1 tablet  1 tablet Oral BID    timolol (TIMOPTIC) 0.5 % ophthalmic solution 1 drop  1 drop Both Eyes BID    HYDROmorphone (DILAUDID) injection 0.25 mg  0.25 mg IntraVENous Q4H PRN    vancomycin (VANCOCIN) intermittent dosing (placeholder)   Other RX Placeholder    sodium chloride flush 0.9 % injection 5-40 mL  5-40 mL IntraVENous 2 times per day    sodium chloride flush 0.9 % injection 5-40 mL  5-40 mL IntraVENous

## 2024-07-13 NOTE — PROGRESS NOTES
[facility]  Progress Note    Patient: Jigna Conner Age: 69 y.o. : 1955 MR#: [id number] SSN: [id number]  Date/Time: 2024     Subjective:   Patient doing well.  Has no pain complaints.  Ambulated today and is eating and drinking well and having regular bowel movements.  Per podiatry patient likely needs to return to the OR on Monday for possible third toe amputation    Review of systems  General: No fevers or chills.  Cardiovascular: No chest pain or pressure. No palpitations.   Pulmonary: No shortness of breath, cough or wheeze.   Gastrointestinal: No abdominal pain, nausea, vomiting or diarrhea.   Genitourinary: No urinary frequency, urgency, hesitancy or dysuria.   Musculoskeletal: No joint or muscle pain, no back pain, no recent trauma.    Neurologic: No headache, numbness, tingling or weakness.   Assessment/Plan:   Diabetic foot wound with osteomyelitis of the right foot  ESRD on hemodialysis  PAD  Type 2 diabetes  Chronic HFpEF    Patient admitted to  S.  Osteomyelitis of the right foot,  wound culture with mixed marimar including gram-negative rods  Status post I&D right foot abscess and second metatarsal debridement on 2024  Per podiatry patient is to return to the OR on 7/15/2024 for possible third toe amputation  Patient currently on Zosyn and vancomycin per infectious disease  Wound VAC has been discontinued  Podiatry following  Continue aspirin for PAD  POCT glucose checks with sliding scale insulin coverage  Per nephrology Epogen and Renvela      I spent 40 minutes with the patient in face-to-face consultation, of which greater than 50% was spent in counseling and coordination of care as described above.    Case discussed with:  [x]Patient  []Family  []Nursing  []Case Management  DVT Prophylaxis:  []Lovenox  [x]Hep SQ  []SCDs  []Coumadin   []Eliquis/Xarelto     Objective:   VS: BP (!) 179/69   Pulse 64   Temp 98 °F (36.7 °C) (Oral)   vial 0-4 Units  0-4 Units SubCUTAneous Nightly    glucose chewable tablet 16 g  4 tablet Oral PRN    dextrose bolus 10% 125 mL  125 mL IntraVENous PRN    Or    dextrose bolus 10% 250 mL  250 mL IntraVENous PRN    glucagon injection 1 mg  1 mg SubCUTAneous PRN    dextrose 10 % infusion   IntraVENous Continuous PRN       Labs:    Recent Results (from the past 24 hour(s))   POCT Glucose    Collection Time: 07/12/24 12:00 PM   Result Value Ref Range    POC Glucose 134 (H) 70 - 110 mg/dL   POCT Glucose    Collection Time: 07/12/24  3:39 PM   Result Value Ref Range    POC Glucose 123 (H) 70 - 110 mg/dL   POCT Glucose    Collection Time: 07/12/24  9:19 PM   Result Value Ref Range    POC Glucose 178 (H) 70 - 110 mg/dL   Vancomycin Level, Random    Collection Time: 07/13/24  2:10 AM   Result Value Ref Range    Vancomycin Rm 24.9 5.0 - 40.0 UG/ML   POCT Glucose    Collection Time: 07/13/24  6:22 AM   Result Value Ref Range    POC Glucose 82 70 - 110 mg/dL       Signed By: Gagan John DO     July 13, 2024      Disclaimer: Sections of this note are dictated using utilizing voice recognition software.  Minor typographical errors may be present. If questions arise, please do not hesitate to contact me or call our department.

## 2024-07-13 NOTE — PLAN OF CARE
Problem: Discharge Planning  Goal: Discharge to home or other facility with appropriate resources  7/13/2024 1008 by Cande Deng RN  Outcome: Progressing  Flowsheets (Taken 7/13/2024 0730)  Discharge to home or other facility with appropriate resources: Identify barriers to discharge with patient and caregiver  7/12/2024 2244 by Shirley Masters RN  Outcome: Progressing     Problem: Pain  Goal: Verbalizes/displays adequate comfort level or baseline comfort level  7/13/2024 1008 by Cande Deng RN  Outcome: Progressing  7/12/2024 2244 by Shirley Masters RN  Outcome: Progressing     Problem: ABCDS Injury Assessment  Goal: Absence of physical injury  7/13/2024 1008 by Cande Deng RN  Outcome: Progressing  7/12/2024 2244 by Shirley Masters RN  Outcome: Progressing     Problem: Safety - Adult  Goal: Free from fall injury  7/13/2024 1008 by Cande Deng RN  Outcome: Progressing  7/12/2024 2244 by Shirley Masters RN  Outcome: Progressing

## 2024-07-13 NOTE — PROGRESS NOTES
4 Eyes Skin Assessment     NAME:  Jigna Conner  YOB: 1955  MEDICAL RECORD NUMBER:  [id number]    The patient is being assessed for  Shift Handoff    I agree that at least one RN has performed a thorough Head to Toe Skin Assessment on the patient. ALL assessment sites listed below have been assessed.      Areas assessed by both nurses:    Head, Face, Ears, Shoulders, Back, Chest, Arms, Elbows, Hands, Sacrum. Buttock, Coccyx, Ischium, Legs. Feet and Heels, and Under Medical Devices         Does the Patient have a Wound? Yes wound(s) were present on assessment. LDA wound assessment was Initiated and completed by RN       Vinay Prevention initiated by RN: Yes  Wound Care Orders initiated by RN: Yes    Pressure Injury (Stage 3,4, Unstageable, DTI, NWPT, and Complex wounds) if present, place Wound referral order by RN under : Yes    New Ostomies, if present place, Ostomy referral order under : Yes     Nurse 1 eSignature: Electronically signed by Shirley Masters RN on 7/13/24 at 7:34 AM EDT    **SHARE this note so that the co-signing nurse can place an eSignature**    Nurse 2 eSignature: {Esignature:[id number]}

## 2024-07-13 NOTE — PLAN OF CARE
Problem: Chronic Conditions and Co-morbidities  Goal: Patient's chronic conditions and co-morbidity symptoms are monitored and maintained or improved  7/13/2024 1650 by Kerry Alejo, RN  Outcome: Progressing    HEMODIALYSIS Plan:  Time: 3 hrs  Dialysate: 2 K+  2.5Ca++  Bath  Net UF: 2 L As tolerated  Access: Aseptic care for ARELY AVF  Hemodynamic stability: Maintain BP WNL

## 2024-07-13 NOTE — PLAN OF CARE
PHYSICAL THERAPY TREATMENT    Patient: Jigna Conner (69 y.o. female)  Date: 7/13/2024  Diagnosis: Hypokalemia [E87.6]  Hyperglycemia [R73.9]  ESRD (end stage renal disease) (HCC) [N18.6]  Diabetic foot infection (HCC) [E11.628, L08.9]  Anemia, unspecified type [D64.9] Diabetic foot infection (HCC)  Procedure(s) (LRB):  RIGHT FOOT IRRIGATION AND DEBRIDEMENT, REMOVAL OF INFECTED/NON-VIABLE TISSUE, WOUND VAC PLACEMENT (Right) 10 Days Post-Op  Precautions: Fall Risk, Aspiration Risk, Weight Bearing, Right Lower Extremity Weight Bearing: Non Weight Bearing (R forefoot),  ,  ,  ,  ,  ,      ASSESSMENT: Pt in long sitting position in bed upon arrival. Cleared pt with nursing staff prior to treatment. Pt transferred to EOB from long sitting position in bed with SUP. Pt STS from EOB > RW with CGA. Pt ambulated approx 12 ft x 2 using RW with CGA, with pt maintaining precautions with use of offloading boot. Pt given 1 seated rest break during gait training for energy conservation. Pt returned to EOB following gait training. Pt performed well with seated exercises listed below. Pt transferred sit > supine with SUP. Pt remained in supine position in bed with call bell and basic needs met.       Progression toward goals:   [x]      Improving appropriately and progressing toward goals  []      Improving slowly and progressing toward goals  []      Not making progress toward goals and plan of care will be adjusted     PLAN:  Patient continues to benefit from skilled intervention to address the above impairments.  Continue treatment per established plan of care.    Further Equipment Recommendations for Discharge: rolling walker    [facility]: AM-PAC Inpatient Mobility Raw Score : 17      Current research shows that an AM-PAC score of 17 (13 without stairs) or less is not associated with a discharge to the patient's home setting.    This AMPAC score should be considered in conjunction with interdisciplinary team recommendations to  apparent distress sitting up in chair  [x] Patient left in no apparent distress in bed  [x] Call bell left within reach  [] Nursing notified  [] Caregiver present  [] Bed alarm activated  [] SCDs applied      COMMUNICATION/EDUCATION:   Patient Education  Education Given To: Patient  Education Provided: Plan of Care;Precautions  Education Method: Verbal;Teach Back;Demonstration  Education Outcome: Verbalized understanding;Demonstrated understanding;Continued education needed      Ynes Lacey PTA  Minutes: 22

## 2024-07-13 NOTE — PROGRESS NOTES
[facility]   Pharmacy Pharmacokinetic Monitoring Service - Vancomycin    Indication: DM foot infection  Target Pre-Dialysis Concentration: 21-24 mg/L  Day of Therapy: 19  Additional Antimicrobials: pip-tazo    Pertinent Laboratory Values:   Wt Readings from Last 1 Encounters:   07/11/24 99 kg (218 lb 4.1 oz)     Temp Readings from Last 1 Encounters:   07/13/24 98 °F (36.7 °C) (Oral)     Recent Labs     07/11/24  0144   WBC 8.9     Pertinent Cultures:  Date Source Results   6/25 blood NGF   6/26 wound GPC, GPR   6/26 tissue Diphtheroids    MRSA Nasal Swab: NA    Assessment:  Date Current Dose Concentration (mg/L) Dialysis Session   6/25 2,000 mg x1 - no   6/26 - - no   6/27 - 27.6 yes   6/28 - - no   6/29  15.4 yes   6/30 - - no   7/1   no   7/2 750 mg x1 19.1 yes   7/3 - - no   7/4 750 mg x1 21.3 yes   7/5 - - no   7/6 750 mg x 1 20.4 yes   7/7 - - no   7/8 - - no   7/9 1,000 mg x1 16.6 yes   7/10 - - no   7/11 1,250 mg x1 17.5 yes   7/12 - - no   7/13 500 mg x1 24.9 yes     Plan:  ESRD on HD q TTS - dose by level  Dose: 500 mg over the last 30 min of HD  No level ordered at this time  Pharmacy will continue to monitor patient and adjust therapy as indicated    Thank you for the consult,  Gianni Wolfe RPH  7/13/2024

## 2024-07-13 NOTE — PLAN OF CARE
airway pressure, respiratory therapy assess nares and determine need for appliance change or resting period.  Outcome: Progressing     Problem: Occupational Therapy - Adult  Goal: By Discharge: Performs self-care activities at highest level of function for planned discharge setting.  See evaluation for individualized goals.  Description: Occupational Therapy Goals:  Initiated 6/28/2024 to be met within 7-10 days.  Re-eval completed 6/9/24, pt making consistent progress. Goals continue to be appropriate.     1.  Patient will perform bathing with supervision/set-up.   2.  Patient will perform lower body dressing with supervision/set-up.  3.  Patient will perform BSC transfers with supervision/set-up using LRAD (sliding board vs FWW).  4.  Patient will perform all aspects of toileting with supervision/set-up.  5.  Patient will participate in upper extremity therapeutic exercise/activities with supervision/set-up for 8 minutes to improve endurance and UB strength needed for ADLs    6.  Patient will utilize energy conservation techniques during functional activities with verbal cues.    PLOF: Pt lives with spouse in 1SH with ramp. Pt is independent with ADLs and functional mobility. Pt's spouse is disabled and has PCA assisting him with ADLs.  7/12/2024 1403 by Lia Domingo OTA  Outcome: Progressing     Problem: Cardiovascular - Adult  Goal: Maintains optimal cardiac output and hemodynamic stability  Outcome: Progressing  Goal: Absence of cardiac dysrhythmias or at baseline  Outcome: Progressing     Problem: Skin/Tissue Integrity - Adult  Goal: Skin integrity remains intact  Outcome: Progressing  Goal: Incisions, wounds, or drain sites healing without S/S of infection  Outcome: Progressing  Goal: Oral mucous membranes remain intact  Outcome: Progressing     Problem: Musculoskeletal - Adult  Goal: Return mobility to safest level of function  Outcome: Progressing  Goal: Maintain proper alignment of affected body  part  Outcome: Progressing  Goal: Return ADL status to a safe level of function  Outcome: Progressing     Problem: Gastrointestinal - Adult  Goal: Minimal or absence of nausea and vomiting  Outcome: Progressing  Goal: Maintains or returns to baseline bowel function  Outcome: Progressing  Goal: Maintains adequate nutritional intake  Outcome: Progressing  Goal: Establish and maintain optimal ostomy function  Outcome: Progressing     Problem: Genitourinary - Adult  Goal: Absence of urinary retention  Outcome: Progressing  Goal: Urinary catheter remains patent  Outcome: Progressing     Problem: Infection - Adult  Goal: Absence of infection at discharge  Outcome: Progressing  Goal: Absence of infection during hospitalization  Outcome: Progressing  Goal: Absence of fever/infection during anticipated neutropenic period  Outcome: Progressing     Problem: Hematologic - Adult  Goal: Maintains hematologic stability  Outcome: Progressing     Problem: Nutrition Deficit:  Goal: Optimize nutritional status  Outcome: Progressing

## 2024-07-14 ENCOUNTER — APPOINTMENT (OUTPATIENT)
Facility: HOSPITAL | Age: 69
End: 2024-07-14
Payer: MEDICARE

## 2024-07-14 LAB
GLUCOSE BLD STRIP.AUTO-MCNC: 107 MG/DL (ref 70–110)
GLUCOSE BLD STRIP.AUTO-MCNC: 117 MG/DL (ref 70–110)
GLUCOSE BLD STRIP.AUTO-MCNC: 120 MG/DL (ref 70–110)
GLUCOSE BLD STRIP.AUTO-MCNC: 153 MG/DL (ref 70–110)
GLUCOSE BLD STRIP.AUTO-MCNC: 83 MG/DL (ref 70–110)

## 2024-07-14 PROCEDURE — 1100000000 HC RM PRIVATE

## 2024-07-14 PROCEDURE — 6360000002 HC RX W HCPCS: Performed by: INTERNAL MEDICINE

## 2024-07-14 PROCEDURE — 6370000000 HC RX 637 (ALT 250 FOR IP): Performed by: INTERNAL MEDICINE

## 2024-07-14 PROCEDURE — 2580000003 HC RX 258: Performed by: STUDENT IN AN ORGANIZED HEALTH CARE EDUCATION/TRAINING PROGRAM

## 2024-07-14 PROCEDURE — 2580000003 HC RX 258: Performed by: PHYSICIAN ASSISTANT

## 2024-07-14 PROCEDURE — 99232 SBSQ HOSP IP/OBS MODERATE 35: CPT | Performed by: INTERNAL MEDICINE

## 2024-07-14 PROCEDURE — 6370000000 HC RX 637 (ALT 250 FOR IP): Performed by: EMERGENCY MEDICINE

## 2024-07-14 PROCEDURE — 6360000002 HC RX W HCPCS: Performed by: EMERGENCY MEDICINE

## 2024-07-14 PROCEDURE — 82962 GLUCOSE BLOOD TEST: CPT

## 2024-07-14 PROCEDURE — 6360000002 HC RX W HCPCS: Performed by: STUDENT IN AN ORGANIZED HEALTH CARE EDUCATION/TRAINING PROGRAM

## 2024-07-14 PROCEDURE — 6370000000 HC RX 637 (ALT 250 FOR IP): Performed by: PHYSICIAN ASSISTANT

## 2024-07-14 PROCEDURE — 2580000003 HC RX 258: Performed by: INTERNAL MEDICINE

## 2024-07-14 PROCEDURE — 73630 X-RAY EXAM OF FOOT: CPT

## 2024-07-14 RX ORDER — HEPARIN SODIUM 1000 [USP'U]/ML
1000 INJECTION, SOLUTION INTRAVENOUS; SUBCUTANEOUS ONCE
Status: DISCONTINUED | OUTPATIENT
Start: 2024-07-14 | End: 2024-07-18 | Stop reason: HOSPADM

## 2024-07-14 RX ADMIN — TIMOLOL MALEATE 1 DROP: 5 SOLUTION OPHTHALMIC at 21:24

## 2024-07-14 RX ADMIN — ATORVASTATIN CALCIUM 10 MG: 10 TABLET, FILM COATED ORAL at 08:42

## 2024-07-14 RX ADMIN — HEPARIN SODIUM 5000 UNITS: 5000 INJECTION INTRAVENOUS; SUBCUTANEOUS at 16:37

## 2024-07-14 RX ADMIN — LATANOPROST 1 DROP: 50 SOLUTION/ DROPS OPHTHALMIC at 21:23

## 2024-07-14 RX ADMIN — MICONAZOLE NITRATE 1 APPLICATOR: 20 CREAM VAGINAL at 21:25

## 2024-07-14 RX ADMIN — PIPERACILLIN AND TAZOBACTAM 3375 MG: 3; .375 INJECTION, POWDER, FOR SOLUTION INTRAVENOUS at 05:43

## 2024-07-14 RX ADMIN — SEVELAMER CARBONATE 800 MG: 800 TABLET, FILM COATED ORAL at 08:42

## 2024-07-14 RX ADMIN — SODIUM CHLORIDE, PRESERVATIVE FREE 10 ML: 5 INJECTION INTRAVENOUS at 08:42

## 2024-07-14 RX ADMIN — INSULIN GLARGINE 10 UNITS: 100 INJECTION, SOLUTION SUBCUTANEOUS at 21:18

## 2024-07-14 RX ADMIN — Medication 1 CAPSULE: at 08:42

## 2024-07-14 RX ADMIN — ASPIRIN 81 MG CHEWABLE TABLET 81 MG: 81 TABLET CHEWABLE at 08:42

## 2024-07-14 RX ADMIN — SACUBITRIL AND VALSARTAN 1 TABLET: 24; 26 TABLET, FILM COATED ORAL at 08:42

## 2024-07-14 RX ADMIN — VANCOMYCIN HYDROCHLORIDE 500 MG: 500 INJECTION, POWDER, LYOPHILIZED, FOR SOLUTION INTRAVENOUS at 07:36

## 2024-07-14 RX ADMIN — OXYCODONE AND ACETAMINOPHEN 1 TABLET: 7.5; 325 TABLET ORAL at 08:48

## 2024-07-14 RX ADMIN — CARVEDILOL 3.12 MG: 3.12 TABLET, FILM COATED ORAL at 16:35

## 2024-07-14 RX ADMIN — HEPARIN SODIUM 5000 UNITS: 5000 INJECTION INTRAVENOUS; SUBCUTANEOUS at 21:18

## 2024-07-14 RX ADMIN — SODIUM CHLORIDE, PRESERVATIVE FREE 10 ML: 5 INJECTION INTRAVENOUS at 21:19

## 2024-07-14 RX ADMIN — DOCUSATE SODIUM 50 MG: 50 LIQUID ORAL at 21:15

## 2024-07-14 RX ADMIN — SEVELAMER CARBONATE 800 MG: 800 TABLET, FILM COATED ORAL at 16:35

## 2024-07-14 RX ADMIN — DOCUSATE SODIUM 50 MG: 50 LIQUID ORAL at 08:42

## 2024-07-14 RX ADMIN — TIMOLOL MALEATE 1 DROP: 5 SOLUTION OPHTHALMIC at 08:45

## 2024-07-14 RX ADMIN — HEPARIN SODIUM 5000 UNITS: 5000 INJECTION INTRAVENOUS; SUBCUTANEOUS at 05:39

## 2024-07-14 RX ADMIN — CARVEDILOL 3.12 MG: 3.12 TABLET, FILM COATED ORAL at 05:39

## 2024-07-14 RX ADMIN — PIPERACILLIN AND TAZOBACTAM 3375 MG: 3; .375 INJECTION, POWDER, FOR SOLUTION INTRAVENOUS at 16:49

## 2024-07-14 RX ADMIN — SACUBITRIL AND VALSARTAN 1 TABLET: 24; 26 TABLET, FILM COATED ORAL at 21:15

## 2024-07-14 RX ADMIN — PANTOPRAZOLE SODIUM 40 MG: 40 TABLET, DELAYED RELEASE ORAL at 05:38

## 2024-07-14 ASSESSMENT — PAIN SCALES - GENERAL
PAINLEVEL_OUTOF10: 0
PAINLEVEL_OUTOF10: 4
PAINLEVEL_OUTOF10: 0
PAINLEVEL_OUTOF10: 0

## 2024-07-14 ASSESSMENT — PAIN DESCRIPTION - DESCRIPTORS: DESCRIPTORS: ACHING

## 2024-07-14 ASSESSMENT — PAIN DESCRIPTION - LOCATION: LOCATION: FOOT

## 2024-07-14 ASSESSMENT — PAIN SCALES - WONG BAKER
WONGBAKER_NUMERICALRESPONSE: NO HURT

## 2024-07-14 ASSESSMENT — PAIN DESCRIPTION - ORIENTATION: ORIENTATION: RIGHT

## 2024-07-14 NOTE — PROGRESS NOTES
Progress Note    68-year-old female with past medical history of ESRD, diabetes, heart failure with preserved ejection fraction admitted for diabetic foot infection, following for ESRD management.  Subjective      Overnight event noted  No complaints offered  No chest pain or sob reported      IMPRESSION:   ESRD, Tuesday Thursday Saturday,   Access left arm AV fistula  Right foot wound infection,  Anemia, received blood transfusion during this admission  Diabetes  Hypertension  Heart failure with preserved ejection fraction.   PLAN:   Dialysis per schedule. Add heparin via circuit during next session to prevent clotting in circuit.  CM will fax the abx to her dialysis unit.  aware. Vancomycin 750 mg, cefepime 1 g with each session till 8/7.   Epogen for anemia.  Renvela for phos binding.   Adjust medication per ESRD status.  Check labs tomorrow in am  For debridement of foot tomorrow by podiatry per patient.       Current Facility-Administered Medications   Medication Dose Route Frequency    heparin (porcine) injection 1,000 Units  1,000 Units IntraVENous Once    [START ON 7/16/2024] epoetin laurence (EPOGEN;PROCRIT) injection 5,000 Units  5,000 Units SubCUTAneous Once per day on Tue Thu Sat    0.9 % sodium chloride infusion   IntraVENous PRN    miconazole (MICOTIN) 2 % vaginal cream 1 applicator  1 applicator Vaginal Nightly    sevelamer (RENVELA) tablet 800 mg  800 mg Oral TID WC    heparin (porcine) injection 5,000 Units  5,000 Units SubCUTAneous 3 times per day    lactobacillus (CULTURELLE) capsule 1 capsule  1 capsule Oral Daily with breakfast    oxyCODONE-acetaminophen (PERCOCET) 7.5-325 MG per tablet 1 tablet  1 tablet Oral Q6H PRN    naloxone (NARCAN) injection 0.4 mg  0.4 mg IntraVENous PRN    ipratropium 0.5 mg-albuterol 2.5 mg (DUONEB) nebulizer solution 1 Dose  1 Dose Inhalation Q4H PRN    melatonin disintegrating tablet 5 mg  5 mg Oral Nightly PRN    docusate (COLACE) 50 MG/5ML liquid 50 mg   thyromegaly  CVS: S1S2 heard,  no rub  RS: + air entry b/l,   Abd: Soft, Non tender, Not distended,   Neuro: non focal, awake, alert , CN II-XII are grossly intact  Extrm: R foot dressing  Skin: no visible  Rash  Musculoskeletal: right foot under dressing.     Procedures/imaging: see electronic medical records for all procedures, Xrays and details which were not copied into this note but were reviewed prior to creation of Plan          RIO HANCOCK MD  July 14, 2024  [facility]  Office [phone]

## 2024-07-14 NOTE — PROGRESS NOTES
Seen and examined for today   Vitals reviewed  Patient denies any compliant   Exam is essentially unchanged when compared to yesterday     Assessment :  Esrd  Diabetic foot  Hyperparathyroidism     Plan:  Couldn’t complete dialysis . Ran for 2 hrs due to access issues and pulling clots . Will try with heparin infusion during next scheduled dialysis . Otherwise continue with rest of treatment as usual

## 2024-07-14 NOTE — PROGRESS NOTES
[facility]   Pharmacy Pharmacokinetic Monitoring Service - Vancomycin    Indication: DM foot infection  Target Pre-Dialysis Concentration: 21-24 mg/L  Day of Therapy: 20  Additional Antimicrobials: pip-tazo    Pertinent Laboratory Values:   Wt Readings from Last 1 Encounters:   07/14/24 90.8 kg (200 lb 2.8 oz)     Temp Readings from Last 1 Encounters:   07/14/24 98.7 °F (37.1 °C) (Oral)     Recent Labs     07/13/24  1130   WBC 8.7     Pertinent Cultures:  Date Source Results   6/25 blood NGF   6/26 wound GPC, GPR   6/26 tissue Diphtheroids    MRSA Nasal Swab: NA    Assessment:  Date Current Dose Concentration (mg/L) Dialysis Session   6/25 2,000 mg x1 - no   6/26 - - no   6/27 - 27.6 yes   6/28 - - no   6/29 Not administered 15.4 yes   6/30 1,000 mg x1 - no   7/1 - - no   7/2 750 mg x1 19.1 yes   7/3 - - no   7/4 750 mg x1 21.3 yes   7/5 - - no   7/6 750 mg x 1 20.4 yes   7/7 - - no   7/8 - - no   7/9 1,000 mg x1 16.6 yes   7/10 - - no   7/11 1,250 mg x1 17.5 yes   7/12 - - no   7/13 500 mg x1 24.9 yes   7/14 - - no     Plan:  ESRD on HD q TTS - dose by level  No dose today  No level ordered at this time  Pharmacy will continue to monitor patient and adjust therapy as indicated    Thank you for the consult,  Gianni Wolfe [facility]  7/14/2024

## 2024-07-14 NOTE — PLAN OF CARE
Signed.  Problem: Discharge Planning  Goal: Discharge to home or other facility with appropriate resources  7/13/2024 2304 by Cecilia Walker RN  Outcome: Progressing  Flowsheets (Taken 7/13/2024 2100)  Discharge to home or other facility with appropriate resources: Identify barriers to discharge with patient and caregiver  7/13/2024 1008 by Cande Deng RN  Outcome: Progressing  Flowsheets (Taken 7/13/2024 0730)  Discharge to home or other facility with appropriate resources: Identify barriers to discharge with patient and caregiver     Problem: Pain  Goal: Verbalizes/displays adequate comfort level or baseline comfort level  7/13/2024 2304 by Cecilia Walker RN  Outcome: Progressing  7/13/2024 1008 by Cande Deng RN  Outcome: Progressing     Problem: Safety - Adult  Goal: Free from fall injury  7/13/2024 2304 by Cecilia Walker RN  Outcome: Progressing  7/13/2024 1008 by Cande Deng RN  Outcome: Progressing     Problem: Skin/Tissue Integrity  Goal: Absence of new skin breakdown  Description: 1.  Monitor for areas of redness and/or skin breakdown  2.  Assess vascular access sites hourly  3.  Every 4-6 hours minimum:  Change oxygen saturation probe site  4.  Every 4-6 hours:  If on nasal continuous positive airway pressure, respiratory therapy assess nares and determine need for appliance change or resting period.  7/13/2024 2304 by Cecilia Walker RN  Outcome: Progressing  7/13/2024 1008 by Cande Deng RN  Outcome: Progressing

## 2024-07-14 NOTE — PROGRESS NOTES
injection vial 0-4 Units  0-4 Units SubCUTAneous Nightly    glucose chewable tablet 16 g  4 tablet Oral PRN    dextrose bolus 10% 125 mL  125 mL IntraVENous PRN    Or    dextrose bolus 10% 250 mL  250 mL IntraVENous PRN    glucagon injection 1 mg  1 mg SubCUTAneous PRN    dextrose 10 % infusion   IntraVENous Continuous PRN       Labs:    Recent Results (from the past 24 hour(s))   Comprehensive Metabolic Panel    Collection Time: 07/13/24 11:30 AM   Result Value Ref Range    Sodium 137 136 - 145 mmol/L    Potassium 4.4 3.5 - 5.5 mmol/L    Chloride 101 100 - 111 mmol/L    CO2 26 21 - 32 mmol/L    Anion Gap 10 3.0 - 18 mmol/L    Glucose 84 74 - 99 mg/dL    BUN 31 (H) 7.0 - 18 MG/DL    Creatinine 5.69 (H) 0.6 - 1.3 MG/DL    BUN/Creatinine Ratio 5 (L) 12 - 20      Est, Glom Filt Rate 8 (L) >60 ml/min/1.73m2    Calcium 10.3 (H) 8.5 - 10.1 MG/DL    Total Bilirubin 0.3 0.2 - 1.0 MG/DL    ALT 11 (L) 13 - 56 U/L    AST 13 10 - 38 U/L    Alk Phosphatase 52 45 - 117 U/L    Total Protein 6.3 (L) 6.4 - 8.2 g/dL    Albumin 2.0 (L) 3.4 - 5.0 g/dL    Globulin 4.3 (H) 2.0 - 4.0 g/dL    Albumin/Globulin Ratio 0.5 (L) 0.8 - 1.7     CBC with Auto Differential    Collection Time: 07/13/24 11:30 AM   Result Value Ref Range    WBC 8.7 4.6 - 13.2 K/uL    RBC 2.94 (L) 4.20 - 5.30 M/uL    Hemoglobin 7.5 (L) 12.0 - 16.0 g/dL    Hematocrit 23.7 (L) 35.0 - 45.0 %    MCV 80.6 78.0 - 100.0 FL    MCH 25.5 24.0 - 34.0 PG    MCHC 31.6 31.0 - 37.0 g/dL    RDW 18.6 (H) 11.6 - 14.5 %    Platelets 373 135 - 420 K/uL    MPV 9.3 9.2 - 11.8 FL    Nucleated RBCs 0.0 0  WBC    nRBC 0.00 0.00 - 0.01 K/uL    Neutrophils % 67 40 - 73 %    Lymphocytes % 17 (L) 21 - 52 %    Monocytes % 12 (H) 3 - 10 %    Eosinophils % 3 0 - 5 %    Basophils % 1 0 - 2 %    Immature Granulocytes % 0 0.0 - 0.5 %    Neutrophils Absolute 5.8 1.8 - 8.0 K/UL    Lymphocytes Absolute 1.5 0.9 - 3.6 K/UL    Monocytes Absolute 1.1 0.05 - 1.2 K/UL    Eosinophils Absolute 0.3 0.0 - 0.4  K/UL    Basophils Absolute 0.0 0.0 - 0.1 K/UL    Immature Granulocytes Absolute 0.0 0.00 - 0.04 K/UL    Differential Type AUTOMATED     POCT Glucose    Collection Time: 07/13/24 11:43 AM   Result Value Ref Range    POC Glucose 99 70 - 110 mg/dL   POCT Glucose    Collection Time: 07/13/24  9:07 PM   Result Value Ref Range    POC Glucose 95 70 - 110 mg/dL   POCT Glucose    Collection Time: 07/14/24  1:14 AM   Result Value Ref Range    POC Glucose 107 70 - 110 mg/dL   POCT Glucose    Collection Time: 07/14/24  6:31 AM   Result Value Ref Range    POC Glucose 83 70 - 110 mg/dL       Signed By: Gagan John DO     July 14, 2024      Disclaimer: Sections of this note are dictated using utilizing voice recognition software.  Minor typographical errors may be present. If questions arise, please do not hesitate to contact me or call our department.

## 2024-07-15 ENCOUNTER — ANESTHESIA (OUTPATIENT)
Facility: HOSPITAL | Age: 69
End: 2024-07-15
Payer: MEDICARE

## 2024-07-15 LAB
ALBUMIN SERPL-MCNC: 2.2 G/DL (ref 3.4–5)
ALBUMIN/GLOB SERPL: 0.5 (ref 0.8–1.7)
ALP SERPL-CCNC: 52 U/L (ref 45–117)
ALT SERPL-CCNC: 10 U/L (ref 13–56)
ANION GAP SERPL CALC-SCNC: 7 MMOL/L (ref 3–18)
AST SERPL-CCNC: 11 U/L (ref 10–38)
BASOPHILS # BLD: 0.1 K/UL (ref 0–0.1)
BASOPHILS NFR BLD: 1 % (ref 0–2)
BILIRUB SERPL-MCNC: 0.3 MG/DL (ref 0.2–1)
BUN SERPL-MCNC: 30 MG/DL (ref 7–18)
BUN/CREAT SERPL: 5 (ref 12–20)
CALCIUM SERPL-MCNC: 10.4 MG/DL (ref 8.5–10.1)
CHLORIDE SERPL-SCNC: 103 MMOL/L (ref 100–111)
CO2 SERPL-SCNC: 27 MMOL/L (ref 21–32)
CREAT SERPL-MCNC: 5.57 MG/DL (ref 0.6–1.3)
DIFFERENTIAL METHOD BLD: ABNORMAL
EOSINOPHIL # BLD: 0.3 K/UL (ref 0–0.4)
EOSINOPHIL NFR BLD: 4 % (ref 0–5)
ERYTHROCYTE [DISTWIDTH] IN BLOOD BY AUTOMATED COUNT: 18.6 % (ref 11.6–14.5)
GLOBULIN SER CALC-MCNC: 4.4 G/DL (ref 2–4)
GLUCOSE BLD STRIP.AUTO-MCNC: 104 MG/DL (ref 70–110)
GLUCOSE BLD STRIP.AUTO-MCNC: 125 MG/DL (ref 70–110)
GLUCOSE BLD STRIP.AUTO-MCNC: 65 MG/DL (ref 70–110)
GLUCOSE BLD STRIP.AUTO-MCNC: 73 MG/DL (ref 70–110)
GLUCOSE BLD STRIP.AUTO-MCNC: 78 MG/DL (ref 70–110)
GLUCOSE BLD STRIP.AUTO-MCNC: 91 MG/DL (ref 70–110)
GLUCOSE BLD STRIP.AUTO-MCNC: 94 MG/DL (ref 70–110)
GLUCOSE SERPL-MCNC: 95 MG/DL (ref 74–99)
HCT VFR BLD AUTO: 26 % (ref 35–45)
HGB BLD-MCNC: 8 G/DL (ref 12–16)
IMM GRANULOCYTES # BLD AUTO: 0 K/UL (ref 0–0.04)
IMM GRANULOCYTES NFR BLD AUTO: 1 % (ref 0–0.5)
LYMPHOCYTES # BLD: 1.5 K/UL (ref 0.9–3.6)
LYMPHOCYTES NFR BLD: 17 % (ref 21–52)
MCH RBC QN AUTO: 25.3 PG (ref 24–34)
MCHC RBC AUTO-ENTMCNC: 30.8 G/DL (ref 31–37)
MCV RBC AUTO: 82.3 FL (ref 78–100)
MONOCYTES # BLD: 1 K/UL (ref 0.05–1.2)
MONOCYTES NFR BLD: 12 % (ref 3–10)
NEUTS SEG # BLD: 5.5 K/UL (ref 1.8–8)
NEUTS SEG NFR BLD: 65 % (ref 40–73)
NRBC # BLD: 0 K/UL (ref 0–0.01)
NRBC BLD-RTO: 0 PER 100 WBC
PLATELET # BLD AUTO: 357 K/UL (ref 135–420)
PMV BLD AUTO: 9.2 FL (ref 9.2–11.8)
POTASSIUM SERPL-SCNC: 4.4 MMOL/L (ref 3.5–5.5)
PROT SERPL-MCNC: 6.6 G/DL (ref 6.4–8.2)
RBC # BLD AUTO: 3.16 M/UL (ref 4.2–5.3)
SODIUM SERPL-SCNC: 137 MMOL/L (ref 136–145)
WBC # BLD AUTO: 8.5 K/UL (ref 4.6–13.2)

## 2024-07-15 PROCEDURE — 97116 GAIT TRAINING THERAPY: CPT

## 2024-07-15 PROCEDURE — 3700000001 HC ADD 15 MINUTES (ANESTHESIA): Performed by: PODIATRIST

## 2024-07-15 PROCEDURE — 88307 TISSUE EXAM BY PATHOLOGIST: CPT

## 2024-07-15 PROCEDURE — 2580000003 HC RX 258: Performed by: INTERNAL MEDICINE

## 2024-07-15 PROCEDURE — 6360000002 HC RX W HCPCS: Performed by: PODIATRIST

## 2024-07-15 PROCEDURE — 94761 N-INVAS EAR/PLS OXIMETRY MLT: CPT

## 2024-07-15 PROCEDURE — 87186 SC STD MICRODIL/AGAR DIL: CPT

## 2024-07-15 PROCEDURE — 2709999900 HC NON-CHARGEABLE SUPPLY: Performed by: PODIATRIST

## 2024-07-15 PROCEDURE — 6370000000 HC RX 637 (ALT 250 FOR IP): Performed by: NURSE ANESTHETIST, CERTIFIED REGISTERED

## 2024-07-15 PROCEDURE — 2500000003 HC RX 250 WO HCPCS: Performed by: PODIATRIST

## 2024-07-15 PROCEDURE — 2580000003 HC RX 258: Performed by: PHYSICIAN ASSISTANT

## 2024-07-15 PROCEDURE — 6360000002 HC RX W HCPCS: Performed by: EMERGENCY MEDICINE

## 2024-07-15 PROCEDURE — 3600000012 HC SURGERY LEVEL 2 ADDTL 15MIN: Performed by: PODIATRIST

## 2024-07-15 PROCEDURE — 2500000003 HC RX 250 WO HCPCS: Performed by: NURSE ANESTHETIST, CERTIFIED REGISTERED

## 2024-07-15 PROCEDURE — 87077 CULTURE AEROBIC IDENTIFY: CPT

## 2024-07-15 PROCEDURE — 6370000000 HC RX 637 (ALT 250 FOR IP): Performed by: PHYSICIAN ASSISTANT

## 2024-07-15 PROCEDURE — 85025 COMPLETE CBC W/AUTO DIFF WBC: CPT

## 2024-07-15 PROCEDURE — 88305 TISSUE EXAM BY PATHOLOGIST: CPT

## 2024-07-15 PROCEDURE — 6360000002 HC RX W HCPCS: Performed by: INTERNAL MEDICINE

## 2024-07-15 PROCEDURE — 88309 TISSUE EXAM BY PATHOLOGIST: CPT

## 2024-07-15 PROCEDURE — 6370000000 HC RX 637 (ALT 250 FOR IP): Performed by: INTERNAL MEDICINE

## 2024-07-15 PROCEDURE — 87075 CULTR BACTERIA EXCEPT BLOOD: CPT

## 2024-07-15 PROCEDURE — 87070 CULTURE OTHR SPECIMN AEROBIC: CPT

## 2024-07-15 PROCEDURE — 6370000000 HC RX 637 (ALT 250 FOR IP): Performed by: EMERGENCY MEDICINE

## 2024-07-15 PROCEDURE — 82962 GLUCOSE BLOOD TEST: CPT

## 2024-07-15 PROCEDURE — 36415 COLL VENOUS BLD VENIPUNCTURE: CPT

## 2024-07-15 PROCEDURE — 1100000000 HC RM PRIVATE

## 2024-07-15 PROCEDURE — 0Y6M0ZC DETACHMENT AT RIGHT FOOT, PARTIAL 3RD RAY, OPEN APPROACH: ICD-10-PCS | Performed by: PODIATRIST

## 2024-07-15 PROCEDURE — 80053 COMPREHEN METABOLIC PANEL: CPT

## 2024-07-15 PROCEDURE — 6360000002 HC RX W HCPCS: Performed by: NURSE ANESTHETIST, CERTIFIED REGISTERED

## 2024-07-15 PROCEDURE — 87205 SMEAR GRAM STAIN: CPT

## 2024-07-15 PROCEDURE — 99232 SBSQ HOSP IP/OBS MODERATE 35: CPT | Performed by: INTERNAL MEDICINE

## 2024-07-15 PROCEDURE — 7100000000 HC PACU RECOVERY - FIRST 15 MIN: Performed by: PODIATRIST

## 2024-07-15 PROCEDURE — 3600000002 HC SURGERY LEVEL 2 BASE: Performed by: PODIATRIST

## 2024-07-15 PROCEDURE — 7100000001 HC PACU RECOVERY - ADDTL 15 MIN: Performed by: PODIATRIST

## 2024-07-15 PROCEDURE — 3700000000 HC ANESTHESIA ATTENDED CARE: Performed by: PODIATRIST

## 2024-07-15 PROCEDURE — 88311 DECALCIFY TISSUE: CPT

## 2024-07-15 RX ORDER — PROPOFOL 10 MG/ML
INJECTION, EMULSION INTRAVENOUS PRN
Status: DISCONTINUED | OUTPATIENT
Start: 2024-07-15 | End: 2024-07-15 | Stop reason: SDUPTHER

## 2024-07-15 RX ORDER — SODIUM CHLORIDE 9 MG/ML
INJECTION, SOLUTION INTRAVENOUS CONTINUOUS
Status: DISCONTINUED | OUTPATIENT
Start: 2024-07-15 | End: 2024-07-15 | Stop reason: HOSPADM

## 2024-07-15 RX ORDER — LABETALOL 20 MG/4 ML (5 MG/ML) INTRAVENOUS SYRINGE
10
Status: DISCONTINUED | OUTPATIENT
Start: 2024-07-15 | End: 2024-07-15 | Stop reason: HOSPADM

## 2024-07-15 RX ORDER — SODIUM CHLORIDE 0.9 % (FLUSH) 0.9 %
5-40 SYRINGE (ML) INJECTION PRN
Status: DISCONTINUED | OUTPATIENT
Start: 2024-07-15 | End: 2024-07-15 | Stop reason: HOSPADM

## 2024-07-15 RX ORDER — FAMOTIDINE 20 MG/1
20 TABLET, FILM COATED ORAL ONCE
Status: COMPLETED | OUTPATIENT
Start: 2024-07-15 | End: 2024-07-15

## 2024-07-15 RX ORDER — MEPERIDINE HYDROCHLORIDE 25 MG/ML
12.5 INJECTION INTRAMUSCULAR; INTRAVENOUS; SUBCUTANEOUS EVERY 5 MIN PRN
Status: DISCONTINUED | OUTPATIENT
Start: 2024-07-15 | End: 2024-07-15 | Stop reason: HOSPADM

## 2024-07-15 RX ORDER — NALOXONE HYDROCHLORIDE 0.4 MG/ML
INJECTION, SOLUTION INTRAMUSCULAR; INTRAVENOUS; SUBCUTANEOUS PRN
Status: DISCONTINUED | OUTPATIENT
Start: 2024-07-15 | End: 2024-07-15 | Stop reason: HOSPADM

## 2024-07-15 RX ORDER — ONDANSETRON 2 MG/ML
4 INJECTION INTRAMUSCULAR; INTRAVENOUS
Status: DISCONTINUED | OUTPATIENT
Start: 2024-07-15 | End: 2024-07-15 | Stop reason: HOSPADM

## 2024-07-15 RX ORDER — ACETAMINOPHEN 325 MG/1
650 TABLET ORAL
Status: DISCONTINUED | OUTPATIENT
Start: 2024-07-15 | End: 2024-07-15 | Stop reason: HOSPADM

## 2024-07-15 RX ORDER — MIDAZOLAM HYDROCHLORIDE 1 MG/ML
INJECTION INTRAMUSCULAR; INTRAVENOUS PRN
Status: DISCONTINUED | OUTPATIENT
Start: 2024-07-15 | End: 2024-07-15 | Stop reason: SDUPTHER

## 2024-07-15 RX ORDER — SODIUM CHLORIDE 0.9 % (FLUSH) 0.9 %
5-40 SYRINGE (ML) INJECTION EVERY 12 HOURS SCHEDULED
Status: DISCONTINUED | OUTPATIENT
Start: 2024-07-15 | End: 2024-07-15 | Stop reason: HOSPADM

## 2024-07-15 RX ORDER — SODIUM CHLORIDE 9 MG/ML
INJECTION, SOLUTION INTRAVENOUS PRN
Status: DISCONTINUED | OUTPATIENT
Start: 2024-07-15 | End: 2024-07-15 | Stop reason: HOSPADM

## 2024-07-15 RX ORDER — HYDRALAZINE HYDROCHLORIDE 20 MG/ML
10 INJECTION INTRAMUSCULAR; INTRAVENOUS EVERY 6 HOURS PRN
Status: DISCONTINUED | OUTPATIENT
Start: 2024-07-15 | End: 2024-07-18 | Stop reason: HOSPADM

## 2024-07-15 RX ORDER — FENTANYL CITRATE 50 UG/ML
25 INJECTION, SOLUTION INTRAMUSCULAR; INTRAVENOUS EVERY 5 MIN PRN
Status: DISCONTINUED | OUTPATIENT
Start: 2024-07-15 | End: 2024-07-15 | Stop reason: HOSPADM

## 2024-07-15 RX ORDER — DIPHENHYDRAMINE HYDROCHLORIDE 50 MG/ML
12.5 INJECTION INTRAMUSCULAR; INTRAVENOUS
Status: DISCONTINUED | OUTPATIENT
Start: 2024-07-15 | End: 2024-07-15 | Stop reason: HOSPADM

## 2024-07-15 RX ORDER — LIDOCAINE HYDROCHLORIDE 20 MG/ML
INJECTION, SOLUTION EPIDURAL; INFILTRATION; INTRACAUDAL; PERINEURAL PRN
Status: DISCONTINUED | OUTPATIENT
Start: 2024-07-15 | End: 2024-07-15 | Stop reason: SDUPTHER

## 2024-07-15 RX ORDER — CARVEDILOL 6.25 MG/1
6.25 TABLET ORAL 2 TIMES DAILY WITH MEALS
Status: DISCONTINUED | OUTPATIENT
Start: 2024-07-15 | End: 2024-07-18 | Stop reason: HOSPADM

## 2024-07-15 RX ORDER — LIDOCAINE HYDROCHLORIDE 10 MG/ML
1 INJECTION, SOLUTION EPIDURAL; INFILTRATION; INTRACAUDAL; PERINEURAL
Status: DISCONTINUED | OUTPATIENT
Start: 2024-07-15 | End: 2024-07-15 | Stop reason: HOSPADM

## 2024-07-15 RX ORDER — DEXTROSE MONOHYDRATE 100 MG/ML
INJECTION, SOLUTION INTRAVENOUS CONTINUOUS PRN
Status: DISCONTINUED | OUTPATIENT
Start: 2024-07-15 | End: 2024-07-15 | Stop reason: HOSPADM

## 2024-07-15 RX ADMIN — OXYCODONE AND ACETAMINOPHEN 1 TABLET: 7.5; 325 TABLET ORAL at 22:58

## 2024-07-15 RX ADMIN — SACUBITRIL AND VALSARTAN 1 TABLET: 24; 26 TABLET, FILM COATED ORAL at 09:39

## 2024-07-15 RX ADMIN — MICONAZOLE NITRATE 1 APPLICATOR: 20 CREAM VAGINAL at 23:06

## 2024-07-15 RX ADMIN — PANTOPRAZOLE SODIUM 40 MG: 40 TABLET, DELAYED RELEASE ORAL at 05:55

## 2024-07-15 RX ADMIN — SEVELAMER CARBONATE 800 MG: 800 TABLET, FILM COATED ORAL at 19:03

## 2024-07-15 RX ADMIN — TIMOLOL MALEATE 1 DROP: 5 SOLUTION OPHTHALMIC at 23:04

## 2024-07-15 RX ADMIN — ASPIRIN 81 MG CHEWABLE TABLET 81 MG: 81 TABLET CHEWABLE at 09:38

## 2024-07-15 RX ADMIN — DEXTROSE MONOHYDRATE 125 ML: 100 INJECTION, SOLUTION INTRAVENOUS at 15:44

## 2024-07-15 RX ADMIN — PROPOFOL 50 MG: 10 INJECTION, EMULSION INTRAVENOUS at 17:19

## 2024-07-15 RX ADMIN — HYDRALAZINE HYDROCHLORIDE 10 MG: 20 INJECTION INTRAMUSCULAR; INTRAVENOUS at 15:44

## 2024-07-15 RX ADMIN — DOCUSATE SODIUM 50 MG: 50 LIQUID ORAL at 09:39

## 2024-07-15 RX ADMIN — SACUBITRIL AND VALSARTAN 1 TABLET: 49; 51 TABLET, FILM COATED ORAL at 22:45

## 2024-07-15 RX ADMIN — PIPERACILLIN AND TAZOBACTAM 3375 MG: 3; .375 INJECTION, POWDER, FOR SOLUTION INTRAVENOUS at 06:11

## 2024-07-15 RX ADMIN — SODIUM CHLORIDE: 9 INJECTION, SOLUTION INTRAVENOUS at 17:26

## 2024-07-15 RX ADMIN — Medication 1 CAPSULE: at 09:39

## 2024-07-15 RX ADMIN — HEPARIN SODIUM 5000 UNITS: 5000 INJECTION INTRAVENOUS; SUBCUTANEOUS at 22:46

## 2024-07-15 RX ADMIN — PIPERACILLIN AND TAZOBACTAM 3375 MG: 3; .375 INJECTION, POWDER, FOR SOLUTION INTRAVENOUS at 19:03

## 2024-07-15 RX ADMIN — PROPOFOL 50 MG: 10 INJECTION, EMULSION INTRAVENOUS at 17:00

## 2024-07-15 RX ADMIN — TIMOLOL MALEATE 1 DROP: 5 SOLUTION OPHTHALMIC at 09:54

## 2024-07-15 RX ADMIN — MIDAZOLAM 2 MG: 1 INJECTION, SOLUTION INTRAMUSCULAR; INTRAVENOUS at 16:46

## 2024-07-15 RX ADMIN — CARVEDILOL 3.12 MG: 3.12 TABLET, FILM COATED ORAL at 05:55

## 2024-07-15 RX ADMIN — LIDOCAINE HYDROCHLORIDE 100 MG: 20 INJECTION, SOLUTION EPIDURAL; INFILTRATION; INTRACAUDAL; PERINEURAL at 16:48

## 2024-07-15 RX ADMIN — LATANOPROST 1 DROP: 50 SOLUTION/ DROPS OPHTHALMIC at 23:39

## 2024-07-15 RX ADMIN — ATORVASTATIN CALCIUM 10 MG: 10 TABLET, FILM COATED ORAL at 09:38

## 2024-07-15 RX ADMIN — INSULIN GLARGINE 10 UNITS: 100 INJECTION, SOLUTION SUBCUTANEOUS at 23:02

## 2024-07-15 RX ADMIN — CARVEDILOL 6.25 MG: 6.25 TABLET, FILM COATED ORAL at 18:57

## 2024-07-15 RX ADMIN — PROPOFOL 50 MG: 10 INJECTION, EMULSION INTRAVENOUS at 17:12

## 2024-07-15 RX ADMIN — PROPOFOL 50 MG: 10 INJECTION, EMULSION INTRAVENOUS at 16:54

## 2024-07-15 RX ADMIN — DOCUSATE SODIUM 50 MG: 50 LIQUID ORAL at 22:45

## 2024-07-15 RX ADMIN — HEPARIN SODIUM 5000 UNITS: 5000 INJECTION INTRAVENOUS; SUBCUTANEOUS at 05:57

## 2024-07-15 RX ADMIN — PROPOFOL 50 MG: 10 INJECTION, EMULSION INTRAVENOUS at 16:48

## 2024-07-15 RX ADMIN — SODIUM CHLORIDE, PRESERVATIVE FREE 10 ML: 5 INJECTION INTRAVENOUS at 08:54

## 2024-07-15 RX ADMIN — FAMOTIDINE 20 MG: 20 TABLET ORAL at 16:28

## 2024-07-15 ASSESSMENT — PAIN DESCRIPTION - DESCRIPTORS: DESCRIPTORS: BURNING

## 2024-07-15 ASSESSMENT — PAIN SCALES - GENERAL
PAINLEVEL_OUTOF10: 0
PAINLEVEL_OUTOF10: 4

## 2024-07-15 ASSESSMENT — PAIN DESCRIPTION - FREQUENCY: FREQUENCY: INTERMITTENT

## 2024-07-15 ASSESSMENT — PAIN - FUNCTIONAL ASSESSMENT: PAIN_FUNCTIONAL_ASSESSMENT: ACTIVITIES ARE NOT PREVENTED

## 2024-07-15 ASSESSMENT — PAIN DESCRIPTION - ONSET: ONSET: GRADUAL

## 2024-07-15 ASSESSMENT — PAIN DESCRIPTION - PAIN TYPE: TYPE: SURGICAL PAIN

## 2024-07-15 ASSESSMENT — PAIN DESCRIPTION - ORIENTATION: ORIENTATION: RIGHT

## 2024-07-15 ASSESSMENT — PAIN DESCRIPTION - LOCATION: LOCATION: FOOT

## 2024-07-15 NOTE — PROGRESS NOTES
BG 73  8 ox orange juice given. Pt asymptomatic. Patient tolerating PO fluids.   Pt denies pain.    Pt reports feeling cold. Mist[employer] warming blanket placed for comfort.

## 2024-07-15 NOTE — PROGRESS NOTES
vial 0-4 Units  0-4 Units SubCUTAneous Nightly    glucose chewable tablet 16 g  4 tablet Oral PRN    dextrose bolus 10% 125 mL  125 mL IntraVENous PRN    Or    dextrose bolus 10% 250 mL  250 mL IntraVENous PRN    glucagon injection 1 mg  1 mg SubCUTAneous PRN    dextrose 10 % infusion   IntraVENous Continuous PRN       Review of Systems:      Complete 10-point review of systems were obtained and discussed in length  with the patient. Complete review of systems was negative/unremarkable  except as mentioned in HPI section.  Data Review:    Labs: Results:       Chemistry Recent Labs     07/13/24  1130 07/15/24  0508    137   K 4.4 4.4    103   CO2 26 27   BUN 31* 30*   GLOB 4.3* 4.4*        CBC w/Diff Recent Labs     07/13/24  1130 07/15/24  0508   WBC 8.7 8.5   RBC 2.94* 3.16*   HGB 7.5* 8.0*   HCT 23.7* 26.0*    357        Coagulation No results for input(s): \"INR\", \"APTT\" in the last 72 hours.    Invalid input(s): \"PTP\"      Iron/Ferritin No results for input(s): \"IRON\" in the last 72 hours.    Invalid input(s): \"TIBCCALC\"     BNP Invalid input(s): \"BNPP\"   Cardiac Enzymes No results for input(s): \"CPK\" in the last 72 hours.    Invalid input(s): \"CKRMB\", \"CKND1\", \"TROIP\", \"ANKIT\"   Liver Enzymes No results for input(s): \"TP\" in the last 72 hours.    Invalid input(s): \"ALB\", \"TBIL\", \"AP\", \"SGOT\", \"GPT\", \"DBIL\"   Thyroid Studies No results found for: \"T4\", \"T3RU\", \"TSH\"      EKG: normal sinus rhythm.    Physical Assessment:   Visit Vitals  BP (!) 188/63   Pulse 60   Temp 98.4 °F (36.9 °C) (Oral)   Resp 16   Ht 1.624 m (5' 3.94\")   Wt 91.6 kg (201 lb 15.1 oz)   SpO2 99%   BMI 34.73 kg/m²     Weight change: 0.8 kg (1 lb 12.2 oz)    Intake/Output Summary (Last 24 hours) at 7/15/2024 1154  Last data filed at 7/15/2024 0940  Gross per 24 hour   Intake 1330.06 ml   Output 1000 ml   Net 330.06 ml       Physical Exam:   General: comfortable, no acute distress   HEENT sclera anicteric, supple neck, no  thyromegaly  CVS: S1S2 heard,  no rub  RS: + air entry b/l,   Abd: Soft, Non tender, Not distended,   Neuro: non focal, awake, alert , CN II-XII are grossly intact  Extrm: R foot dressing  Skin: no visible  Rash  Musculoskeletal: right foot under dressing.     Procedures/imaging: see electronic medical records for all procedures, Xrays and details which were not copied into this note but were reviewed prior to creation of Plan          RIO HANCOCK MD  July 15, 2024  [facility]  Office [phone]

## 2024-07-15 NOTE — PROGRESS NOTES
PRN    melatonin disintegrating tablet 5 mg  5 mg Oral Nightly PRN    docusate (COLACE) 50 MG/5ML liquid 50 mg  50 mg Oral BID    bisacodyl (DULCOLAX) suppository 10 mg  10 mg Rectal Daily PRN    senna (SENOKOT) tablet 8.6 mg  1 tablet Oral Nightly PRN    lactulose (CHRONULAC) 10 GM/15ML solution 30 g  30 g Oral Daily PRN    0.9 % sodium chloride infusion   IntraVENous PRN    piperacillin-tazobactam (ZOSYN) 3,375 mg in sodium chloride 0.9 % 50 mL IVPB (mini-bag)  3,375 mg IntraVENous Q12H    aspirin chewable tablet 81 mg  81 mg Oral Daily    atorvastatin (LIPITOR) tablet 10 mg  10 mg Oral Daily    [Held by provider] dorzolamide (TRUSOPT) 2 % ophthalmic solution 1 drop  1 drop Ophthalmic BID    insulin glargine (LANTUS) injection vial 10 Units  10 Units SubCUTAneous Nightly    latanoprost (XALATAN) 0.005 % ophthalmic solution 1 drop  1 drop Both Eyes Nightly    pantoprazole (PROTONIX) tablet 40 mg  40 mg Oral QAM AC    timolol (TIMOPTIC) 0.5 % ophthalmic solution 1 drop  1 drop Both Eyes BID    HYDROmorphone (DILAUDID) injection 0.25 mg  0.25 mg IntraVENous Q4H PRN    vancomycin (VANCOCIN) intermittent dosing (placeholder)   Other RX Placeholder    sodium chloride flush 0.9 % injection 5-40 mL  5-40 mL IntraVENous 2 times per day    sodium chloride flush 0.9 % injection 5-40 mL  5-40 mL IntraVENous PRN    0.9 % sodium chloride infusion   IntraVENous PRN    ondansetron (ZOFRAN-ODT) disintegrating tablet 4 mg  4 mg Oral Q8H PRN    Or    ondansetron (ZOFRAN) injection 4 mg  4 mg IntraVENous Q6H PRN    polyethylene glycol (GLYCOLAX) packet 17 g  17 g Oral Daily PRN    acetaminophen (TYLENOL) tablet 650 mg  650 mg Oral Q6H PRN    Or    acetaminophen (TYLENOL) suppository 650 mg  650 mg Rectal Q6H PRN    potassium chloride (KLOR-CON M) extended release tablet 40 mEq  40 mEq Oral Once    insulin lispro (HUMALOG,ADMELOG) injection vial 0-4 Units  0-4 Units SubCUTAneous TID WC    insulin lispro (HUMALOG,ADMELOG) injection  vial 0-4 Units  0-4 Units SubCUTAneous Nightly    glucose chewable tablet 16 g  4 tablet Oral PRN    dextrose bolus 10% 125 mL  125 mL IntraVENous PRN    Or    dextrose bolus 10% 250 mL  250 mL IntraVENous PRN    glucagon injection 1 mg  1 mg SubCUTAneous PRN    dextrose 10 % infusion   IntraVENous Continuous PRN       Labs:    Recent Results (from the past 24 hour(s))   POCT Glucose    Collection Time: 07/14/24  3:16 PM   Result Value Ref Range    POC Glucose 120 (H) 70 - 110 mg/dL   POCT Glucose    Collection Time: 07/14/24  9:13 PM   Result Value Ref Range    POC Glucose 153 (H) 70 - 110 mg/dL   Comprehensive Metabolic Panel    Collection Time: 07/15/24  5:08 AM   Result Value Ref Range    Sodium 137 136 - 145 mmol/L    Potassium 4.4 3.5 - 5.5 mmol/L    Chloride 103 100 - 111 mmol/L    CO2 27 21 - 32 mmol/L    Anion Gap 7 3.0 - 18 mmol/L    Glucose 95 74 - 99 mg/dL    BUN 30 (H) 7.0 - 18 MG/DL    Creatinine 5.57 (H) 0.6 - 1.3 MG/DL    BUN/Creatinine Ratio 5 (L) 12 - 20      Est, Glom Filt Rate 8 (L) >60 ml/min/1.73m2    Calcium 10.4 (H) 8.5 - 10.1 MG/DL    Total Bilirubin 0.3 0.2 - 1.0 MG/DL    ALT 10 (L) 13 - 56 U/L    AST 11 10 - 38 U/L    Alk Phosphatase 52 45 - 117 U/L    Total Protein 6.6 6.4 - 8.2 g/dL    Albumin 2.2 (L) 3.4 - 5.0 g/dL    Globulin 4.4 (H) 2.0 - 4.0 g/dL    Albumin/Globulin Ratio 0.5 (L) 0.8 - 1.7     CBC with Auto Differential    Collection Time: 07/15/24  5:08 AM   Result Value Ref Range    WBC 8.5 4.6 - 13.2 K/uL    RBC 3.16 (L) 4.20 - 5.30 M/uL    Hemoglobin 8.0 (L) 12.0 - 16.0 g/dL    Hematocrit 26.0 (L) 35.0 - 45.0 %    MCV 82.3 78.0 - 100.0 FL    MCH 25.3 24.0 - 34.0 PG    MCHC 30.8 (L) 31.0 - 37.0 g/dL    RDW 18.6 (H) 11.6 - 14.5 %    Platelets 357 135 - 420 K/uL    MPV 9.2 9.2 - 11.8 FL    Nucleated RBCs 0.0 0  WBC    nRBC 0.00 0.00 - 0.01 K/uL    Neutrophils % 65 40 - 73 %    Lymphocytes % 17 (L) 21 - 52 %    Monocytes % 12 (H) 3 - 10 %    Eosinophils % 4 0 - 5 %

## 2024-07-15 NOTE — CARE COORDINATION
Called [facility] [phone] and [facility] stated they do not have bed available at this time.        0937:     Minnie of [facility] sent a message via [employer] that peer to peer is due today before 12 noon and number to call is [phone].  Sent message to Dr. John.    0943:  Met with pt.  She stated she is having surgery today.  CM will continue to follow along.      WENDY MccarthyN RN  Care Management

## 2024-07-15 NOTE — PROGRESS NOTES
4 Eyes Skin Assessment     NAME:  Jigna Conner  YOB: 1955  MEDICAL RECORD NUMBER:  [id number]    The patient is being assessed for  Shift Handoff    I agree that at least one RN has performed a thorough Head to Toe Skin Assessment on the patient. ALL assessment sites listed below have been assessed.      Areas assessed by both nurses:    Head, Face, Ears, Shoulders, Back, Chest, Arms, Elbows, Hands, Sacrum. Buttock, Coccyx, Ischium, Legs. Feet and Heels, and Under Medical Devices         Does the Patient have a Wound? Yes wound(s) were present on assessment. LDA wound assessment was Initiated and completed by RN       Vinay Prevention initiated by RN: Yes  Wound Care Orders initiated by RN: Yes    Pressure Injury (Stage 3,4, Unstageable, DTI, NWPT, and Complex wounds) if present, place Wound referral order by RN under : Yes    New Ostomies, if present place, Ostomy referral order under : Yes     Nurse 1 eSignature: Electronically signed by Shirley Masters RN on 7/15/24 at 6:39 AM EDT    **SHARE this note so that the co-signing nurse can place an eSignature**    Nurse 2 eSignature: Electronically signed by Fernanda Ward RN on 7/15/24 at 8:15 AM EDT

## 2024-07-15 NOTE — PLAN OF CARE
2014 by Shirley Masters RN  Outcome: Progressing  7/14/2024 1534 by Cande Deng RN  Outcome: Progressing  Goal: Absence of fever/infection during anticipated neutropenic period  7/14/2024 2014 by Shirley Masters RN  Outcome: Progressing  7/14/2024 1534 by Cande Deng RN  Outcome: Progressing     Problem: Hematologic - Adult  Goal: Maintains hematologic stability  7/14/2024 2014 by Shirley Masters RN  Outcome: Progressing  7/14/2024 1534 by Cande Deng RN  Outcome: Progressing     Problem: Nutrition Deficit:  Goal: Optimize nutritional status  7/14/2024 2014 by Shirley Masters RN  Outcome: Progressing  7/14/2024 1534 by Cande Deng RN  Outcome: Progressing

## 2024-07-15 NOTE — PERIOP NOTE
TRANSFER - IN REPORT:    Verbal report received from LUCERO Michael  on Jigna Conner  being received from Room 512 for ordered procedure      Report consisted of patient's Situation, Background, Assessment and   Recommendations(SBAR).     Information from the following report(s) Nurse Handoff Report was reviewed with the receiving nurse.    Opportunity for questions and clarification was provided.      Assessment completed upon patient's arrival to unit and care assumed.

## 2024-07-15 NOTE — PROGRESS NOTES
Pt not seen for skilled OT due to:     [ ]  Nausea/vomiting  [ ]  Eating  [ ]  Pain  [ ]  Pt lethargic  [x]  Off Unit 1051 echo, 1500 sx procedure  Other:    OT to follow

## 2024-07-15 NOTE — PROGRESS NOTES
[facility]  (A Division of [facility])    Follow-up Note      Date of Admission: 2024       Date of Note:  7/15/2024          Current Antimicrobials:    Prior Antimicrobials:  Zosyn  to present  Vancomycin  to present      Assessment:         Osteomyelitis of the right foot:   - wound culture with mixed marimar including gram-negative rods   -Status post I&D right foot abscess and second metatarsal debridement on .  Diabetes mellitus type 2  Peripheral artery disease: Status post angiography   End-stage renal disease on dialysis    Plan:   Continue current wound care with wound VAC-will need home health  Will plan to transition to vancomycin plus cefepime upon discharge.   - Stop date 24   Trend CBC, BMP, ESR, CRP at least twice weekly  Tight glucose control for optimal wound healing.    Discussed with Dr. John  Follow-up as outpatient 2 to 3 weeks    DO [facility]  [address], [address] [address]  Office: [phone], Ext [phone]       Microbiology:   right foot tissue culture: Diphtheroids   wound culture: Heavy mixed skin marimar with gram-negative rods   blood cultures no growth to date x 2    Lines / Catheters:  Peripheral    Subjective:   Patient seen and examined.  No complaints.  Waiting for additional debridement today.  Discharge is on hold until surgical procedure is complete    Tmax 98.6  WBC 8.5    Objective:      BP (!) 188/63   Pulse 60   Temp 98.4 °F (36.9 °C) (Oral)   Resp 16   Ht 1.624 m (5' 3.94\")   Wt 91.6 kg (201 lb 15.1 oz)   SpO2 99%   BMI 34.73 kg/m²   Temp (24hrs), Av.3 °F (36.8 °C), Min:97.6 °F (36.4 °C), Max:98.6 °F (37 °C)        General:   awake alert and oriented, non-toxic   Skin:    dry and warm, dressings in place over right foot    HEENT:  No scleral icterus or pallor; oral mucosa moist, lips moist   Lymph Nodes:   not assessed

## 2024-07-15 NOTE — PLAN OF CARE
Problem: Pain  Goal: Verbalizes/displays adequate comfort level or baseline comfort level  Outcome: Progressing     Problem: Safety - Adult  Goal: Free from fall injury  Outcome: Progressing     Problem: Chronic Conditions and Co-morbidities  Goal: Patient's chronic conditions and co-morbidity symptoms are monitored and maintained or improved  Outcome: Progressing     Problem: Skin/Tissue Integrity  Goal: Absence of new skin breakdown  Description: 1.  Monitor for areas of redness and/or skin breakdown  2.  Assess vascular access sites hourly  3.  Every 4-6 hours minimum:  Change oxygen saturation probe site  4.  Every 4-6 hours:  If on nasal continuous positive airway pressure, respiratory therapy assess nares and determine need for appliance change or resting period.  Outcome: Progressing     Problem: Cardiovascular - Adult  Goal: Maintains optimal cardiac output and hemodynamic stability  Outcome: Progressing  Goal: Absence of cardiac dysrhythmias or at baseline  Outcome: Progressing

## 2024-07-15 NOTE — ANESTHESIA POSTPROCEDURE EVALUATION
Department of Anesthesiology  Postprocedure Note    Patient: Jigna Conner  MRN: [id number]  YOB: 1955  Date of evaluation: 7/15/2024    Procedure Summary       Date: 07/15/24 Room / Location: [facility] MAIN 07 / [facility] MAIN OR    Anesthesia Start: 1646 Anesthesia Stop: 1740    Procedure: Right foot partial third ray amputation, irrigation and debridement of right foot wounds (Right: Toes) Diagnosis:       Osteomyelitis of right foot, unspecified type (HCC)      Gangrene of toe of right foot (HCC)      (Osteomyelitis of right foot, unspecified type (HCC) [M86.9])      (Gangrene of toe of right foot (HCC) [I96])    Surgeons: Marcin Gabriel DPM Responsible Provider: Aylin Bustos MD    Anesthesia Type: MAC ASA Status: 4            Anesthesia Type: MAC    Fernando Phase I: Fernando Score: 9    Fernando Phase II:      Anesthesia Post Evaluation    Patient location during evaluation: bedside  Patient participation: complete - patient participated  Airway patency: patent  Cardiovascular status: hemodynamically stable  Respiratory status: acceptable  Hydration status: stable    No notable events documented.

## 2024-07-15 NOTE — PROGRESS NOTES
Podiatry Progress Note    Patient: Jigna Conner               Sex: female          DOA: [unfilled]       YOB: 1955      Age:  69 y.o.        LOS:  LOS: 20 days     POD Day of Surgery  Procedure:   Procedure(s):  INCISION AND DEBRIDEMENT RIGHT FOOT AND RIGHT THIRD TOE AMPUTATION      Subjective:     Patient seen resting quiet and comfortably and no apparent distress. Patient has necrosis of third toe. Patient also has wounds with fibrotic slough medially and at second ray amputation stump. Denies N/V/C/F.      Objective:       Physical Exam:  Right foot medial wound with dry, fibrotic slough at first metatarsal head. Second toe amputation stump wound with antibiotic beads. Third toe is necrotic with gangrenous changes.       Assessment:     Principal Problem:    Diabetic foot infection (HCC)  Active Problems:    Hypertension    Anemia of renal disease    ESRD on dialysis (HCC)    Diabetes mellitus (HCC)    Hypokalemia    Chronic heart failure with preserved ejection fraction (HFpEF) (HCC)    Coronary artery disease involving native coronary artery of native heart without angina pectoris    History of MI (myocardial infarction)    Class 2 obesity in adult    Constipation  Resolved Problems:    * No resolved hospital problems. *      Plan/Recommendations/Medical Decision Making:     - x-ray of right foot reviewed. Findings concerning for osteomyelitic changes at the head of the third metatarsal and proximal phalanx of the third digit. The structures are however partially obscured on some views by antibiotic beads. Intervening amputation through the distal second metatarsal.  - Patient will need partial third ray amputation with irrigation and debridement. Plan for surgery tonight. NPO status confirmed.   - Remain NWB to right forefoot.   - Antibiotics per ID recommendation.   - Medial management per primary team.   - Patient is awaiting discharge pending on rehab placement.

## 2024-07-15 NOTE — ANESTHESIA PRE PROCEDURE
Department of Anesthesiology  Preprocedure Note       Name:  Jigna Conner   Age:  69 y.o.  :  1955                                          MRN:  [id number]         Date:  7/15/2024      Surgeon: Surgeon(s):  Marcin Gabriel DPM    Procedure: Procedure(s):  INCISION AND DEBRIDEMENT RIGHT FOOT AND RIGHT THIRD TOE AMPUTATION    Medications prior to admission:   Prior to Admission medications    Medication Sig Start Date End Date Taking? Authorizing Provider   Multiple Vitamins-Minerals (THERAPEUTIC MULTIVITAMIN-MINERALS) tablet Take 1 tablet by mouth daily   Yes Audrey Lion MD   carvedilol (COREG) 3.125 MG tablet Take 1 tablet by mouth 2 times daily (with meals) 1/3/20  Yes Audrey Lion MD   dorzolamide (TRUSOPT) 2 % ophthalmic solution Apply 1 drop to eye 2 times daily 24  Yes Audrey Lion MD   insulin detemir (LEVEMIR) 100 UNIT/ML injection vial Inject 10 Units into the skin nightly    Audrey Lion MD   Insulin Pump - insulin lispro Inject 10 Units into the skin 3 times daily (before meals)  Patient not taking: Reported on 2024    ProviderAudrey MD   aspirin 81 MG chewable tablet Take 1 tablet by mouth daily    Automatic Reconciliation, Ar   atorvastatin (LIPITOR) 10 MG tablet 1 tablet daily 20   Automatic Reconciliation, Ar   vitamin D (CHOLECALCIFEROL) 25 MCG (1000 UT) TABS tablet Take 1 tablet by mouth daily    Automatic Reconciliation, Ar   furosemide (LASIX) 80 MG tablet 1 tablet daily 20   Automatic Reconciliation, Ar   latanoprost (XALATAN) 0.005 % ophthalmic solution Apply 1 drop to eye nightly 20   Automatic Reconciliation, Ar   pantoprazole (PROTONIX) 40 MG tablet 1 tablet daily 20   Automatic Reconciliation, Ar   sacubitril-valsartan (ENTRESTO) 24-26 MG per tablet Take 1 tablet by mouth 2 times daily 20   Automatic Reconciliation, Ar   simethicone (MYLICON) 125 MG chewable tablet Take 1 tablet by mouth every 6

## 2024-07-16 PROBLEM — I73.9 PAD (PERIPHERAL ARTERY DISEASE) (HCC): Status: ACTIVE | Noted: 2024-07-16

## 2024-07-16 PROBLEM — M86.9 OSTEOMYELITIS OF RIGHT FOOT (HCC): Status: ACTIVE | Noted: 2024-07-16

## 2024-07-16 LAB
ALBUMIN SERPL-MCNC: 2.1 G/DL (ref 3.4–5)
ANION GAP SERPL CALC-SCNC: 9 MMOL/L (ref 3–18)
BASOPHILS # BLD: 0.1 K/UL (ref 0–0.1)
BASOPHILS NFR BLD: 1 % (ref 0–2)
BUN SERPL-MCNC: 34 MG/DL (ref 7–18)
BUN/CREAT SERPL: 5 (ref 12–20)
CALCIUM SERPL-MCNC: 10.3 MG/DL (ref 8.5–10.1)
CHLORIDE SERPL-SCNC: 104 MMOL/L (ref 100–111)
CO2 SERPL-SCNC: 23 MMOL/L (ref 21–32)
CREAT SERPL-MCNC: 6.26 MG/DL (ref 0.6–1.3)
DIFFERENTIAL METHOD BLD: ABNORMAL
EOSINOPHIL # BLD: 0.4 K/UL (ref 0–0.4)
EOSINOPHIL NFR BLD: 3 % (ref 0–5)
ERYTHROCYTE [DISTWIDTH] IN BLOOD BY AUTOMATED COUNT: 18.7 % (ref 11.6–14.5)
GLUCOSE BLD STRIP.AUTO-MCNC: 126 MG/DL (ref 70–110)
GLUCOSE BLD STRIP.AUTO-MCNC: 141 MG/DL (ref 70–110)
GLUCOSE BLD STRIP.AUTO-MCNC: 61 MG/DL (ref 70–110)
GLUCOSE BLD STRIP.AUTO-MCNC: 67 MG/DL (ref 70–110)
GLUCOSE BLD STRIP.AUTO-MCNC: 75 MG/DL (ref 70–110)
GLUCOSE BLD STRIP.AUTO-MCNC: 77 MG/DL (ref 70–110)
GLUCOSE SERPL-MCNC: 81 MG/DL (ref 74–99)
HCT VFR BLD AUTO: 25.7 % (ref 35–45)
HGB BLD-MCNC: 7.8 G/DL (ref 12–16)
IMM GRANULOCYTES # BLD AUTO: 0.1 K/UL (ref 0–0.04)
IMM GRANULOCYTES NFR BLD AUTO: 1 % (ref 0–0.5)
LYMPHOCYTES # BLD: 1.1 K/UL (ref 0.9–3.6)
LYMPHOCYTES NFR BLD: 10 % (ref 21–52)
MCH RBC QN AUTO: 25.2 PG (ref 24–34)
MCHC RBC AUTO-ENTMCNC: 30.4 G/DL (ref 31–37)
MCV RBC AUTO: 83.2 FL (ref 78–100)
MONOCYTES # BLD: 1.4 K/UL (ref 0.05–1.2)
MONOCYTES NFR BLD: 12 % (ref 3–10)
NEUTS SEG # BLD: 8.3 K/UL (ref 1.8–8)
NEUTS SEG NFR BLD: 73 % (ref 40–73)
NRBC # BLD: 0 K/UL (ref 0–0.01)
NRBC BLD-RTO: 0 PER 100 WBC
PHOSPHATE SERPL-MCNC: 5.3 MG/DL (ref 2.5–4.9)
PLATELET # BLD AUTO: 349 K/UL (ref 135–420)
PMV BLD AUTO: 9.9 FL (ref 9.2–11.8)
POTASSIUM SERPL-SCNC: 5.1 MMOL/L (ref 3.5–5.5)
RBC # BLD AUTO: 3.09 M/UL (ref 4.2–5.3)
SODIUM SERPL-SCNC: 136 MMOL/L (ref 136–145)
VANCOMYCIN SERPL-MCNC: 23.7 UG/ML (ref 5–40)
WBC # BLD AUTO: 11.3 K/UL (ref 4.6–13.2)

## 2024-07-16 PROCEDURE — 36415 COLL VENOUS BLD VENIPUNCTURE: CPT

## 2024-07-16 PROCEDURE — 97168 OT RE-EVAL EST PLAN CARE: CPT

## 2024-07-16 PROCEDURE — 99232 SBSQ HOSP IP/OBS MODERATE 35: CPT | Performed by: INTERNAL MEDICINE

## 2024-07-16 PROCEDURE — 6360000002 HC RX W HCPCS: Performed by: PHYSICIAN ASSISTANT

## 2024-07-16 PROCEDURE — 80069 RENAL FUNCTION PANEL: CPT

## 2024-07-16 PROCEDURE — 6370000000 HC RX 637 (ALT 250 FOR IP): Performed by: PHYSICIAN ASSISTANT

## 2024-07-16 PROCEDURE — 6360000002 HC RX W HCPCS: Performed by: EMERGENCY MEDICINE

## 2024-07-16 PROCEDURE — 2580000003 HC RX 258: Performed by: PHYSICIAN ASSISTANT

## 2024-07-16 PROCEDURE — 97110 THERAPEUTIC EXERCISES: CPT

## 2024-07-16 PROCEDURE — 97164 PT RE-EVAL EST PLAN CARE: CPT

## 2024-07-16 PROCEDURE — 6370000000 HC RX 637 (ALT 250 FOR IP): Performed by: INTERNAL MEDICINE

## 2024-07-16 PROCEDURE — 1100000000 HC RM PRIVATE

## 2024-07-16 PROCEDURE — 85025 COMPLETE CBC W/AUTO DIFF WBC: CPT

## 2024-07-16 PROCEDURE — 80202 ASSAY OF VANCOMYCIN: CPT

## 2024-07-16 PROCEDURE — 6360000002 HC RX W HCPCS: Performed by: INTERNAL MEDICINE

## 2024-07-16 PROCEDURE — 2580000003 HC RX 258: Performed by: INTERNAL MEDICINE

## 2024-07-16 PROCEDURE — 82962 GLUCOSE BLOOD TEST: CPT

## 2024-07-16 PROCEDURE — 94761 N-INVAS EAR/PLS OXIMETRY MLT: CPT

## 2024-07-16 PROCEDURE — 6370000000 HC RX 637 (ALT 250 FOR IP): Performed by: EMERGENCY MEDICINE

## 2024-07-16 PROCEDURE — 90935 HEMODIALYSIS ONE EVALUATION: CPT

## 2024-07-16 RX ADMIN — Medication 1 CAPSULE: at 12:20

## 2024-07-16 RX ADMIN — HEPARIN SODIUM 5000 UNITS: 5000 INJECTION INTRAVENOUS; SUBCUTANEOUS at 14:51

## 2024-07-16 RX ADMIN — TIMOLOL MALEATE 1 DROP: 5 SOLUTION OPHTHALMIC at 12:19

## 2024-07-16 RX ADMIN — PIPERACILLIN AND TAZOBACTAM 3375 MG: 3; .375 INJECTION, POWDER, FOR SOLUTION INTRAVENOUS at 06:14

## 2024-07-16 RX ADMIN — SACUBITRIL AND VALSARTAN 1 TABLET: 49; 51 TABLET, FILM COATED ORAL at 12:20

## 2024-07-16 RX ADMIN — TIMOLOL MALEATE 1 DROP: 5 SOLUTION OPHTHALMIC at 21:26

## 2024-07-16 RX ADMIN — OXYCODONE AND ACETAMINOPHEN 1 TABLET: 7.5; 325 TABLET ORAL at 06:07

## 2024-07-16 RX ADMIN — PANTOPRAZOLE SODIUM 40 MG: 40 TABLET, DELAYED RELEASE ORAL at 06:09

## 2024-07-16 RX ADMIN — EPOETIN ALFA-EPBX 5000 UNITS: 3000 INJECTION, SOLUTION INTRAVENOUS; SUBCUTANEOUS at 22:22

## 2024-07-16 RX ADMIN — OXYCODONE AND ACETAMINOPHEN 1 TABLET: 7.5; 325 TABLET ORAL at 12:20

## 2024-07-16 RX ADMIN — HEPARIN SODIUM 5000 UNITS: 5000 INJECTION INTRAVENOUS; SUBCUTANEOUS at 05:33

## 2024-07-16 RX ADMIN — DOCUSATE SODIUM 50 MG: 50 LIQUID ORAL at 21:25

## 2024-07-16 RX ADMIN — ATORVASTATIN CALCIUM 10 MG: 10 TABLET, FILM COATED ORAL at 12:20

## 2024-07-16 RX ADMIN — SACUBITRIL AND VALSARTAN 1 TABLET: 49; 51 TABLET, FILM COATED ORAL at 21:26

## 2024-07-16 RX ADMIN — Medication 16 G: at 07:31

## 2024-07-16 RX ADMIN — INSULIN GLARGINE 10 UNITS: 100 INJECTION, SOLUTION SUBCUTANEOUS at 21:40

## 2024-07-16 RX ADMIN — PIPERACILLIN AND TAZOBACTAM 3375 MG: 3; .375 INJECTION, POWDER, FOR SOLUTION INTRAVENOUS at 17:27

## 2024-07-16 RX ADMIN — MICONAZOLE NITRATE 1 APPLICATOR: 20 CREAM VAGINAL at 21:30

## 2024-07-16 RX ADMIN — SEVELAMER CARBONATE 800 MG: 800 TABLET, FILM COATED ORAL at 12:20

## 2024-07-16 RX ADMIN — HEPARIN SODIUM 5000 UNITS: 5000 INJECTION INTRAVENOUS; SUBCUTANEOUS at 21:26

## 2024-07-16 RX ADMIN — CARVEDILOL 6.25 MG: 6.25 TABLET, FILM COATED ORAL at 06:09

## 2024-07-16 RX ADMIN — SODIUM CHLORIDE, PRESERVATIVE FREE 10 ML: 5 INJECTION INTRAVENOUS at 21:27

## 2024-07-16 RX ADMIN — LATANOPROST 1 DROP: 50 SOLUTION/ DROPS OPHTHALMIC at 21:27

## 2024-07-16 RX ADMIN — ASPIRIN 81 MG CHEWABLE TABLET 81 MG: 81 TABLET CHEWABLE at 12:20

## 2024-07-16 RX ADMIN — SEVELAMER CARBONATE 800 MG: 800 TABLET, FILM COATED ORAL at 17:18

## 2024-07-16 RX ADMIN — ONDANSETRON 4 MG: 2 INJECTION INTRAMUSCULAR; INTRAVENOUS at 08:28

## 2024-07-16 RX ADMIN — DOCUSATE SODIUM 50 MG: 50 LIQUID ORAL at 12:20

## 2024-07-16 RX ADMIN — CARVEDILOL 6.25 MG: 6.25 TABLET, FILM COATED ORAL at 17:18

## 2024-07-16 ASSESSMENT — PAIN SCALES - GENERAL
PAINLEVEL_OUTOF10: 3
PAINLEVEL_OUTOF10: 0
PAINLEVEL_OUTOF10: 0
PAINLEVEL_OUTOF10: 4
PAINLEVEL_OUTOF10: 5
PAINLEVEL_OUTOF10: 1
PAINLEVEL_OUTOF10: 0
PAINLEVEL_OUTOF10: 0

## 2024-07-16 ASSESSMENT — PAIN DESCRIPTION - LOCATION
LOCATION: FOOT
LOCATION: FOOT

## 2024-07-16 ASSESSMENT — PAIN DESCRIPTION - FREQUENCY
FREQUENCY: INTERMITTENT
FREQUENCY: CONTINUOUS

## 2024-07-16 ASSESSMENT — PAIN DESCRIPTION - ORIENTATION
ORIENTATION: RIGHT
ORIENTATION: RIGHT

## 2024-07-16 ASSESSMENT — PAIN - FUNCTIONAL ASSESSMENT
PAIN_FUNCTIONAL_ASSESSMENT: ACTIVITIES ARE NOT PREVENTED
PAIN_FUNCTIONAL_ASSESSMENT: PREVENTS OR INTERFERES WITH MANY ACTIVE NOT PASSIVE ACTIVITIES

## 2024-07-16 ASSESSMENT — PAIN DESCRIPTION - DESCRIPTORS
DESCRIPTORS: ACHING;BURNING
DESCRIPTORS: ACHING;BURNING

## 2024-07-16 ASSESSMENT — PAIN DESCRIPTION - PAIN TYPE: TYPE: SURGICAL PAIN

## 2024-07-16 ASSESSMENT — PAIN DESCRIPTION - ONSET: ONSET: GRADUAL

## 2024-07-16 NOTE — PROGRESS NOTES
4 Eyes Skin Assessment     NAME:  Jigna Conner  YOB: 1955  MEDICAL RECORD NUMBER:  [id number]    The patient is being assessed for  Shift Handoff    I agree that at least one RN has performed a thorough Head to Toe Skin Assessment on the patient. ALL assessment sites listed below have been assessed.      Areas assessed by both nurses:    Head, Face, Ears, Shoulders, Back, Chest, Arms, Elbows, Hands, Sacrum. Buttock, Coccyx, Ischium, Legs. Feet and Heels, and Under Medical Devices         Does the Patient have a Wound? No noted wound(s)       Vinay Prevention initiated by RN: No  Wound Care Orders initiated by RN: No    Pressure Injury (Stage 3,4, Unstageable, DTI, NWPT, and Complex wounds) if present, place Wound referral order by RN under : No    New Ostomies, if present place, Ostomy referral order under : No     Nurse 1 eSignature: Electronically signed by Shant Sanchez RN on 7/16/24 at 6:34 PM EDT    **SHARE this note so that the co-signing nurse can place an eSignature**    Nurse 2 eSignature: {Esignature:[id number]}     well-groomed/no distress

## 2024-07-16 NOTE — PLAN OF CARE
Problem: Physical Therapy - Adult  Goal: By Discharge: Performs mobility at highest level of function for planned discharge setting.  See evaluation for individualized goals.  Description: Physical Therapy Goals:  Initiated 6/27/2024 to be met within 7-10 days.  Reviewed and Updated: 7/4/2024  Reviewed and Updated: 7/16/2024    1.  Patient will move from supine to sit and sit to supine , scoot up and down, and roll side to side in bed with independence in order to safely get into/out of bed.    2.  Patient will transfer from bed to chair and chair to bed with minimal assistance  using RW in order to safely partake in OOB mobility.  3.  Patient will perform sit to stand with minimal assistance  in order to safely partake in OOB mobility.  4.  Patient will ambulate with minimal assistance/contact guard assist for 15 feet using RW in order to safely navigate household distances.     PLOF: lives with  in 1 level home with ramped entry, independent at baseline using rollator in community and SPC in home      Outcome: Progressing   PHYSICAL THERAPY RE-EVALUATION    Patient: Jigna Conner (69 y.o. female)  Date: 7/16/2024  Primary Diagnosis: Hypokalemia [E87.6]  Hyperglycemia [R73.9]  ESRD (end stage renal disease) (HCC) [N18.6]  Diabetic foot infection (HCC) [E11.628, L08.9]  Anemia, unspecified type [D64.9]  Procedure(s) (LRB):  Right foot partial third ray amputation, irrigation and debridement of right foot wounds (Right) 1 Day Post-Op   Precautions: Weight Bearing, Fall Risk, Right Lower Extremity Weight Bearing: Non Weight Bearing (Forefoot NWB),  ,  ,  ,  ,  ,    ASSESSMENT :  Re-evaluation s/p R foot wound debridement and R 3rd toe amputation on 7/15/2024; goals reviewed and updated as indicated. Patient received in bed and agreed to PT. Bed mobility SBA. Educated on RLE Forefoot NWB precautions. Pt reports dizziness sitting EOB with good balance using UE support. BLE 4/5 strength, AROM WFL. Donned  Given To: Patient  Education Provided: Role of Therapy;Plan of Care;Precautions;Transfer Training  Education Method: Demonstration;Verbal;Teach Back  Barriers to Learning: None  Education Outcome: Verbalized understanding;Demonstrated understanding;Continued education needed    Thank you for this referral.  Fani Muhammad, SPT  Minutes: 24

## 2024-07-16 NOTE — PROGRESS NOTES
[facility]   Pharmacy Pharmacokinetic Monitoring Service - Vancomycin    Indication: DM foot infection  Target Pre-Dialysis Concentration: 21-24 mg/L  Day of Therapy: 22  Continue through 8/7/24  Additional Antimicrobials: pip-tazo    Pertinent Laboratory Values:   Wt Readings from Last 1 Encounters:   07/15/24 91.6 kg (201 lb 15.1 oz)     Temp Readings from Last 1 Encounters:   07/16/24 98.6 °F (37 °C) (Oral)     Recent Labs     07/15/24  0508 07/16/24  0243   WBC 8.5 11.3     Pertinent Cultures:  Date Source Results   6/25 blood NGF   6/26 wound GPC, GPR   6/26 tissue Diphtheroids    MRSA Nasal Swab: NA    Assessment:  Date Current Dose Concentration (mg/L) Dialysis Session   6/25 2,000 mg x1 - no   6/26 - - no   6/27 - 27.6 yes   6/28 - - no   6/29 Not administered 15.4 yes   6/30 1,000 mg x1 - no   7/1 - - no   7/2 750 mg x1 19.1 yes   7/3 - - no   7/4 750 mg x1 21.3 yes   7/5 - - no   7/6 750 mg x 1 20.4 yes   7/7 - - no   7/8 - - no   7/9 1,000 mg x1 16.6 yes   7/10 - - no   7/11 1,250 mg x1 17.5 yes   7/12 - - no   7/13 - 24.9 yes   7/14 500mg x1 - no   7/15 -  no   7/16 - 23.7 yes     Plan:  ESRD on HD q TTS - dose by level  No dose today  Vancomycin level in a.m. 7/18  Pharmacy will continue to monitor patient and adjust therapy as indicated    Thank you for the consult,  KASI ORTA RPH  7/16/2024

## 2024-07-16 NOTE — PROGRESS NOTES
HD Care plan  Time: 3 hrs  Dialysate:  2 K 2.0  Ca  Bath  Net UF:2L  Access: Aseptically care for Newly inserted LUE AVF  Hemodynamic stability: Maintain BP WNL     Pre Dialysis:  Pt received from Unit Nurse VITALIY Sanchez ,Pt arrived  on a bed,A+O X 4, No s/s of distress noted  on RA .   LUE AVF assessed no abnormalities noted, no active bleeding observed from the insertion site at this time, line patent with good flow.  AVF accessed w/15G needles x2 per protocol without any difficulty     Intra Dialysis:  Tx initiated at 0828    AVF  flowing with ease.  For hemodynamic stability UF goal  set at 2500 ml as tolerated   Pt offered assistance with repositioning every 2 hours/prn    Vascular access visible  and line connections remained intact throughout entire duration of treatment.   Vital signs checked every 15 mins, WNL     Post Dialysis:  Tx completed at 1130,   Tolerated well 2L removed.  De-accessed per protocol.  Clot time 5 minutes for arterial, and 5 minutes for venous.      Post Dialysis report given to unit  Nurse VITALIY Sanchez

## 2024-07-16 NOTE — PROGRESS NOTES
Comprehensive Nutrition Assessment    Type and Reason for Visit:  Reassess    Nutrition Recommendations/Plan:   Advance diet as medically feasible   Monitor po intake, ONS acceptance and GI symptoms      Malnutrition Assessment:  Malnutrition Status:  Insufficient data (07/03/24 1621)    Context:  Chronic Illness     Findings of the 6 clinical characteristics of malnutrition:  Energy Intake:  Unable to assess  Weight Loss:  No significant weight loss     Body Fat Loss:  Unable to assess     Muscle Mass Loss:  Unable to assess    Fluid Accumulation:  No significant fluid accumulation     Strength:  Not Performed    Nutrition Assessment:    Pt NPO today for debridement and R 3rd toe amputation. Pt continues w/ po intake > 75% per documentation. Meds and labs reviewed, will follow per protocol.    Nutrition Related Findings:    No GI symptoms noted, LBM 7/14. No edema. Wound Type: Surgical Incision, Diabetic Ulcer       Current Nutrition Intake & Therapies:    Average Meal Intake: %  Average Supplements Intake: Unable to assess  Diet NPO Exceptions are: Sips of Water with Meds    Anthropometric Measures:  Height: 162.4 cm (5' 3.94\")  Ideal Body Weight (IBW): 120 lbs (55 kg)       Current Body Weight: 96 kg (211 lb 10.3 oz), 176.4 % IBW. Weight Source: Bed Scale  Current BMI (kg/m2): 36.4  Usual Body Weight: 93 kg (205 lb) (per EMRq)  % Weight Change (Calculated): -2.8  Weight Adjustment For:  (EDW 90.4kg; BMI 34.3)                 BMI Categories: Obese Class 1 (BMI 30.0-34.9)    Estimated Daily Nutrient Needs:  Energy Requirements Based On: Formula  Weight Used for Energy Requirements: Current  Energy (kcal/day): 1709 kcals (MSJ x1.2AF)  Weight Used for Protein Requirements: Current  Protein (g/day): 108g (1.2g/kg, 25% of kcals from pro; wounds and ESRD)  Method Used for Fluid Requirements: 1 ml/kcal  Fluid (ml/day): 1709 ml    Nutrition Diagnosis:   Increased nutrient needs related to increase demand for

## 2024-07-16 NOTE — CARE COORDINATION
OPAL spoke to Pt regarding the declined Auth. OPAL explained there was a home health agency that had accepted the Pt in [place]. Pt expressed her frustrations that if she were to discharge she would be \"going home with nothing.\" SW advised they can continue to send out additional referrals. However, due to insurance, continues to be a barrier.     SW then contact Khao at [facility] in [place]. Khoa advised he did not receive any referrals regarding this patient. [facility]. Khoa stated he would follow up with OPAL.     PAMELA Catalan    Case Management Department       ADDENDUM 07/16/2024 4:55pm.     OPAL was advised Pt wanted to speak with them. Pt expressed their frustration regarding the denials of services and repeated that home health was not going to meet her needs. OPAL explained there was one  agency that had accepted her as a potential back up plan. OPAL continued they had contact Khoa from [facility] and had resent there referrals, stating that Khoa would notify OPAL.     Pt stated she wanted to appeal the ARU decision and was on hold with her insurance [facility] during the conversation. SW sent email to ARU ([email or web address]) regarding the appeal. Pt described the challenges her family had faced to include her  being in Rehab currently in [place], and he is also unable to attend to himself. Pt stated she also was frustrated with her current situation and stated she was always active until this. SW provided support regarding the next level of care and active efforts were being made. Pt then requested SW name and wrote it in their book. Per Pts request, OPAL sent [facility] via [facility].     PAMELA Catalan    Case Management Department

## 2024-07-16 NOTE — PROGRESS NOTES
mg Oral Nightly PRN    docusate (COLACE) 50 MG/5ML liquid 50 mg  50 mg Oral BID    bisacodyl (DULCOLAX) suppository 10 mg  10 mg Rectal Daily PRN    senna (SENOKOT) tablet 8.6 mg  1 tablet Oral Nightly PRN    lactulose (CHRONULAC) 10 GM/15ML solution 30 g  30 g Oral Daily PRN    0.9 % sodium chloride infusion   IntraVENous PRN    piperacillin-tazobactam (ZOSYN) 3,375 mg in sodium chloride 0.9 % 50 mL IVPB (mini-bag)  3,375 mg IntraVENous Q12H    aspirin chewable tablet 81 mg  81 mg Oral Daily    atorvastatin (LIPITOR) tablet 10 mg  10 mg Oral Daily    [Held by provider] dorzolamide (TRUSOPT) 2 % ophthalmic solution 1 drop  1 drop Ophthalmic BID    insulin glargine (LANTUS) injection vial 10 Units  10 Units SubCUTAneous Nightly    latanoprost (XALATAN) 0.005 % ophthalmic solution 1 drop  1 drop Both Eyes Nightly    pantoprazole (PROTONIX) tablet 40 mg  40 mg Oral QAM AC    timolol (TIMOPTIC) 0.5 % ophthalmic solution 1 drop  1 drop Both Eyes BID    HYDROmorphone (DILAUDID) injection 0.25 mg  0.25 mg IntraVENous Q4H PRN    vancomycin (VANCOCIN) intermittent dosing (placeholder)   Other RX Placeholder    sodium chloride flush 0.9 % injection 5-40 mL  5-40 mL IntraVENous 2 times per day    sodium chloride flush 0.9 % injection 5-40 mL  5-40 mL IntraVENous PRN    0.9 % sodium chloride infusion   IntraVENous PRN    ondansetron (ZOFRAN-ODT) disintegrating tablet 4 mg  4 mg Oral Q8H PRN    Or    ondansetron (ZOFRAN) injection 4 mg  4 mg IntraVENous Q6H PRN    polyethylene glycol (GLYCOLAX) packet 17 g  17 g Oral Daily PRN    acetaminophen (TYLENOL) tablet 650 mg  650 mg Oral Q6H PRN    Or    acetaminophen (TYLENOL) suppository 650 mg  650 mg Rectal Q6H PRN    potassium chloride (KLOR-CON M) extended release tablet 40 mEq  40 mEq Oral Once    insulin lispro (HUMALOG,ADMELOG) injection vial 0-4 Units  0-4 Units SubCUTAneous TID WC    insulin lispro (HUMALOG,ADMELOG) injection vial 0-4 Units  0-4 Units SubCUTAneous Nightly     vial 0-4 Units  0-4 Units SubCUTAneous Nightly    glucose chewable tablet 16 g  4 tablet Oral PRN    dextrose bolus 10% 125 mL  125 mL IntraVENous PRN    Or    dextrose bolus 10% 250 mL  250 mL IntraVENous PRN    glucagon injection 1 mg  1 mg SubCUTAneous PRN    dextrose 10 % infusion   IntraVENous Continuous PRN       Labs:    Recent Results (from the past 24 hour(s))   POCT Glucose    Collection Time: 07/15/24  3:11 PM   Result Value Ref Range    POC Glucose 65 (L) 70 - 110 mg/dL   POCT Glucose    Collection Time: 07/15/24  4:07 PM   Result Value Ref Range    POC Glucose 104 70 - 110 mg/dL   Culture, Tissue    Collection Time: 07/15/24  5:15 PM    Specimen: Tissue    CLEAN MARGINS RIGHT 3RD METATARSAL   Result Value Ref Range    Special Requests NO SPECIAL REQUESTS      Gram Stain RARE WBCS SEEN      Gram Stain NO ORGANISMS SEEN      Culture PENDING    POCT Glucose    Collection Time: 07/15/24  5:53 PM   Result Value Ref Range    POC Glucose 73 70 - 110 mg/dL   POCT Glucose    Collection Time: 07/15/24  6:45 PM   Result Value Ref Range    POC Glucose 91 70 - 110 mg/dL   POCT Glucose    Collection Time: 07/15/24 11:01 PM   Result Value Ref Range    POC Glucose 125 (H) 70 - 110 mg/dL   Renal Function Panel    Collection Time: 07/16/24  2:43 AM   Result Value Ref Range    Sodium 136 136 - 145 mmol/L    Potassium 5.1 3.5 - 5.5 mmol/L    Chloride 104 100 - 111 mmol/L    CO2 23 21 - 32 mmol/L    Anion Gap 9 3.0 - 18 mmol/L    Glucose 81 74 - 99 mg/dL    BUN 34 (H) 7.0 - 18 MG/DL    Creatinine 6.26 (H) 0.6 - 1.3 MG/DL    BUN/Creatinine Ratio 5 (L) 12 - 20      Est, Glom Filt Rate 7 (L) >60 ml/min/1.73m2    Calcium 10.3 (H) 8.5 - 10.1 MG/DL    Phosphorus 5.3 (H) 2.5 - 4.9 MG/DL    Albumin 2.1 (L) 3.4 - 5.0 g/dL   Vancomycin Level, Random    Collection Time: 07/16/24  2:43 AM   Result Value Ref Range    Vancomycin Rm 23.7 5.0 - 40.0 UG/ML   CBC with Auto Differential    Collection Time: 07/16/24  2:43 AM   Result Value Ref

## 2024-07-16 NOTE — CARE COORDINATION
SW contacted [facility]. SW was advised the Auth was denied. SW attempted to discuss with Pt regarding home health services, Pt was off the floor.     OPAL will continue to follow up, and notified the Auth manager.       PAMELA Catalan    Case Management Department

## 2024-07-16 NOTE — PROGRESS NOTES
Progress Note    68-year-old female with past medical history of ESRD, diabetes, heart failure with preserved ejection fraction admitted for diabetic foot infection, following for ESRD management.  Subjective      Overnight event noted  No complaints offered  No chest pain or sob reported  At 10:40 AM on 7/16/2024, I saw and examined patient during hemodialysis treatment. The patient was receiving hemodialysis for treatment of  renal failure. I have also reviewed vital signs, intake and output, lab results and recent events, and agreed with today's dialysis order.     IMPRESSION:   ESRD, Tuesday Thursday Saturday,   Access left arm AV fistula  Right foot wound infection,  Anemia, received blood transfusion during this admission  Diabetes  Hypertension  Heart failure with preserved ejection fraction.   PLAN:   Dialysis per schedule. Added heparin via circuit to prevent clotting in circuit.  CM will fax the abx to her dialysis unit.  aware. Vancomycin 750 mg, cefepime 1 g with each session till 8/7.   Epogen for anemia.  Renvela for phos binding.   Adjust medication per ESRD status.  Low calcium bath  For debridement of foot by podiatry       Current Facility-Administered Medications   Medication Dose Route Frequency    carvedilol (COREG) tablet 6.25 mg  6.25 mg Oral BID WC    sacubitril-valsartan (ENTRESTO) 49-51 MG per tablet 1 tablet  1 tablet Oral BID    hydrALAZINE (APRESOLINE) injection 10 mg  10 mg IntraVENous Q6H PRN    heparin (porcine) injection 1,000 Units  1,000 Units IntraVENous Once    epoetin laurence-epbx (RETACRIT) 5,000 Units combo injection  5,000 Units SubCUTAneous Once per day on Tue Thu Sat    0.9 % sodium chloride infusion   IntraVENous PRN    miconazole (MICOTIN) 2 % vaginal cream 1 applicator  1 applicator Vaginal Nightly    sevelamer (RENVELA) tablet 800 mg  800 mg Oral TID     heparin (porcine) injection 5,000 Units  5,000 Units SubCUTAneous 3 times per day    lactobacillus

## 2024-07-16 NOTE — PLAN OF CARE
Problem: Occupational Therapy - Adult  Goal: By Discharge: Performs self-care activities at highest level of function for planned discharge setting.  See evaluation for individualized goals.  Description: Occupational Therapy Goals:  Initiated 6/28/2024 to be met within 7-10 days.  Re-eval completed 6/9/24, pt making consistent progress. Goals continue to be appropriate.   Re-eval completed 7/16/24, pt making consistent progress. Goals continue to be appropriate.     1.  Patient will perform bathing with supervision/set-up.   2.  Patient will perform lower body dressing with supervision/set-up.  3.  Patient will perform BSC transfers with supervision/set-up using LRAD (sliding board vs FWW).  4.  Patient will perform all aspects of toileting with supervision/set-up.  5.  Patient will participate in upper extremity therapeutic exercise/activities with supervision/set-up for 8 minutes to improve endurance and UB strength needed for ADLs    6.  Patient will utilize energy conservation techniques during functional activities with verbal cues.    PLOF: Pt lives with spouse in 1SH with ramp. Pt is independent with ADLs and functional mobility. Pt's spouse is disabled and has PCA assisting him with ADLs.  Outcome: Progressing     OCCUPATIONAL THERAPY RE-EVALUATION    Patient: Jigna Conner (69 y.o. female)  Date: 7/16/2024  Primary Diagnosis: Hypokalemia [E87.6]  Hyperglycemia [R73.9]  ESRD (end stage renal disease) (HCC) [N18.6]  Diabetic foot infection (HCC) [E11.628, L08.9]  Anemia, unspecified type [D64.9]  Procedure(s) (LRB):  Right foot partial third ray amputation, irrigation and debridement of right foot wounds (Right) 1 Day Post-Op   Precautions: Weight Bearing, Fall Risk, Right Lower Extremity Weight Bearing: Non Weight Bearing    ASSESSMENT :  Pt cleared for OT re-eval and pt agreeable to participate. Pt reports feeling weak and dizzy from surgery and dialysis today. Pt reports that PT just left the

## 2024-07-16 NOTE — OP NOTE
Operative Note      Patient: Jigna Conner  YOB: 1955  MRN: [id number]    Date of Procedure: 7/15/2024    Pre-Op Diagnosis:  Right foot third toe gangrene with osteomyelitis, Wounds at left medial foot and second ray amputation stump    Post-Op Diagnosis: Same       Procedure(s):  Right foot partial third ray amputation, irrigation and debridement of right foot wounds    Surgeon(s):  Marcin Gabriel DPM    Assistant:   Surgical Assistant: Isis Barbosa    Anesthesia: Monitor Anesthesia Care with local    Estimated Blood Loss (mL): Minimal    Complications: None    Specimens:   ID Type Source Tests Collected by Time Destination   1 : clean margins right third metatarsal Tissue Tissue CULTURE, ANAEROBIC, CULTURE, TISSUE Marcin Gabriel DPM 7/15/2024 1715    A : Right third toe and metatarsal Tissue Tissue SURGICAL PATHOLOGY Marcin Gabriel DPM 7/15/2024 1705    B : clean margins right third metatarsal Tissue Tissue SURGICAL PATHOLOGY Marcin Gabriel DPM 7/15/2024 1714        Implants:  * No implants in log *      Drains:   Negative Pressure Wound Therapy Foot Right (Active)   $ Standard NPWT >50 sq cm PER TX $ Yes 07/01/24 1443   Wound Type Surgical 07/11/24 0840   Unit Type Ulta 07/11/24 0840   Dressing Type Black Foam 07/11/24 0840   Number of pieces used 3 07/08/24 1852   Number of pieces removed 4 07/08/24 1852   Cycle Continuous 07/10/24 1930   Target Pressure (mmHg) 125 07/11/24 0840   Irrigation Solution Sodium chloride 0.9% 07/10/24 1930   Canister changed? Yes 07/08/24 1852   Dressing Status Clean, dry & intact 07/11/24 0840   Dressing Changed Other (Comment) 07/11/24 0840   Drainage Amount Moderate 07/11/24 0840   Drainage Description Serosanguinous 07/11/24 0840   Dressing Change Due 07/12/24 07/11/24 0840   Output (ml) 75 ml 07/10/24 1930   Wound Assessment Exposed structure bone;Erythema 07/10/24 1930   Laine-wound Assessment Hyperpigmented 07/10/24 1930   Shape irregular  wound and sharp excisional debridement performed down to and including deep fascia/capsule and all devitalized, necrotic tissue excised and exposed bleeding tissue. Racket shaped incision performed at base of third toe with # 10 blade and it was deepened down to bone and toe as freed up at MTPJ and it was disarticulated and sent to pathology. Then capsular incision performed at distal third metatarsal and it was exposed. We performed osteotomy to distal third metatarsal using sagittal bone saw and it was resected. We irrigated surgical site with irrisept solution and clean margin obtained from third metatarsal stump and sent to micro and pathology. Medial wound noted about 10 x 4.5 cm and 1 cm deep and third and second ray amputation stump wound measure 5 x 3 cm and 3 cm deep. Surgical sites packed with betadine soaked gauze and dressed with 4 x 4, kerlex and ace bandage.     Patient tolerated procedure and anesthesia well. She was transported to recovery room with stable vitals.     Electronically signed by Marcin Gabriel DPM on 7/16/2024 at 3:37 PM

## 2024-07-16 NOTE — PROGRESS NOTES
4 Eyes Skin Assessment     NAME:  Jigna Conner  YOB: 1955  MEDICAL RECORD NUMBER:  [id number]    The patient is being assessed for  Shift Handoff    I agree that at least one RN has performed a thorough Head to Toe Skin Assessment on the patient. ALL assessment sites listed below have been assessed.      Areas assessed by both nurses:    Head, Face, Ears, Shoulders, Back, Chest, Arms, Elbows, Hands, Sacrum. Buttock, Coccyx, Ischium, Legs. Feet and Heels, and Under Medical Devices         Does the Patient have a Wound? Yes wound(s) were present on assessment. LDA wound assessment was Initiated and completed by RN       Vinay Prevention initiated by RN: Yes  Wound Care Orders initiated by RN: Yes    Pressure Injury (Stage 3,4, Unstageable, DTI, NWPT, and Complex wounds) if present, place Wound referral order by RN under : Yes    New Ostomies, if present place, Ostomy referral order under : Yes     Nurse 1 eSignature: Electronically signed by Fernanda Ward RN on 7/15/24 at 8;15 PM EDT    **SHARE this note so that the co-signing nurse can place an eSignature**    Nurse 2 eSignature: {Esignature:[id number]}

## 2024-07-16 NOTE — PROGRESS NOTES
Pt blood sugar 61, repeat 67,  no acute dfistress noted,orange juice given,bs recheck 75 at 0726, glucose tabs /po given at 0731, notified Dr Grant  at 0731,orders  to  recheck blood sugar, day nurse aware.

## 2024-07-16 NOTE — PROGRESS NOTES
[facility]  (A Division of [facility])    Follow-up Note      Date of Admission: 2024       Date of Note:  2024          Current Antimicrobials:    Prior Antimicrobials:  Zosyn  to present  Vancomycin  to present      Assessment:         Osteomyelitis of the right foot:   - wound culture with mixed marimar including gram-negative rods   -Status post I&D right foot abscess and second metatarsal debridement on .  Diabetes mellitus type 2  Peripheral artery disease: Status post angiography   End-stage renal disease on dialysis    Plan:   Continue current wound care with wound VAC-will need home health  Will plan to transition to vancomycin plus cefepime upon discharge.   - Stop date 24   Trend CBC, BMP, ESR, CRP at least twice weekly  Tight glucose control for optimal wound healing.    Discussed with Dr. John  Follow-up as outpatient 2 to 3 weeks    DO [facility]  [address], [address] [place] [address]  Office: [phone], Ext [phone]       Microbiology:   right foot tissue culture: Diphtheroids   wound culture: Heavy mixed skin marimar with gram-negative rods   blood cultures no growth to date x 2    Lines / Catheters:  Peripheral    Subjective:   Patient seen and examined in HD this afternoon.   Frustrated about discharge and lack of resources.     Tmax 98.6  WBC 11.3    Objective:      BP (!) 136/58   Pulse 61   Temp 97.2 °F (36.2 °C) (Oral)   Resp 18   Ht 1.624 m (5' 3.94\")   Wt 97.4 kg (214 lb 11.7 oz)   SpO2 98%   BMI 36.93 kg/m²   Temp (24hrs), Av.8 °F (36.6 °C), Min:96.8 °F (36 °C), Max:98.6 °F (37 °C)        General:   awake alert and oriented, non-toxic   Skin:    dry and warm, dressings in place over right foot    HEENT:  No scleral icterus or pallor; oral mucosa moist, lips moist   Lymph Nodes:   not assessed today   Lungs:   Non-labored; bilaterally  clear to aspiration- no crackles wheezes rales or rhonchi   Heart:  RRR, s1 and s2; no murmurs rubs or gallops; no edema, + pedal pulses   Abdomen:  soft, non-distended, active bowel sounds, non-tender   Genitourinary:  deferred   Extremities:   average muscle tone; no contractures, no joint effusions; surgical dressings in place over left groin   Neurologic:  No gross focal motor or sensory abnormalities; CN 2-12 intact;  Follows commands.    Psychiatric:   appropriate and interactive.         Lab results:  Chemistry  Recent Labs     07/15/24  0508 07/16/24  0243    136   K 4.4 5.1    104   CO2 27 23   BUN 30* 34*   GLOB 4.4*  --          CBC w/ Diff  Recent Labs     07/15/24  0508 07/16/24 0243   WBC 8.5 11.3   RBC 3.16* 3.09*   HGB 8.0* 7.8*   HCT 26.0* 25.7*    349         Microbiology  Results       Procedure Component Value Units Date/Time    Culture, Anaerobic [[id number]] Collected: 07/15/24 1715    Order Status: Sent Specimen: Tissue Updated: 07/16/24 0042    Culture, Tissue [[id number]] Collected: 07/15/24 1715    Order Status: Completed Specimen: Tissue Updated: 07/16/24 1430     Special Requests NO SPECIAL REQUESTS        Gram Stain RARE WBCS SEEN         NO ORGANISMS SEEN        Culture NO GROWTH THUS FAR                 John Trivedi DO   7/16/2024

## 2024-07-17 LAB
ALBUMIN SERPL-MCNC: 2 G/DL (ref 3.4–5)
ANION GAP SERPL CALC-SCNC: 8 MMOL/L (ref 3–18)
BUN SERPL-MCNC: 22 MG/DL (ref 7–18)
BUN/CREAT SERPL: 5 (ref 12–20)
CALCIUM SERPL-MCNC: 9.3 MG/DL (ref 8.5–10.1)
CHLORIDE SERPL-SCNC: 102 MMOL/L (ref 100–111)
CO2 SERPL-SCNC: 28 MMOL/L (ref 21–32)
CREAT SERPL-MCNC: 4.61 MG/DL (ref 0.6–1.3)
GLUCOSE BLD STRIP.AUTO-MCNC: 101 MG/DL (ref 70–110)
GLUCOSE BLD STRIP.AUTO-MCNC: 118 MG/DL (ref 70–110)
GLUCOSE BLD STRIP.AUTO-MCNC: 145 MG/DL (ref 70–110)
GLUCOSE BLD STRIP.AUTO-MCNC: 77 MG/DL (ref 70–110)
GLUCOSE SERPL-MCNC: 106 MG/DL (ref 74–99)
PHOSPHATE SERPL-MCNC: 4.1 MG/DL (ref 2.5–4.9)
POTASSIUM SERPL-SCNC: 4.3 MMOL/L (ref 3.5–5.5)
SODIUM SERPL-SCNC: 138 MMOL/L (ref 136–145)

## 2024-07-17 PROCEDURE — 36415 COLL VENOUS BLD VENIPUNCTURE: CPT

## 2024-07-17 PROCEDURE — 6360000002 HC RX W HCPCS: Performed by: EMERGENCY MEDICINE

## 2024-07-17 PROCEDURE — 97535 SELF CARE MNGMENT TRAINING: CPT

## 2024-07-17 PROCEDURE — 1100000000 HC RM PRIVATE

## 2024-07-17 PROCEDURE — 6370000000 HC RX 637 (ALT 250 FOR IP): Performed by: INTERNAL MEDICINE

## 2024-07-17 PROCEDURE — 6370000000 HC RX 637 (ALT 250 FOR IP): Performed by: EMERGENCY MEDICINE

## 2024-07-17 PROCEDURE — 6360000002 HC RX W HCPCS: Performed by: INTERNAL MEDICINE

## 2024-07-17 PROCEDURE — 94761 N-INVAS EAR/PLS OXIMETRY MLT: CPT

## 2024-07-17 PROCEDURE — 99232 SBSQ HOSP IP/OBS MODERATE 35: CPT | Performed by: INTERNAL MEDICINE

## 2024-07-17 PROCEDURE — 80069 RENAL FUNCTION PANEL: CPT

## 2024-07-17 PROCEDURE — 6370000000 HC RX 637 (ALT 250 FOR IP): Performed by: PHYSICIAN ASSISTANT

## 2024-07-17 PROCEDURE — 97530 THERAPEUTIC ACTIVITIES: CPT

## 2024-07-17 PROCEDURE — 2580000003 HC RX 258: Performed by: INTERNAL MEDICINE

## 2024-07-17 PROCEDURE — 82962 GLUCOSE BLOOD TEST: CPT

## 2024-07-17 PROCEDURE — 2580000003 HC RX 258: Performed by: PHYSICIAN ASSISTANT

## 2024-07-17 RX ADMIN — SODIUM CHLORIDE, PRESERVATIVE FREE 10 ML: 5 INJECTION INTRAVENOUS at 21:19

## 2024-07-17 RX ADMIN — MICONAZOLE NITRATE 1 APPLICATOR: 20 CREAM VAGINAL at 21:40

## 2024-07-17 RX ADMIN — HEPARIN SODIUM 5000 UNITS: 5000 INJECTION INTRAVENOUS; SUBCUTANEOUS at 09:05

## 2024-07-17 RX ADMIN — ACETAMINOPHEN 325MG 650 MG: 325 TABLET ORAL at 16:47

## 2024-07-17 RX ADMIN — INSULIN GLARGINE 10 UNITS: 100 INJECTION, SOLUTION SUBCUTANEOUS at 21:19

## 2024-07-17 RX ADMIN — CARVEDILOL 6.25 MG: 6.25 TABLET, FILM COATED ORAL at 16:47

## 2024-07-17 RX ADMIN — HEPARIN SODIUM 5000 UNITS: 5000 INJECTION INTRAVENOUS; SUBCUTANEOUS at 14:30

## 2024-07-17 RX ADMIN — DOCUSATE SODIUM 50 MG: 50 LIQUID ORAL at 09:05

## 2024-07-17 RX ADMIN — PIPERACILLIN AND TAZOBACTAM 3375 MG: 3; .375 INJECTION, POWDER, FOR SOLUTION INTRAVENOUS at 16:54

## 2024-07-17 RX ADMIN — HEPARIN SODIUM 5000 UNITS: 5000 INJECTION INTRAVENOUS; SUBCUTANEOUS at 21:19

## 2024-07-17 RX ADMIN — CARVEDILOL 6.25 MG: 6.25 TABLET, FILM COATED ORAL at 09:05

## 2024-07-17 RX ADMIN — SEVELAMER CARBONATE 800 MG: 800 TABLET, FILM COATED ORAL at 12:00

## 2024-07-17 RX ADMIN — ASPIRIN 81 MG CHEWABLE TABLET 81 MG: 81 TABLET CHEWABLE at 09:05

## 2024-07-17 RX ADMIN — LATANOPROST 1 DROP: 50 SOLUTION/ DROPS OPHTHALMIC at 21:33

## 2024-07-17 RX ADMIN — OXYCODONE AND ACETAMINOPHEN 1 TABLET: 7.5; 325 TABLET ORAL at 21:30

## 2024-07-17 RX ADMIN — DOCUSATE SODIUM 50 MG: 50 LIQUID ORAL at 21:19

## 2024-07-17 RX ADMIN — ATORVASTATIN CALCIUM 10 MG: 10 TABLET, FILM COATED ORAL at 09:05

## 2024-07-17 RX ADMIN — SACUBITRIL AND VALSARTAN 1 TABLET: 49; 51 TABLET, FILM COATED ORAL at 09:05

## 2024-07-17 RX ADMIN — SEVELAMER CARBONATE 800 MG: 800 TABLET, FILM COATED ORAL at 16:47

## 2024-07-17 RX ADMIN — Medication 1 CAPSULE: at 09:05

## 2024-07-17 RX ADMIN — SODIUM CHLORIDE, PRESERVATIVE FREE 10 ML: 5 INJECTION INTRAVENOUS at 09:00

## 2024-07-17 RX ADMIN — PIPERACILLIN AND TAZOBACTAM 3375 MG: 3; .375 INJECTION, POWDER, FOR SOLUTION INTRAVENOUS at 09:08

## 2024-07-17 RX ADMIN — PANTOPRAZOLE SODIUM 40 MG: 40 TABLET, DELAYED RELEASE ORAL at 09:05

## 2024-07-17 RX ADMIN — SODIUM CHLORIDE, PRESERVATIVE FREE 10 ML: 5 INJECTION INTRAVENOUS at 21:31

## 2024-07-17 RX ADMIN — TIMOLOL MALEATE 1 DROP: 5 SOLUTION OPHTHALMIC at 09:13

## 2024-07-17 RX ADMIN — TIMOLOL MALEATE 1 DROP: 5 SOLUTION OPHTHALMIC at 21:31

## 2024-07-17 RX ADMIN — SACUBITRIL AND VALSARTAN 1 TABLET: 49; 51 TABLET, FILM COATED ORAL at 21:30

## 2024-07-17 RX ADMIN — SEVELAMER CARBONATE 800 MG: 800 TABLET, FILM COATED ORAL at 09:05

## 2024-07-17 ASSESSMENT — PAIN DESCRIPTION - ORIENTATION
ORIENTATION: RIGHT
ORIENTATION: RIGHT

## 2024-07-17 ASSESSMENT — PAIN DESCRIPTION - LOCATION
LOCATION: FOOT
LOCATION: FOOT

## 2024-07-17 ASSESSMENT — PAIN SCALES - GENERAL
PAINLEVEL_OUTOF10: 0
PAINLEVEL_OUTOF10: 6
PAINLEVEL_OUTOF10: 0
PAINLEVEL_OUTOF10: 4
PAINLEVEL_OUTOF10: 0
PAINLEVEL_OUTOF10: 0

## 2024-07-17 ASSESSMENT — PAIN DESCRIPTION - PAIN TYPE: TYPE: SURGICAL PAIN

## 2024-07-17 ASSESSMENT — PAIN DESCRIPTION - ONSET: ONSET: GRADUAL

## 2024-07-17 ASSESSMENT — PAIN DESCRIPTION - DESCRIPTORS
DESCRIPTORS: THROBBING;BURNING
DESCRIPTORS: TENDER

## 2024-07-17 ASSESSMENT — PAIN DESCRIPTION - FREQUENCY: FREQUENCY: INTERMITTENT

## 2024-07-17 NOTE — CARE COORDINATION
SNF Authorization  7/17/2024, 12:52 PM    Patient Name: Jigna Conner                   YOB: 1955    Received via [employer]  Auth ID:  [id number]  Service:  [facility]  Approval Dates:  7/17/2024-7/19/2024  Next Review Date:  7/19/2024    Larissa Bello  Case Management Department  Ph: [phone]

## 2024-07-17 NOTE — CARE COORDINATION
Call made to [place] transportation, unable to transport patient on 7/18/2024 at 2:00 pm, they can transport patient on 7/19/2024.  Call made to [facility] transportation [phone], will transport patient on 7/18/2024 at 10:00 am.

## 2024-07-17 NOTE — PROGRESS NOTES
[facility]  Progress Note    Patient: Jigna Conner Age: 69 y.o. : 1955 MR#: [id number] SSN: [id number]  Date/Time: 2024     Subjective:   Patient doing well with no pain complaints.  No wound VAC in place.  Patient undergoing authorization for skilled nursing placement.    Dispo: Snf  DC 1-2 days    Review of systems  General: No fevers or chills.  Cardiovascular: No chest pain or pressure. No palpitations.   Pulmonary: No shortness of breath, cough or wheeze.   Gastrointestinal: No abdominal pain, nausea, vomiting or diarrhea.   Genitourinary: No urinary frequency, urgency, hesitancy or dysuria.   Musculoskeletal: No joint or muscle pain, no back pain, no recent trauma.    Neurologic: No headache, numbness, tingling or weakness.   Assessment/Plan:   Diabetic foot wound with osteomyelitis of the right foot  ESRD on hemodialysis  PAD  Type 2 diabetes  Chronic HFpEF  HTN     Patient admitted to [facility].  Osteomyelitis of the right foot,  wound culture with mixed marimar including gram-negative rods  Status post I&D right foot abscess and second metatarsal debridement on 2024  Per podiatry patient received amputation of 3rd toe.  Patient currently on Zosyn and vancomycin per infectious disease, plan to transition to vanco and cefepime at [place]  Wound VAC has been discontinued  Podiatry following  Continue aspirin for PAD  Increased Entresto for uncontrolled BP  Hydralazine PRN for elevated BP  POCT glucose checks with sliding scale insulin coverage  Per nephrology Epogen and Renvela      I spent 40 minutes with the patient in face-to-face consultation, of which greater than 50% was spent in counseling and coordination of care as described above.     Case discussed with:  [x]Patient  []Family  []Nursing  []Case Management  DVT Prophylaxis:  []Lovenox  [x]Hep SQ  []SCDs  []Coumadin   []Eliquis/Xarelto     Objective:   VS: BP (!) 171/62   Pulse 71   Temp 99

## 2024-07-17 NOTE — PROGRESS NOTES
[facility]  (A Division of [facility])    Follow-up Note      Date of Admission: 2024       Date of Note:  2024          Current Antimicrobials:    Prior Antimicrobials:  Zosyn  to present  Vancomycin  to present      Assessment:         Osteomyelitis of the right foot:   - wound culture with mixed marimar including gram-negative rods   -Status post I&D right foot abscess and second metatarsal debridement on .  Diabetes mellitus type 2  Peripheral artery disease: Status post angiography   End-stage renal disease on dialysis    Plan:   Continue current wound care     Will plan to transition to vancomycin plus cefepime upon discharge (to be given with HD).   - Stop date 24   Trend CBC, BMP, ESR, CRP at least twice weekly  Tight glucose control for optimal wound healing.    Discussed with Dr. Dora Carpio for discharge from ID standpoint    John Trivedi DO  [facility]  [address], [address], [place] [address]  Office: [phone], Ext [phone]       Microbiology:   right foot tissue culture: Diphtheroids   wound culture: Heavy mixed skin marimar with gram-negative rods   blood cultures no growth to date x 2    Lines / Catheters:  Peripheral    Subjective:   Talking to Case manger about options.   Frustrated about discharge and lack of resources.     Tmax 99.0  WBC none today    Objective:      BP (!) 171/62   Pulse 71   Temp 99 °F (37.2 °C) (Oral)   Resp 20   Ht 1.624 m (5' 3.94\")   Wt 97.4 kg (214 lb 11.7 oz)   SpO2 97%   BMI 36.93 kg/m²   Temp (24hrs), Av.6 °F (37 °C), Min:98 °F (36.7 °C), Max:99 °F (37.2 °C)        General:   awake alert and oriented, non-toxic   Skin:    dry and warm, dressings in place over right foot    HEENT:  No scleral icterus or pallor; oral mucosa moist, lips moist   Lymph Nodes:   not assessed today   Lungs:   Non-labored; bilaterally clear to aspiration- no

## 2024-07-17 NOTE — CARE COORDINATION
Requested Case Management specialist to assist with transportation to [facility].  [place] [address] and phone number is [phone]  Patient will require BLS transport.   Pt requires Stretcher If stretcher, reason: diabetic foot wound,  osteomyelitis of right foot, right foot abscess, amputation 3rd toe chronic heart failure, scoliosis, impaired mobility  Patient is currently requiring oxygen No   Height:5'3\"   Weight: 214 Ib  Pt is on isolation: No  Is the pt ready now? No  Requested time:  tomorrow Thursday 7/18/24 at 2pm  PCS Faxed:  No  Insurance verified on face sheet: Yes  Auth needed for transport: No  CM completed PCS/ Envelope and placed on chart.         1520:  Notified pt's nurse Alissa and CCL Nadya of pt's discharge for tomorrow to SNF and need for pt to get dialysis beofe leaving.      Sonia Arevalo, WENDYN RN  Care Management

## 2024-07-17 NOTE — PROGRESS NOTES
Patient is schedule for discharge tomorrow. Nurse coordinate patient to have dialysis tomorrow 7/18/24 first shift prior to discharge. Primary nurse Sheri and charge nurse Lalitha made aware.

## 2024-07-17 NOTE — PLAN OF CARE
on nasal continuous positive airway pressure, respiratory therapy assess nares and determine need for appliance change or resting period.  Outcome: Progressing     Problem: Occupational Therapy - Adult  Goal: By Discharge: Performs self-care activities at highest level of function for planned discharge setting.  See evaluation for individualized goals.  Description: Occupational Therapy Goals:  Initiated 6/28/2024 to be met within 7-10 days.  Re-eval completed 6/9/24, pt making consistent progress. Goals continue to be appropriate.   Re-eval completed 7/16/24, pt making consistent progress. Goals continue to be appropriate.     1.  Patient will perform bathing with supervision/set-up.   2.  Patient will perform lower body dressing with supervision/set-up.  3.  Patient will perform BSC transfers with supervision/set-up using LRAD (sliding board vs FWW).  4.  Patient will perform all aspects of toileting with supervision/set-up.  5.  Patient will participate in upper extremity therapeutic exercise/activities with supervision/set-up for 8 minutes to improve endurance and UB strength needed for ADLs    6.  Patient will utilize energy conservation techniques during functional activities with verbal cues.    PLOF: Pt lives with spouse in 1[place] with ramp. Pt is independent with ADLs and functional mobility. Pt's spouse is disabled and has PCA assisting him with ADLs.  7/16/2024 1443 by Lauren Ramirez, OT  Outcome: Progressing     Problem: Cardiovascular - Adult  Goal: Maintains optimal cardiac output and hemodynamic stability  Outcome: Progressing     Problem: Skin/Tissue Integrity - Adult  Goal: Skin integrity remains intact  Outcome: Progressing  Goal: Incisions, wounds, or drain sites healing without S/S of infection  Outcome: Progressing     Problem: Musculoskeletal - Adult  Goal: Return mobility to safest level of function  Outcome: Progressing  Goal: Maintain proper alignment of affected body part  Outcome:  Progressing  Goal: Return ADL status to a safe level of function  Outcome: Progressing     Problem: Gastrointestinal - Adult  Goal: Minimal or absence of nausea and vomiting  Outcome: Progressing  Goal: Maintains or returns to baseline bowel function  Outcome: Progressing  Goal: Maintains adequate nutritional intake  Outcome: Progressing  Goal: Establish and maintain optimal ostomy function  Outcome: Progressing

## 2024-07-17 NOTE — PLAN OF CARE
Problem: Occupational Therapy - Adult  Goal: By Discharge: Performs self-care activities at highest level of function for planned discharge setting.  See evaluation for individualized goals.  Description: Occupational Therapy Goals:  Initiated 6/28/2024 to be met within 7-10 days.  Re-eval completed 6/9/24, pt making consistent progress. Goals continue to be appropriate.   Re-eval completed 7/16/24, pt making consistent progress. Goals continue to be appropriate.     1.  Patient will perform bathing with supervision/set-up.   2.  Patient will perform lower body dressing with supervision/set-up.  3.  Patient will perform BSC transfers with supervision/set-up using LRAD (sliding board vs FWW).  4.  Patient will perform all aspects of toileting with supervision/set-up.  5.  Patient will participate in upper extremity therapeutic exercise/activities with supervision/set-up for 8 minutes to improve endurance and UB strength needed for ADLs    6.  Patient will utilize energy conservation techniques during functional activities with verbal cues.    PLOF: Pt lives with spouse in 1[place] with ramp. Pt is independent with ADLs and functional mobility. Pt's spouse is disabled and has PCA assisting him with ADLs.  Outcome: Progressing   OCCUPATIONAL THERAPY TREATMENT    Patient: Jigna Conner (69 y.o. female)  Date: 7/17/2024  Diagnosis: Hypokalemia [E87.6]  Hyperglycemia [R73.9]  ESRD (end stage renal disease) (HCC) [N18.6]  Diabetic foot infection (HCC) [E11.628, L08.9]  Anemia, unspecified type [D64.9] Diabetic foot infection (HCC)  Procedure(s) (LRB):  Right foot partial third ray amputation, irrigation and debridement of right foot wounds (Right) 2 Days Post-Op  Precautions: Weight Bearing, Fall Risk, Right Lower Extremity Weight Bearing: Non Weight Bearing,  ,  ,  ,  ,  ,      Chart, occupational therapy assessment, plan of care, and goals were reviewed.  ASSESSMENT:  Pt presented supine in bed upon entry and

## 2024-07-17 NOTE — CARE COORDINATION
Pre-cert Request   7/17/2024, 11:41 AM    Patient Name: Jigna Conner                   YOB: 1955    Submitted via [employer].  Requested Facility:  [facility].   Anticipated Admit Date to SNF:  7/17/2024  Pending Auth #:  [id number]    Larissa Bello  Case Management Department  Ph: [phone]

## 2024-07-17 NOTE — PROGRESS NOTES
Comprehensive Nutrition Assessment    Type and Reason for Visit:  Reassess, NPO/Clear Liquid    Nutrition Recommendations/Plan:   Modify current diet- remove protein restriction r/t increased needs 2/2 dialysis pt.   Continue to monitor PO intake/tolerance of meals, weight, labs, and POC while admitted.      Malnutrition Assessment:  Malnutrition Status:  Insufficient data (07/17/24 1315)    Context:  Chronic Illness       Nutrition Assessment:    Admitted for diabetic foot infection, osteomyelitis. S/p right partial third ray amputation, irrigation and debridement of right foot wounds 7/15. Last dialyzed 7/16- 2L removed. Diet advanced this morning to  regular, carb controlled with protein restriction. Plan to remove protein restriction r/t pt with increased needs 2/2 dialysis pt with wounds.    Nutrition Related Findings:    Last BM: 7/14. Edema: none. Labs: BUN/Cr 22/4.61 H, GFR 10 L. POC glucose  x 24-hrs. Pertinent meds: renvela, zosyn, protonix, lactobacillus, lantus, retacrit, colace. Wound Type: Multiple, Surgical Incision       Current Nutrition Intake & Therapies:    Average Meal Intake: Unable to assess (diet was NPO. advanced this morning)  Average Supplements Intake: None Ordered  ADULT DIET; Regular; 4 carb choices (60 gm/meal); 60 to 80 gm    Anthropometric Measures:  Height: 162.4 cm (5' 3.94\")  Ideal Body Weight (IBW): 120 lbs (55 kg)    Admission Body Weight: 88.3 kg (194 lb 10.7 oz)  Current Body Weight: 97.4 kg (214 lb 11.7 oz), 178.9 % IBW. Weight Source: Bed Scale  Current BMI (kg/m2): 36.9  Usual Body Weight: 93 kg (205 lb) (per EMRq)  % Weight Change (Calculated): -2.8  Weight Adjustment For:  (s/p right partial third ray amputation)  BMI Categories: Obese Class 2 (BMI 35.0 -39.9)    Estimated Daily Nutrient Needs:  Energy Requirements Based On: Formula  Weight Used for Energy Requirements: Admission  Energy (kcal/day): 7025-5494 (MSJ x 1.1-1.2)  Weight Used for Protein Requirements:

## 2024-07-17 NOTE — PROGRESS NOTES
[facility]   Pharmacy Pharmacokinetic Monitoring Service - Vancomycin    Indication: DM foot infection  Target Pre-Dialysis Concentration: 21-24 mg/L  Day of Therapy: 23  Continue through 8/7/24  Additional Antimicrobials: pip-tazo    Pertinent Laboratory Values:   Wt Readings from Last 1 Encounters:   07/16/24 97.4 kg (214 lb 11.7 oz)     Temp Readings from Last 1 Encounters:   07/17/24 98.8 °F (37.1 °C) (Oral)     Recent Labs     07/15/24  0508 07/16/24  0243   WBC 8.5 11.3     Pertinent Cultures:  Date Source Results   6/25 blood NGF   6/26 wound GPC, GPR   6/26 tissue Diphtheroids    MRSA Nasal Swab: NA    Assessment:  Date Current Dose Concentration (mg/L) Dialysis Session   6/25 2,000 mg x1 - no   6/26 - - no   6/27 - 27.6 yes   6/28 - - no   6/29 Not administered 15.4 yes   6/30 1,000 mg x1 - no   7/1 - - no   7/2 750 mg x1 19.1 yes   7/3 - - no   7/4 750 mg x1 21.3 yes   7/5 - - no   7/6 750 mg x 1 20.4 yes   7/7 - - no   7/8 - - no   7/9 1,000 mg x1 16.6 yes   7/10 - - no   7/11 1,250 mg x1 17.5 yes   7/12 - - no   7/13 - 24.9 yes   7/14 500mg x1 - no   7/15 -  no   7/16 - 23.7 yes     Plan:  ESRD on HD q TTS - dose by level  No dose today  Vancomycin level in a.m. 7/18  Pharmacy will continue to monitor patient and adjust therapy as indicated    Thank you for the consult,  Heladio Farrell [facility]  7/17/2024

## 2024-07-17 NOTE — CARE COORDINATION
Chart reviewed.  Reviewed [employer]port  Contacted Minnie of [facility].  They can still accept pt.  Met with pt and informed her that [facility] declined her again because of cost of her care.  Informed her that [facility] still open to accept her.  Pt stated she wants [facility] first and then [facility].  Spoke with Gabbi Grewal of [facility]  Sent message to Larissa Bello of [facility] Case Management to initiate auth.            WENDY MccarthyN RN  Care Management

## 2024-07-17 NOTE — CARE COORDINATION
Patient received auth to [facility].  Sent message to Dr. John  Sent message to Minnie of [facility] in [facility].  Called Minnie and she stated they can receive pt tomorrow and for pt to receive dialysis before transferring.    Sent message to Dr. John.    Met with pt and pt is in agreement with being d/c'd to [facility] for rehab        ALY Mccarthy RN  Care Management

## 2024-07-17 NOTE — PROGRESS NOTES
Progress Note    68-year-old female with past medical history of ESRD, diabetes, heart failure with preserved ejection fraction admitted for diabetic foot infection, following for ESRD management.  Subjective      Overnight event noted  No complaints offered  No chest pain or sob reported      IMPRESSION:   ESRD, Tuesday Thursday Saturday,   Access left arm AV fistula  Right foot wound infection,  Anemia, received blood transfusion during this admission  Diabetes  Hypertension  Heart failure with preserved ejection fraction.   PLAN:   Dialysis per schedule. Added heparin via circuit to prevent clotting in circuit.  CM will fax the abx to her dialysis unit.  aware. Vancomycin 750 mg, cefepime 1 g with each session till 8/7.   Epogen for anemia.  Renvela for phos binding.   Adjust medication per ESRD status.       Current Facility-Administered Medications   Medication Dose Route Frequency    carvedilol (COREG) tablet 6.25 mg  6.25 mg Oral BID WC    sacubitril-valsartan (ENTRESTO) 49-51 MG per tablet 1 tablet  1 tablet Oral BID    hydrALAZINE (APRESOLINE) injection 10 mg  10 mg IntraVENous Q6H PRN    heparin (porcine) injection 1,000 Units  1,000 Units IntraVENous Once    epoetin laurence-epbx (RETACRIT) 5,000 Units combo injection  5,000 Units SubCUTAneous Once per day on Tue Thu Sat    0.9 % sodium chloride infusion   IntraVENous PRN    miconazole (MICOTIN) 2 % vaginal cream 1 applicator  1 applicator Vaginal Nightly    sevelamer (RENVELA) tablet 800 mg  800 mg Oral TID WC    heparin (porcine) injection 5,000 Units  5,000 Units SubCUTAneous 3 times per day    lactobacillus (CULTURELLE) capsule 1 capsule  1 capsule Oral Daily with breakfast    oxyCODONE-acetaminophen (PERCOCET) 7.5-325 MG per tablet 1 tablet  1 tablet Oral Q6H PRN    naloxone (NARCAN) injection 0.4 mg  0.4 mg IntraVENous PRN    ipratropium 0.5 mg-albuterol 2.5 mg (DUONEB) nebulizer solution 1 Dose  1 Dose Inhalation Q4H PRN    melatonin

## 2024-07-17 NOTE — CARE COORDINATION
Call made to [facility] [phone], they have 5:00 pm availability to transport patient on 7/18/2024.

## 2024-07-18 VITALS
HEIGHT: 64 IN | SYSTOLIC BLOOD PRESSURE: 168 MMHG | BODY MASS INDEX: 36.66 KG/M2 | DIASTOLIC BLOOD PRESSURE: 77 MMHG | OXYGEN SATURATION: 100 % | TEMPERATURE: 98 F | HEART RATE: 63 BPM | RESPIRATION RATE: 18 BRPM | WEIGHT: 214.73 LBS

## 2024-07-18 LAB
ALBUMIN SERPL-MCNC: 2.1 G/DL (ref 3.4–5)
ALBUMIN/GLOB SERPL: 0.5 (ref 0.8–1.7)
ALP SERPL-CCNC: 49 U/L (ref 45–117)
ALT SERPL-CCNC: 7 U/L (ref 13–56)
ANION GAP SERPL CALC-SCNC: 5 MMOL/L (ref 3–18)
AST SERPL-CCNC: 7 U/L (ref 10–38)
BACTERIA SPEC CULT: NORMAL
BASOPHILS # BLD: 0 K/UL (ref 0–0.1)
BASOPHILS NFR BLD: 1 % (ref 0–2)
BILIRUB SERPL-MCNC: 0.5 MG/DL (ref 0.2–1)
BUN SERPL-MCNC: 30 MG/DL (ref 7–18)
BUN/CREAT SERPL: 5 (ref 12–20)
CALCIUM SERPL-MCNC: 10.3 MG/DL (ref 8.5–10.1)
CHLORIDE SERPL-SCNC: 102 MMOL/L (ref 100–111)
CO2 SERPL-SCNC: 29 MMOL/L (ref 21–32)
CREAT SERPL-MCNC: 5.87 MG/DL (ref 0.6–1.3)
DIFFERENTIAL METHOD BLD: ABNORMAL
EOSINOPHIL # BLD: 0.4 K/UL (ref 0–0.4)
EOSINOPHIL NFR BLD: 5 % (ref 0–5)
ERYTHROCYTE [DISTWIDTH] IN BLOOD BY AUTOMATED COUNT: 18.7 % (ref 11.6–14.5)
GLOBULIN SER CALC-MCNC: 4.2 G/DL (ref 2–4)
GLUCOSE BLD STRIP.AUTO-MCNC: 100 MG/DL (ref 70–110)
GLUCOSE BLD STRIP.AUTO-MCNC: 62 MG/DL (ref 70–110)
GLUCOSE BLD STRIP.AUTO-MCNC: 63 MG/DL (ref 70–110)
GLUCOSE BLD STRIP.AUTO-MCNC: 74 MG/DL (ref 70–110)
GLUCOSE SERPL-MCNC: 56 MG/DL (ref 74–99)
HCT VFR BLD AUTO: 24.4 % (ref 35–45)
HGB BLD-MCNC: 7.3 G/DL (ref 12–16)
IMM GRANULOCYTES # BLD AUTO: 0 K/UL (ref 0–0.04)
IMM GRANULOCYTES NFR BLD AUTO: 0 % (ref 0–0.5)
LYMPHOCYTES # BLD: 1.3 K/UL (ref 0.9–3.6)
LYMPHOCYTES NFR BLD: 18 % (ref 21–52)
MCH RBC QN AUTO: 24.7 PG (ref 24–34)
MCHC RBC AUTO-ENTMCNC: 29.9 G/DL (ref 31–37)
MCV RBC AUTO: 82.4 FL (ref 78–100)
MONOCYTES # BLD: 0.9 K/UL (ref 0.05–1.2)
MONOCYTES NFR BLD: 12 % (ref 3–10)
NEUTS SEG # BLD: 4.6 K/UL (ref 1.8–8)
NEUTS SEG NFR BLD: 64 % (ref 40–73)
NRBC # BLD: 0 K/UL (ref 0–0.01)
NRBC BLD-RTO: 0 PER 100 WBC
PLATELET # BLD AUTO: 308 K/UL (ref 135–420)
PMV BLD AUTO: 9.6 FL (ref 9.2–11.8)
POTASSIUM SERPL-SCNC: 4.6 MMOL/L (ref 3.5–5.5)
PROT SERPL-MCNC: 6.3 G/DL (ref 6.4–8.2)
RBC # BLD AUTO: 2.96 M/UL (ref 4.2–5.3)
SERVICE CMNT-IMP: NORMAL
SODIUM SERPL-SCNC: 136 MMOL/L (ref 136–145)
VANCOMYCIN SERPL-MCNC: 17.1 UG/ML (ref 5–40)
WBC # BLD AUTO: 7.2 K/UL (ref 4.6–13.2)

## 2024-07-18 PROCEDURE — 6360000002 HC RX W HCPCS: Performed by: PHYSICIAN ASSISTANT

## 2024-07-18 PROCEDURE — 85025 COMPLETE CBC W/AUTO DIFF WBC: CPT

## 2024-07-18 PROCEDURE — 6360000002 HC RX W HCPCS: Performed by: EMERGENCY MEDICINE

## 2024-07-18 PROCEDURE — 82962 GLUCOSE BLOOD TEST: CPT

## 2024-07-18 PROCEDURE — 6370000000 HC RX 637 (ALT 250 FOR IP): Performed by: INTERNAL MEDICINE

## 2024-07-18 PROCEDURE — 99239 HOSP IP/OBS DSCHRG MGMT >30: CPT | Performed by: INTERNAL MEDICINE

## 2024-07-18 PROCEDURE — 2580000003 HC RX 258: Performed by: INTERNAL MEDICINE

## 2024-07-18 PROCEDURE — 80053 COMPREHEN METABOLIC PANEL: CPT

## 2024-07-18 PROCEDURE — 2580000003 HC RX 258: Performed by: PHYSICIAN ASSISTANT

## 2024-07-18 PROCEDURE — 6360000002 HC RX W HCPCS: Performed by: INTERNAL MEDICINE

## 2024-07-18 PROCEDURE — 36415 COLL VENOUS BLD VENIPUNCTURE: CPT

## 2024-07-18 PROCEDURE — 80202 ASSAY OF VANCOMYCIN: CPT

## 2024-07-18 PROCEDURE — 90935 HEMODIALYSIS ONE EVALUATION: CPT

## 2024-07-18 PROCEDURE — 94761 N-INVAS EAR/PLS OXIMETRY MLT: CPT

## 2024-07-18 PROCEDURE — 6370000000 HC RX 637 (ALT 250 FOR IP): Performed by: PHYSICIAN ASSISTANT

## 2024-07-18 RX ORDER — SEVELAMER CARBONATE 800 MG/1
800 TABLET, FILM COATED ORAL
Qty: 90 TABLET | Refills: 3 | Status: SHIPPED | OUTPATIENT
Start: 2024-07-18

## 2024-07-18 RX ORDER — OXYCODONE AND ACETAMINOPHEN 7.5; 325 MG/1; MG/1
1 TABLET ORAL EVERY 8 HOURS PRN
Qty: 9 TABLET | Refills: 0 | Status: SHIPPED | OUTPATIENT
Start: 2024-07-18 | End: 2024-07-21

## 2024-07-18 RX ORDER — CARVEDILOL 6.25 MG/1
6.25 TABLET ORAL 2 TIMES DAILY WITH MEALS
Qty: 60 TABLET | Refills: 3 | Status: SHIPPED | OUTPATIENT
Start: 2024-07-18

## 2024-07-18 RX ORDER — LACTULOSE 10 G/15ML
30 SOLUTION ORAL DAILY PRN
Qty: 473 ML | Refills: 1 | Status: SHIPPED | OUTPATIENT
Start: 2024-07-18

## 2024-07-18 RX ADMIN — PIPERACILLIN AND TAZOBACTAM 3375 MG: 3; .375 INJECTION, POWDER, FOR SOLUTION INTRAVENOUS at 06:38

## 2024-07-18 RX ADMIN — HEPARIN SODIUM 5000 UNITS: 5000 INJECTION INTRAVENOUS; SUBCUTANEOUS at 06:31

## 2024-07-18 RX ADMIN — VANCOMYCIN HYDROCHLORIDE 1000 MG: 1 INJECTION, POWDER, LYOPHILIZED, FOR SOLUTION INTRAVENOUS at 11:30

## 2024-07-18 RX ADMIN — CARVEDILOL 6.25 MG: 6.25 TABLET, FILM COATED ORAL at 06:31

## 2024-07-18 RX ADMIN — SODIUM CHLORIDE, PRESERVATIVE FREE 10 ML: 5 INJECTION INTRAVENOUS at 08:59

## 2024-07-18 RX ADMIN — PANTOPRAZOLE SODIUM 40 MG: 40 TABLET, DELAYED RELEASE ORAL at 06:31

## 2024-07-18 ASSESSMENT — PAIN SCALES - GENERAL
PAINLEVEL_OUTOF10: 0

## 2024-07-18 NOTE — PROGRESS NOTES
0640 Blood sugar 63, Patient asymptomatic   Immediate recheck 62  Patient given 4 oz of cranberry juice    0657 blood sugar 74   Patient given another 4 oz of cranberry juice    Blood sugar 100    Ryley Caruso MD aware of the above

## 2024-07-18 NOTE — PROGRESS NOTES
Podiatry Progress Note    Patient: Jigna Conner               Sex: female          DOA: [unfilled]       YOB: 1955      Age:  69 y.o.        LOS:  LOS: 23 days     POD 3 Days Post-Op  Procedure:   Procedure(s):  Right foot partial third ray amputation, irrigation and debridement of right foot wounds      Subjective:     Patient seen resting quiet and comfortably and no apparent distress. Patient has no new pedal complaint. She is accepted to rehab in [place]. Denies N/V/C/F.      Objective:       Physical Exam:  Right medial foot wound with antibiotic beads, right second and third partial ray amputation stump wound stable. No purulence noted. No erythema or edema noted.       Assessment:     Principal Problem:    Diabetic foot infection (HCC)  Active Problems:    Hypertension    Anemia of renal disease    ESRD on dialysis (HCC)    Diabetes mellitus (HCC)    Hypokalemia    Chronic heart failure with preserved ejection fraction (HFpEF) ([facility])    Coronary artery disease involving native coronary artery of native heart without angina pectoris    History of MI (myocardial infarction)    Class 2 obesity in adult    Constipation    PAD (peripheral artery disease) ([facility])    Osteomyelitis of right foot (HCC)  Resolved Problems:    * No resolved hospital problems. *      Plan/Recommendations/Medical Decision Making:     - Right foot wounds dressed with wet to dry dressings. Wound care instructions in chart.   - Remain NWB to right forefoot.   - Antibiotics per ID recommendation.   - Patient is stable to d/c from foot standpoint. Will see her outpatient in 1 week.

## 2024-07-18 NOTE — CARE COORDINATION
Pt's clinicals including Armaan Vargas and Shikha's notes for IV abx during dialysis  and H&P and facesheet  faxed to [facility] [phone]:  Called [facility] and spoke with Ciro.  She confirmed receipt of pt's clinicals and she was informed pt is discharging today from the hospital.      Sonia Arevalo, WENDYN RN  Care Management

## 2024-07-18 NOTE — DIALYSIS
HD Care plan  Time: 3 hrs  Dialysate: 2 K+  2.5 Ca++  Bath  Net UF: 2 L  Access: Aseptic care for ARELY  AVF  Hemodynamic stability: Maintain BP WNL     Pre Dialysis:  Patient received from Sheri Gatica RN. Patient arrived on a bed, A+O X 4, on RA,  no s/s of acute distress noted. ARELY AVF assessed, no abnormalities noted, bruit and thrill strong. AVF accessed using 15G needles without any difficulty. Good flow achieved from both needles.     Intra Dialysis:  Time out / safety process performed per policy. Tx initiated at 0930.    AVF flowing with ease. For hemodynamic stability UF goal set at 2000 ml as tolerated.  Pt offered assistance with repositioning every 2 hours/prn    Vascular access visible and line connections remained intact throughout entire duration of treatment.   Vital signs checked every 15 mins. WNL  Due Dose of vancomycin 1000 mg / 250 ml  given last of dialysis.     Post Dialysis:  Tx Discontinued at 1230,   Tolerated well despite the clotting, 2.0 L  removed. De-accessed per protocol.    Clot time 8 minutes for arterial, and 8 minutes for venous.   Dry dressings applied. Bruit/Thrill present above and below dressings.  Post Dialysis report given to LUCERO Wade

## 2024-07-18 NOTE — PROGRESS NOTES
Progress Note    68-year-old female with past medical history of ESRD, diabetes, heart failure with preserved ejection fraction admitted for diabetic foot infection, following for ESRD management.  Subjective      Overnight event noted  No complaints offered  No chest pain or sob reported      IMPRESSION:   ESRD, Tuesday Thursday Saturday,   Access left arm AV fistula  Right foot wound infection,  Anemia, received blood transfusion during this admission  Diabetes  Hypertension  Heart failure with preserved ejection fraction.   PLAN:   Dialysis per schedule. Added heparin via circuit to prevent clotting in circuit.  CM will fax the abx to her dialysis unit.  Vancomycin 750 mg, cefepime 1 g with each session till 8/7.   Epogen for anemia.  Renvela for phos binding.   Adjust medication per ESRD status.       Current Facility-Administered Medications   Medication Dose Route Frequency    vancomycin (VANCOCIN) 1,000 mg in sodium chloride 0.9 % 250 mL IVPB ([id number])  1,000 mg IntraVENous Once in dialysis    carvedilol (COREG) tablet 6.25 mg  6.25 mg Oral BID WC    sacubitril-valsartan (ENTRESTO) 49-51 MG per tablet 1 tablet  1 tablet Oral BID    hydrALAZINE (APRESOLINE) injection 10 mg  10 mg IntraVENous Q6H PRN    heparin (porcine) injection 1,000 Units  1,000 Units IntraVENous Once    epoetin laurence-epbx (RETACRIT) 5,000 Units combo injection  5,000 Units SubCUTAneous Once per day on Tue Thu Sat    0.9 % sodium chloride infusion   IntraVENous PRN    miconazole (MICOTIN) 2 % vaginal cream 1 applicator  1 applicator Vaginal Nightly    sevelamer (RENVELA) tablet 800 mg  800 mg Oral TID     heparin (porcine) injection 5,000 Units  5,000 Units SubCUTAneous 3 times per day    lactobacillus (CULTURELLE) capsule 1 capsule  1 capsule Oral Daily with breakfast    oxyCODONE-acetaminophen (PERCOCET) 7.5-325 MG per tablet 1 tablet  1 tablet Oral Q6H PRN    naloxone (NARCAN) injection 0.4 mg  0.4 mg IntraVENous PRN    ipratropium  Units  0-4 Units SubCUTAneous TID WC    insulin lispro (HUMALOG,ADMELOG) injection vial 0-4 Units  0-4 Units SubCUTAneous Nightly    glucose chewable tablet 16 g  4 tablet Oral PRN    dextrose bolus 10% 125 mL  125 mL IntraVENous PRN    Or    dextrose bolus 10% 250 mL  250 mL IntraVENous PRN    glucagon injection 1 mg  1 mg SubCUTAneous PRN    dextrose 10 % infusion   IntraVENous Continuous PRN       Review of Systems:      Complete 10-point review of systems were obtained and discussed in length  with the patient. Complete review of systems was negative/unremarkable  except as mentioned in HPI section.  Data Review:    Labs: Results:       Chemistry Recent Labs     07/16/24  0243 07/17/24  0141 07/18/24  0555    138 136   K 5.1 4.3 4.6    102 102   CO2 23 28 29   BUN 34* 22* 30*   GLOB  --   --  4.2*        CBC w/Diff Recent Labs     07/16/24  0243 07/18/24  0555   WBC 11.3 7.2   RBC 3.09* 2.96*   HGB 7.8* 7.3*   HCT 25.7* 24.4*    308        Coagulation No results for input(s): \"INR\", \"APTT\" in the last 72 hours.    Invalid input(s): \"PTP\"      Iron/Ferritin No results for input(s): \"IRON\" in the last 72 hours.    Invalid input(s): \"TIBCCALC\"     BNP Invalid input(s): \"BNPP\"   Cardiac Enzymes No results for input(s): \"CPK\" in the last 72 hours.    Invalid input(s): \"CKRMB\", \"CKND1\", \"TROIP\", \"ANKIT\"   Liver Enzymes No results for input(s): \"TP\" in the last 72 hours.    Invalid input(s): \"ALB\", \"TBIL\", \"AP\", \"SGOT\", \"GPT\", \"DBIL\"   Thyroid Studies No results found for: \"T4\", \"T3RU\", \"TSH\"      EKG: normal sinus rhythm.    Physical Assessment:   Visit Vitals  BP (!) 152/63   Pulse 60   Temp 98 °F (36.7 °C) (Oral)   Resp 20   Ht 1.624 m (5' 3.94\")   Wt 97.4 kg (214 lb 11.7 oz)   SpO2 100%   BMI 36.93 kg/m²     Weight change:     Intake/Output Summary (Last 24 hours) at 7/18/2024 09  Last data filed at 7/18/2024 0859  Gross per 24 hour   Intake 550 ml   Output 0 ml   Net 550 ml       Physical Exam:  First Degree

## 2024-07-18 NOTE — DISCHARGE INSTRUCTIONS
Hello you came in with an infected foot, podiatry took you for multiple procedures, please follow-up with them in the office.  Please also follow-up with the infectious disease doctor.  Take care

## 2024-07-18 NOTE — PLAN OF CARE
Problem: Discharge Planning  Goal: Discharge to home or other facility with appropriate resources  Recent Flowsheet Documentation  Taken 7/17/2024 2100 by Pat Acosta, RN  Discharge to home or other facility with appropriate resources: Identify barriers to discharge with patient and caregiver

## 2024-07-18 NOTE — PROGRESS NOTES
[facility]  (A Division of [facility])    Follow-up Note      Date of Admission: 2024       Date of Note:  2024          Current Antimicrobials:    Prior Antimicrobials:  Zosyn  to present  Vancomycin  to present      Assessment:         Osteomyelitis of the right foot:   - wound culture with mixed marimar including gram-negative rods   -Status post I&D right foot abscess and second metatarsal debridement on .  Diabetes mellitus type 2  Peripheral artery disease: Status post angiography   End-stage renal disease on dialysis    Plan:   Continue current wound care     vancomycin plus cefepime upon discharge (to be given with HD).   - Stop date 24   Trend CBC, BMP, ESR, CRP at least twice weekly  Tight glucose control for optimal wound healing.    Discussed with Dr. Dora Carpio for discharge from ID standpoint    John Trivedi DO  [facility]  [address], [address] [place] [address]  Office: [phone], Ext [phone]       Microbiology:   right foot tissue culture: Diphtheroids   wound culture: Heavy mixed skin marimar with gram-negative rods   blood cultures no growth to date x 2    Lines / Catheters:  Peripheral    Subjective:   Getting ready to leave with transport to SNF.   Glad to finally leave. No current concerns.     Tmax 98.8  WBC none today    Objective:      BP (!) 168/77   Pulse 63   Temp 98 °F (36.7 °C)   Resp 18   Ht 1.624 m (5' 3.94\")   Wt 97.4 kg (214 lb 11.7 oz)   SpO2 100%   BMI 36.93 kg/m²   Temp (24hrs), Av.1 °F (36.7 °C), Min:97.4 °F (36.3 °C), Max:98.8 °F (37.1 °C)        General:   awake alert and oriented, non-toxic   Skin:    dry and warm, dressings in place over right foot    HEENT:  No scleral icterus or pallor; oral mucosa moist, lips moist   Lymph Nodes:   not assessed today   Lungs:   Non-labored; bilaterally clear to aspiration- no crackles wheezes rales

## 2024-07-18 NOTE — PROGRESS NOTES
Pt d/c in stabled condition.  Pt given copy of discharge after summary papers. Pt instructed prescriptions in packet.  IV removed. Arm bands removed.  Pt left on scratcher with transportation.  Destination [facility].

## 2024-07-18 NOTE — CARE COORDINATION
Call made to [facility] [phone], they can transport patient on Saturday.  Call made to [facility] [phone], spoke with Amee, transport time changed to 1:00 pm.

## 2024-07-18 NOTE — CARE COORDINATION
Discussed with pt's nurse Sheri.  Pt has to go to dialysis first thing this am so we can arrange transport.        Sonia Arevalo, WENDYN RN  Care Management

## 2024-07-18 NOTE — DISCHARGE SUMMARY
Discharge Summary    Patient: Jigna Conner MRN: [id number]  CSN: [id number]    YOB: 1955  Age: 69 y.o.  Sex: female    DOA: 6/25/2024 LOS:  LOS: 23 days   Discharge Date:      Admission Diagnosis: Hypokalemia [E87.6]  Hyperglycemia [R73.9]  ESRD (end stage renal disease) (HCC) [N18.6]  Diabetic foot infection (HCC) [E11.628, L08.9]  Anemia, unspecified type [D64.9]    Discharge Diagnosis:    Diabetic foot wound with osteomyelitis of the right foot  ESRD on hemodialysis  PAD  Type 2 diabetes  Chronic HFpEF  HTN    Discharge Condition: Stable    PHYSICAL EXAM  Visit Vitals  BP (!) 141/59   Pulse 56   Temp 97.7 °F (36.5 °C)   Resp 18   Ht 1.624 m (5' 3.94\")   Wt 97.4 kg (214 lb 11.7 oz)   SpO2 100%   BMI 36.93 kg/m²       General:  Alert, cooperative, no acute distress    HEENT: PERRLA, anicteric sclerae. Left EJ in place  Pulmonary:  CTA Bilaterally. No Wheezing/Rales.  Cardiovascular: Regular rate and Rhythm.  GI:  Soft, Non distended, Non tender. + Bowel sounds.  Extremities:  No edema. No calf tenderness.  Left arm AV fistula, right foot bandaged at this time.  Mild to +1 pitting edema to right mid thigh  Psych: Good insight. Not anxious or agitated.  Neurologic: Alert and oriented X 3. Moves all ext.  Additional:    Hospital Course: Per the H&P:Jigna Conner is a 68 y.o. female with a PMHx of IDDM, CAD, ESRD on dialysis, HFpEF who presented to the ED with c/o right foot pain associated with wound/drainage ongoing for 5-6 weeks. She reports two wounds, one on the bottom of her foot and one at the site of her prior 2nd toe amputation. Pain is throbbing and severe, but improved to 3/10 after morphine. She states she's had three rounds of abx. She was instructed to go to the ED by per podiatrist today for admission for I&D tomorrow. She denies fever, chills, abd pain, nvd, sob, cp, cough.   In the ED, VSS. Labs with K 3.0, , hgb 6.2, MCV 76.8.  Podiatry consulted.     Patient  admitted and placed on vancomycin and Zosyn, an MRI of the right foot was ordered.  Patient received 1 unit PRBCs because of her microcytic anemia and anemia of renal disease nephrology evaluated the patient and ordered dialysis.  Podiatry evaluated the patient and reviewed the x-ray of the right foot which had gangrenous/gas producing infection seen throughout the distal foot, they recommended urgent incision and drainage and decompression of soft tissue emphysema.  Patient received this debridement on 6/26/2024 and also received a partial resection of the second metatarsal.  Wound care was consulted for help with the patient's wound.  Vascular was consulted for patient's peripheral arterial disease.  Patient had an angiography of the lower right extremity on 7/1/2024.  Infectious disease was consulted who recommended possible wound VAC and vancomycin plus cefepime.  Patient transition to vancomycin and cefepime until an end date of 8/7/2024 which was to be arranged via dialysis.  These orders were sent and confirmed by the nephrologist.  Patient returned to the OR for irrigation and debridement of infected tissue on 7/3/2024.  Patient also received Neupogen and Renvela per nephrology.  Patient returned to the OR on 7/15/2024 for right foot third toe gangrene with osteomyelitis and amputation of the second ray stump.  Patient had her wound VAC removed due to clean tissue margins.  Patient discharged on the below medicines with follow-up in place.    Consults: Nephrology, infectious disease, podiatry    Significant Diagnostic Studies:   XR right foot 07/13/24  Findings concerning for osteomyelitic changes at the head of the third  metatarsal and proximal phalanx of the third digit. The structures are however  partially obscured on some views by antibiotic beads.  Intervening amputation through the distal second metatarsal.    Discharge Medications:     Current Discharge Medication List        START taking these

## 2024-07-18 NOTE — PROGRESS NOTES
[facility]   Pharmacy Pharmacokinetic Monitoring Service - Vancomycin    Indication: DM foot infection  Target Pre-Dialysis Concentration: 21-24 mg/L  Day of Therapy: 24  Continue through 8/7/24  Additional Antimicrobials: pip-tazo    Pertinent Laboratory Values:   Wt Readings from Last 1 Encounters:   07/16/24 97.4 kg (214 lb 11.7 oz)     Temp Readings from Last 1 Encounters:   07/18/24 98 °F (36.7 °C) (Oral)     Recent Labs     07/16/24  0243 07/18/24  0555   WBC 11.3 7.2     Pertinent Cultures:  Date Source Results   6/25 blood NGF   6/26 Wound (R foot abscess) GPC, GPR   6/26 tissue GNR; Diphtheroids    MRSA Nasal Swab: NA    Assessment:  Date Current Dose Concentration (mg/L) Dialysis Session   6/25 2,000 mg x1 - no   6/26 - - no   6/27 - 27.6 yes   6/28 - - no   6/29 Not administered 15.4 yes   6/30 1,000 mg x1 - no   7/1 - - no   7/2 750 mg x1 19.1 yes   7/3 - - no   7/4 750 mg x1 21.3 yes   7/5 - - no   7/6 750 mg x 1 20.4 yes   7/7 - - no   7/8 - - no   7/9 1,000 mg x1 16.6 yes   7/10 - - no   7/11 1,250 mg x1 17.5 yes   7/12 - - no   7/13 - 24.9 yes   7/14 500mg x1 - no   7/15 -  no   7/16 - 23.7 yes   7/17 - - no     Plan:  ESRD on HD q TTS - dose by level  Vancomycin 1000 mg x 1 at end of dialysis   Vancomycin level in a.m. 7/20  Pharmacy will continue to monitor patient and adjust therapy as indicated    Thank you for the consult,  JUAN COLON, [facility]  7/18/2024

## 2024-07-20 LAB
BACTERIA SPEC CULT: ABNORMAL
GRAM STN SPEC: ABNORMAL
GRAM STN SPEC: ABNORMAL
SERVICE CMNT-IMP: ABNORMAL

## 2024-09-13 NOTE — CARE COORDINATION
Pt still on the floor.  Called Dialysis and spoke with Angelica.  She stated they are trying to get pt in at 9am as discussed with pt's nurse yesterday.        Sonia Arevalo, WENDYN RN  Care Management     ineffective breastfeeding

## 2024-10-02 ENCOUNTER — HOSPITAL ENCOUNTER (OUTPATIENT)
Facility: HOSPITAL | Age: 69
Discharge: HOME | End: 2024-10-05
Payer: MEDICARE

## 2024-10-02 DIAGNOSIS — Z12.31 VISIT FOR SCREENING MAMMOGRAM: ICD-10-CM

## 2024-10-02 PROCEDURE — 77063 BREAST TOMOSYNTHESIS BI: CPT

## 2025-03-11 NOTE — PROGRESS NOTES
in d/c instructions given. Head, normocephalic, atraumatic, Face, Face within normal limits, Ears, External ears within normal limits

## 2025-04-08 ENCOUNTER — TRANSCRIBE ORDERS (OUTPATIENT)
Facility: HOSPITAL | Age: 70
End: 2025-04-08

## 2025-04-08 DIAGNOSIS — S91.109S OPEN TOE WOUND, SEQUELA: Primary | ICD-10-CM

## 2025-04-15 ENCOUNTER — HOSPITAL ENCOUNTER (OUTPATIENT)
Facility: HOSPITAL | Age: 70
Discharge: HOME | End: 2025-04-18
Attending: PODIATRIST
Payer: MEDICARE

## 2025-04-15 DIAGNOSIS — S91.109S OPEN TOE WOUND, SEQUELA: ICD-10-CM

## 2025-04-15 PROCEDURE — 73718 MRI LOWER EXTREMITY W/O DYE: CPT

## 2025-08-19 ENCOUNTER — TRANSCRIBE ORDERS (OUTPATIENT)
Facility: HOSPITAL | Age: 70
End: 2025-08-19

## 2025-08-19 DIAGNOSIS — Z12.31 OTHER SCREENING MAMMOGRAM: Primary | ICD-10-CM

## (undated) DEVICE — CURITY NON-ADHERENT STRIPS: Brand: CURITY

## (undated) DEVICE — APPLICATOR MEDICATED 26 CC SOLUTION HI LT ORNG CHLORAPREP

## (undated) DEVICE — RADIFOCUS TORQUE DEVICE MULTI-TORQUE VISE: Brand: RADIFOCUS TORQUE DEVICE

## (undated) DEVICE — CANISTER NEG PRSS 500ML WND THER W/ TBNG NO PRSS RANG W/

## (undated) DEVICE — GUIDEWIRE VASC L180CM DIA0.035IN L15CM STR TIP PTFE S STL

## (undated) DEVICE — CASTING PLSTR DYNACAST 4X15FT

## (undated) DEVICE — ANGIOGRAPHY KIT CUST VASC

## (undated) DEVICE — NEEDLE 25GA X 1.5 IN ECLIPSE

## (undated) DEVICE — TRAY PREP DRY W/ PREM GLV 2 APPL 6 SPNG 2 UNDPD 1 OVERWRAP

## (undated) DEVICE — KIT OR TURNOVER

## (undated) DEVICE — THREE-QUARTER SHEET: Brand: CONVERTORS

## (undated) DEVICE — SOLUTION IRRIG 1000ML STRL H2O USP PLAS POUR BTL

## (undated) DEVICE — SYRINGE KIT,PACKAGED,,150FT,MK 7(ANGIO-ARTERION, 150ML SYR KIT W/QFT,MC)(60729385): Brand: MEDRAD® MARK 7 ARTERION DISPOSABLE SYRINGE 150 ML WITH QUICK FILL TUBE

## (undated) DEVICE — STERILE POLYISOPRENE POWDER-FREE SURGICAL GLOVES: Brand: PROTEXIS

## (undated) DEVICE — SPONGE GZ W4XL4IN COT 12 PLY TYP VII WVN C FLD DSGN STERILE

## (undated) DEVICE — JELLY,LUBE,STERILE,FLIP TOP,TUBE,4-OZ: Brand: MEDLINE

## (undated) DEVICE — 450 ML BOTTLE OF 0.05% CHLORHEXIDINE GLUCONATE IN 99.95% STERILE WATER FOR IRRIGATION, USP AND APPLICATOR.: Brand: IRRISEPT ANTIMICROBIAL WOUND LAVAGE

## (undated) DEVICE — STOCKINETTE,IMPERVIOUS,12X48,STERILE: Brand: MEDLINE

## (undated) DEVICE — VASCULAR CATH-PACK

## (undated) DEVICE — ZIMMER® STERILE DISPOSABLE TOURNIQUET CUFF WITH PROTECTIVE SLEEVE AND PLC, DUAL PORT, SINGLE BLADDER, 18 IN. (46 CM)

## (undated) DEVICE — CATHETER ANGIO JUDKINS CRV 3 035 AD 4 FRX65 CM TEMPO AQUA

## (undated) DEVICE — PAD,PREPPING,CUFFED,24X48,7",NONSTERILE: Brand: MEDLINE

## (undated) DEVICE — CATHETER ANGIO 4FR L65CM 0.035IN VIS OMNI FLUSH-0 SFT TIP

## (undated) DEVICE — RADIFOCUS GLIDEWIRE: Brand: GLIDEWIRE

## (undated) DEVICE — TUBING, SUCTION, 9/32" X 10', STRAIGHT: Brand: MEDLINE

## (undated) DEVICE — DRESSING STERILE PETRO W3XL8IN N ADH OIL EMUL GZ CURAD

## (undated) DEVICE — INTENDED FOR TISSUE SEPARATION, AND OTHER PROCEDURES THAT REQUIRE A SHARP SURGICAL BLADE TO PUNCTURE OR CUT.: Brand: BARD-PARKER SAFETY BLADES SIZE 10, STERILE

## (undated) DEVICE — NEEDLE BX 8 GAX101 MM BNE MAR TAPR DSTL TIP RAZOR SHRP JAMSH

## (undated) DEVICE — BLADE SAW W9XL25MM THK0.38MM CUT THK0.43MM REPL SAG OSC THN

## (undated) DEVICE — PACK PROCEDURE SURG MAJ W/ BASIN LF

## (undated) DEVICE — SOLUTION IRRIG 1000ML 0.9% SOD CHL USP POUR PLAS BTL

## (undated) DEVICE — SET FLD ADMIN 3 W STPCOCK FIX FEM L BOR 1IN

## (undated) DEVICE — PADDING CAST W4INXL4YD NONSTERILE COT COHESIVE HND TEARABLE

## (undated) DEVICE — RADIFOCUS GLIDEWIRE ADVANTAGE GUIDEWIRE: Brand: GLIDEWIRE ADVANTAGE

## (undated) DEVICE — BANDAGE,GAUZE,BULKEE II,4.5"X4.1YD,STRL: Brand: MEDLINE

## (undated) DEVICE — GLOVE ORANGE PI 7 1/2   MSG9075

## (undated) DEVICE — COVER US PRB W15XL120CM W/ GEL RUBBERBAND TAPE STRP FLD GEN

## (undated) DEVICE — 1200CC GUARDIAN II: Brand: GUARDIAN

## (undated) DEVICE — FORCEPS BX L240CM JAW DIA2.4MM ORNG L CAP W/ NDL DISP RAD

## (undated) DEVICE — DRAPE,EXTREMITY,89X128,STERILE: Brand: MEDLINE

## (undated) DEVICE — SOLUTION SCRB 4OZ 10% PVP I POVIDONE IOD TOP PAINT EXIDINE

## (undated) DEVICE — GAUZE,SPONGE,4"X4",16PLY,STRL,LF,10/TRAY: Brand: MEDLINE

## (undated) DEVICE — BLADE ES L2.75IN ELASTOMERIC COAT DURABLE BEND UPTO 90DEG

## (undated) DEVICE — INTENDED FOR TISSUE SEPARATION, AND OTHER PROCEDURES THAT REQUIRE A SHARP SURGICAL BLADE TO PUNCTURE OR CUT.: Brand: BARD-PARKER SAFETY BLADES SIZE 15, STERILE

## (undated) DEVICE — SHEATH 4FR 11CM BRITETIP

## (undated) DEVICE — 4FR MICROPUNCTURE KIT

## (undated) DEVICE — DRESSING PETRO W3XL3IN OIL EMUL N ADH GZ KNIT IMPREG CELOS

## (undated) DEVICE — ELECTRODE PT RET AD L9FT HI MOIST COND ADH HYDRGEL CORDED

## (undated) DEVICE — LINE SAMPLING ADVANCE ORAL NASAL MICROSTREAM O2 TUBING 6.5'

## (undated) DEVICE — BANDAGE COMPR W4INXL5YD WHT BGE POLY COT M E WRP WV HK AND

## (undated) DEVICE — DRESSING NEG PRSS L W15XH3.3XL26CM BLK POLYUR DRP PD

## (undated) DEVICE — DRAPE,UNDERBUTTOCKS,PCH,STERILE: Brand: MEDLINE

## (undated) DEVICE — BANDAGE COBAN 4 IN COMPR W4INXL5YD FOAM COHESIVE QUIK STK SELF ADH SFT